# Patient Record
Sex: FEMALE | Race: WHITE | NOT HISPANIC OR LATINO | Employment: OTHER | URBAN - NONMETROPOLITAN AREA
[De-identification: names, ages, dates, MRNs, and addresses within clinical notes are randomized per-mention and may not be internally consistent; named-entity substitution may affect disease eponyms.]

---

## 2017-01-19 ENCOUNTER — OFFICE VISIT (OUTPATIENT)
Dept: FAMILY MEDICINE CLINIC | Facility: CLINIC | Age: 62
End: 2017-01-19

## 2017-01-19 VITALS
DIASTOLIC BLOOD PRESSURE: 90 MMHG | BODY MASS INDEX: 24.41 KG/M2 | HEART RATE: 107 BPM | WEIGHT: 143 LBS | SYSTOLIC BLOOD PRESSURE: 140 MMHG | HEIGHT: 64 IN | OXYGEN SATURATION: 98 %

## 2017-01-19 DIAGNOSIS — M54.41 CHRONIC BILATERAL LOW BACK PAIN WITH BILATERAL SCIATICA: Primary | ICD-10-CM

## 2017-01-19 DIAGNOSIS — M35.3 POLYMYALGIA RHEUMATICA (HCC): ICD-10-CM

## 2017-01-19 DIAGNOSIS — M54.42 CHRONIC BILATERAL LOW BACK PAIN WITH BILATERAL SCIATICA: Primary | ICD-10-CM

## 2017-01-19 DIAGNOSIS — G89.29 CHRONIC BILATERAL LOW BACK PAIN WITH BILATERAL SCIATICA: Primary | ICD-10-CM

## 2017-01-19 PROCEDURE — 99213 OFFICE O/P EST LOW 20 MIN: CPT | Performed by: FAMILY MEDICINE

## 2017-01-19 RX ORDER — HYDROCODONE BITARTRATE AND ACETAMINOPHEN 10; 325 MG/1; MG/1
1 TABLET ORAL EVERY 6 HOURS PRN
Qty: 120 TABLET | Refills: 0 | Status: SHIPPED | OUTPATIENT
Start: 2017-01-19 | End: 2017-02-17 | Stop reason: SDUPTHER

## 2017-01-19 RX ORDER — FLUCONAZOLE 150 MG/1
150 TABLET ORAL DAILY
Refills: 1 | COMMUNITY
Start: 2016-12-22 | End: 2017-02-24

## 2017-01-19 NOTE — MR AVS SNAPSHOT
Adriana Nuñez   1/19/2017 9:45 AM   Office Visit    Dept Phone:  [phone]   Encounter #:  [id number]    Provider:  Donato Donis MD   Department:  [facility] FAM[facility]                Your Full Care Plan              Where to Get Your Medications      You can get these medications from any pharmacy     Bring a paper prescription for each of these medications     HYDROcodone-acetaminophen  MG per tablet            Your Updated Medication List          This list is accurate as of: 1/19/17 10:35 AM.  Always use your most recent med list.                ATROVENT HFA 17 MCG/ACT inhaler   Generic drug:  ipratropium   Inhale 2 puffs 4 (Four) Times a Day.       diazePAM 5 MG tablet   Commonly known as:  VALIUM   Take 1 tablet by mouth 2 (Two) Times a Day As Needed for anxiety.       dicyclomine 20 MG tablet   Commonly known as:  BENTYL   TAKE 1 TABLET BY MOUTH TWO TIMES A DAY       fluconazole 150 MG tablet   Commonly known as:  DIFLUCAN       HYDROcodone-acetaminophen  MG per tablet   Commonly known as:  NORCO   Take 1 tablet by mouth Every 6 (Six) Hours As Needed for moderate pain (4-6).       predniSONE 10 MG tablet   Commonly known as:  DELTASONE       raloxifene 60 MG tablet   Commonly known as:  EVISTA       VENTOLIN  (90 BASE) MCG/ACT inhaler   Generic drug:  albuterol   USE 1 PUFF BY MOUTH EVERY 8 HOURS AS NEEDED               We Performed the Following     Ambulatory Referral to Rheumatology       You Were Diagnosed With        Codes Comments    Chronic bilateral low back pain with bilateral sciatica    -  Primary ICD-10-CM: M54.42, M54.41, G89.29  ICD-9-CM: 724.2, 724.3, 338.29     Polymyalgia rheumatica     ICD-10-CM: M35.3  ICD-9-CM: 725       Instructions     None    Patient Instructions History      Upcoming Appointments     Visit Type Date Time Department    OFFICE VISIT 1/19/2017  9:45 AM [facility]    OFFICE VISIT  "2/9/2017  9:00 AM [facility]    OFFICE VISIT 2/17/2017 11:00 AM [facility]      MyChart Signup     Our records indicate that you have declined [facility] sign[facility]. If you would like to sign up for MyChart, please email [email or web address] or call [phone] to obtain an activation code.             Other Info from Your Visit           Your Appointments     Feb 09, 2017  9:00 AM CST   Office Visit with Aric Pina MD   [facility] (--)    [address] [place] [address] [address]  [address] [facility] [address] [phone]           Arrive 15 minutes prior to appointment.            Feb 17, 2017 11:00 AM CST   Office Visit with Donato Donis MD   [facility] (--)    [address]  [address] [facility] [address] [phone]           Arrive 15 minutes prior to appointment.              Allergies     Fosamax [Alendronate]  GI Intolerance      Reason for Visit     Back Pain           Vital Signs     Blood Pressure Pulse Height Weight Oxygen Saturation Body Mass Index    140/90 107 64\" (162.6 cm) 143 lb (64.9 kg) 98% 24.55 kg/m2    Smoking Status                   Current Every Day Smoker           Problems and Diagnoses Noted     Chronic low back pain    Muscle inflammation        "

## 2017-01-19 NOTE — PROGRESS NOTES
Subjective   Adriana Nuñez is a 61 y.o. female.     Back Pain   This is a chronic problem. The current episode started more than 1 year ago. The problem has been waxing and waning since onset. The pain is present in the lumbar spine. The quality of the pain is described as aching. The pain radiates to the right knee and left knee. The pain is at a severity of 4/10. The pain is mild. The pain is worse during the day. The symptoms are aggravated by position and bending. Pertinent negatives include no bladder incontinence, bowel incontinence or fever.        The following portions of the patient's history were reviewed and updated as appropriate: allergies, current medications, past family history, past medical history, past social history, past surgical history and problem list.    Review of Systems   Constitutional: Negative for fever.   Gastrointestinal: Negative for bowel incontinence.   Genitourinary: Negative for bladder incontinence.   Musculoskeletal: Positive for back pain.       Objective   Physical Exam   Constitutional: She is oriented to person, place, and time. She appears well-developed and well-nourished. No distress.   HENT:   Head: Normocephalic and atraumatic.   Musculoskeletal:        Lumbar back: She exhibits decreased range of motion, tenderness and pain. She exhibits no bony tenderness, no swelling, no edema, no deformity, no laceration and no spasm.   Neurological: She is alert and oriented to person, place, and time.   Reflex Scores:       Patellar reflexes are 2+ on the right side and 2+ on the left side.  Skin: Skin is warm and dry. She is not diaphoretic.   Psychiatric: She has a normal mood and affect. Her behavior is normal. Judgment and thought content normal.   Nursing note and vitals reviewed.      Assessment/Plan   Problems Addressed this Visit        Nervous and Auditory    Chronic low back pain - Primary    Relevant Orders    Urine Drug Screen       Other    Polymyalgia  rheumatica

## 2017-01-30 ENCOUNTER — TELEPHONE (OUTPATIENT)
Dept: FAMILY MEDICINE CLINIC | Facility: CLINIC | Age: 62
End: 2017-01-30

## 2017-01-31 NOTE — TELEPHONE ENCOUNTER
"RESULTS & RECOMMENDATIONS RELAYED pr Dr. Donis.  Patient was advised her Cholesterol was elevated and had gone up from 241 to 303 in 3 mos.  She blames it on her Chocolate Milk and \"being full of infection\",   She does not want to add any medications at this time.  States she will change her diet and see if it goes down.  No Scripts were sent today.       ----- Message from Donato Donis MD sent at 1/19/2017  3:26 PM CST -----  Her urine drug screen is okay.  Somehow someone must have ordered a cholesterol on her.  It is elevated . she does carry a diagnosis of hyperlipidemia.  Recommend starting Lipitor 10 mg a day.  Recheck CMP and lipid in 3 months.    "

## 2017-02-09 ENCOUNTER — OFFICE VISIT (OUTPATIENT)
Dept: GASTROENTEROLOGY | Facility: CLINIC | Age: 62
End: 2017-02-09

## 2017-02-09 ENCOUNTER — HOSPITAL ENCOUNTER (OUTPATIENT)
Facility: HOSPITAL | Age: 62
Setting detail: HOSPITAL OUTPATIENT SURGERY
End: 2017-02-09
Attending: INTERNAL MEDICINE | Admitting: INTERNAL MEDICINE

## 2017-02-09 VITALS
WEIGHT: 145.6 LBS | DIASTOLIC BLOOD PRESSURE: 89 MMHG | HEART RATE: 105 BPM | BODY MASS INDEX: 24.86 KG/M2 | SYSTOLIC BLOOD PRESSURE: 144 MMHG | HEIGHT: 64 IN

## 2017-02-09 DIAGNOSIS — K63.5 COLON POLYPS: Primary | ICD-10-CM

## 2017-02-09 DIAGNOSIS — K59.00 CONSTIPATION, UNSPECIFIED CONSTIPATION TYPE: ICD-10-CM

## 2017-02-09 PROCEDURE — 99213 OFFICE O/P EST LOW 20 MIN: CPT | Performed by: INTERNAL MEDICINE

## 2017-02-09 RX ORDER — SODIUM, POTASSIUM,MAG SULFATES 17.5-3.13G
1 SOLUTION, RECONSTITUTED, ORAL ORAL EVERY 12 HOURS
Qty: 1 BOTTLE | Refills: 0 | Status: SHIPPED | OUTPATIENT
Start: 2017-02-09 | End: 2017-02-24

## 2017-02-09 RX ORDER — DEXTROSE AND SODIUM CHLORIDE 5; .45 G/100ML; G/100ML
30 INJECTION, SOLUTION INTRAVENOUS CONTINUOUS PRN
Status: CANCELLED | OUTPATIENT
Start: 2017-02-09

## 2017-02-09 NOTE — PROGRESS NOTES
[facility]      Chief Complaint:   Chief Complaint   Patient presents with   • Abdominal Pain   • Nausea       Subjective     HPI:   Patient with history of colonic polyps.  Patient had colonic polyp removed last year and a poor prep.  Patient has been fed constipation and abdominal pain.  Patient will need a repeat colonoscopy as she most likely has more polyps.    Plan; we'll schedule patient for colonoscopy to evaluate.    Past Medical History:   Past Medical History   Diagnosis Date   • Adenomatous polyp of colon    • Anorectal abscess    • Anxiety disorder    • Benign polyp of large intestine    • Bladder fistula       post rapair      • Blood in urine    • Chronic obstructive lung disease    • Chronic urinary tract infection    • Depressive disorder    • Diarrhea    • Diverticulitis of colon       post colectomy      • Elevated levels of transaminase & lactic acid dehydrogenase    • Epigastric pain    • Generalized anxiety disorder    • Heavy tobacco smoker    • History of colon polyps    • Hyperlipidemia    • Insomnia    • Low back pain    • Lower urinary tract infectious disease    • Lymphadenopathy      ON CT   • Osteoporosis    • Peripheral nerve disease    • Polymyalgia rheumatica    • Polyp of colon    • Urinary tract infection    • Vitamin D deficiency        Family History:  History reviewed. No pertinent family history.    Social History:   reports that she has been smoking Cigars and Cigarettes.  She has been smoking about 1.00 pack per day. She does not have any smokeless tobacco history on file. She reports that she does not drink alcohol.    Medications:   Current Outpatient Prescriptions   Medication Sig Dispense Refill   • ATROVENT HFA 17 MCG/ACT inhaler Inhale 2 puffs 4 (Four) Times a Day. 12.9 g 11   • diazePAM (VALIUM) 5 MG tablet Take 1 tablet by mouth 2 (Two) Times a Day As Needed for anxiety. 60 tablet 2   • dicyclomine (BENTYL) 20 MG tablet TAKE 1 TABLET BY MOUTH  "TWO TIMES A DAY 60 tablet 6   • fluconazole (DIFLUCAN) 150 MG tablet Take 150 mg by mouth Daily.  1   • HYDROcodone-acetaminophen (NORCO)  MG per tablet Take 1 tablet by mouth Every 6 (Six) Hours As Needed for moderate pain (4-6). 120 tablet 0   • predniSONE (DELTASONE) 10 MG tablet Take 10 mg by mouth Daily.     • raloxifene (EVISTA) 60 MG tablet Take 60 mg by mouth daily.     • sodium-potassium-magnesium sulfates (SUPREP) solution oral solution Take 1 bottle by mouth Every 12 (Twelve) Hours. 1 bottle 0   • VENTOLIN  (90 BASE) MCG/ACT inhaler USE 1 PUFF BY MOUTH EVERY 8 HOURS AS NEEDED 18 g 11     No current facility-administered medications for this visit.        Allergies:  Fosamax [alendronate]    ROS:    Review of Systems   HENT: Negative for congestion, sore throat and trouble swallowing.    Respiratory: Negative for apnea, cough, choking, chest tightness, shortness of breath, wheezing and stridor.    Cardiovascular: Negative for chest pain, palpitations and leg swelling.   Gastrointestinal: Negative for abdominal distention, abdominal pain, anal bleeding, blood in stool, constipation, diarrhea, nausea, rectal pain and vomiting.   Skin: Negative for color change, pallor, rash and wound.   Neurological: Negative for dizziness, syncope, weakness and headaches.   Psychiatric/Behavioral: Negative for agitation, behavioral problems, confusion and decreased concentration.     Objective     Blood pressure 144/89, pulse 105, height 64\" (162.6 cm), weight 145 lb 9.6 oz (66 kg).    Physical Exam   Constitutional: She is oriented to person, place, and time. She appears well-developed and well-nourished. No distress.   HENT:   Head: Normocephalic and atraumatic.   Cardiovascular: Normal rate, regular rhythm, normal heart sounds and intact distal pulses.  Exam reveals no gallop and no friction rub.    No murmur heard.  Pulmonary/Chest: Breath sounds normal. No respiratory distress. She has no wheezes. She has " no rales. She exhibits no tenderness.   Abdominal: Soft. Bowel sounds are normal. She exhibits no distension and no mass. There is no tenderness. There is no rebound and no guarding. No hernia.   Musculoskeletal: Normal range of motion. She exhibits no edema.   Neurological: She is alert and oriented to person, place, and time.   Skin: Skin is warm and dry. No rash noted. She is not diaphoretic. No erythema. No pallor.   Psychiatric: She has a normal mood and affect. Her behavior is normal. Judgment and thought content normal.        Assessment/Plan   Adriana was seen today for abdominal pain and nausea.    Diagnoses and all orders for this visit:    Colon polyps  -     Case Request; Standing  -     dextrose 5 % and sodium chloride 0.45 % infusion; Infuse 30 mL/hr into a venous catheter Continuous As Needed (Start Prior to Procedure).  -     Case Request  -     sodium-potassium-magnesium sulfates (SUPREP) solution oral solution; Take 1 bottle by mouth Every 12 (Twelve) Hours.    Constipation, unspecified constipation type    Other orders  -     Implement Anesthesia Orders Day of Procedure; Standing  -     Obtain Informed Consent; Standing  -     Verify Informed Consent; Standing  -     POC Glucose Fingerstick; Standing        COLONOSCOPY (N/A)     Diagnosis Plan   1. Colon polyps  Case Request    dextrose 5 % and sodium chloride 0.45 % infusion    Case Request    sodium-potassium-magnesium sulfates (SUPREP) solution oral solution   2. Constipation, unspecified constipation type         Anticipated Surgical Procedure:  No orders of the defined types were placed in this encounter.      The risks, benefits, and alternatives of this procedure have been discussed with the patient or the responsible party- the patient understands and agrees to proceed.

## 2017-02-15 ENCOUNTER — TELEPHONE (OUTPATIENT)
Dept: GASTROENTEROLOGY | Facility: CLINIC | Age: 62
End: 2017-02-15

## 2017-02-16 ENCOUNTER — TELEPHONE (OUTPATIENT)
Dept: GASTROENTEROLOGY | Facility: CLINIC | Age: 62
End: 2017-02-16

## 2017-02-16 NOTE — TELEPHONE ENCOUNTER
Patient calling office saying that she is very sick. Patient states that her head is hurting her and that her neck hurts. Patient states that she is looking for antibiotics. Patient states that she is swollen and that her mouth is blood red. Patient states that dr nam pulled something out of her that stopped her diarrhea. She is wanting to let dr nam know that she is not feeling well. Patient is informed to go to the hospital if she feels that sick. Patient refuses because she states she only gets antibiotics. Patient is encouraged again to seek emergency medical attention she refuses.

## 2017-02-17 ENCOUNTER — OFFICE VISIT (OUTPATIENT)
Dept: FAMILY MEDICINE CLINIC | Facility: CLINIC | Age: 62
End: 2017-02-17

## 2017-02-17 VITALS
WEIGHT: 147 LBS | OXYGEN SATURATION: 98 % | DIASTOLIC BLOOD PRESSURE: 90 MMHG | SYSTOLIC BLOOD PRESSURE: 132 MMHG | BODY MASS INDEX: 25.23 KG/M2 | HEART RATE: 109 BPM

## 2017-02-17 DIAGNOSIS — M54.42 CHRONIC BILATERAL LOW BACK PAIN WITH BILATERAL SCIATICA: Primary | ICD-10-CM

## 2017-02-17 DIAGNOSIS — G89.29 CHRONIC BILATERAL LOW BACK PAIN WITH BILATERAL SCIATICA: Primary | ICD-10-CM

## 2017-02-17 DIAGNOSIS — M54.41 CHRONIC BILATERAL LOW BACK PAIN WITH BILATERAL SCIATICA: Primary | ICD-10-CM

## 2017-02-17 PROCEDURE — 99213 OFFICE O/P EST LOW 20 MIN: CPT | Performed by: FAMILY MEDICINE

## 2017-02-17 RX ORDER — HYDROCODONE BITARTRATE AND ACETAMINOPHEN 10; 325 MG/1; MG/1
1 TABLET ORAL EVERY 6 HOURS PRN
Qty: 120 TABLET | Refills: 0 | Status: SHIPPED | OUTPATIENT
Start: 2017-02-17 | End: 2017-03-17 | Stop reason: SDUPTHER

## 2017-02-17 RX ORDER — DIAZEPAM 5 MG/1
5 TABLET ORAL 2 TIMES DAILY PRN
Qty: 60 TABLET | Refills: 2 | OUTPATIENT
Start: 2017-02-17 | End: 2017-05-16 | Stop reason: SDUPTHER

## 2017-02-17 RX ORDER — DIAZEPAM 5 MG/1
5 TABLET ORAL 2 TIMES DAILY PRN
Qty: 60 TABLET | Refills: 2 | OUTPATIENT
Start: 2017-02-17 | End: 2017-02-17 | Stop reason: SDUPTHER

## 2017-02-17 NOTE — PROGRESS NOTES
Subjective   Adriana Nuñez is a 61 y.o. female.     Back Pain   This is a chronic problem. The current episode started more than 1 year ago. The problem has been waxing and waning since onset. The pain is present in the lumbar spine. The quality of the pain is described as aching. The pain radiates to the right knee and left knee. The pain is at a severity of 4/10. The pain is mild. The pain is worse during the day. The symptoms are aggravated by position and bending. Pertinent negatives include no bladder incontinence, bowel incontinence or fever.        The following portions of the patient's history were reviewed and updated as appropriate: allergies, current medications, past family history, past medical history, past social history, past surgical history and problem list.    Review of Systems   Constitutional: Negative for fever.   Gastrointestinal: Negative for bowel incontinence.   Genitourinary: Negative for bladder incontinence.   Musculoskeletal: Positive for back pain.       Objective   Physical Exam   Constitutional: She is oriented to person, place, and time. She appears well-developed and well-nourished. No distress.   HENT:   Head: Normocephalic and atraumatic.   Musculoskeletal:        Lumbar back: She exhibits decreased range of motion, tenderness and pain. She exhibits no bony tenderness, no swelling, no edema, no deformity, no laceration and no spasm.   Neurological: She is alert and oriented to person, place, and time.   Reflex Scores:       Patellar reflexes are 2+ on the right side and 2+ on the left side.  Skin: Skin is warm and dry. She is not diaphoretic.   Psychiatric: She has a normal mood and affect. Her behavior is normal. Judgment and thought content normal.   Nursing note and vitals reviewed.      Assessment/Plan   Problems Addressed this Visit     None      Visit Diagnoses     Chronic bilateral low back pain with bilateral sciatica    -  Primary

## 2017-02-23 ENCOUNTER — TELEPHONE (OUTPATIENT)
Dept: GASTROENTEROLOGY | Facility: CLINIC | Age: 62
End: 2017-02-23

## 2017-02-23 NOTE — TELEPHONE ENCOUNTER
"PATIENT PHONE CALL IS RETURNED. PATIENT STATES THAT SHE FEELS LIKE \" HER GYNO POURED BOILING WATER IN HER VAGINA AND IT IS BLOOD RED\" SHE ALSO STATES THAT HER NECK HURTS AND ALSO HER BRAIN STEM. PATIENT ALSO STATES THAT SHE THINKS SHE IS ATE UP WITH STAPH. PATIENT IS RECOMMENDED TO SEEK CARE FOR HER VAGINA FROM HER OB/GYN. PATIENT [facility]. PATIENT IS ALSO ENCOURAGED TO TALK TO HER PRIMARY CARE DOCTOR ABOUT POSSIBLY GETTING A MUSCLE BIOPSY. PATIENT IS THEN ENCOURAGED TO MAKE APPOINTMENT UPSTAIRS WITH OB/GYN AND IS TRANSFERRED TO [facility]              ----- Message from Rama Lorenz sent at 2/23/2017  1:35 PM CST -----  Please call pt, she is having issues     "

## 2017-02-24 ENCOUNTER — OFFICE VISIT (OUTPATIENT)
Dept: OBSTETRICS AND GYNECOLOGY | Facility: CLINIC | Age: 62
End: 2017-02-24

## 2017-02-24 VITALS
BODY MASS INDEX: 27.19 KG/M2 | WEIGHT: 144 LBS | HEIGHT: 61 IN | SYSTOLIC BLOOD PRESSURE: 126 MMHG | DIASTOLIC BLOOD PRESSURE: 84 MMHG

## 2017-02-24 DIAGNOSIS — N89.8 VAGINAL IRRITATION: Primary | ICD-10-CM

## 2017-02-24 LAB
CANDIDA ALBICANS: NEGATIVE
GARDNERELLA VAGINALIS: POSITIVE
TRICHOMONAS VAGINALIS PCR: NEGATIVE

## 2017-02-24 PROCEDURE — 87481 CANDIDA DNA AMP PROBE: CPT | Performed by: OBSTETRICS & GYNECOLOGY

## 2017-02-24 PROCEDURE — 87512 GARDNER VAG DNA QUANT: CPT | Performed by: OBSTETRICS & GYNECOLOGY

## 2017-02-24 PROCEDURE — 87661 TRICHOMONAS VAGINALIS AMPLIF: CPT | Performed by: OBSTETRICS & GYNECOLOGY

## 2017-02-24 PROCEDURE — 99212 OFFICE O/P EST SF 10 MIN: CPT | Performed by: OBSTETRICS & GYNECOLOGY

## 2017-02-24 NOTE — PROGRESS NOTES
Subjective   Adriana Nuñez is a 61 y.o. female.     HPI Comments: Patient presents today to discuss vaginal redness and irritation. She reports a number of recent infections with says that she has taken antibiotics including bactrim.  Discussed potential for development of yeast infection in connection with antibiotic use and potential for discomfort associated with lack of estrogen.   Will plan to check vaginitis panel and treat if applicable.    Vaginitis   Associated symptoms include abdominal pain (Chronic abdominalpain and GI issues).         Review of Systems   Gastrointestinal: Positive for abdominal pain (Chronic abdominalpain and GI issues).   Genitourinary: Positive for vaginal pain. Negative for vaginal bleeding.   All other systems reviewed and are negative.      Objective   Physical Exam   Constitutional: She appears well-developed and well-nourished. She appears distressed (pressured speech).   HENT:   Head: Normocephalic and atraumatic.   Right Ear: External ear normal.   Left Ear: External ear normal.   Nose: Nose normal.   Mouth/Throat: Oropharynx is clear and moist.   Abdominal: Soft. She exhibits no distension and no mass. There is tenderness. There is no rebound and no guarding. No hernia.   Genitourinary:   Genitourinary Comments: Mild vaginal erythema, vagina lacks rugation. Cervix, uterus and adnexa are surgically absent.   Neurological: She is alert.   Skin: Skin is warm and dry. She is not diaphoretic.   Psychiatric:   Pressured speech, she is difficult to follow but her behavior is consistent with what I have seen from her previously.       Assessment/Plan   Adriana was seen today for vaginitis.    Diagnoses and all orders for this visit:    Vaginal irritation  -     Vaginitis / Vaginosis DNA Probe         Check vaginitis panel and call if yeast infection or other abnormalities present on swab.

## 2017-03-03 ENCOUNTER — TELEPHONE (OUTPATIENT)
Dept: OBSTETRICS AND GYNECOLOGY | Facility: CLINIC | Age: 62
End: 2017-03-03

## 2017-03-03 RX ORDER — METRONIDAZOLE 500 MG/1
500 TABLET ORAL 2 TIMES DAILY
Qty: 14 TABLET | Refills: 0 | Status: SHIPPED | OUTPATIENT
Start: 2017-03-03 | End: 2017-03-08

## 2017-03-03 NOTE — TELEPHONE ENCOUNTER
----- Message from Radha Brandt sent at 3/1/2017  1:22 PM CST -----  Regarding: HURTING  Contact: [phone]  Said had something done last week,and is a lot of pain and needs to talk to you...      Dr. Brian talked to this patient.

## 2017-03-08 ENCOUNTER — OFFICE VISIT (OUTPATIENT)
Dept: OBSTETRICS AND GYNECOLOGY | Facility: CLINIC | Age: 62
End: 2017-03-08

## 2017-03-08 VITALS
HEIGHT: 61 IN | SYSTOLIC BLOOD PRESSURE: 126 MMHG | BODY MASS INDEX: 27.38 KG/M2 | DIASTOLIC BLOOD PRESSURE: 72 MMHG | WEIGHT: 145 LBS

## 2017-03-08 DIAGNOSIS — N76.4 VULVAR ABSCESS: ICD-10-CM

## 2017-03-08 DIAGNOSIS — N76.1 CHRONIC VAGINITIS: Primary | ICD-10-CM

## 2017-03-08 LAB
CANDIDA ALBICANS: NEGATIVE
GARDNERELLA VAGINALIS: POSITIVE
TRICHOMONAS VAGINALIS PCR: NEGATIVE

## 2017-03-08 PROCEDURE — 87661 TRICHOMONAS VAGINALIS AMPLIF: CPT | Performed by: OBSTETRICS & GYNECOLOGY

## 2017-03-08 PROCEDURE — 87481 CANDIDA DNA AMP PROBE: CPT | Performed by: OBSTETRICS & GYNECOLOGY

## 2017-03-08 PROCEDURE — 99212 OFFICE O/P EST SF 10 MIN: CPT | Performed by: OBSTETRICS & GYNECOLOGY

## 2017-03-08 PROCEDURE — 87512 GARDNER VAG DNA QUANT: CPT | Performed by: OBSTETRICS & GYNECOLOGY

## 2017-03-08 NOTE — PROGRESS NOTES
Subjective   Adriana Nuñez is a 61 y.o. female.     HPI Comments: Patient reports a number of infections that she believes she has and a number of gastrointestinal concerns.  Specifically she reports that her groin is red and irritated. She brought a disc with imaging and a report from an outside facility which shows normal imaging of the groin. She asks if she can have a culture of the effected area collected.  After seeing a normal appearing groin with normal appearing imaging of the area I suggest that we may repeat a vaginitis panel as the area just medial to the labia minora does show some mild erythema.        Review of Systems    Objective   Physical Exam   Constitutional: She appears well-developed and well-nourished. Distressed: pressured speech.   Genitourinary: Pelvic exam was performed with patient supine. No labial fusion. There is no rash, tenderness, lesion or injury on the right labia. There is no rash, tenderness, lesion or injury on the left labia. There is erythema (mild erythema just inside labia minora) in the vagina. No tenderness or bleeding in the vagina. No foreign body in the vagina. No signs of injury around the vagina. No vaginal discharge found.   Neurological: She is alert.   Skin: She is not diaphoretic.       Assessment/Plan   Adriana was seen today for follow-up.    Diagnoses and all orders for this visit:    Chronic vaginitis    Vulvar abscess  -     Cancel: Wound Culture; Future  -     Wound Culture  -     Gardnerella vaginalis, Trichomonas vaginalis, Candida albicans, PCR       Check Vaginitis panel  F/u with GI as scheduled.    Note:  I tried moving the vaginitis panel under chronic vaginitis and removing the vulvar abscess diagnosis for this visit but [facility] won't let me. The patient does not appear to have a vulvar abscess.

## 2017-03-09 ENCOUNTER — TELEPHONE (OUTPATIENT)
Dept: OBSTETRICS AND GYNECOLOGY | Facility: CLINIC | Age: 62
End: 2017-03-09

## 2017-03-09 NOTE — TELEPHONE ENCOUNTER
----- Message from Kervin Brian MD sent at 3/8/2017  6:02 PM CST -----  Please call patient to notify of recurrent BV and call in script for Flagyl 500 mg PO BID x 7 days #14    Letter sent.  Her phone is disconnected.

## 2017-03-17 ENCOUNTER — OFFICE VISIT (OUTPATIENT)
Dept: FAMILY MEDICINE CLINIC | Facility: CLINIC | Age: 62
End: 2017-03-17

## 2017-03-17 VITALS
WEIGHT: 141 LBS | DIASTOLIC BLOOD PRESSURE: 80 MMHG | SYSTOLIC BLOOD PRESSURE: 116 MMHG | OXYGEN SATURATION: 94 % | BODY MASS INDEX: 26.64 KG/M2 | HEART RATE: 94 BPM

## 2017-03-17 DIAGNOSIS — G89.29 CHRONIC BILATERAL LOW BACK PAIN WITH BILATERAL SCIATICA: ICD-10-CM

## 2017-03-17 DIAGNOSIS — H66.006 RECURRENT ACUTE SUPPURATIVE OTITIS MEDIA WITHOUT SPONTANEOUS RUPTURE OF TYMPANIC MEMBRANE OF BOTH SIDES: Primary | ICD-10-CM

## 2017-03-17 DIAGNOSIS — M54.41 CHRONIC BILATERAL LOW BACK PAIN WITH BILATERAL SCIATICA: ICD-10-CM

## 2017-03-17 DIAGNOSIS — M54.42 CHRONIC BILATERAL LOW BACK PAIN WITH BILATERAL SCIATICA: ICD-10-CM

## 2017-03-17 PROCEDURE — 99214 OFFICE O/P EST MOD 30 MIN: CPT | Performed by: FAMILY MEDICINE

## 2017-03-17 PROCEDURE — 96372 THER/PROPH/DIAG INJ SC/IM: CPT | Performed by: FAMILY MEDICINE

## 2017-03-17 RX ORDER — AMOXICILLIN 875 MG/1
875 TABLET, COATED ORAL 2 TIMES DAILY
Refills: 0 | COMMUNITY
Start: 2017-03-12 | End: 2017-04-18

## 2017-03-17 RX ORDER — HYDROCODONE BITARTRATE AND ACETAMINOPHEN 10; 325 MG/1; MG/1
1 TABLET ORAL EVERY 6 HOURS PRN
Qty: 120 TABLET | Refills: 0 | Status: SHIPPED | OUTPATIENT
Start: 2017-03-17 | End: 2017-04-18 | Stop reason: SDUPTHER

## 2017-03-17 RX ORDER — GUAIFENESIN 600 MG/1
1200 TABLET, EXTENDED RELEASE ORAL 2 TIMES DAILY
Qty: 40 TABLET | Refills: 0 | Status: SHIPPED | OUTPATIENT
Start: 2017-03-17 | End: 2017-03-27

## 2017-03-17 RX ORDER — BETAMETHASONE SODIUM PHOSPHATE AND BETAMETHASONE ACETATE 3; 3 MG/ML; MG/ML
12 INJECTION, SUSPENSION INTRA-ARTICULAR; INTRALESIONAL; INTRAMUSCULAR; SOFT TISSUE ONCE
Status: COMPLETED | OUTPATIENT
Start: 2017-03-17 | End: 2017-03-17

## 2017-03-17 RX ADMIN — BETAMETHASONE SODIUM PHOSPHATE AND BETAMETHASONE ACETATE 12 MG: 3; 3 INJECTION, SUSPENSION INTRA-ARTICULAR; INTRALESIONAL; INTRAMUSCULAR; SOFT TISSUE at 09:56

## 2017-03-17 NOTE — PROGRESS NOTES
Subjective   Adriana Nuñez is a 61 y.o. female.     Back Pain   This is a chronic problem. The current episode started more than 1 year ago. The problem has been waxing and waning since onset. The pain is present in the lumbar spine. The quality of the pain is described as aching. The pain radiates to the right knee and left knee. The pain is at a severity of 4/10. The pain is mild. The pain is worse during the day. The symptoms are aggravated by position and bending. Associated symptoms include dysuria, a fever and weakness. Pertinent negatives include no bladder incontinence or bowel incontinence.   Earache    Associated symptoms include neck pain. Pertinent negatives include no vomiting.   Muscle Pain   Associated symptoms include dysuria, fatigue, a fever, shortness of breath and weakness. Pertinent negatives include no nausea, vaginal discharge, visual change or vomiting.   Shortness of Breath   Associated symptoms include ear pain, a fever and neck pain. Pertinent negatives include no vomiting.   Dizziness   This is a new problem. The current episode started in the past 7 days. The problem occurs constantly. The problem has been gradually worsening. Associated symptoms include chills, fatigue, a fever, neck pain, vertigo and weakness. Pertinent negatives include no nausea, visual change or vomiting.        The following portions of the patient's history were reviewed and updated as appropriate: allergies, current medications, past family history, past medical history, past social history, past surgical history and problem list.    Review of Systems   Constitutional: Positive for chills, fatigue, fever and unexpected weight change.   HENT: Positive for ear pain.    Respiratory: Positive for shortness of breath.    Gastrointestinal: Negative for bowel incontinence, nausea and vomiting.   Genitourinary: Positive for dysuria and hematuria. Negative for bladder incontinence and vaginal discharge.    Musculoskeletal: Positive for back pain and neck pain.   Neurological: Positive for dizziness, vertigo and weakness. Negative for syncope.   Psychiatric/Behavioral: The patient is not nervous/anxious.        Objective   Physical Exam   Constitutional: She is oriented to person, place, and time. She appears well-developed and well-nourished. No distress.   HENT:   Head: Normocephalic and atraumatic.   Right Ear: Hearing normal. Tympanic membrane is injected and retracted.   Left Ear: Hearing normal. Tympanic membrane is retracted. Tympanic membrane is not injected.   Cardiovascular: Normal rate, regular rhythm and normal heart sounds.  Exam reveals no gallop and no friction rub.    No murmur heard.  Pulmonary/Chest: Effort normal and breath sounds normal. No respiratory distress. She has no wheezes. She has no rales. She exhibits no tenderness.   Musculoskeletal:        Lumbar back: She exhibits decreased range of motion, tenderness and pain. She exhibits no bony tenderness, no swelling, no edema, no deformity, no laceration and no spasm.   Neurological: She is alert and oriented to person, place, and time.   Reflex Scores:       Patellar reflexes are 2+ on the right side and 2+ on the left side.  Skin: Skin is warm and dry. She is not diaphoretic.   Psychiatric: She has a normal mood and affect. Her behavior is normal. Judgment and thought content normal.   Nursing note and vitals reviewed.      Assessment/Plan   Problems Addressed this Visit     None      Visit Diagnoses     Recurrent acute suppurative otitis media without spontaneous rupture of tympanic membrane of both sides    -  Primary    Chronic bilateral low back pain with bilateral sciatica

## 2017-03-20 RX ORDER — METRONIDAZOLE 500 MG/1
500 TABLET ORAL 2 TIMES DAILY
Qty: 14 TABLET | Refills: 0 | Status: SHIPPED | OUTPATIENT
Start: 2017-03-20 | End: 2017-03-27

## 2017-03-29 ENCOUNTER — TELEPHONE (OUTPATIENT)
Dept: FAMILY MEDICINE CLINIC | Facility: CLINIC | Age: 62
End: 2017-03-29

## 2017-03-29 NOTE — TELEPHONE ENCOUNTER
"----- Message from Alisha Lawrence sent at 3/29/2017 11:19 AM CDT -----  Regarding: NEED TO CALL PT BACK  Contact: [phone]  Adriana called and said she had all kinds of things wrong with her and needed to talk to Dr Donis.  I told her to go to the ER and she said she couldn't with all the kids, she would have to wait until in the morning?!!  She said her blood pressure was a hundred.  I told her I would let the nurse call her back and she said \"you know what he will say?  Your just rambling\".      Sorry  "

## 2017-03-29 NOTE — TELEPHONE ENCOUNTER
Called and spoke with patient.  Relayed recommendations per Dr. Donis to make sure patient was taking her medications as directed.  And if she was in extreme pain that she needed to go to the ER.  Pt stated that if she was still not feeling well in the morning that she would go to the ER.

## 2017-03-30 ENCOUNTER — HOSPITAL ENCOUNTER (EMERGENCY)
Facility: HOSPITAL | Age: 62
Discharge: HOME | End: 2017-03-30
Attending: EMERGENCY MEDICINE | Admitting: EMERGENCY MEDICINE

## 2017-03-30 ENCOUNTER — APPOINTMENT (OUTPATIENT)
Dept: CT IMAGING | Facility: HOSPITAL | Age: 62
End: 2017-03-30

## 2017-03-30 ENCOUNTER — APPOINTMENT (OUTPATIENT)
Dept: GENERAL RADIOLOGY | Facility: HOSPITAL | Age: 62
End: 2017-03-30

## 2017-03-30 VITALS
HEIGHT: 64 IN | DIASTOLIC BLOOD PRESSURE: 81 MMHG | SYSTOLIC BLOOD PRESSURE: 131 MMHG | HEART RATE: 92 BPM | WEIGHT: 130 LBS | TEMPERATURE: 98.8 F | RESPIRATION RATE: 22 BRPM | BODY MASS INDEX: 22.2 KG/M2 | OXYGEN SATURATION: 94 %

## 2017-03-30 DIAGNOSIS — R00.0 TACHYCARDIA: ICD-10-CM

## 2017-03-30 DIAGNOSIS — R51.9 NONINTRACTABLE HEADACHE, UNSPECIFIED CHRONICITY PATTERN, UNSPECIFIED HEADACHE TYPE: ICD-10-CM

## 2017-03-30 DIAGNOSIS — D72.828 OTHER ELEVATED WHITE BLOOD CELL (WBC) COUNT: ICD-10-CM

## 2017-03-30 DIAGNOSIS — F41.1 GENERALIZED ANXIETY DISORDER: ICD-10-CM

## 2017-03-30 DIAGNOSIS — R10.84 GENERALIZED ABDOMINAL PAIN: Primary | ICD-10-CM

## 2017-03-30 LAB
ALBUMIN SERPL-MCNC: 4.2 G/DL (ref 3.4–4.8)
ALBUMIN/GLOB SERPL: 1.9 G/DL (ref 1.1–1.8)
ALP SERPL-CCNC: 90 U/L (ref 38–126)
ALT SERPL W P-5'-P-CCNC: 60 U/L (ref 9–52)
ANION GAP SERPL CALCULATED.3IONS-SCNC: 11 MMOL/L (ref 5–15)
APTT PPP: 26.4 SECONDS (ref 20–40.3)
AST SERPL-CCNC: 34 U/L (ref 14–36)
BASOPHILS # BLD AUTO: 0.02 10*3/MM3 (ref 0–0.2)
BASOPHILS NFR BLD AUTO: 0.1 % (ref 0–2)
BILIRUB SERPL-MCNC: 0.5 MG/DL (ref 0.2–1.3)
BILIRUB UR QL STRIP: ABNORMAL
BUN BLD-MCNC: 18 MG/DL (ref 7–21)
BUN/CREAT SERPL: 36 (ref 7–25)
CALCIUM SPEC-SCNC: 9.6 MG/DL (ref 8.4–10.2)
CHLORIDE SERPL-SCNC: 104 MMOL/L (ref 95–110)
CLARITY UR: CLEAR
CO2 SERPL-SCNC: 24 MMOL/L (ref 22–31)
COLOR UR: ABNORMAL
CREAT BLD-MCNC: 0.5 MG/DL (ref 0.5–1)
D-LACTATE SERPL-SCNC: 0.8 MMOL/L (ref 0.5–2)
DEPRECATED RDW RBC AUTO: 39.2 FL (ref 36.4–46.3)
EOSINOPHIL # BLD AUTO: 0.02 10*3/MM3 (ref 0–0.7)
EOSINOPHIL NFR BLD AUTO: 0.1 % (ref 0–7)
ERYTHROCYTE [DISTWIDTH] IN BLOOD BY AUTOMATED COUNT: 12.2 % (ref 11.5–14.5)
GFR SERPL CREATININE-BSD FRML MDRD: 125 ML/MIN/1.73 (ref 60–104)
GLOBULIN UR ELPH-MCNC: 2.2 GM/DL (ref 2.3–3.5)
GLUCOSE BLD-MCNC: 114 MG/DL (ref 60–100)
GLUCOSE UR STRIP-MCNC: NEGATIVE MG/DL
HCT VFR BLD AUTO: 44.8 % (ref 35–45)
HGB BLD-MCNC: 15.9 G/DL (ref 12–15.5)
HGB UR QL STRIP.AUTO: NEGATIVE
HOLD SPECIMEN: NORMAL
IMM GRANULOCYTES # BLD: 0.07 10*3/MM3 (ref 0–0.02)
IMM GRANULOCYTES NFR BLD: 0.4 % (ref 0–0.5)
INR PPP: 0.89 (ref 0.8–1.2)
KETONES UR QL STRIP: NEGATIVE
LEUKOCYTE ESTERASE UR QL STRIP.AUTO: NEGATIVE
LIPASE SERPL-CCNC: 55 U/L (ref 23–300)
LYMPHOCYTES # BLD AUTO: 1.47 10*3/MM3 (ref 0.6–4.2)
LYMPHOCYTES NFR BLD AUTO: 8.5 % (ref 10–50)
MCH RBC QN AUTO: 31.1 PG (ref 26.5–34)
MCHC RBC AUTO-ENTMCNC: 35.5 G/DL (ref 31.4–36)
MCV RBC AUTO: 87.5 FL (ref 80–98)
MONOCYTES # BLD AUTO: 0.43 10*3/MM3 (ref 0–0.9)
MONOCYTES NFR BLD AUTO: 2.5 % (ref 0–12)
NEUTROPHILS # BLD AUTO: 15.34 10*3/MM3 (ref 2–8.6)
NEUTROPHILS NFR BLD AUTO: 88.4 % (ref 37–80)
NITRITE UR QL STRIP: NEGATIVE
PH UR STRIP.AUTO: 7 [PH] (ref 5–9)
PLATELET # BLD AUTO: 267 10*3/MM3 (ref 150–450)
PMV BLD AUTO: 10.5 FL (ref 8–12)
POTASSIUM BLD-SCNC: 4.2 MMOL/L (ref 3.5–5.1)
PROT SERPL-MCNC: 6.4 G/DL (ref 6.3–8.6)
PROT UR QL STRIP: NEGATIVE
PROTHROMBIN TIME: 11.9 SECONDS (ref 11.1–15.3)
RBC # BLD AUTO: 5.12 10*6/MM3 (ref 3.77–5.16)
SODIUM BLD-SCNC: 139 MMOL/L (ref 137–145)
SP GR UR STRIP: 1.02 (ref 1–1.03)
TROPONIN I SERPL-MCNC: <0.012 NG/ML
UROBILINOGEN UR QL STRIP: ABNORMAL
WBC NRBC COR # BLD: 17.35 10*3/MM3 (ref 3.2–9.8)

## 2017-03-30 PROCEDURE — 85730 THROMBOPLASTIN TIME PARTIAL: CPT | Performed by: EMERGENCY MEDICINE

## 2017-03-30 PROCEDURE — 93010 ELECTROCARDIOGRAM REPORT: CPT | Performed by: INTERNAL MEDICINE

## 2017-03-30 PROCEDURE — 85025 COMPLETE CBC W/AUTO DIFF WBC: CPT | Performed by: EMERGENCY MEDICINE

## 2017-03-30 PROCEDURE — 70450 CT HEAD/BRAIN W/O DYE: CPT

## 2017-03-30 PROCEDURE — 0 IOPAMIDOL 61 % SOLUTION: Performed by: EMERGENCY MEDICINE

## 2017-03-30 PROCEDURE — 84484 ASSAY OF TROPONIN QUANT: CPT | Performed by: EMERGENCY MEDICINE

## 2017-03-30 PROCEDURE — 80053 COMPREHEN METABOLIC PANEL: CPT | Performed by: EMERGENCY MEDICINE

## 2017-03-30 PROCEDURE — 71010 HC CHEST PA OR AP: CPT

## 2017-03-30 PROCEDURE — 81003 URINALYSIS AUTO W/O SCOPE: CPT | Performed by: EMERGENCY MEDICINE

## 2017-03-30 PROCEDURE — 85610 PROTHROMBIN TIME: CPT | Performed by: EMERGENCY MEDICINE

## 2017-03-30 PROCEDURE — 96360 HYDRATION IV INFUSION INIT: CPT

## 2017-03-30 PROCEDURE — 74177 CT ABD & PELVIS W/CONTRAST: CPT

## 2017-03-30 PROCEDURE — 83605 ASSAY OF LACTIC ACID: CPT | Performed by: EMERGENCY MEDICINE

## 2017-03-30 PROCEDURE — 83690 ASSAY OF LIPASE: CPT | Performed by: EMERGENCY MEDICINE

## 2017-03-30 PROCEDURE — 93005 ELECTROCARDIOGRAM TRACING: CPT

## 2017-03-30 PROCEDURE — 99284 EMERGENCY DEPT VISIT MOD MDM: CPT

## 2017-03-30 RX ADMIN — SODIUM CHLORIDE 1000 ML: 900 INJECTION, SOLUTION INTRAVENOUS at 10:15

## 2017-03-30 RX ADMIN — IOPAMIDOL 93 ML: 612 INJECTION, SOLUTION INTRAVENOUS at 12:37

## 2017-04-12 ENCOUNTER — OFFICE VISIT (OUTPATIENT)
Dept: OBSTETRICS AND GYNECOLOGY | Facility: CLINIC | Age: 62
End: 2017-04-12

## 2017-04-12 VITALS
DIASTOLIC BLOOD PRESSURE: 74 MMHG | WEIGHT: 141.8 LBS | HEIGHT: 64 IN | OXYGEN SATURATION: 95 % | BODY MASS INDEX: 24.21 KG/M2 | SYSTOLIC BLOOD PRESSURE: 124 MMHG

## 2017-04-12 DIAGNOSIS — R10.31 GROIN PAIN, RIGHT: ICD-10-CM

## 2017-04-12 DIAGNOSIS — B96.89 BV (BACTERIAL VAGINOSIS): Primary | ICD-10-CM

## 2017-04-12 DIAGNOSIS — N76.0 BV (BACTERIAL VAGINOSIS): Primary | ICD-10-CM

## 2017-04-12 PROCEDURE — 99212 OFFICE O/P EST SF 10 MIN: CPT | Performed by: OBSTETRICS & GYNECOLOGY

## 2017-04-12 RX ORDER — METRONIDAZOLE 7.5 MG/G
GEL VAGINAL 2 TIMES WEEKLY
Qty: 70 G | Refills: 5 | Status: SHIPPED | OUTPATIENT
Start: 2017-04-13 | End: 2017-05-16

## 2017-04-12 RX ORDER — METRONIDAZOLE 500 MG/1
500 TABLET ORAL 2 TIMES DAILY
Qty: 14 TABLET | Refills: 0 | Status: SHIPPED | OUTPATIENT
Start: 2017-04-12 | End: 2017-05-16

## 2017-04-12 NOTE — PROGRESS NOTES
Subjective   Adriana Nuñez is a 61 y.o. female.     HPI Comments: Patient presents today to discuss right-sided groin pain which has been an ongoing issue now for several months of more.  Additionally the patient reports continued vaginal discomfort.  She reports that previous treatment with Flagyl has been helpful for her vaginal discomfort and that it has helped with her abdominal discomfort previously.  Previously vaginitis panels have shown bacterial vaginosis on multiple occasions and patient has undergone multiple courses of treatment with Flagyl.  We discussed potential prolonged course of Flagyl with an extended course of MetroGel.  Regarding the patient's groin pain we discussed potential management options including nonmedical treatment modalities such as massage which may be more helpful for likely musculoskeletal issue.    Abdominal Pain   Associated symptoms include myalgias.   Gynecologic Exam   Associated symptoms include abdominal pain.         Review of Systems   Gastrointestinal: Positive for abdominal pain.   Musculoskeletal: Positive for myalgias.   All other systems reviewed and are negative.      Objective   Physical Exam   Constitutional: She appears well-developed and well-nourished. No distress.   HENT:   Head: Normocephalic and atraumatic.   Right Ear: External ear normal.   Left Ear: External ear normal.   Nose: Nose normal.   Mouth/Throat: Oropharynx is clear and moist.   Abdominal: Hernia confirmed negative in the right inguinal area and confirmed negative in the left inguinal area.   Genitourinary: Pelvic exam was performed with patient supine. No labial fusion. There is no rash, tenderness, lesion or injury on the right labia. There is no rash, tenderness, lesion or injury on the left labia.   Genitourinary Comments: Scant labial erythema   Lymphadenopathy:        Right: No inguinal adenopathy present.        Left: No inguinal adenopathy present.   Skin: Skin is warm and dry. No  rash noted. She is not diaphoretic. No erythema. No pallor.   Psychiatric:   Pressured speech   Vitals reviewed.      Assessment/Plan   Adriana was seen today for abdominal pain and gynecologic exam.    Diagnoses and all orders for this visit:    BV (bacterial vaginosis)    Groin pain, right    Other orders  -     metroNIDAZOLE (METROGEL VAGINAL) 0.75 % vaginal gel; Insert  into the vagina 2 (Two) Times a Week for 180 days. Insert pea sized dollop into vagina 2 times per week  -     metroNIDAZOLE (FLAGYL) 500 MG tablet; Take 1 tablet by mouth 2 (Two) Times a Day.       Prolonged course of metronidazole for recurrent BV  F/u 2 mo  Recommended outside management for groin pain.

## 2017-04-18 ENCOUNTER — TELEPHONE (OUTPATIENT)
Dept: GASTROENTEROLOGY | Facility: CLINIC | Age: 62
End: 2017-04-18

## 2017-04-18 ENCOUNTER — OFFICE VISIT (OUTPATIENT)
Dept: FAMILY MEDICINE CLINIC | Facility: CLINIC | Age: 62
End: 2017-04-18

## 2017-04-18 VITALS
SYSTOLIC BLOOD PRESSURE: 130 MMHG | DIASTOLIC BLOOD PRESSURE: 82 MMHG | WEIGHT: 139 LBS | HEART RATE: 108 BPM | BODY MASS INDEX: 23.86 KG/M2 | OXYGEN SATURATION: 98 %

## 2017-04-18 DIAGNOSIS — H65.23 BILATERAL CHRONIC SEROUS OTITIS MEDIA: ICD-10-CM

## 2017-04-18 DIAGNOSIS — M54.42 CHRONIC BILATERAL LOW BACK PAIN WITH BILATERAL SCIATICA: ICD-10-CM

## 2017-04-18 DIAGNOSIS — M54.41 CHRONIC BILATERAL LOW BACK PAIN WITH BILATERAL SCIATICA: ICD-10-CM

## 2017-04-18 DIAGNOSIS — G89.29 CHRONIC BILATERAL LOW BACK PAIN WITH BILATERAL SCIATICA: ICD-10-CM

## 2017-04-18 DIAGNOSIS — M35.3 POLYMYALGIA RHEUMATICA (HCC): Primary | ICD-10-CM

## 2017-04-18 PROCEDURE — 99214 OFFICE O/P EST MOD 30 MIN: CPT | Performed by: FAMILY MEDICINE

## 2017-04-18 PROCEDURE — 96372 THER/PROPH/DIAG INJ SC/IM: CPT | Performed by: FAMILY MEDICINE

## 2017-04-18 RX ORDER — BETAMETHASONE SODIUM PHOSPHATE AND BETAMETHASONE ACETATE 3; 3 MG/ML; MG/ML
12 INJECTION, SUSPENSION INTRA-ARTICULAR; INTRALESIONAL; INTRAMUSCULAR; SOFT TISSUE ONCE
Status: COMPLETED | OUTPATIENT
Start: 2017-04-18 | End: 2017-04-18

## 2017-04-18 RX ORDER — GUAIFENESIN 600 MG/1
1200 TABLET, EXTENDED RELEASE ORAL 2 TIMES DAILY
Qty: 40 TABLET | Refills: 0 | Status: SHIPPED | OUTPATIENT
Start: 2017-04-18 | End: 2017-04-28

## 2017-04-18 RX ORDER — HYDROCODONE BITARTRATE AND ACETAMINOPHEN 10; 325 MG/1; MG/1
1 TABLET ORAL EVERY 6 HOURS PRN
Qty: 120 TABLET | Refills: 0 | Status: SHIPPED | OUTPATIENT
Start: 2017-04-18 | End: 2017-05-16 | Stop reason: SDUPTHER

## 2017-04-18 RX ORDER — HYDROCODONE BITARTRATE AND ACETAMINOPHEN 10; 325 MG/1; MG/1
1 TABLET ORAL EVERY 6 HOURS PRN
Qty: 120 TABLET | Refills: 0 | Status: SHIPPED | OUTPATIENT
Start: 2017-04-18 | End: 2017-04-18 | Stop reason: SDUPTHER

## 2017-04-18 RX ADMIN — BETAMETHASONE SODIUM PHOSPHATE AND BETAMETHASONE ACETATE 12 MG: 3; 3 INJECTION, SUSPENSION INTRA-ARTICULAR; INTRALESIONAL; INTRAMUSCULAR; SOFT TISSUE at 14:41

## 2017-04-18 NOTE — PROGRESS NOTES
Subjective   Adriana Nuñez is a 61 y.o. female.     Back Pain   This is a chronic problem. The current episode started more than 1 year ago. The problem has been waxing and waning since onset. The pain is present in the lumbar spine. The quality of the pain is described as aching. The pain radiates to the right knee and left knee. The pain is at a severity of 10/10. The pain is mild. The pain is worse during the day. The symptoms are aggravated by position and bending. Associated symptoms include abdominal pain. Pertinent negatives include no bladder incontinence, bowel incontinence, dysuria or fever.   Anxiety   Presents for follow-up visit. Symptoms include excessive worry and nervous/anxious behavior. Patient reports no depressed mood or insomnia.            The following portions of the patient's history were reviewed and updated as appropriate: allergies, current medications, past family history, past medical history, past social history, past surgical history and problem list.    Review of Systems   Constitutional: Negative for chills, fever and unexpected weight change.   HENT: Positive for ear pain. Negative for ear discharge.    Gastrointestinal: Positive for abdominal distention, abdominal pain and diarrhea. Negative for anal bleeding and bowel incontinence.   Genitourinary: Negative for bladder incontinence, dysuria and hematuria.   Musculoskeletal: Positive for back pain.   Psychiatric/Behavioral: The patient is nervous/anxious. The patient does not have insomnia.        Objective   Physical Exam   Constitutional: She is oriented to person, place, and time. She appears well-developed and well-nourished. No distress.   HENT:   Head: Normocephalic and atraumatic.   Right Ear: Hearing normal. Tympanic membrane is retracted. Tympanic membrane is not injected, not scarred, not perforated and not erythematous. No hemotympanum.   Left Ear: Hearing normal. Tympanic membrane is retracted. Tympanic membrane  is not injected, not scarred, not perforated and not erythematous. No hemotympanum.   Musculoskeletal:        Lumbar back: She exhibits decreased range of motion, tenderness and pain. She exhibits no bony tenderness, no swelling, no edema, no deformity, no laceration and no spasm.   Neurological: She is alert and oriented to person, place, and time.   Reflex Scores:       Patellar reflexes are 2+ on the right side and 2+ on the left side.  Skin: Skin is warm and dry. She is not diaphoretic.   Psychiatric: She has a normal mood and affect. Her behavior is normal. Judgment and thought content normal. Her speech is rapid and/or pressured. Her speech is not delayed and not slurred. She is not actively hallucinating. She is communicative. She is inattentive.   Nursing note and vitals reviewed.      Assessment/Plan   Problems Addressed this Visit        Other    Polymyalgia rheumatica - Primary      Other Visit Diagnoses     Chronic bilateral low back pain with bilateral sciatica        Bilateral chronic serous otitis media

## 2017-05-16 ENCOUNTER — OFFICE VISIT (OUTPATIENT)
Dept: FAMILY MEDICINE CLINIC | Facility: CLINIC | Age: 62
End: 2017-05-16

## 2017-05-16 VITALS
SYSTOLIC BLOOD PRESSURE: 138 MMHG | HEART RATE: 117 BPM | OXYGEN SATURATION: 98 % | WEIGHT: 138 LBS | DIASTOLIC BLOOD PRESSURE: 88 MMHG | BODY MASS INDEX: 23.69 KG/M2

## 2017-05-16 DIAGNOSIS — M54.41 CHRONIC BILATERAL LOW BACK PAIN WITH BILATERAL SCIATICA: Primary | ICD-10-CM

## 2017-05-16 DIAGNOSIS — G89.29 CHRONIC BILATERAL LOW BACK PAIN WITH BILATERAL SCIATICA: Primary | ICD-10-CM

## 2017-05-16 DIAGNOSIS — M54.42 CHRONIC BILATERAL LOW BACK PAIN WITH BILATERAL SCIATICA: Primary | ICD-10-CM

## 2017-05-16 PROCEDURE — 99213 OFFICE O/P EST LOW 20 MIN: CPT | Performed by: FAMILY MEDICINE

## 2017-05-16 RX ORDER — DIAZEPAM 5 MG/1
5 TABLET ORAL 2 TIMES DAILY PRN
Qty: 60 TABLET | Refills: 2 | OUTPATIENT
Start: 2017-05-16 | End: 2017-08-11 | Stop reason: SDUPTHER

## 2017-05-16 RX ORDER — HYDROCODONE BITARTRATE AND ACETAMINOPHEN 10; 325 MG/1; MG/1
1 TABLET ORAL EVERY 6 HOURS PRN
Qty: 120 TABLET | Refills: 0 | Status: SHIPPED | OUTPATIENT
Start: 2017-05-16 | End: 2017-06-16 | Stop reason: SDUPTHER

## 2017-05-18 ENCOUNTER — OFFICE VISIT (OUTPATIENT)
Dept: GASTROENTEROLOGY | Facility: CLINIC | Age: 62
End: 2017-05-18

## 2017-05-18 VITALS
WEIGHT: 141 LBS | DIASTOLIC BLOOD PRESSURE: 83 MMHG | SYSTOLIC BLOOD PRESSURE: 128 MMHG | HEIGHT: 64 IN | BODY MASS INDEX: 24.07 KG/M2 | HEART RATE: 96 BPM

## 2017-05-18 DIAGNOSIS — K63.5 COLON POLYPS: Primary | ICD-10-CM

## 2017-05-18 PROCEDURE — 99213 OFFICE O/P EST LOW 20 MIN: CPT | Performed by: INTERNAL MEDICINE

## 2017-05-18 RX ORDER — SODIUM CHLORIDE 0.9 % (FLUSH) 0.9 %
1-10 SYRINGE (ML) INJECTION AS NEEDED
Status: CANCELLED | OUTPATIENT
Start: 2017-05-24

## 2017-05-18 RX ORDER — SODIUM, POTASSIUM,MAG SULFATES 17.5-3.13G
1 SOLUTION, RECONSTITUTED, ORAL ORAL EVERY 12 HOURS
Qty: 1 BOTTLE | Refills: 0 | Status: ON HOLD | OUTPATIENT
Start: 2017-05-18 | End: 2017-05-24

## 2017-05-18 RX ORDER — DEXTROSE AND SODIUM CHLORIDE 5; .45 G/100ML; G/100ML
30 INJECTION, SOLUTION INTRAVENOUS CONTINUOUS PRN
Status: CANCELLED | OUTPATIENT
Start: 2017-05-24

## 2017-05-22 RX ORDER — ALBUTEROL SULFATE 90 UG/1
2 AEROSOL, METERED RESPIRATORY (INHALATION) 3 TIMES DAILY
COMMUNITY
End: 2018-03-27 | Stop reason: DRUGHIGH

## 2017-05-22 RX ORDER — DICYCLOMINE HCL 20 MG
20 TABLET ORAL 2 TIMES DAILY
COMMUNITY
End: 2018-03-01 | Stop reason: SDUPTHER

## 2017-05-24 ENCOUNTER — HOSPITAL ENCOUNTER (OUTPATIENT)
Facility: HOSPITAL | Age: 62
Setting detail: HOSPITAL OUTPATIENT SURGERY
Discharge: HOME | End: 2017-05-24
Attending: INTERNAL MEDICINE | Admitting: INTERNAL MEDICINE

## 2017-05-24 ENCOUNTER — ANESTHESIA EVENT (OUTPATIENT)
Dept: GASTROENTEROLOGY | Facility: HOSPITAL | Age: 62
End: 2017-05-24

## 2017-05-24 ENCOUNTER — ANESTHESIA (OUTPATIENT)
Dept: GASTROENTEROLOGY | Facility: HOSPITAL | Age: 62
End: 2017-05-24

## 2017-05-24 VITALS
HEART RATE: 93 BPM | SYSTOLIC BLOOD PRESSURE: 101 MMHG | DIASTOLIC BLOOD PRESSURE: 59 MMHG | BODY MASS INDEX: 23.3 KG/M2 | HEIGHT: 64 IN | TEMPERATURE: 96.8 F | OXYGEN SATURATION: 96 % | RESPIRATION RATE: 16 BRPM | WEIGHT: 136.47 LBS

## 2017-05-24 DIAGNOSIS — K63.5 COLON POLYPS: ICD-10-CM

## 2017-05-24 PROCEDURE — 88305 TISSUE EXAM BY PATHOLOGIST: CPT | Performed by: INTERNAL MEDICINE

## 2017-05-24 PROCEDURE — 25010000002 PROPOFOL 10 MG/ML EMULSION: Performed by: NURSE ANESTHETIST, CERTIFIED REGISTERED

## 2017-05-24 PROCEDURE — 88305 TISSUE EXAM BY PATHOLOGIST: CPT | Performed by: PATHOLOGY

## 2017-05-24 PROCEDURE — 45385 COLONOSCOPY W/LESION REMOVAL: CPT | Performed by: INTERNAL MEDICINE

## 2017-05-24 RX ORDER — PROMETHAZINE HYDROCHLORIDE 25 MG/ML
12.5 INJECTION, SOLUTION INTRAMUSCULAR; INTRAVENOUS ONCE AS NEEDED
Status: DISCONTINUED | OUTPATIENT
Start: 2017-05-24 | End: 2017-05-24 | Stop reason: HOSPADM

## 2017-05-24 RX ORDER — DEXTROSE AND SODIUM CHLORIDE 5; .45 G/100ML; G/100ML
30 INJECTION, SOLUTION INTRAVENOUS CONTINUOUS PRN
Status: DISCONTINUED | OUTPATIENT
Start: 2017-05-24 | End: 2017-05-24 | Stop reason: HOSPADM

## 2017-05-24 RX ORDER — PROPOFOL 10 MG/ML
VIAL (ML) INTRAVENOUS AS NEEDED
Status: DISCONTINUED | OUTPATIENT
Start: 2017-05-24 | End: 2017-05-24 | Stop reason: SURG

## 2017-05-24 RX ORDER — PROMETHAZINE HYDROCHLORIDE 25 MG/1
25 SUPPOSITORY RECTAL ONCE AS NEEDED
Status: DISCONTINUED | OUTPATIENT
Start: 2017-05-24 | End: 2017-05-24 | Stop reason: HOSPADM

## 2017-05-24 RX ORDER — ONDANSETRON 2 MG/ML
4 INJECTION INTRAMUSCULAR; INTRAVENOUS ONCE AS NEEDED
Status: DISCONTINUED | OUTPATIENT
Start: 2017-05-24 | End: 2017-05-24 | Stop reason: HOSPADM

## 2017-05-24 RX ORDER — PROMETHAZINE HYDROCHLORIDE 25 MG/1
25 TABLET ORAL ONCE AS NEEDED
Status: DISCONTINUED | OUTPATIENT
Start: 2017-05-24 | End: 2017-05-24 | Stop reason: HOSPADM

## 2017-05-24 RX ORDER — LIDOCAINE HYDROCHLORIDE 10 MG/ML
INJECTION, SOLUTION INFILTRATION; PERINEURAL AS NEEDED
Status: DISCONTINUED | OUTPATIENT
Start: 2017-05-24 | End: 2017-05-24 | Stop reason: SURG

## 2017-05-24 RX ADMIN — PROPOFOL 20 MG: 10 INJECTION, EMULSION INTRAVENOUS at 15:15

## 2017-05-24 RX ADMIN — DEXTROSE AND SODIUM CHLORIDE 30 ML/HR: 5; 450 INJECTION, SOLUTION INTRAVENOUS at 14:05

## 2017-05-24 RX ADMIN — LIDOCAINE HYDROCHLORIDE 60 MG: 10 INJECTION, SOLUTION INFILTRATION; PERINEURAL at 15:12

## 2017-05-24 RX ADMIN — PROPOFOL 50 MG: 10 INJECTION, EMULSION INTRAVENOUS at 15:18

## 2017-05-24 RX ADMIN — PROPOFOL 80 MG: 10 INJECTION, EMULSION INTRAVENOUS at 15:12

## 2017-05-24 RX ADMIN — PROPOFOL 20 MG: 10 INJECTION, EMULSION INTRAVENOUS at 15:20

## 2017-05-26 LAB
LAB AP CASE REPORT: NORMAL
Lab: NORMAL
PATH REPORT.FINAL DX SPEC: NORMAL
PATH REPORT.GROSS SPEC: NORMAL

## 2017-06-12 ENCOUNTER — TELEPHONE (OUTPATIENT)
Dept: FAMILY MEDICINE CLINIC | Facility: CLINIC | Age: 62
End: 2017-06-12

## 2017-06-12 NOTE — TELEPHONE ENCOUNTER
MAREN HASTINGS HAS CALLED SAYING HER INNER EAR INF IS BACK AGAIN AND HER NECK HURTS AND CANNOT HEAR AGAIN..ASKING TO BE REF TO ENT  AND ANOTHER ROUND OF ANTIBIOTICS 880MG?SAID THEY HELPED BUT NOT THE 500MG SHE HAS BEEN ON..PLEASE SEND TO [facility] [place]..

## 2017-06-12 NOTE — TELEPHONE ENCOUNTER
As discussed, let patient know that next available appt with Dr. Donis is Wed, June 21st.  Ask if she wants to go to  for ear infection and then see Dr. Donis next Wednesday for f/u and referral to ENT.  Thank you

## 2017-06-16 ENCOUNTER — OFFICE VISIT (OUTPATIENT)
Dept: FAMILY MEDICINE CLINIC | Facility: CLINIC | Age: 62
End: 2017-06-16

## 2017-06-16 VITALS
HEIGHT: 64 IN | HEART RATE: 117 BPM | OXYGEN SATURATION: 98 % | WEIGHT: 138 LBS | BODY MASS INDEX: 23.56 KG/M2 | DIASTOLIC BLOOD PRESSURE: 90 MMHG | TEMPERATURE: 99.3 F | SYSTOLIC BLOOD PRESSURE: 144 MMHG

## 2017-06-16 DIAGNOSIS — G89.29 CHRONIC BILATERAL LOW BACK PAIN WITH BILATERAL SCIATICA: ICD-10-CM

## 2017-06-16 DIAGNOSIS — M54.41 CHRONIC BILATERAL LOW BACK PAIN WITH BILATERAL SCIATICA: ICD-10-CM

## 2017-06-16 DIAGNOSIS — H66.016 RECURRENT ACUTE SUPPURATIVE OTITIS MEDIA WITH SPONTANEOUS RUPTURE OF BOTH TYMPANIC MEMBRANES: Primary | ICD-10-CM

## 2017-06-16 DIAGNOSIS — M54.42 CHRONIC BILATERAL LOW BACK PAIN WITH BILATERAL SCIATICA: ICD-10-CM

## 2017-06-16 PROCEDURE — 99214 OFFICE O/P EST MOD 30 MIN: CPT | Performed by: FAMILY MEDICINE

## 2017-06-16 RX ORDER — HYDROCODONE BITARTRATE AND ACETAMINOPHEN 10; 325 MG/1; MG/1
1 TABLET ORAL EVERY 6 HOURS PRN
Qty: 120 TABLET | Refills: 0 | Status: SHIPPED | OUTPATIENT
Start: 2017-06-16 | End: 2017-07-13 | Stop reason: SDUPTHER

## 2017-06-16 RX ORDER — LORATADINE 10 MG/1
10 TABLET ORAL DAILY
Refills: 0 | COMMUNITY
Start: 2017-06-07 | End: 2018-06-04

## 2017-06-16 RX ORDER — SULFAMETHOXAZOLE AND TRIMETHOPRIM 800; 160 MG/1; MG/1
1 TABLET ORAL 2 TIMES DAILY
Qty: 14 TABLET | Refills: 0 | Status: SHIPPED | OUTPATIENT
Start: 2017-06-16 | End: 2017-06-23

## 2017-06-16 NOTE — PROGRESS NOTES
Subjective   Adriana Nuñez is a 62 y.o. female.     Earache    There is pain in both ears. This is a new problem. The current episode started more than 1 year ago. The problem occurs constantly. The problem has been waxing and waning. The maximum temperature recorded prior to her arrival was 103 - 104 F. The fever has been present for 5 days or more. The pain is at a severity of 10/10. Associated symptoms include coughing and headaches. Associated symptoms comments: Seen at [facility] center got [facility].   Headache    This is a recurrent problem. The current episode started more than 1 month ago. The problem occurs intermittently. The problem has been waxing and waning. The pain is located in the occipital region. Associated symptoms include back pain, coughing, ear pain and a fever.   Back Pain   This is a chronic problem. The current episode started more than 1 year ago. The problem has been waxing and waning since onset. The pain is present in the lumbar spine. The quality of the pain is described as aching. The pain radiates to the right knee and left knee. The pain is at a severity of 6/10. The pain is moderate. The pain is worse during the day. The symptoms are aggravated by position and bending. Associated symptoms include a fever and headaches. Pertinent negatives include no bladder incontinence or bowel incontinence.   Anxiety   Presents for follow-up visit. Symptoms include excessive worry and nervous/anxious behavior. Patient reports no depressed mood.            The following portions of the patient's history were reviewed and updated as appropriate: allergies, current medications, past family history, past medical history, past social history, past surgical history and problem list.    Review of Systems   Constitutional: Positive for fever.   HENT: Positive for ear pain.    Respiratory: Positive for cough.    Gastrointestinal: Negative for bowel incontinence.   Genitourinary: Negative for bladder  incontinence.   Neurological: Positive for headaches.   Psychiatric/Behavioral: The patient is nervous/anxious.        Objective   Physical Exam   Constitutional: She is oriented to person, place, and time. She appears well-developed and well-nourished. No distress.   HENT:   Head: Normocephalic and atraumatic.   Right Ear: Hearing normal. Tympanic membrane is injected and retracted. Tympanic membrane is not perforated and not erythematous.   Left Ear: Hearing normal. Tympanic membrane is retracted. Tympanic membrane is not injected, not perforated and not erythematous.   Cardiovascular: Normal rate, regular rhythm and normal heart sounds.    Pulmonary/Chest: Effort normal and breath sounds normal. No respiratory distress. She has no wheezes. She has no rales. She exhibits no tenderness.   Musculoskeletal:        Lumbar back: She exhibits decreased range of motion, tenderness and pain. She exhibits no bony tenderness, no swelling, no edema, no deformity, no laceration and no spasm.   Neurological: She is alert and oriented to person, place, and time.   Reflex Scores:       Patellar reflexes are 2+ on the right side and 2+ on the left side.  Skin: Skin is warm and dry. She is not diaphoretic.   Psychiatric: She has a normal mood and affect. Her behavior is normal. Judgment and thought content normal.   Nursing note and vitals reviewed.      Assessment/Plan   Problems Addressed this Visit     None      Visit Diagnoses     Recurrent acute suppurative otitis media with spontaneous rupture of both tympanic membranes    -  Primary    Chronic bilateral low back pain with bilateral sciatica                Had abnormal? perineal BX by her reportand has been referred to Specialist. To get vulvectomy. Scheduled for pre op clerence

## 2017-06-21 ENCOUNTER — OFFICE VISIT (OUTPATIENT)
Dept: FAMILY MEDICINE CLINIC | Facility: CLINIC | Age: 62
End: 2017-06-21

## 2017-06-21 VITALS
HEART RATE: 113 BPM | DIASTOLIC BLOOD PRESSURE: 72 MMHG | WEIGHT: 135 LBS | HEIGHT: 64 IN | SYSTOLIC BLOOD PRESSURE: 120 MMHG | OXYGEN SATURATION: 96 % | BODY MASS INDEX: 23.05 KG/M2

## 2017-06-21 DIAGNOSIS — Z01.818 PREOPERATIVE CLEARANCE: Primary | ICD-10-CM

## 2017-06-21 DIAGNOSIS — M54.41 CHRONIC BILATERAL LOW BACK PAIN WITH BILATERAL SCIATICA: ICD-10-CM

## 2017-06-21 DIAGNOSIS — F41.1 GENERALIZED ANXIETY DISORDER: ICD-10-CM

## 2017-06-21 DIAGNOSIS — M54.42 CHRONIC BILATERAL LOW BACK PAIN WITH BILATERAL SCIATICA: ICD-10-CM

## 2017-06-21 DIAGNOSIS — J42 CHRONIC BRONCHITIS, UNSPECIFIED CHRONIC BRONCHITIS TYPE (HCC): ICD-10-CM

## 2017-06-21 DIAGNOSIS — R31.9 BLOOD IN URINE: ICD-10-CM

## 2017-06-21 DIAGNOSIS — D07.1 VIN III (VULVAR INTRAEPITHELIAL NEOPLASIA III): ICD-10-CM

## 2017-06-21 DIAGNOSIS — M35.3 POLYMYALGIA RHEUMATICA (HCC): ICD-10-CM

## 2017-06-21 DIAGNOSIS — G89.29 CHRONIC BILATERAL LOW BACK PAIN WITH BILATERAL SCIATICA: ICD-10-CM

## 2017-06-21 PROBLEM — N90.89 VULVAR LESION: Status: ACTIVE | Noted: 2017-06-21

## 2017-06-21 PROCEDURE — 93005 ELECTROCARDIOGRAM TRACING: CPT | Performed by: FAMILY MEDICINE

## 2017-06-21 PROCEDURE — 93010 ELECTROCARDIOGRAM REPORT: CPT | Performed by: INTERNAL MEDICINE

## 2017-06-21 PROCEDURE — 99214 OFFICE O/P EST MOD 30 MIN: CPT | Performed by: FAMILY MEDICINE

## 2017-06-21 NOTE — PROGRESS NOTES
.     COPD   This is a chronic problem. The current episode started more than 1 year ago. The problem has been unchanged. Associated symptoms include abdominal pain (chronic, bryant sig change), arthralgias, fatigue, myalgias and weakness. Pertinent negatives include no anorexia, change in bowel habit, chest pain, chills, congestion, coughing, fever, headaches, joint swelling, nausea, neck pain, numbness, rash, sore throat, swollen glands, urinary symptoms, vertigo, visual change or vomiting.   Anxiety   Presents for follow-up visit. Symptoms include depressed mood and nervous/anxious behavior. Patient reports no chest pain, confusion, decreased concentration, dizziness, insomnia, irritability, nausea, palpitations, shortness of breath or suicidal ideas.            The following portions of the patient's history were reviewed and updated as appropriate: allergies, current medications, past family history, past medical history, past social history, past surgical history and problem list.    Review of Systems   Constitutional: Positive for fatigue. Negative for activity change, appetite change, chills, fever, irritability and unexpected weight change.   HENT: Negative for congestion, facial swelling, nosebleeds, postnasal drip, rhinorrhea, sinus pressure and sore throat.    Eyes: Negative for pain, discharge, itching and visual disturbance.   Respiratory: Negative for cough, chest tightness, shortness of breath and wheezing.    Cardiovascular: Negative for chest pain, palpitations and leg swelling.   Gastrointestinal: Positive for abdominal pain (chronic, bryant sig change). Negative for abdominal distention, anorexia, change in bowel habit, constipation, diarrhea, nausea and vomiting.   Endocrine: Negative for cold intolerance and heat intolerance.   Genitourinary: Positive for genital sores and pelvic pain. Negative for difficulty urinating, dysuria, enuresis, frequency and hematuria.   Musculoskeletal: Positive for  arthralgias and myalgias. Negative for joint swelling and neck pain.   Skin: Positive for wound. Negative for rash.   Allergic/Immunologic: Negative for environmental allergies and food allergies.   Neurological: Positive for weakness. Negative for dizziness, vertigo, seizures, syncope, numbness and headaches.   Hematological: Does not bruise/bleed easily.   Psychiatric/Behavioral: Negative for confusion, decreased concentration, hallucinations, self-injury, sleep disturbance and suicidal ideas. The patient is nervous/anxious and is hyperactive. The patient does not have insomnia.        Objective   Physical Exam   Constitutional: She is oriented to person, place, and time. She appears well-developed and well-nourished. No distress.   HENT:   Head: Normocephalic and atraumatic.   Eyes: Conjunctivae are normal. Pupils are equal, round, and reactive to light. Right eye exhibits no discharge. Left eye exhibits no discharge.   Cardiovascular: Normal rate, regular rhythm and normal heart sounds.  Exam reveals no gallop and no friction rub.    No murmur heard.  Pulmonary/Chest: Effort normal and breath sounds normal. No respiratory distress. She has no wheezes. She has no rales. She exhibits no tenderness.   Abdominal: Soft. Bowel sounds are normal. She exhibits no distension and no mass. There is no tenderness.   Genitourinary:       Pelvic exam was performed with patient supine. There is no rash on the right labia. There is lesion on the left labia. There is no rash on the left labia.   Musculoskeletal: She exhibits no edema or tenderness.   Neurological: She is alert and oriented to person, place, and time.   Skin: Skin is warm and dry. She is not diaphoretic.   Psychiatric: Her speech is normal and behavior is normal. Judgment and thought content normal. Her mood appears anxious.   Nursing note and vitals reviewed.      Assessment/Plan   Problems Addressed this Visit        Respiratory    Chronic obstructive lung  disease       Nervous and Auditory    Chronic low back pain       Genitourinary    GONZALES III (vulvar intraepithelial neoplasia III)       Other    Polymyalgia rheumatica    Generalized anxiety disorder    Blood in urine seeing urology.      Other Visit Diagnoses     Preoperative clearance    -  Primary    Relevant Orders    ECG 12 Lead (Completed)      Lab results and chest x-ray from [facility] done on or about 6/15/2017 were obtained and reviewed today.  EKG was obtained in my office and reviewed today.  It showed normal sinus rhythm no acute changes.  From my standpoint patient has a low probability for complications from  bulbar excision never GONZALES 3 at this time.

## 2017-06-30 ENCOUNTER — TELEPHONE (OUTPATIENT)
Dept: FAMILY MEDICINE CLINIC | Facility: CLINIC | Age: 62
End: 2017-06-30

## 2017-06-30 NOTE — TELEPHONE ENCOUNTER
----- Message from Donato Donis MD sent at 6/30/2017 10:23 AM CDT -----  Regarding: RE: PLEASE CALL  Contact: [phone]  Postop pain with obese the responsibility of the surgeon she needs to call him.  ----- Message -----     From: Jesse Surbamanian MA     Sent: 6/30/2017  10:18 AM       To: Donato Donis MD  Subject: FW: PLEASE CALL                                  Please advise, thank you    ----- Message -----     From: Lucrecia Gonzalez     Sent: 6/30/2017   9:44 AM       To: Jesse Subramanian MA  Subject: PLEASE CALL                                      PATIENT HAS CALLED AND SAID SHE HAD SURGERY LAST Monday. THE DOCTOR ONLY GAVE HER 14 PAIN PILLS. SHE SAID SHE HAS 2 LEFT AND SHE IS STILL IN A LOT OF PAIN. SHE NEEDS TO TALK TO YOU ABOUT MAYBE GETTING SOMETHING FOR THE PAIN.     SHE ALSO IS ASKING FOR A REFERRAL TO SEE DR MELENDEZ. PLEASE CALL HER [phone]

## 2017-07-13 ENCOUNTER — OFFICE VISIT (OUTPATIENT)
Dept: FAMILY MEDICINE CLINIC | Facility: CLINIC | Age: 62
End: 2017-07-13

## 2017-07-13 ENCOUNTER — TELEPHONE (OUTPATIENT)
Dept: GASTROENTEROLOGY | Facility: CLINIC | Age: 62
End: 2017-07-13

## 2017-07-13 ENCOUNTER — OFFICE VISIT (OUTPATIENT)
Dept: GASTROENTEROLOGY | Facility: CLINIC | Age: 62
End: 2017-07-13

## 2017-07-13 VITALS
HEIGHT: 64 IN | WEIGHT: 138.5 LBS | SYSTOLIC BLOOD PRESSURE: 122 MMHG | HEART RATE: 102 BPM | OXYGEN SATURATION: 98 % | BODY MASS INDEX: 23.64 KG/M2 | DIASTOLIC BLOOD PRESSURE: 70 MMHG

## 2017-07-13 VITALS
HEART RATE: 98 BPM | WEIGHT: 138.5 LBS | BODY MASS INDEX: 23.64 KG/M2 | DIASTOLIC BLOOD PRESSURE: 80 MMHG | SYSTOLIC BLOOD PRESSURE: 124 MMHG | HEIGHT: 64 IN

## 2017-07-13 DIAGNOSIS — G89.29 CHRONIC BILATERAL LOW BACK PAIN WITH BILATERAL SCIATICA: ICD-10-CM

## 2017-07-13 DIAGNOSIS — M54.41 CHRONIC BILATERAL LOW BACK PAIN WITH BILATERAL SCIATICA: ICD-10-CM

## 2017-07-13 DIAGNOSIS — R10.13 EPIGASTRIC PAIN: Primary | ICD-10-CM

## 2017-07-13 DIAGNOSIS — F41.1 GENERALIZED ANXIETY DISORDER: Primary | ICD-10-CM

## 2017-07-13 DIAGNOSIS — M54.42 CHRONIC BILATERAL LOW BACK PAIN WITH BILATERAL SCIATICA: ICD-10-CM

## 2017-07-13 DIAGNOSIS — S03.00XA TMJ (DISLOCATION OF TEMPOROMANDIBULAR JOINT), INITIAL ENCOUNTER: ICD-10-CM

## 2017-07-13 PROCEDURE — 99214 OFFICE O/P EST MOD 30 MIN: CPT | Performed by: FAMILY MEDICINE

## 2017-07-13 PROCEDURE — 99214 OFFICE O/P EST MOD 30 MIN: CPT | Performed by: INTERNAL MEDICINE

## 2017-07-13 RX ORDER — SODIUM CHLORIDE 0.9 % (FLUSH) 0.9 %
1-10 SYRINGE (ML) INJECTION AS NEEDED
Status: CANCELLED | OUTPATIENT
Start: 2017-07-21

## 2017-07-13 RX ORDER — DEXTROSE AND SODIUM CHLORIDE 5; .45 G/100ML; G/100ML
30 INJECTION, SOLUTION INTRAVENOUS CONTINUOUS PRN
Status: CANCELLED | OUTPATIENT
Start: 2017-07-21

## 2017-07-13 RX ORDER — HYDROCODONE BITARTRATE AND ACETAMINOPHEN 10; 325 MG/1; MG/1
1 TABLET ORAL EVERY 6 HOURS PRN
Qty: 120 TABLET | Refills: 0 | Status: SHIPPED | OUTPATIENT
Start: 2017-07-13 | End: 2017-08-11

## 2017-07-13 NOTE — PROGRESS NOTES
Subjective   Adriana Nuñez is a 62 y.o. female.     Back Pain   This is a chronic problem. The current episode started more than 1 year ago. The problem has been waxing and waning since onset. The pain is present in the lumbar spine. The quality of the pain is described as aching. The pain radiates to the right knee and left knee. The pain is at a severity of 6/10. The pain is moderate. The pain is worse during the day. The symptoms are aggravated by position and bending. Pertinent negatives include no bladder incontinence or bowel incontinence.   URI    Associated symptoms include a plugged ear sensation.   Earache    There is pain in both ears. This is a new problem. The current episode started more than 1 year ago. The problem occurs constantly. The problem has been waxing and waning. The maximum temperature recorded prior to her arrival was 103 - 104 F. The fever has been present for 5 days or more. The pain is at a severity of 10/10. Associated symptoms comments: Seen at [facility] center got [facility].   Anxiety   Presents for follow-up visit. Symptoms include excessive worry and nervous/anxious behavior. Patient reports no depressed mood.            The following portions of the patient's history were reviewed and updated as appropriate: allergies, current medications, past family history, past medical history, past social history, past surgical history and problem list.    Review of Systems   Gastrointestinal: Negative for bowel incontinence.   Genitourinary: Negative for bladder incontinence.   Psychiatric/Behavioral: The patient is nervous/anxious.        Objective   Physical Exam   Constitutional: She is oriented to person, place, and time. She appears well-developed and well-nourished. No distress.   HENT:   Head: Normocephalic and atraumatic.   Right Ear: Hearing and tympanic membrane normal.   Left Ear: Hearing and tympanic membrane normal.   Neck:       Cardiovascular: Normal rate and regular rhythm.   Exam reveals no gallop and no friction rub.    No murmur heard.  Pulmonary/Chest: Effort normal and breath sounds normal. No respiratory distress. She has no wheezes. She has no rales. She exhibits no tenderness.   Musculoskeletal:        Lumbar back: She exhibits decreased range of motion, tenderness and pain. She exhibits no bony tenderness, no swelling, no edema, no deformity, no laceration and no spasm.   Neurological: She is alert and oriented to person, place, and time.   Skin: Skin is warm and dry. She is not diaphoretic.   Psychiatric: She has a normal mood and affect. Her behavior is normal. Judgment and thought content normal.   Nursing note and vitals reviewed.      Assessment/Plan   Problems Addressed this Visit        Musculoskeletal and Integument    TMJ (dislocation of temporomandibular joint)       Other    Generalized anxiety disorder - Primary    Chronic bilateral low back pain with bilateral sciatica

## 2017-07-13 NOTE — TELEPHONE ENCOUNTER
Please call pt with apt day and time and give instructions                            ----- Message -----          From: Glenny Lopez          Sent: 7/13/2017   1:30 PM            To: Rama Lorenz              10 am July 21st                             ----- Message -----          From: Rama Lorenz          Sent: 7/13/2017  12:52 PM            To: Glenny Lopez              Please schedule       Left appointment date and time in message for patient

## 2017-07-13 NOTE — PROGRESS NOTES
[facility], [place], operated by [facility]      Chief Complaint:   Chief Complaint   Patient presents with   • Results   • Colonoscopy     follow up from 05/24/2017       Subjective     HPI:   Patient for follow-up on colonoscopy.  Patient states some epigastric abdominal pain.    Plan; we'll schedule patient for EGD    Past Medical History:   Past Medical History:   Diagnosis Date   • Adenomatous polyp of colon    • Anorectal abscess    • Anxiety disorder    • Benign polyp of large intestine    • Bladder fistula      post rapair      • Blood in urine    • Chronic obstructive lung disease    • Chronic urinary tract infection    • Depressive disorder    • Diarrhea    • Diverticulitis of colon      post colectomy      • Elevated levels of transaminase & lactic acid dehydrogenase    • Epigastric pain    • Generalized anxiety disorder    • Heavy tobacco smoker    • History of colon polyps    • Hyperlipidemia    • Insomnia    • Low back pain    • Lower urinary tract infectious disease    • Lymphadenopathy     ON CT   • Osteoporosis    • Peripheral nerve disease    • Polymyalgia rheumatica    • Polyp of colon    • Urinary tract infection    • Vitamin D deficiency        Family History:  History reviewed. No pertinent family history.    Social History:   reports that she has been smoking Cigars and Cigarettes.  She has been smoking about 1.00 pack per day. She has never used smokeless tobacco. She reports that she does not drink alcohol or use illicit drugs.    Medications:   Current Outpatient Prescriptions   Medication Sig Dispense Refill   • albuterol (PROVENTIL HFA) 108 (90 BASE) MCG/ACT inhaler Inhale 2 puffs 3 (Three) Times a Day.     • ATROVENT HFA 17 MCG/ACT inhaler Inhale 2 puffs 4 (Four) Times a Day. 12.9 g 11   • diazePAM (VALIUM) 5 MG tablet Take 1 tablet by mouth 2 (Two) Times a Day As Needed for Anxiety. 60 tablet 2   • dicyclomine (BENTYL) 20 MG tablet Take 20 mg by mouth 2 (Two) Times a Day.     •  "HYDROcodone-acetaminophen (NORCO)  MG per tablet Take 1 tablet by mouth Every 6 (Six) Hours As Needed for Moderate Pain (4-6). 120 tablet 0   • loratadine (CLARITIN) 10 MG tablet Take 10 mg by mouth Daily.  0   • predniSONE (DELTASONE) 10 MG tablet Take 10 mg by mouth Daily.     • raloxifene (EVISTA) 60 MG tablet Take 60 mg by mouth daily.       No current facility-administered medications for this visit.        Allergies:  Fosamax [alendronate]    ROS:    Review of Systems   Constitutional: Negative for activity change, appetite change, chills, diaphoresis, fatigue, fever and unexpected weight change.   HENT: Negative for sore throat and trouble swallowing.    Respiratory: Negative for shortness of breath.    Gastrointestinal: Negative for abdominal distention, abdominal pain, anal bleeding, blood in stool, constipation, diarrhea, nausea, rectal pain and vomiting.   Musculoskeletal: Negative for arthralgias.   Skin: Negative for pallor.   Neurological: Negative for light-headedness.     Objective     Blood pressure 124/80, pulse 98, height 64\" (162.6 cm), weight 138 lb 8 oz (62.8 kg).    Physical Exam   Constitutional: She is oriented to person, place, and time. She appears well-developed and well-nourished. No distress.   HENT:   Head: Normocephalic and atraumatic.   Cardiovascular: Normal rate, regular rhythm, normal heart sounds and intact distal pulses.  Exam reveals no gallop and no friction rub.    No murmur heard.  Pulmonary/Chest: Breath sounds normal. No respiratory distress. She has no wheezes. She has no rales. She exhibits no tenderness.   Abdominal: Soft. Bowel sounds are normal. She exhibits no distension and no mass. There is no tenderness. There is no rebound and no guarding. No hernia.   Musculoskeletal: Normal range of motion. She exhibits no edema.   Neurological: She is alert and oriented to person, place, and time.   Skin: Skin is warm and dry. No rash noted. She is not diaphoretic. No " erythema. No pallor.   Psychiatric: She has a normal mood and affect. Her behavior is normal. Judgment and thought content normal.        Assessment/Plan   Adriana was seen today for results and colonoscopy.    Diagnoses and all orders for this visit:    Epigastric pain  -     Case Request; Standing  -     sodium chloride 0.9 % flush 1-10 mL; Infuse 1-10 mL into a venous catheter As Needed for Line Care.  -     dextrose 5 % and sodium chloride 0.45 % infusion; Infuse 30 mL/hr into a venous catheter Continuous As Needed (Start Prior to Procedure).  -     Case Request    Other orders  -     Implement Anesthesia Orders Day of Procedure; Standing  -     Obtain Informed Consent; Standing  -     Insert Peripheral IV; Standing  -     Saline Lock & Maintain IV Access; Standing        ESOPHAGOGASTRODUODENOSCOPY (N/A)     Diagnosis Plan   1. Epigastric pain  Case Request    sodium chloride 0.9 % flush 1-10 mL    dextrose 5 % and sodium chloride 0.45 % infusion    Case Request       Anticipated Surgical Procedure:  No orders of the defined types were placed in this encounter.      The risks, benefits, and alternatives of this procedure have been discussed with the patient or the responsible party- the patient understands and agrees to proceed.

## 2017-08-01 ENCOUNTER — TELEPHONE (OUTPATIENT)
Dept: GASTROENTEROLOGY | Facility: CLINIC | Age: 62
End: 2017-08-01

## 2017-08-01 NOTE — TELEPHONE ENCOUNTER
"Patient is calling stating that she has staph all over her body. Patient states that she also has an ear infection and her neck is red. Patient states that \"every whole on me infection is pouring out of me and its a joke\". Patient states that she has bright red female area. She states that she is frustrated because no one will listen to her. Patient states that  told her \"that I am just up the creek\". Patient states \"i am not crazy\". She states that she is interested in finding a new Primary care doctor also. Patient states that she has a fever and is blood red. She states that she was on strong antibiotics for 2 months and that she was fine for about 1 month after but now everything is back. Patient is encouraged to seek emergency medical attention at her local ER. Patient states that she will go there tomorrow.   "

## 2017-08-01 NOTE — TELEPHONE ENCOUNTER
Patient left voicemail saying that her staph is back in full blast and that she is unable to sit on her tail bone. Patient is wanting a referral to a nerve doctor. Office is unable to reach patient. Voicemail is left for patient to return phone call if she is having any problems.

## 2017-08-11 ENCOUNTER — OFFICE VISIT (OUTPATIENT)
Dept: FAMILY MEDICINE CLINIC | Facility: CLINIC | Age: 62
End: 2017-08-11

## 2017-08-11 VITALS
DIASTOLIC BLOOD PRESSURE: 82 MMHG | WEIGHT: 138.5 LBS | HEART RATE: 88 BPM | BODY MASS INDEX: 23.64 KG/M2 | SYSTOLIC BLOOD PRESSURE: 130 MMHG | HEIGHT: 64 IN | OXYGEN SATURATION: 97 %

## 2017-08-11 DIAGNOSIS — F42.9 OCD (OBSESSIVE COMPULSIVE DISORDER): ICD-10-CM

## 2017-08-11 DIAGNOSIS — M54.41 CHRONIC BILATERAL LOW BACK PAIN WITH BILATERAL SCIATICA: ICD-10-CM

## 2017-08-11 DIAGNOSIS — M54.42 CHRONIC BILATERAL LOW BACK PAIN WITH BILATERAL SCIATICA: ICD-10-CM

## 2017-08-11 DIAGNOSIS — G89.29 CHRONIC BILATERAL LOW BACK PAIN WITH BILATERAL SCIATICA: ICD-10-CM

## 2017-08-11 DIAGNOSIS — F41.1 GENERALIZED ANXIETY DISORDER: ICD-10-CM

## 2017-08-11 DIAGNOSIS — R10.31 RLQ ABDOMINAL PAIN: ICD-10-CM

## 2017-08-11 DIAGNOSIS — K63.5 COLON POLYPS: Primary | ICD-10-CM

## 2017-08-11 PROCEDURE — 99214 OFFICE O/P EST MOD 30 MIN: CPT | Performed by: FAMILY MEDICINE

## 2017-08-11 RX ORDER — HYDROCODONE BITARTRATE AND ACETAMINOPHEN 10; 325 MG/1; MG/1
1 TABLET ORAL EVERY 6 HOURS PRN
Qty: 120 TABLET | Refills: 0 | Status: SHIPPED | OUTPATIENT
Start: 2017-08-11 | End: 2017-09-11 | Stop reason: SDUPTHER

## 2017-08-11 RX ORDER — DIAZEPAM 5 MG/1
5 TABLET ORAL 2 TIMES DAILY PRN
Qty: 60 TABLET | Refills: 2 | Status: SHIPPED | OUTPATIENT
Start: 2017-08-11 | End: 2017-09-11 | Stop reason: SDUPTHER

## 2017-08-11 RX ORDER — DIAZEPAM 5 MG/1
5 TABLET ORAL 2 TIMES DAILY PRN
Qty: 60 TABLET | Refills: 2 | OUTPATIENT
Start: 2017-08-11 | End: 2017-08-11 | Stop reason: SDUPTHER

## 2017-08-11 NOTE — PROGRESS NOTES
Subjective   Adriana Nuñez is a 62 y.o. female.     Anxiety   Presents for follow-up visit. Symptoms include compulsions, excessive worry and nervous/anxious behavior. Patient reports no depressed mood.       Back Pain   This is a chronic problem. The current episode started more than 1 year ago. The problem has been waxing and waning since onset. The pain is present in the lumbar spine. The quality of the pain is described as aching. The pain radiates to the right knee and left knee. The pain is at a severity of 6/10. The pain is moderate. The pain is worse during the day. The symptoms are aggravated by position and bending. Pertinent negatives include no bladder incontinence or bowel incontinence.        The following portions of the patient's history were reviewed and updated as appropriate: allergies, current medications, past family history, past medical history, past social history, past surgical history and problem list.    Review of Systems   Gastrointestinal: Negative for bowel incontinence.   Genitourinary: Negative for bladder incontinence.   Musculoskeletal: Positive for back pain.   Psychiatric/Behavioral: The patient is nervous/anxious.        Objective   Physical Exam   Constitutional: She is oriented to person, place, and time. She appears well-developed and well-nourished. No distress.   HENT:   Head: Normocephalic and atraumatic.   Right Ear: Hearing and tympanic membrane normal.   Left Ear: Hearing and tympanic membrane normal.   Neck:       Pulmonary/Chest: Effort normal and breath sounds normal.   Abdominal: Soft. Bowel sounds are normal. She exhibits no distension. There is tenderness in the right lower quadrant.   Musculoskeletal:        Lumbar back: She exhibits decreased range of motion, tenderness and pain. She exhibits no bony tenderness, no swelling, no edema, no deformity, no laceration and no spasm.   Neurological: She is alert and oriented to person, place, and time.   Skin: Skin  is warm and dry. She is not diaphoretic.   Psychiatric: She has a normal mood and affect. Her behavior is normal. Judgment and thought content normal.   Nursing note and vitals reviewed.      Assessment/Plan   Problems Addressed this Visit        Digestive    Colon polyps - Primary       Other    Generalized anxiety disorder    Chronic bilateral low back pain with bilateral sciatica      Other Visit Diagnoses     RLQ abdominal pain        OCD (obsessive compulsive disorder)              Ct abd and GI notes reviewed     annie chavez OCD, declined treatment for it

## 2017-08-16 RX ORDER — DICYCLOMINE HCL 20 MG
TABLET ORAL
Qty: 60 TABLET | Refills: 6 | Status: SHIPPED | OUTPATIENT
Start: 2017-08-16 | End: 2017-09-11 | Stop reason: SDUPTHER

## 2017-09-05 ENCOUNTER — TELEPHONE (OUTPATIENT)
Dept: GASTROENTEROLOGY | Facility: CLINIC | Age: 62
End: 2017-09-05

## 2017-09-05 NOTE — TELEPHONE ENCOUNTER
Patient is wanting a staph blood test ordered on her. She keeps talking about an infection coming out from her ears and female area. for the past 2 weeks she has felt floaty and underwater. Complains of blood pressure dropping/heart rate going up and that she doesn't feel good. states the hospital does not help her and they tell her to see her family provider and that dr. norman will not help her. Patient states that APRN's will not accept her. Patient is advised to seek emergency medical attention. Patient states that no one will help her and that she doesn't want any of this reported due to the fact that she may loose her doctor. Patient is advised again to seek emergency medical attention.

## 2017-09-11 ENCOUNTER — OFFICE VISIT (OUTPATIENT)
Dept: FAMILY MEDICINE CLINIC | Facility: CLINIC | Age: 62
End: 2017-09-11

## 2017-09-11 VITALS
WEIGHT: 139 LBS | HEART RATE: 97 BPM | BODY MASS INDEX: 23.73 KG/M2 | DIASTOLIC BLOOD PRESSURE: 82 MMHG | HEIGHT: 64 IN | SYSTOLIC BLOOD PRESSURE: 102 MMHG | OXYGEN SATURATION: 99 %

## 2017-09-11 DIAGNOSIS — M54.41 CHRONIC BILATERAL LOW BACK PAIN WITH BILATERAL SCIATICA: Primary | ICD-10-CM

## 2017-09-11 DIAGNOSIS — G89.29 CHRONIC BILATERAL LOW BACK PAIN WITH BILATERAL SCIATICA: Primary | ICD-10-CM

## 2017-09-11 DIAGNOSIS — H65.06 RECURRENT ACUTE SEROUS OTITIS MEDIA OF BOTH EARS: ICD-10-CM

## 2017-09-11 DIAGNOSIS — F41.1 GENERALIZED ANXIETY DISORDER: ICD-10-CM

## 2017-09-11 DIAGNOSIS — M54.42 CHRONIC BILATERAL LOW BACK PAIN WITH BILATERAL SCIATICA: Primary | ICD-10-CM

## 2017-09-11 PROCEDURE — 99214 OFFICE O/P EST MOD 30 MIN: CPT | Performed by: FAMILY MEDICINE

## 2017-09-11 RX ORDER — FLUTICASONE PROPIONATE 50 MCG
2 SPRAY, SUSPENSION (ML) NASAL DAILY
Qty: 9.9 ML | Refills: 2 | Status: SHIPPED | OUTPATIENT
Start: 2017-09-11 | End: 2018-01-25

## 2017-09-11 RX ORDER — GUAIFENESIN 600 MG/1
1200 TABLET, EXTENDED RELEASE ORAL 2 TIMES DAILY
Qty: 40 TABLET | Refills: 0 | Status: SHIPPED | OUTPATIENT
Start: 2017-09-11 | End: 2017-09-21

## 2017-09-11 RX ORDER — HYDROCODONE BITARTRATE AND ACETAMINOPHEN 10; 325 MG/1; MG/1
1 TABLET ORAL EVERY 6 HOURS PRN
Qty: 120 TABLET | Refills: 0 | Status: SHIPPED | OUTPATIENT
Start: 2017-09-11 | End: 2017-10-04 | Stop reason: SDUPTHER

## 2017-09-11 RX ORDER — DIAZEPAM 5 MG/1
5 TABLET ORAL 2 TIMES DAILY PRN
Qty: 60 TABLET | Refills: 2 | Status: SHIPPED | OUTPATIENT
Start: 2017-09-11 | End: 2017-10-04 | Stop reason: SDUPTHER

## 2017-09-11 NOTE — PROGRESS NOTES
Subjective   Adriana Nuñez is a 62 y.o. female.     Back Pain   This is a chronic problem. The current episode started more than 1 year ago. The problem has been waxing and waning since onset. The pain is present in the lumbar spine. The quality of the pain is described as aching. The pain radiates to the right knee and left knee. The pain is at a severity of 6/10. The pain is moderate. The pain is worse during the day. The symptoms are aggravated by position and bending. Pertinent negatives include no bladder incontinence or bowel incontinence.   Anxiety   Presents for follow-up visit. Symptoms include compulsions, excessive worry and nervous/anxious behavior. Patient reports no depressed mood.       Ear Fullness    There is pain in both ears. This is a recurrent problem. The current episode started in the past 7 days. The problem occurs constantly. The problem has been waxing and waning. There has been no fever (none docummented). The pain is mild. Pertinent negatives include no coughing or ear discharge.        The following portions of the patient's history were reviewed and updated as appropriate: allergies, current medications, past family history, past medical history, past social history, past surgical history and problem list.    Review of Systems   HENT: Negative for ear discharge.    Respiratory: Negative for cough.    Gastrointestinal: Negative for bowel incontinence.   Genitourinary: Negative for bladder incontinence.   Musculoskeletal: Positive for back pain.   Psychiatric/Behavioral: The patient is nervous/anxious.        Objective   Physical Exam   Constitutional: She is oriented to person, place, and time. She appears well-developed and well-nourished. No distress.   HENT:   Head: Normocephalic and atraumatic.   Right Ear: Hearing normal. Tympanic membrane is retracted.   Left Ear: Hearing normal. Tympanic membrane is retracted.   Neck:       Pulmonary/Chest: Effort normal and breath sounds  normal.   Musculoskeletal:        Lumbar back: She exhibits decreased range of motion, tenderness and pain. She exhibits no bony tenderness, no swelling, no edema, no deformity, no laceration and no spasm.   Lymphadenopathy:     She has no cervical adenopathy.        Right cervical: No superficial cervical, no deep cervical and no posterior cervical adenopathy present.       Left cervical: No superficial cervical, no deep cervical and no posterior cervical adenopathy present.   Neurological: She is alert and oriented to person, place, and time.   Skin: Skin is warm and dry. She is not diaphoretic.   Psychiatric: She has a normal mood and affect. Her behavior is normal. Judgment and thought content normal.   Nursing note and vitals reviewed.      Assessment/Plan   Problems Addressed this Visit        Other    Generalized anxiety disorder    Relevant Medications    diazePAM (VALIUM) 5 MG tablet    Chronic bilateral low back pain with bilateral sciatica - Primary      Other Visit Diagnoses     Recurrent acute serous otitis media of both ears                 annie pect OCD, declined treatment for it

## 2017-09-11 NOTE — PROGRESS NOTES
Subjective   Adriana Nuñez is a 62 y.o. female.     Back Pain     Anxiety              {Common H&P Review Areas:31625}    Review of Systems   Musculoskeletal: Positive for back pain.       Objective   Physical Exam    Assessment/Plan   {Assess/PlanSmartLinks:10183}

## 2017-09-18 ENCOUNTER — ANESTHESIA EVENT (OUTPATIENT)
Dept: GASTROENTEROLOGY | Facility: HOSPITAL | Age: 62
End: 2017-09-18

## 2017-09-18 ENCOUNTER — HOSPITAL ENCOUNTER (OUTPATIENT)
Facility: HOSPITAL | Age: 62
Setting detail: HOSPITAL OUTPATIENT SURGERY
Discharge: HOME | End: 2017-09-18
Attending: INTERNAL MEDICINE | Admitting: INTERNAL MEDICINE

## 2017-09-18 ENCOUNTER — ANESTHESIA (OUTPATIENT)
Dept: GASTROENTEROLOGY | Facility: HOSPITAL | Age: 62
End: 2017-09-18

## 2017-09-18 VITALS
DIASTOLIC BLOOD PRESSURE: 68 MMHG | HEIGHT: 64 IN | TEMPERATURE: 98 F | SYSTOLIC BLOOD PRESSURE: 110 MMHG | HEART RATE: 88 BPM | WEIGHT: 132.06 LBS | RESPIRATION RATE: 16 BRPM | BODY MASS INDEX: 22.55 KG/M2 | OXYGEN SATURATION: 98 %

## 2017-09-18 DIAGNOSIS — R10.13 EPIGASTRIC PAIN: ICD-10-CM

## 2017-09-18 PROCEDURE — 88305 TISSUE EXAM BY PATHOLOGIST: CPT | Performed by: INTERNAL MEDICINE

## 2017-09-18 PROCEDURE — 25010000002 MIDAZOLAM PER 1 MG: Performed by: NURSE ANESTHETIST, CERTIFIED REGISTERED

## 2017-09-18 PROCEDURE — 43239 EGD BIOPSY SINGLE/MULTIPLE: CPT | Performed by: INTERNAL MEDICINE

## 2017-09-18 PROCEDURE — 25010000002 PROPOFOL 10 MG/ML EMULSION: Performed by: NURSE ANESTHETIST, CERTIFIED REGISTERED

## 2017-09-18 PROCEDURE — 88305 TISSUE EXAM BY PATHOLOGIST: CPT | Performed by: PATHOLOGY

## 2017-09-18 RX ORDER — LIDOCAINE HYDROCHLORIDE 10 MG/ML
INJECTION, SOLUTION INFILTRATION; PERINEURAL AS NEEDED
Status: DISCONTINUED | OUTPATIENT
Start: 2017-09-18 | End: 2017-09-18 | Stop reason: SURG

## 2017-09-18 RX ORDER — MIDAZOLAM HYDROCHLORIDE 1 MG/ML
INJECTION INTRAMUSCULAR; INTRAVENOUS AS NEEDED
Status: DISCONTINUED | OUTPATIENT
Start: 2017-09-18 | End: 2017-09-18 | Stop reason: SURG

## 2017-09-18 RX ORDER — PROPOFOL 10 MG/ML
VIAL (ML) INTRAVENOUS AS NEEDED
Status: DISCONTINUED | OUTPATIENT
Start: 2017-09-18 | End: 2017-09-18 | Stop reason: SURG

## 2017-09-18 RX ORDER — DEXTROSE AND SODIUM CHLORIDE 5; .45 G/100ML; G/100ML
30 INJECTION, SOLUTION INTRAVENOUS CONTINUOUS PRN
Status: DISCONTINUED | OUTPATIENT
Start: 2017-09-18 | End: 2017-09-18 | Stop reason: HOSPADM

## 2017-09-18 RX ADMIN — PROPOFOL 50 MG: 10 INJECTION, EMULSION INTRAVENOUS at 12:05

## 2017-09-18 RX ADMIN — PROPOFOL 100 MG: 10 INJECTION, EMULSION INTRAVENOUS at 12:02

## 2017-09-18 RX ADMIN — DEXTROSE AND SODIUM CHLORIDE 30 ML/HR: 5; 450 INJECTION, SOLUTION INTRAVENOUS at 10:41

## 2017-09-18 RX ADMIN — MIDAZOLAM 1 MG: 1 INJECTION INTRAMUSCULAR; INTRAVENOUS at 11:54

## 2017-09-18 RX ADMIN — LIDOCAINE HYDROCHLORIDE 60 MG: 10 INJECTION, SOLUTION INFILTRATION; PERINEURAL at 11:54

## 2017-09-18 NOTE — PLAN OF CARE
Problem: Patient Care Overview (Adult)  Goal: Plan of Care Review  Outcome: Ongoing (interventions implemented as appropriate)    09/18/17 1207   Coping/Psychosocial Response Interventions   Plan Of Care Reviewed With patient   Patient Care Overview   Progress no change   Outcome Evaluation   Outcome Summary/Follow up Plan vss         Problem: GI Endoscopy (Adult)  Goal: Signs and Symptoms of Listed Potential Problems Will be Absent or Manageable (GI Endoscopy)  Outcome: Ongoing (interventions implemented as appropriate)    09/18/17 1207   GI Endoscopy   Problems Assessed (GI Endoscopy) all   Problems Present (GI Endoscopy) none

## 2017-09-18 NOTE — PLAN OF CARE
Problem: GI Endoscopy (Adult)  Goal: Signs and Symptoms of Listed Potential Problems Will be Absent or Manageable (GI Endoscopy)  Outcome: Ongoing (interventions implemented as appropriate)    09/18/17 1230   GI Endoscopy   Problems Assessed (GI Endoscopy) all   Problems Present (GI Endoscopy) none

## 2017-09-18 NOTE — ANESTHESIA POSTPROCEDURE EVALUATION
Patient: Adriana Nuñez    Procedure Summary     Date Anesthesia Start Anesthesia Stop Room / Location    09/18/17 1154 1206 [facility] ENDOSCOPY 3 / [facility] ENDOSCOPY       Procedure Diagnosis Surgeon Provider    ESOPHAGOGASTRODUODENOSCOPY (N/A Esophagus) Epigastric pain  (Epigastric pain [R10.13]) MD Diego Oneil CRNA          Anesthesia Type: MAC  Last vitals  BP        Temp        Pulse       Resp        SpO2          Post Anesthesia Care and Evaluation    Patient location during evaluation: bedside  Patient participation: complete - patient participated  Level of consciousness: awake  Pain score: 0  Pain management: adequate  Airway patency: patent  Anesthetic complications: No anesthetic complications  PONV Status: none  Cardiovascular status: acceptable and stable  Respiratory status: acceptable and spontaneous ventilation  Hydration status: acceptable

## 2017-09-18 NOTE — ANESTHESIA PREPROCEDURE EVALUATION
Anesthesia Evaluation     Patient summary reviewed and Nursing notes reviewed   no history of anesthetic complications:  NPO Solid Status: > 8 hours  NPO Liquid Status: > 2 hours     Airway   Mallampati: II  TM distance: >3 FB  Neck ROM: full  no difficulty expected  Dental - normal exam     Pulmonary - normal exam    breath sounds clear to auscultation  (+) a smoker Current, COPD,   (-) shortness of breath  Cardiovascular - normal exam  Exercise tolerance: good (4-7 METS)    Rhythm: regular  Rate: normal    (+) hyperlipidemia  (-) CAD, angina, orthopnea, CAMPA      Neuro/Psych- negative ROS  (-) CVA  GI/Hepatic/Renal/Endo - negative ROS     Musculoskeletal (-) negative ROS    Abdominal  - normal exam   Substance History - negative use     OB/GYN negative ob/gyn ROS         Other      history of cancer                                    Anesthesia Plan    ASA 2     MAC     intravenous induction   Anesthetic plan and risks discussed with patient.

## 2017-09-18 NOTE — H&P
Progress Notes  Encounter Date: 9/18/2017  Aric Pina MD   Gastroenterology   Expand All Collapse All    []Hide copied text  []Claudia for attribution information  [facility]        Chief Complaint:        Chief Complaint   Patient presents with   • Results   • Colonoscopy       follow up from 05/24/2017            Subjective         HPI:   Patient for follow-up on colonoscopy.  Patient states some epigastric abdominal pain.     Plan; we'll schedule patient for EGD     Past Medical History:    Medical History         Past Medical History:   Diagnosis Date   • Adenomatous polyp of colon     • Anorectal abscess     • Anxiety disorder     • Benign polyp of large intestine     • Bladder fistula        post rapair      • Blood in urine     • Chronic obstructive lung disease     • Chronic urinary tract infection     • Depressive disorder     • Diarrhea     • Diverticulitis of colon        post colectomy      • Elevated levels of transaminase & lactic acid dehydrogenase     • Epigastric pain     • Generalized anxiety disorder     • Heavy tobacco smoker     • History of colon polyps     • Hyperlipidemia     • Insomnia     • Low back pain     • Lower urinary tract infectious disease     • Lymphadenopathy       ON CT   • Osteoporosis     • Peripheral nerve disease     • Polymyalgia rheumatica     • Polyp of colon     • Urinary tract infection     • Vitamin D deficiency              Family History:  History reviewed. No pertinent family history.     Social History:   reports that she has been smoking Cigars and Cigarettes.  She has been smoking about 1.00 pack per day. She has never used smokeless tobacco. She reports that she does not drink alcohol or use illicit drugs.     Medications:    Current Medications           Current Outpatient Prescriptions   Medication Sig Dispense Refill   • albuterol (PROVENTIL HFA) 108 (90 BASE) MCG/ACT inhaler Inhale 2 puffs 3 (Three) Times a Day.       •  "ATROVENT HFA 17 MCG/ACT inhaler Inhale 2 puffs 4 (Four) Times a Day. 12.9 g 11   • diazePAM (VALIUM) 5 MG tablet Take 1 tablet by mouth 2 (Two) Times a Day As Needed for Anxiety. 60 tablet 2   • dicyclomine (BENTYL) 20 MG tablet Take 20 mg by mouth 2 (Two) Times a Day.       • HYDROcodone-acetaminophen (NORCO)  MG per tablet Take 1 tablet by mouth Every 6 (Six) Hours As Needed for Moderate Pain (4-6). 120 tablet 0   • loratadine (CLARITIN) 10 MG tablet Take 10 mg by mouth Daily.   0   • predniSONE (DELTASONE) 10 MG tablet Take 10 mg by mouth Daily.       • raloxifene (EVISTA) 60 MG tablet Take 60 mg by mouth daily.          No current facility-administered medications for this visit.             Allergies:  Fosamax [alendronate]     ROS:    Review of Systems   Constitutional: Negative for activity change, appetite change, chills, diaphoresis, fatigue, fever and unexpected weight change.   HENT: Negative for sore throat and trouble swallowing.    Respiratory: Negative for shortness of breath.    Gastrointestinal: Negative for abdominal distention, abdominal pain, anal bleeding, blood in stool, constipation, diarrhea, nausea, rectal pain and vomiting.   Musculoskeletal: Negative for arthralgias.   Skin: Negative for pallor.   Neurological: Negative for light-headedness.         Objective          Blood pressure 124/80, pulse 98, height 64\" (162.6 cm), weight 138 lb 8 oz (62.8 kg).     Physical Exam   Constitutional: She is oriented to person, place, and time. She appears well-developed and well-nourished. No distress.   HENT:   Head: Normocephalic and atraumatic.   Cardiovascular: Normal rate, regular rhythm, normal heart sounds and intact distal pulses.  Exam reveals no gallop and no friction rub.    No murmur heard.  Pulmonary/Chest: Breath sounds normal. No respiratory distress. She has no wheezes. She has no rales. She exhibits no tenderness.   Abdominal: Soft. Bowel sounds are normal. She exhibits no " distension and no mass. There is no tenderness. There is no rebound and no guarding. No hernia.   Musculoskeletal: Normal range of motion. She exhibits no edema.   Neurological: She is alert and oriented to person, place, and time.   Skin: Skin is warm and dry. No rash noted. She is not diaphoretic. No erythema. No pallor.   Psychiatric: She has a normal mood and affect. Her behavior is normal. Judgment and thought content normal.            Assessment/Plan       Adriana was seen today for results and colonoscopy.     Diagnoses and all orders for this visit:     Epigastric pain  -     Case Request; Standing  -     sodium chloride 0.9 % flush 1-10 mL; Infuse 1-10 mL into a venous catheter As Needed for Line Care.  -     dextrose 5 % and sodium chloride 0.45 % infusion; Infuse 30 mL/hr into a venous catheter Continuous As Needed (Start Prior to Procedure).  -     Case Request     Other orders  -     Implement Anesthesia Orders Day of Procedure; Standing  -     Obtain Informed Consent; Standing  -     Insert Peripheral IV; Standing  -     Saline Lock & Maintain IV Access; Standing           ESOPHAGOGASTRODUODENOSCOPY (N/A)       Diagnosis Plan   1. Epigastric pain  Case Request     sodium chloride 0.9 % flush 1-10 mL     dextrose 5 % and sodium chloride 0.45 % infusion     Case Request         Anticipated Surgical Procedure:  No orders of the defined types were placed in this encounter.        The risks, benefits, and alternatives of this procedure have been discussed with the patient or the responsible party- the patient understands and agrees to proceed.                                                                                                           Office Visit on 7/13/2017              Detailed Report

## 2017-09-18 NOTE — PLAN OF CARE
Problem: Patient Care Overview (Adult)  Goal: Plan of Care Review  Outcome: Ongoing (interventions implemented as appropriate)    09/18/17 1230   Coping/Psychosocial Response Interventions   Plan Of Care Reviewed With patient   Patient Care Overview   Progress no change   Outcome Evaluation   Outcome Summary/Follow up Plan vidhya well

## 2017-09-21 ENCOUNTER — TELEPHONE (OUTPATIENT)
Dept: GASTROENTEROLOGY | Facility: CLINIC | Age: 62
End: 2017-09-21

## 2017-09-21 NOTE — TELEPHONE ENCOUNTER
09/21/2017, 1521 - Patient telephoned 09/20/2017 at 1122 submitting voice message to return telephone call.  Patient's telephone call is returned per this staff member [phone].  Patient requested EGD pathology results performed 09/18/2017.  Patient notified of requested pathology results as stated in the final diagnosis section of pathology procedure report.  Patient stated she continued to experience abdominal pain.  Patient instructed to seek emergency medical care at [facility] should symptoms persist or worsen.  Patient offered earlier clinical appointment date with Dr. Flower - Tuesday, October 17, 2017 at 2:00 p.m. in [place] versus Tuesday, November 21, 2017 in [place].  Patient declined offer at this time.

## 2017-09-22 ENCOUNTER — TELEPHONE (OUTPATIENT)
Dept: GASTROENTEROLOGY | Facility: CLINIC | Age: 62
End: 2017-09-22

## 2017-09-22 NOTE — TELEPHONE ENCOUNTER
09/22/2017, 0934 - Patient telephoned requesting earlier clinical appointment date with Dr. Flower stating she is experiencing abdominal pain.  Patient noted to have EGD performed 09/18/2017.  Patient noted to reside in [facility].  Patient clinical appointment scheduled Tuesday, September 26, 2017 at 10:15 a.m. at [place], [place] versus original clinical appointment date Tuesday, November 21, 2017 at 10:00 a.m. at [place], [place].  Patient verbalized understanding by repeating clinical appointment date/time.

## 2017-09-26 ENCOUNTER — OFFICE VISIT (OUTPATIENT)
Dept: GASTROENTEROLOGY | Facility: CLINIC | Age: 62
End: 2017-09-26

## 2017-09-26 VITALS
HEIGHT: 64 IN | DIASTOLIC BLOOD PRESSURE: 70 MMHG | WEIGHT: 133 LBS | HEART RATE: 97 BPM | BODY MASS INDEX: 22.71 KG/M2 | SYSTOLIC BLOOD PRESSURE: 100 MMHG

## 2017-09-26 DIAGNOSIS — R10.13 EPIGASTRIC PAIN: Primary | ICD-10-CM

## 2017-09-26 PROCEDURE — 99213 OFFICE O/P EST LOW 20 MIN: CPT | Performed by: INTERNAL MEDICINE

## 2017-09-26 RX ORDER — DEXLANSOPRAZOLE 60 MG/1
60 CAPSULE, DELAYED RELEASE ORAL DAILY
Qty: 30 CAPSULE | Refills: 0 | Status: SHIPPED | OUTPATIENT
Start: 2017-09-26 | End: 2017-09-27 | Stop reason: ALTCHOICE

## 2017-09-26 NOTE — PROGRESS NOTES
[facility]      Chief Complaint:   Chief Complaint   Patient presents with   • Abdominal Pain     Epigastric   • EGD Performed 09/18/2017       Subjective     HPI:   Patient with epigastric abdominal pain severe nature.  Patient had EGD which shows gastritis.    Plan; we'll start patient on a proton pump inhibitor to decrease patient's abdominal pain.    Past Medical History:   Past Medical History:   Diagnosis Date   • Adenomatous polyp of colon    • Anorectal abscess    • Anxiety disorder    • Benign polyp of large intestine    • Bladder fistula      post rapair      • Blood in urine    • Chronic obstructive lung disease    • Chronic urinary tract infection    • Depressive disorder    • Diarrhea    • Diverticulitis of colon      post colectomy      • Elevated levels of transaminase & lactic acid dehydrogenase    • Epigastric pain    • Generalized anxiety disorder    • Heavy tobacco smoker    • History of colon polyps    • Hyperlipidemia    • Insomnia    • Low back pain    • Lower urinary tract infectious disease    • Lymphadenopathy     ON CT   • Osteoporosis    • Peripheral nerve disease    • Polymyalgia rheumatica    • Polyp of colon    • Urinary tract infection    • Vitamin D deficiency        Family History:  History reviewed. No pertinent family history.    Social History:   reports that she has been smoking Cigars and Cigarettes.  She has been smoking about 1.00 pack per day. She has never used smokeless tobacco. She reports that she does not drink alcohol or use illicit drugs.    Medications:   Current Outpatient Prescriptions   Medication Sig Dispense Refill   • albuterol (PROVENTIL HFA) 108 (90 BASE) MCG/ACT inhaler Inhale 2 puffs 3 (Three) Times a Day.     • dicyclomine (BENTYL) 20 MG tablet Take 20 mg by mouth 2 (Two) Times a Day.     • fluticasone (FLONASE) 50 MCG/ACT nasal spray 2 sprays into each nostril Daily. 9.9 mL 2   • loratadine (CLARITIN) 10 MG tablet Take 10 mg by  "mouth Daily.  0   • predniSONE (DELTASONE) 10 MG tablet Take 10 mg by mouth Daily.     • ATROVENT HFA 17 MCG/ACT inhaler INHALE 2 PUFFS 4 (FOUR) TIMES A DAY. 12.9 g 11   • diazePAM (VALIUM) 5 MG tablet Take 1 tablet by mouth 2 (Two) Times a Day As Needed for Anxiety. 60 tablet 0   • HYDROcodone-acetaminophen (NORCO)  MG per tablet Take 1 tablet by mouth Every 6 (Six) Hours As Needed for Moderate Pain . 120 tablet 0   • omeprazole (priLOSEC) 20 MG capsule Take 1 capsule by mouth Daily. 30 capsule 5     No current facility-administered medications for this visit.        Allergies:  Fosamax [alendronate]    ROS:    Review of Systems   Constitutional: Negative for activity change, appetite change, chills, diaphoresis, fatigue, fever and unexpected weight change.   HENT: Negative for sore throat and trouble swallowing.    Respiratory: Negative for shortness of breath.    Gastrointestinal: Negative for abdominal distention, abdominal pain, anal bleeding, blood in stool, constipation, diarrhea, nausea, rectal pain and vomiting.   Musculoskeletal: Negative for arthralgias.   Skin: Negative for pallor.   Neurological: Negative for light-headedness.     Objective     Blood pressure 100/70, pulse 97, height 64\" (162.6 cm), weight 133 lb (60.3 kg).    Physical Exam   Constitutional: She is oriented to person, place, and time. She appears well-developed and well-nourished. No distress.   HENT:   Head: Normocephalic and atraumatic.   Cardiovascular: Normal rate, regular rhythm, normal heart sounds and intact distal pulses.  Exam reveals no gallop and no friction rub.    No murmur heard.  Pulmonary/Chest: Breath sounds normal. No respiratory distress. She has no wheezes. She has no rales. She exhibits no tenderness.   Abdominal: Soft. Bowel sounds are normal. She exhibits no distension and no mass. There is no tenderness. There is no rebound and no guarding. No hernia.   Musculoskeletal: Normal range of motion. She exhibits " no edema.   Neurological: She is alert and oriented to person, place, and time.   Skin: Skin is warm and dry. No rash noted. She is not diaphoretic. No erythema. No pallor.   Psychiatric: She has a normal mood and affect. Her behavior is normal. Judgment and thought content normal.        Assessment/Plan   Adriana was seen today for abdominal pain and egd performed 09/18/2017.    Diagnoses and all orders for this visit:    Epigastric pain    Other orders  -     Discontinue: dexlansoprazole (DEXILANT) 60 MG capsule; Take 1 capsule by mouth Daily for 30 days.      * Surgery not found *     Diagnosis Plan   1. Epigastric pain         Anticipated Surgical Procedure:  No orders of the defined types were placed in this encounter.      The risks, benefits, and alternatives of this procedure have been discussed with the patient or the responsible party- the patient understands and agrees to proceed.

## 2017-09-27 ENCOUNTER — TELEPHONE (OUTPATIENT)
Dept: GASTROENTEROLOGY | Facility: CLINIC | Age: 62
End: 2017-09-27

## 2017-09-27 DIAGNOSIS — R10.13 EPIGASTRIC PAIN: Primary | ICD-10-CM

## 2017-09-27 RX ORDER — OMEPRAZOLE 20 MG/1
20 CAPSULE, DELAYED RELEASE ORAL DAILY
Qty: 30 CAPSULE | Refills: 5 | Status: SHIPPED | OUTPATIENT
Start: 2017-09-27 | End: 2018-03-27 | Stop reason: SDUPTHER

## 2017-09-27 NOTE — TELEPHONE ENCOUNTER
09/27/2017, 1222 and 1229 - Patient telephoned per this staff member [phone] with notification of verbal order received per Dr. Flower regarding alternate prescription medication - Prilosec 20 MG, 1 capsule by mouth daily, #30, 5 renewals - due to patient's statement Dexilant 60 MG, 1 capsule by mouth daily, as prescribed per Dr. Flower 09/26/2017, not a covered prescription medication per patient's insurance benefits.  Patient confirmed pharmacy of choice, [facility], [place], [place].   Prilosec 20 MG, 1 capsule by mouth daily, #30, 5 renewals, e-scribed per this staff member to [facility], [place], [place].  Patient verbalized understanding.

## 2017-09-27 NOTE — TELEPHONE ENCOUNTER
----- Message from Rama Lorenz sent at 9/27/2017 11:51 AM CDT -----  Medication from yesterday not covered needs something different

## 2017-10-04 ENCOUNTER — OFFICE VISIT (OUTPATIENT)
Dept: FAMILY MEDICINE CLINIC | Facility: CLINIC | Age: 62
End: 2017-10-04

## 2017-10-04 VITALS
DIASTOLIC BLOOD PRESSURE: 70 MMHG | OXYGEN SATURATION: 98 % | WEIGHT: 131.3 LBS | HEART RATE: 101 BPM | BODY MASS INDEX: 22.42 KG/M2 | HEIGHT: 64 IN | SYSTOLIC BLOOD PRESSURE: 110 MMHG

## 2017-10-04 DIAGNOSIS — J42 CHRONIC BRONCHITIS, UNSPECIFIED CHRONIC BRONCHITIS TYPE (HCC): Primary | ICD-10-CM

## 2017-10-04 DIAGNOSIS — G89.29 CHRONIC BILATERAL LOW BACK PAIN WITH BILATERAL SCIATICA: ICD-10-CM

## 2017-10-04 DIAGNOSIS — M54.41 CHRONIC BILATERAL LOW BACK PAIN WITH BILATERAL SCIATICA: ICD-10-CM

## 2017-10-04 DIAGNOSIS — M54.42 CHRONIC BILATERAL LOW BACK PAIN WITH BILATERAL SCIATICA: ICD-10-CM

## 2017-10-04 DIAGNOSIS — F17.200 HEAVY TOBACCO SMOKER: ICD-10-CM

## 2017-10-04 PROCEDURE — 99214 OFFICE O/P EST MOD 30 MIN: CPT | Performed by: FAMILY MEDICINE

## 2017-10-04 RX ORDER — DIAZEPAM 5 MG/1
5 TABLET ORAL 2 TIMES DAILY PRN
Qty: 60 TABLET | Refills: 0 | Status: SHIPPED | OUTPATIENT
Start: 2017-10-04 | End: 2017-11-03 | Stop reason: SDUPTHER

## 2017-10-04 RX ORDER — IPRATROPIUM BROMIDE 17 UG/1
AEROSOL, METERED RESPIRATORY (INHALATION)
Qty: 12.9 G | Refills: 11 | Status: SHIPPED | OUTPATIENT
Start: 2017-10-04 | End: 2018-12-17 | Stop reason: SDUPTHER

## 2017-10-04 RX ORDER — HYDROCODONE BITARTRATE AND ACETAMINOPHEN 10; 325 MG/1; MG/1
1 TABLET ORAL EVERY 6 HOURS PRN
Qty: 120 TABLET | Refills: 0 | Status: SHIPPED | OUTPATIENT
Start: 2017-10-04 | End: 2017-11-03 | Stop reason: SDUPTHER

## 2017-10-04 NOTE — PROGRESS NOTES
Subjective   Adriana Nuñez is a 62 y.o. female.     Back Pain   This is a chronic problem. The current episode started more than 1 year ago. The problem has been waxing and waning since onset. The pain is present in the lumbar spine. The quality of the pain is described as aching. The pain radiates to the right knee and left knee. The pain is at a severity of 6/10. The pain is moderate. The pain is worse during the day. The symptoms are aggravated by position and bending. Pertinent negatives include no bladder incontinence or bowel incontinence.   Anxiety   Presents for follow-up visit. Symptoms include compulsions, excessive worry, nervous/anxious behavior and obsessions. Patient reports no depressed mood.       COPD   This is a chronic problem. The current episode started more than 1 year ago. The problem has been unchanged. Pertinent negatives include no congestion or coughing.        The following portions of the patient's history were reviewed and updated as appropriate: allergies, current medications, past family history, past medical history, past social history, past surgical history and problem list.    Review of Systems   HENT: Negative for congestion.    Respiratory: Negative for cough.    Gastrointestinal: Negative for bowel incontinence.   Genitourinary: Negative for bladder incontinence.   Musculoskeletal: Positive for back pain.   Psychiatric/Behavioral: The patient is nervous/anxious.        Objective   Physical Exam   Constitutional: She is oriented to person, place, and time. She appears well-developed and well-nourished. No distress.   HENT:   Head: Normocephalic and atraumatic.   Right Ear: Hearing and tympanic membrane normal.   Left Ear: Hearing and tympanic membrane normal.   Neck:       Cardiovascular: Regular rhythm.  Exam reveals distant heart sounds. Exam reveals no S4 and no friction rub.    No murmur heard.  Pulmonary/Chest: Effort normal. She has decreased breath sounds. She has no  wheezes. She has rhonchi. She has rales.   Musculoskeletal:        Lumbar back: She exhibits decreased range of motion, tenderness and pain. She exhibits no bony tenderness, no swelling, no edema, no deformity, no laceration and no spasm.   Neurological: She is alert and oriented to person, place, and time.   Skin: Skin is warm and dry. She is not diaphoretic.   Psychiatric: She has a normal mood and affect. Her behavior is normal. Judgment and thought content normal.   Nursing note and vitals reviewed.      Assessment/Plan   Problems Addressed this Visit        Respiratory    Chronic obstructive lung disease - Primary       Nervous and Auditory    Chronic bilateral low back pain with bilateral sciatica       Other    Heavy tobacco smoker               annie chavez OCD, declined treatment for it

## 2017-10-26 ENCOUNTER — TELEPHONE (OUTPATIENT)
Dept: GASTROENTEROLOGY | Facility: CLINIC | Age: 62
End: 2017-10-26

## 2017-10-26 NOTE — TELEPHONE ENCOUNTER
10/26/2017, 1628 - Telephone call is returned per this staff member [phone].  Zero answer.  Voice message submitted with date, time, office contact information, and instruction to return this staff member's telephone call at earliest convenience.

## 2017-10-26 NOTE — TELEPHONE ENCOUNTER
----- Message from Rama Lorenz sent at 10/26/2017  2:55 PM CDT -----  Please call harvey at [facility] regarding pt [phone]

## 2017-11-03 ENCOUNTER — OFFICE VISIT (OUTPATIENT)
Dept: FAMILY MEDICINE CLINIC | Facility: CLINIC | Age: 62
End: 2017-11-03

## 2017-11-03 VITALS
WEIGHT: 133.9 LBS | HEIGHT: 64 IN | SYSTOLIC BLOOD PRESSURE: 124 MMHG | BODY MASS INDEX: 22.86 KG/M2 | DIASTOLIC BLOOD PRESSURE: 72 MMHG | OXYGEN SATURATION: 98 % | HEART RATE: 98 BPM

## 2017-11-03 DIAGNOSIS — G89.29 CHRONIC BILATERAL LOW BACK PAIN WITH BILATERAL SCIATICA: Primary | ICD-10-CM

## 2017-11-03 DIAGNOSIS — M54.42 CHRONIC BILATERAL LOW BACK PAIN WITH BILATERAL SCIATICA: Primary | ICD-10-CM

## 2017-11-03 DIAGNOSIS — M35.3 POLYMYALGIA RHEUMATICA (HCC): ICD-10-CM

## 2017-11-03 DIAGNOSIS — Z13.1 DIABETES MELLITUS SCREENING: ICD-10-CM

## 2017-11-03 DIAGNOSIS — M54.41 CHRONIC BILATERAL LOW BACK PAIN WITH BILATERAL SCIATICA: Primary | ICD-10-CM

## 2017-11-03 DIAGNOSIS — R52 PAIN: Primary | ICD-10-CM

## 2017-11-03 DIAGNOSIS — K21.9 GASTROESOPHAGEAL REFLUX DISEASE WITHOUT ESOPHAGITIS: ICD-10-CM

## 2017-11-03 DIAGNOSIS — F41.1 GENERALIZED ANXIETY DISORDER: ICD-10-CM

## 2017-11-03 PROCEDURE — 99214 OFFICE O/P EST MOD 30 MIN: CPT | Performed by: FAMILY MEDICINE

## 2017-11-03 RX ORDER — HYDROCODONE BITARTRATE AND ACETAMINOPHEN 10; 325 MG/1; MG/1
1 TABLET ORAL EVERY 6 HOURS PRN
Qty: 120 TABLET | Refills: 0 | Status: SHIPPED | OUTPATIENT
Start: 2017-11-03 | End: 2017-11-03 | Stop reason: SDUPTHER

## 2017-11-03 RX ORDER — DIAZEPAM 5 MG/1
5 TABLET ORAL 2 TIMES DAILY PRN
Qty: 60 TABLET | Refills: 0 | Status: SHIPPED | OUTPATIENT
Start: 2017-11-03 | End: 2017-12-01 | Stop reason: SDUPTHER

## 2017-11-03 RX ORDER — HYDROCODONE BITARTRATE AND ACETAMINOPHEN 10; 325 MG/1; MG/1
1 TABLET ORAL EVERY 6 HOURS PRN
Qty: 120 TABLET | Refills: 0 | Status: SHIPPED | OUTPATIENT
Start: 2017-11-03 | End: 2017-12-01 | Stop reason: SDUPTHER

## 2017-11-03 NOTE — PROGRESS NOTES
Subjective   Adriana Nuñez is a 62 y.o. female.     Back Pain   This is a chronic problem. The current episode started more than 1 year ago. The problem has been waxing and waning since onset. The pain is present in the lumbar spine. The quality of the pain is described as aching. The pain radiates to the right knee and left knee. The pain is at a severity of 6/10. The pain is moderate. The pain is worse during the day. The symptoms are aggravated by position and bending. Associated symptoms include a fever (100). Pertinent negatives include no bladder incontinence or bowel incontinence.   Anxiety   Presents for follow-up visit. Symptoms include compulsions, excessive worry and nervous/anxious behavior. Patient reports no depressed mood.       Heartburn   She complains of heartburn. This is a chronic problem. The current episode started more than 1 year ago. The problem has been waxing and waning.        The following portions of the patient's history were reviewed and updated as appropriate: allergies, current medications, past family history, past medical history, past social history, past surgical history and problem list.    Review of Systems   Constitutional: Positive for fever (100).   Gastrointestinal: Positive for heartburn. Negative for bowel incontinence.   Genitourinary: Negative for bladder incontinence.   Musculoskeletal: Positive for back pain.   Psychiatric/Behavioral: The patient is nervous/anxious.        Objective   Physical Exam   Constitutional: She is oriented to person, place, and time. She appears well-developed and well-nourished. No distress.   HENT:   Head: Normocephalic and atraumatic.   Right Ear: Hearing and tympanic membrane normal.   Left Ear: Hearing and tympanic membrane normal.   Neck:       Cardiovascular: Normal rate, regular rhythm and normal heart sounds.  Exam reveals no gallop and no friction rub.    No murmur heard.  Pulmonary/Chest: Effort normal and breath sounds  normal. No respiratory distress. She has no wheezes. She has no rales. She exhibits no tenderness.   Abdominal: Soft. Bowel sounds are normal. She exhibits no distension. There is no tenderness.   Musculoskeletal:        Lumbar back: She exhibits decreased range of motion, tenderness and pain. She exhibits no bony tenderness, no swelling, no edema, no deformity, no laceration and no spasm.   Neurological: She is alert and oriented to person, place, and time.   Skin: Skin is warm and dry. She is not diaphoretic.   Psychiatric: She has a normal mood and affect. Her behavior is normal. Judgment and thought content normal.   Nursing note and vitals reviewed.      Assessment/Plan   Problems Addressed this Visit        Digestive    Gastroesophageal reflux disease without esophagitis    Relevant Orders    Vitamin B12    Magnesium       Nervous and Auditory    Chronic bilateral low back pain with bilateral sciatica - Primary       Other    Polymyalgia rheumatica    Generalized anxiety disorder    Relevant Medications    diazePAM (VALIUM) 5 MG tablet      Other Visit Diagnoses     Diabetes mellitus screening        Relevant Orders    Comprehensive Metabolic Panel             annie chavez OCD, declined treatment for it

## 2017-11-06 ENCOUNTER — OFFICE VISIT (OUTPATIENT)
Dept: ORTHOPEDIC SURGERY | Facility: CLINIC | Age: 62
End: 2017-11-06

## 2017-11-06 VITALS — HEIGHT: 64 IN | BODY MASS INDEX: 22.71 KG/M2 | WEIGHT: 133 LBS

## 2017-11-06 DIAGNOSIS — M53.3 COCCYX PAIN: ICD-10-CM

## 2017-11-06 DIAGNOSIS — M54.41 CHRONIC BILATERAL LOW BACK PAIN WITH BILATERAL SCIATICA: Primary | ICD-10-CM

## 2017-11-06 DIAGNOSIS — M54.42 CHRONIC BILATERAL LOW BACK PAIN WITH BILATERAL SCIATICA: Primary | ICD-10-CM

## 2017-11-06 DIAGNOSIS — G89.29 CHRONIC BILATERAL LOW BACK PAIN WITH BILATERAL SCIATICA: Primary | ICD-10-CM

## 2017-11-06 PROCEDURE — 99213 OFFICE O/P EST LOW 20 MIN: CPT | Performed by: NURSE PRACTITIONER

## 2017-11-06 NOTE — PROGRESS NOTES
Adriana Nuñez is a 62 y.o. female   Primary provider:  Donato Donis MD       Chief Complaint   Patient presents with   • Establish Care     Tailbone pain   • Results     patient brings xrays done @ [facility] 08/4/17       HISTORY OF PRESENT ILLNESS:    HPI Comments: 62-year-old  female into the office today for follow-up on the lumbar spine and pelvic pain with bilateral leg pain.  This patient is an extremely poor historian and during her presentation she rambles on about multiple complaints that she's experienced over the last 5-10 years including multiple bouts of bowel sepsis, colon resections and other gastrointestinal complaints.  After a long conversation she finally refocused on her lumbar spine pelvic and leg pain which has been occurring for several months and worsening.  She states that it feels like she has numbness, tingling, pain that starts at her knees and radiates into her feet without any evidence of a traumatic event in the past.  She's been evaluated by multiple providers his family referred her to us for further evaluation of her symptoms.    Lower Extremity Issue   This is a new problem. The current episode started more than 1 month ago. The problem occurs constantly. The problem has been gradually worsening. Associated symptoms comments: Burning and aching. Exacerbated by: sitting. She has tried nothing for the symptoms.        CONCURRENT MEDICAL HISTORY:    Past Medical History:   Diagnosis Date   • Adenomatous polyp of colon    • Anorectal abscess    • Anxiety disorder    • Benign polyp of large intestine    • Bladder fistula      post rapair      • Blood in urine    • Chronic obstructive lung disease    • Chronic urinary tract infection    • Depressive disorder    • Diarrhea    • Diverticulitis of colon      post colectomy      • Elevated levels of transaminase & lactic acid dehydrogenase    • Epigastric pain    • Generalized anxiety disorder    • Heavy tobacco smoker     • History of colon polyps    • Hyperlipidemia    • Insomnia    • Low back pain    • Lower urinary tract infectious disease    • Lymphadenopathy     ON CT   • Osteoporosis    • Peripheral nerve disease    • Polymyalgia rheumatica    • Polyp of colon    • Urinary tract infection    • Vitamin D deficiency        Allergies   Allergen Reactions   • Fosamax [Alendronate] GI Intolerance         Current Outpatient Prescriptions:   •  albuterol (PROVENTIL HFA) 108 (90 BASE) MCG/ACT inhaler, Inhale 2 puffs 3 (Three) Times a Day., Disp: , Rfl:   •  ATROVENT HFA 17 MCG/ACT inhaler, INHALE 2 PUFFS 4 (FOUR) TIMES A DAY., Disp: 12.9 g, Rfl: 11  •  diazePAM (VALIUM) 5 MG tablet, Take 1 tablet by mouth 2 (Two) Times a Day As Needed for Anxiety., Disp: 60 tablet, Rfl: 0  •  dicyclomine (BENTYL) 20 MG tablet, Take 20 mg by mouth 2 (Two) Times a Day., Disp: , Rfl:   •  fluticasone (FLONASE) 50 MCG/ACT nasal spray, 2 sprays into each nostril Daily., Disp: 9.9 mL, Rfl: 2  •  HYDROcodone-acetaminophen (NORCO)  MG per tablet, Take 1 tablet by mouth Every 6 (Six) Hours As Needed for Moderate Pain ., Disp: 120 tablet, Rfl: 0  •  loratadine (CLARITIN) 10 MG tablet, Take 10 mg by mouth Daily., Disp: , Rfl: 0  •  omeprazole (priLOSEC) 20 MG capsule, Take 1 capsule by mouth Daily., Disp: 30 capsule, Rfl: 5  •  predniSONE (DELTASONE) 10 MG tablet, Take 10 mg by mouth Daily., Disp: , Rfl:     Past Surgical History:   Procedure Laterality Date   • COLON RESECTION     • COLONOSCOPY  03/17/2014    polyps   • COLONOSCOPY  02/08/2016   • COLONOSCOPY N/A 5/24/2017    Procedure: COLONOSCOPY;  Surgeon: Aric Pina MD;  Location: [facility] ENDOSCOPY;  Service:    • ENDOSCOPY  01/26/2015    Normal esophagus. Gastritis was found in the stomach. Biopsy taken. Normal duodenum   • ENDOSCOPY N/A 9/18/2017    Procedure: ESOPHAGOGASTRODUODENOSCOPY;  Surgeon: Aric Pina MD;  Location: [facility] ENDOSCOPY;  Service:    • HYSTERECTOMY     • INJECTION OF  "MEDICATION  12/03/2010    KENALOG(3)   • INJECTION OF MEDICATION  11/07/2014    ROCEPHIN(1)   • UPPER GASTROINTESTINAL ENDOSCOPY  01/26/2015   • UPPER GASTROINTESTINAL ENDOSCOPY  09/18/2017       History reviewed. No pertinent family history.    Social History     Social History   • Marital status:      Spouse name: N/A   • Number of children: N/A   • Years of education: N/A     Occupational History   • Not on file.     Social History Main Topics   • Smoking status: Current Every Day Smoker     Packs/day: 1.00     Types: Cigars, Cigarettes   • Smokeless tobacco: Never Used      Comment: 09/26/2017 - Patient states she has utilized tobacco products periodically since the age of 11.   • Alcohol use No   • Drug use: No   • Sexual activity: Defer     Other Topics Concern   • Not on file     Social History Narrative        Review of Systems   Musculoskeletal: Positive for back pain.   All other systems reviewed and are negative.      PHYSICAL EXAMINATION:       Ht 64\" (162.6 cm)  Wt 133 lb (60.3 kg)  BMI 22.83 kg/m2    Physical Exam   Constitutional: She is oriented to person, place, and time. Vital signs are normal. She appears well-developed and well-nourished. She is cooperative.   HENT:   Head: Normocephalic and atraumatic.   Neck: Trachea normal and phonation normal.   Pulmonary/Chest: Effort normal. No respiratory distress.   Abdominal: Soft. Normal appearance. She exhibits no distension.   Neurological: She is alert and oriented to person, place, and time. GCS eye subscore is 4. GCS verbal subscore is 5. GCS motor subscore is 6.   Skin: Skin is warm, dry and intact.   Psychiatric: She has a normal mood and affect. Her speech is normal and behavior is normal. Judgment and thought content normal. Cognition and memory are normal.   Vitals reviewed.      GAIT:     []  Normal  []  Antalgic    Assistive device: []  None  []  Walker     []  Crutches  []  Cane     []  Wheelchair  []  Stretcher    Right Hip Exam "     Tenderness   The patient is experiencing tenderness in the greater trochanter.    Range of Motion   Flexion: normal   Internal Rotation: normal   External Rotation: normal   Abduction: normal     Muscle Strength   Abduction: 4/5   Adduction: 4/5   Flexion: 4/5     Other   Erythema: absent  Scars: absent  Sensation: normal  Pulse: present      Left Hip Exam     Tenderness   The patient is experiencing tenderness in the greater trochanter.    Range of Motion   Flexion: normal   Internal Rotation: normal   External Rotation: normal   Abduction: normal     Muscle Strength   Abduction: 4/5   Adduction: 4/5   Flexion: 4/5     Other   Erythema: absent  Scars: absent  Sensation: normal  Pulse: present      Back Exam     Tenderness   The patient is experiencing tenderness in the sacroiliac and lumbar.    Range of Motion   Extension: abnormal   Flexion: abnormal   Lateral Bend Right: abnormal   Lateral Bend Left: abnormal   Rotation Right: abnormal   Rotation Left: abnormal     Muscle Strength   Right Quadriceps:  4/5   Left Quadriceps:  4/5   Right Hamstrings:  4/5   Left Hamstrings:  4/5     Tests   Straight leg raise right: negative  Straight leg raise left: negative    Reflexes   Patellar: abnormal  Achilles: abnormal    Other   Toe Walk: abnormal  Heel Walk: abnormal  Sensation: decreased  Gait: abnormal   Erythema: no back redness  Scars: absent                  No results found.        ASSESSMENT:    Diagnoses and all orders for this visit:    Chronic bilateral low back pain with bilateral sciatica  -     MRI Pelvis Without Contrast; Future  -     MRI Lumbar Spine Without Contrast; Future  -     EMG & Nerve Conduction Test; Future    Coccyx pain  -     MRI Pelvis Without Contrast; Future  -     MRI Lumbar Spine Without Contrast; Future  -     EMG & Nerve Conduction Test; Future          PLAN  Recommend MRIs of the lumbar spine and pelvis and EMGs for further evaluation of pain    No Follow-up on file.    Kevin BHAGAT  Karrie, NAKUL

## 2017-11-10 DIAGNOSIS — M54.41 CHRONIC BILATERAL LOW BACK PAIN WITH BILATERAL SCIATICA: Primary | ICD-10-CM

## 2017-11-10 DIAGNOSIS — M54.42 CHRONIC BILATERAL LOW BACK PAIN WITH BILATERAL SCIATICA: Primary | ICD-10-CM

## 2017-11-10 DIAGNOSIS — G89.29 CHRONIC BILATERAL LOW BACK PAIN WITH BILATERAL SCIATICA: Primary | ICD-10-CM

## 2017-11-27 ENCOUNTER — TELEPHONE (OUTPATIENT)
Dept: GASTROENTEROLOGY | Facility: CLINIC | Age: 62
End: 2017-11-27

## 2017-11-27 NOTE — TELEPHONE ENCOUNTER
Patient phone call is returned. Patient is calling office stating that she is hurting in her right lower quad. She states that painful place is as big as her hand. She states that her bone doctor is running a MRI of her tailbone and she is wondering if  would be willing to order her an MRI of her sigmoid colon. Patient is made aware that  is not in office today and that it would be tomorrow before office could ask him to order this testing. Patient is made aware that if problems worsen or persist to seek emergency medical attention. Patient agrees.              ----- Message from Candie Gil sent at 11/27/2017 11:22 AM CST -----  Contact: [phone]  Patient would like you to call her is have mri of the butt bone on wed at 1045 and is wanting to know if cyril would order her to have mri of her sigmoid at the same time as the other mri. Patient states is having a lot pain, dirrhea.

## 2017-11-29 ENCOUNTER — HOSPITAL ENCOUNTER (OUTPATIENT)
Dept: MRI IMAGING | Facility: HOSPITAL | Age: 62
Discharge: HOME | End: 2017-11-29
Admitting: NURSE PRACTITIONER

## 2017-11-29 DIAGNOSIS — M53.3 COCCYX PAIN: ICD-10-CM

## 2017-11-29 DIAGNOSIS — M54.42 CHRONIC BILATERAL LOW BACK PAIN WITH BILATERAL SCIATICA: ICD-10-CM

## 2017-11-29 DIAGNOSIS — G89.29 CHRONIC BILATERAL LOW BACK PAIN WITH BILATERAL SCIATICA: ICD-10-CM

## 2017-11-29 DIAGNOSIS — M54.41 CHRONIC BILATERAL LOW BACK PAIN WITH BILATERAL SCIATICA: ICD-10-CM

## 2017-11-29 PROCEDURE — 72195 MRI PELVIS W/O DYE: CPT

## 2017-12-01 ENCOUNTER — OFFICE VISIT (OUTPATIENT)
Dept: FAMILY MEDICINE CLINIC | Facility: CLINIC | Age: 62
End: 2017-12-01

## 2017-12-01 VITALS
DIASTOLIC BLOOD PRESSURE: 80 MMHG | BODY MASS INDEX: 23.56 KG/M2 | OXYGEN SATURATION: 98 % | SYSTOLIC BLOOD PRESSURE: 110 MMHG | HEIGHT: 64 IN | WEIGHT: 138 LBS | HEART RATE: 116 BPM

## 2017-12-01 DIAGNOSIS — N39.0 URINARY TRACT INFECTION WITHOUT HEMATURIA, SITE UNSPECIFIED: ICD-10-CM

## 2017-12-01 DIAGNOSIS — M54.42 CHRONIC BILATERAL LOW BACK PAIN WITH BILATERAL SCIATICA: Primary | ICD-10-CM

## 2017-12-01 DIAGNOSIS — F41.1 GENERALIZED ANXIETY DISORDER: ICD-10-CM

## 2017-12-01 DIAGNOSIS — M54.41 CHRONIC BILATERAL LOW BACK PAIN WITH BILATERAL SCIATICA: Primary | ICD-10-CM

## 2017-12-01 DIAGNOSIS — G89.29 CHRONIC BILATERAL LOW BACK PAIN WITH BILATERAL SCIATICA: Primary | ICD-10-CM

## 2017-12-01 LAB
BILIRUB BLD-MCNC: NEGATIVE MG/DL
CLARITY, POC: ABNORMAL
COLOR UR: ABNORMAL
GLUCOSE UR STRIP-MCNC: NEGATIVE MG/DL
KETONES UR QL: NEGATIVE
LEUKOCYTE EST, POC: NEGATIVE
NITRITE UR-MCNC: NEGATIVE MG/ML
PH UR: 5 [PH] (ref 5–8)
PROT UR STRIP-MCNC: ABNORMAL MG/DL
RBC # UR STRIP: NEGATIVE /UL
SP GR UR: 1.03 (ref 1–1.03)
UROBILINOGEN UR QL: NORMAL

## 2017-12-01 PROCEDURE — 99214 OFFICE O/P EST MOD 30 MIN: CPT | Performed by: FAMILY MEDICINE

## 2017-12-01 RX ORDER — HYDROCODONE BITARTRATE AND ACETAMINOPHEN 10; 325 MG/1; MG/1
1 TABLET ORAL EVERY 6 HOURS PRN
Qty: 120 TABLET | Refills: 0 | Status: SHIPPED | OUTPATIENT
Start: 2017-12-01 | End: 2017-12-28 | Stop reason: SDUPTHER

## 2017-12-01 RX ORDER — ALBUTEROL SULFATE 90 UG/1
AEROSOL, METERED RESPIRATORY (INHALATION)
Qty: 18 G | Refills: 11 | Status: SHIPPED | OUTPATIENT
Start: 2017-12-01 | End: 2018-11-19 | Stop reason: SDUPTHER

## 2017-12-01 RX ORDER — DIAZEPAM 5 MG/1
5 TABLET ORAL 2 TIMES DAILY PRN
Qty: 60 TABLET | Refills: 0 | Status: SHIPPED | OUTPATIENT
Start: 2017-12-01 | End: 2017-12-28 | Stop reason: SDUPTHER

## 2017-12-01 RX ORDER — PREDNISONE 10 MG/1
TABLET ORAL
Qty: 30 TABLET | Refills: 12 | Status: SHIPPED | OUTPATIENT
Start: 2017-12-01 | End: 2018-12-06 | Stop reason: SDUPTHER

## 2017-12-01 NOTE — PROGRESS NOTES
Subjective   Adriana Nuñez is a 62 y.o. female.     Back Pain   This is a chronic problem. The current episode started more than 1 year ago. The problem has been waxing and waning since onset. The pain is present in the lumbar spine. The quality of the pain is described as aching. The pain radiates to the right knee and left knee. The pain is at a severity of 10/10. The pain is moderate. The pain is worse during the day. The symptoms are aggravated by position and bending. Associated symptoms include a fever (says 100). Pertinent negatives include no bladder incontinence or bowel incontinence.   Anxiety   Presents for follow-up visit. Symptoms include compulsions, excessive worry and nervous/anxious behavior. Patient reports no depressed mood.       Urinary Tract Infection    This is a recurrent problem. The current episode started in the past 7 days. The problem occurs every urination. The problem has been rapidly worsening. The quality of the pain is described as burning. The pain is mild. The maximum temperature recorded prior to her arrival was 100 - 100.9 F. Associated symptoms include frequency and hematuria. Pertinent negatives include no chills.        The following portions of the patient's history were reviewed and updated as appropriate: allergies, current medications, past family history, past medical history, past social history, past surgical history and problem list.    Review of Systems   Constitutional: Positive for fever (says 100). Negative for chills.   Gastrointestinal: Negative for bowel incontinence.   Genitourinary: Positive for frequency and hematuria. Negative for bladder incontinence.   Musculoskeletal: Positive for back pain.   Psychiatric/Behavioral: The patient is nervous/anxious.        Objective   Physical Exam   Constitutional: She is oriented to person, place, and time. She appears well-developed and well-nourished. No distress.   HENT:   Head: Normocephalic and atraumatic.    Right Ear: Hearing and tympanic membrane normal.   Left Ear: Hearing and tympanic membrane normal.   Abdominal: There is no tenderness. There is no CVA tenderness.   Musculoskeletal:        Lumbar back: She exhibits decreased range of motion, tenderness and pain. She exhibits no bony tenderness, no swelling, no edema, no deformity, no laceration and no spasm.   Neurological: She is alert and oriented to person, place, and time.   Reflex Scores:       Patellar reflexes are 2+ on the right side and 2+ on the left side.  Skin: Skin is warm and dry. She is not diaphoretic.   Psychiatric: Her speech is normal and behavior is normal. Judgment and thought content normal. Her mood appears anxious. Her affect is not angry, not blunt, not labile and not inappropriate. She does not exhibit a depressed mood.   Nursing note and vitals reviewed.      Assessment/Plan   Problems Addressed this Visit        Nervous and Auditory    Chronic bilateral low back pain with bilateral sciatica - Primary       Other    Generalized anxiety disorder    Relevant Medications    diazePAM (VALIUM) 5 MG tablet

## 2017-12-04 ENCOUNTER — OFFICE VISIT (OUTPATIENT)
Dept: ORTHOPEDIC SURGERY | Facility: CLINIC | Age: 62
End: 2017-12-04

## 2017-12-04 VITALS — WEIGHT: 133 LBS | BODY MASS INDEX: 22.71 KG/M2 | HEIGHT: 64 IN

## 2017-12-04 DIAGNOSIS — G89.29 CHRONIC BILATERAL LOW BACK PAIN WITH BILATERAL SCIATICA: ICD-10-CM

## 2017-12-04 DIAGNOSIS — G89.29 CHRONIC LOW BACK PAIN WITHOUT SCIATICA, UNSPECIFIED BACK PAIN LATERALITY: Primary | ICD-10-CM

## 2017-12-04 DIAGNOSIS — M54.42 CHRONIC BILATERAL LOW BACK PAIN WITH BILATERAL SCIATICA: ICD-10-CM

## 2017-12-04 DIAGNOSIS — M54.41 CHRONIC BILATERAL LOW BACK PAIN WITH BILATERAL SCIATICA: ICD-10-CM

## 2017-12-04 DIAGNOSIS — N75.0 BARTHOLIN'S CYST: ICD-10-CM

## 2017-12-04 DIAGNOSIS — M53.3 COCCYX PAIN: ICD-10-CM

## 2017-12-04 DIAGNOSIS — M54.50 CHRONIC LOW BACK PAIN WITHOUT SCIATICA, UNSPECIFIED BACK PAIN LATERALITY: Primary | ICD-10-CM

## 2017-12-04 PROCEDURE — 99214 OFFICE O/P EST MOD 30 MIN: CPT | Performed by: NURSE PRACTITIONER

## 2017-12-04 NOTE — PROGRESS NOTES
Adriana Nuñez is a 62 y.o. female returns for MRI results- Pelvis     Chief Complaint   Patient presents with   • Lumbar Spine - Follow-up   • Pelvis - Follow-up       HISTORY OF PRESENT ILLNESS: Patient returns to my office today to discuss MRI results- Pelvis. MRI was obtained 11/29/2017. Patient states that she has followed all directives given during her last office visit. Patient states that the pain is the same. Patient        CONCURRENT MEDICAL HISTORY:    Past Medical History:   Diagnosis Date   • Adenomatous polyp of colon    • Anorectal abscess    • Anxiety disorder    • Benign polyp of large intestine    • Bladder fistula      post rapair      • Blood in urine    • Chronic obstructive lung disease    • Chronic urinary tract infection    • Depressive disorder    • Diarrhea    • Diverticulitis of colon      post colectomy      • Elevated levels of transaminase & lactic acid dehydrogenase    • Epigastric pain    • Generalized anxiety disorder    • Heavy tobacco smoker    • History of colon polyps    • Hyperlipidemia    • Insomnia    • Low back pain    • Lower urinary tract infectious disease    • Lymphadenopathy     ON CT   • Osteoporosis    • Peripheral nerve disease    • Polymyalgia rheumatica    • Polyp of colon    • Urinary tract infection    • Vitamin D deficiency        Allergies   Allergen Reactions   • Fosamax [Alendronate] GI Intolerance         Current Outpatient Prescriptions:   •  albuterol (PROVENTIL HFA) 108 (90 BASE) MCG/ACT inhaler, Inhale 2 puffs 3 (Three) Times a Day., Disp: , Rfl:   •  ATROVENT HFA 17 MCG/ACT inhaler, INHALE 2 PUFFS 4 (FOUR) TIMES A DAY., Disp: 12.9 g, Rfl: 11  •  diazePAM (VALIUM) 5 MG tablet, Take 1 tablet by mouth 2 (Two) Times a Day As Needed for Anxiety., Disp: 60 tablet, Rfl: 0  •  dicyclomine (BENTYL) 20 MG tablet, Take 20 mg by mouth 2 (Two) Times a Day., Disp: , Rfl:   •  fluticasone (FLONASE) 50 MCG/ACT nasal spray, 2 sprays into each nostril Daily., Disp:  "9.9 mL, Rfl: 2  •  HYDROcodone-acetaminophen (NORCO)  MG per tablet, Take 1 tablet by mouth Every 6 (Six) Hours As Needed for Moderate Pain ., Disp: 120 tablet, Rfl: 0  •  loratadine (CLARITIN) 10 MG tablet, Take 10 mg by mouth Daily., Disp: , Rfl: 0  •  omeprazole (priLOSEC) 20 MG capsule, Take 1 capsule by mouth Daily., Disp: 30 capsule, Rfl: 5  •  predniSONE (DELTASONE) 10 MG tablet, TAKE ONE TABLET BY MOUTH EVERY DAY, Disp: 30 tablet, Rfl: 12  •  VENTOLIN  (90 Base) MCG/ACT inhaler, INHALE 1 PUFF BY MOUTH EVERY 8 HOURS AS NEEDED, Disp: 18 g, Rfl: 11    Past Surgical History:   Procedure Laterality Date   • COLON RESECTION     • COLONOSCOPY  03/17/2014    polyps   • COLONOSCOPY  02/08/2016   • COLONOSCOPY N/A 5/24/2017    Procedure: COLONOSCOPY;  Surgeon: Aric Pina MD;  Location: [facility] ENDOSCOPY;  Service:    • ENDOSCOPY  01/26/2015    Normal esophagus. Gastritis was found in the stomach. Biopsy taken. Normal duodenum   • ENDOSCOPY N/A 9/18/2017    Procedure: ESOPHAGOGASTRODUODENOSCOPY;  Surgeon: Aric Pina MD;  Location: [facility] ENDOSCOPY;  Service:    • HYSTERECTOMY     • INJECTION OF MEDICATION  12/03/2010    KENALOG(3)   • INJECTION OF MEDICATION  11/07/2014    ROCEPHIN(1)   • UPPER GASTROINTESTINAL ENDOSCOPY  01/26/2015   • UPPER GASTROINTESTINAL ENDOSCOPY  09/18/2017       ROS  No fevers or chills.  No chest pain or shortness of air.  No GI or  disturbances.    PHYSICAL EXAMINATION:       Ht 162.6 cm (64\")  Wt 60.3 kg (133 lb)  BMI 22.83 kg/m2    Physical Exam   Constitutional: She is oriented to person, place, and time. Vital signs are normal. She appears well-developed and well-nourished. She is cooperative.   HENT:   Head: Normocephalic and atraumatic.   Neck: Trachea normal and phonation normal.   Pulmonary/Chest: Effort normal. No respiratory distress.   Abdominal: Soft. Normal appearance. She exhibits no distension.   Neurological: She is alert and oriented to person, " place, and time. GCS eye subscore is 4. GCS verbal subscore is 5. GCS motor subscore is 6.   Skin: Skin is warm, dry and intact.   Psychiatric: She has a normal mood and affect. Her speech is normal and behavior is normal. Judgment and thought content normal. Cognition and memory are normal.   Vitals reviewed.      GAIT:     [x]  Normal  []  Antalgic    Assistive device: [x]  None  []  Walker     []  Crutches  []  Cane     []  Wheelchair  []  Stretcher    Right Hip Exam     Tenderness   The patient is experiencing tenderness in the greater trochanter.    Range of Motion   Flexion: normal   Internal Rotation: normal   External Rotation: normal   Abduction: normal     Muscle Strength   Abduction: 4/5   Adduction: 4/5   Flexion: 4/5     Other   Erythema: absent  Scars: absent  Sensation: normal  Pulse: present      Left Hip Exam     Tenderness   The patient is experiencing tenderness in the greater trochanter.    Range of Motion   Flexion: normal   Internal Rotation: normal   External Rotation: normal   Abduction: normal     Muscle Strength   Abduction: 4/5   Adduction: 4/5   Flexion: 4/5     Other   Erythema: absent  Scars: absent  Sensation: normal  Pulse: present      Back Exam     Tenderness   The patient is experiencing tenderness in the sacroiliac and lumbar.    Range of Motion   Extension: abnormal   Flexion: abnormal   Lateral Bend Right: abnormal   Lateral Bend Left: abnormal   Rotation Right: abnormal   Rotation Left: abnormal     Muscle Strength   Right Quadriceps:  4/5   Left Quadriceps:  4/5   Right Hamstrings:  4/5   Left Hamstrings:  4/5     Tests   Straight leg raise right: negative  Straight leg raise left: negative    Reflexes   Patellar: abnormal  Achilles: abnormal    Other   Toe Walk: abnormal  Heel Walk: abnormal  Sensation: decreased  Gait: abnormal   Erythema: no back redness  Scars: absent              Mri Pelvis Without Contrast    Result Date: 11/29/2017  Narrative: MRI pelvis without  contrast. HISTORY: Lower back pain and bilateral sciatica. Prior exam: CT abdomen pelvis March 30, 2017. PET CT Fabri 17, 2014. TECHNIQUE: Multiplanar multisequence noncontrast images of the right and left hips and pelvis. FINDINGS: Normal right and left hips. Normal marrow signal intensity. No bone edema or fracture. No avascular necrosis. No joint effusions. The visualized portions the bony pelvis and sacrum are normal. No pelvic soft tissue masses. There is incidental oval cystic lesion to the left of the lumbar spine at level of the L3-L4 disc space. This can be seen on prior imaging study CT March 30, 2017 and is unchanged, probably benign retroperitoneal cyst. There is an oval 1.84 cm cyst in the right labia probably incidental Bartholin's cyst. ( Series 3 image 26. Series 4 image 8.)     Impression: CONCLUSION: Stable benign left-sided retroperitoneal cyst similar to prior imaging studies. Incidental oval cyst right labia probably a Bartholin's cyst. Otherwise unremarkable MRI examination pelvis. Electronically signed by:  Candido Gilbert MD  11/29/2017 2:59 PM CST Workstation: [id number]            ASSESSMENT:    Diagnoses and all orders for this visit:    Chronic low back pain without sciatica, unspecified back pain laterality  -     Ambulatory Referral to Physical Therapy Evaluate and treat    Coccyx pain    Chronic bilateral low back pain with bilateral sciatica    Bartholin's cyst          PLAN  Recommend course of Pt/HEP for back and f/u in 6 weeks for recheck     No Follow-up on file.    Kevin Yoon, NAKUL

## 2017-12-06 ENCOUNTER — CONSULT (OUTPATIENT)
Dept: PHYSICAL THERAPY | Facility: CLINIC | Age: 62
End: 2017-12-06

## 2017-12-06 DIAGNOSIS — M54.50 CHRONIC BILATERAL LOW BACK PAIN WITHOUT SCIATICA: Primary | ICD-10-CM

## 2017-12-06 DIAGNOSIS — G89.29 CHRONIC BILATERAL LOW BACK PAIN WITHOUT SCIATICA: Primary | ICD-10-CM

## 2017-12-06 PROCEDURE — 97162 PT EVAL MOD COMPLEX 30 MIN: CPT | Performed by: PHYSICAL THERAPIST

## 2017-12-06 PROCEDURE — 97110 THERAPEUTIC EXERCISES: CPT | Performed by: PHYSICAL THERAPIST

## 2017-12-06 NOTE — PROGRESS NOTES
Outpatient Physical Therapy Ortho Initial Evaluation       Patient Name: Adriana Nuñez  : 1955  MRN: [id number]  Today's Date: 2017      Visit Date: 2017  Visit   Return to MD: 17  Re-cert date: 17  TIME IN 1022    TIME OUT 1055     Patient Active Problem List   Diagnosis   • Vitamin D deficiency   • Urinary tract infection   • \   • Polymyalgia rheumatica   • Peripheral nerve disease   • Chronic low back pain   • Insomnia   • Hyperlipidemia   • Heavy tobacco smoker   • Generalized anxiety disorder   • Elevated levels of transaminase & lactic acid dehydrogenase   • Depressive disorder   • Chronic obstructive lung disease   • Blood in urine seeing urology.   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   • GONZALES III (vulvar intraepithelial neoplasia III)   • Chronic bilateral low back pain with bilateral sciatica   • TMJ (dislocation of temporomandibular joint)   • Epigastric pain   • Gastroesophageal reflux disease without esophagitis   • Coccyx pain   • Bartholin's cyst        Past Medical History:   Diagnosis Date   • Adenomatous polyp of colon    • Anorectal abscess    • Anxiety disorder    • Benign polyp of large intestine    • Bladder fistula      post rapair      • Blood in urine    • Chronic obstructive lung disease    • Chronic urinary tract infection    • Depressive disorder    • Diarrhea    • Diverticulitis of colon      post colectomy      • Elevated levels of transaminase & lactic acid dehydrogenase    • Epigastric pain    • Generalized anxiety disorder    • Heavy tobacco smoker    • History of colon polyps    • Hyperlipidemia    • Insomnia    • Low back pain    • Lower urinary tract infectious disease    • Lymphadenopathy     ON CT   • Osteoporosis    • Peripheral nerve disease    • Polymyalgia rheumatica    • Polyp of colon    • Urinary tract  infection    • Vitamin D deficiency         Past Surgical History:   Procedure Laterality Date   • COLON RESECTION     • COLONOSCOPY  03/17/2014    polyps   • COLONOSCOPY  02/08/2016   • COLONOSCOPY N/A 5/24/2017    Procedure: COLONOSCOPY;  Surgeon: Aric Pina MD;  Location: [facility] ENDOSCOPY;  Service:    • ENDOSCOPY  01/26/2015    Normal esophagus. Gastritis was found in the stomach. Biopsy taken. Normal duodenum   • ENDOSCOPY N/A 9/18/2017    Procedure: ESOPHAGOGASTRODUODENOSCOPY;  Surgeon: Aric Pina MD;  Location: [facility] ENDOSCOPY;  Service:    • HYSTERECTOMY     • INJECTION OF MEDICATION  12/03/2010    KENALOG(3)   • INJECTION OF MEDICATION  11/07/2014    ROCEPHIN(1)   • UPPER GASTROINTESTINAL ENDOSCOPY  01/26/2015   • UPPER GASTROINTESTINAL ENDOSCOPY  09/18/2017       Visit Dx:     ICD-10-CM ICD-9-CM   1. Chronic bilateral low back pain without sciatica M54.5 724.2    G89.29 338.29     Outpatient Medications     albuterol (PROVENTIL HFA) 108 (90 BASE) MCG/ACT inhaler      ATROVENT HFA 17 MCG/ACT inhaler      diazePAM (VALIUM) 5 MG tablet      dicyclomine (BENTYL) 20 MG tablet      fluticasone (FLONASE) 50 MCG/ACT nasal spray      HYDROcodone-acetaminophen (NORCO)  MG per tablet      loratadine (CLARITIN) 10 MG tablet      omeprazole (priLOSEC) 20 MG capsule      predniSONE (DELTASONE) 10 MG tablet      VENTOLIN  (90 Base) MCG/ACT inhaler      Allergies: Fosamax  Precautions: cognitive impairment-severe difficulty following commands, nonsensical speech     Subjective Evaluation    History of Present Illness  Mechanism of injury: 63 yo female with chronic LBP for many years, onset of B LE radicular symptoms with B feet and lower legs starting in February 2017. Reports worsening LBP since then.     Quality of life: fair    Pain  Current pain rating: 10  At worst pain rating: 10  Location: LBP  Quality: throbbing  Relieving factors: heat  Aggravating factors: movement and prolonged  positioning (sitting > 15 min)  Progression: worsening    Social Support  Lives in: one-story house  Lives with: alone    Hand dominance: right    Treatments  Current treatment: physical therapy  Patient Goals  Patient goals for therapy: decreased pain and increased strength                  Strength RLE  R Hip Flexion: 5/5  R Knee Flexion: 5/5  R Knee Extension: 5/5  R Ankle Dorsiflexion: 5/5  R Ankle Plantar Flexion: 5/5  1st Toe Extension: 5/5              Strength LLE  L Hip Flexion: 5/5  L Knee Flexion: 5/5  L Knee Extension: 5/5  L Ankle Dorsiflexion: 5/5  L Ankle Plantar Flexion: 5/5  1st Toe Extension: 5/5                  Body Part  Body Part: Lumbar               Lumbar Spine Range of Motion  Lumbar Spine Flexion:  (min limit 75% )  Lumbar Spine Extension:  (mod limit 50%)  Lumbar Spine Sidebend Left:  (%)  Lumbar Spine Sidebend Right:  (WNL)  Lumbar Spine Rotation Left:  (75% min limit)  Lumbar Spine Rotation Right:  (WNL)     Lumbar Special Tests  SLR (Neural Tension): Bilateral:, Negative                       Exercises       12/06/17 1045          Exercise 1    Exercise Name 1 supine piriformis stretch  -BS        User Key  (r) = Recorded By, (t) = Taken By, (c) = Cosigned By    Initials Name Provider Type    DHRUV Kitchen PT Physical Therapist           [place]rapeutic ex [id number]: 15 minutes        Assessment & Plan     Assessment  Impairments: abnormal or restricted ROM, lacks appropriate home exercise program and pain with function  Assessment details: Chronic LBP due to core trunk instability. No evidence of B LE radiculopathy, negative special test findings for adverse neural tissue tension (-SLR bilat).    Functional Limitations: uncomfortable because of pain and sitting  Goals  Plan Goals: Goals: (2-3 weeks)  1. Pt I with HEP for core trunk stability.  2. Improve lumbar extension AROM to %.  3. Improve core trunk stability to WNL.  4. Reduce LBP to minimal level (2/10) with no c/o LE  radicular symptoms.    Plan  Therapy options: will be seen for skilled physical therapy services  Planned modality interventions: cryotherapy, electrical stimulation/Russian stimulation and thermotherapy (hydrocollator packs)  Planned therapy interventions: manual therapy, postural training, soft tissue mobilization, spinal/joint mobilization, strengthening, joint mobilization, home exercise program and flexibility  Frequency: 2x week  Duration in weeks: 3  Treatment plan discussed with: patient  Plan details: F/u with core trunk stabilization.         Time Calculation: 33                      Marvel Kitchen, PT  12/6/2017        Adriana Nuñez    1955

## 2017-12-13 ENCOUNTER — TREATMENT (OUTPATIENT)
Dept: PHYSICAL THERAPY | Facility: CLINIC | Age: 62
End: 2017-12-13

## 2017-12-13 DIAGNOSIS — M54.50 CHRONIC BILATERAL LOW BACK PAIN WITHOUT SCIATICA: Primary | ICD-10-CM

## 2017-12-13 DIAGNOSIS — G89.29 CHRONIC BILATERAL LOW BACK PAIN WITHOUT SCIATICA: Primary | ICD-10-CM

## 2017-12-13 PROCEDURE — 97110 THERAPEUTIC EXERCISES: CPT | Performed by: PHYSICAL THERAPIST

## 2017-12-13 NOTE — PROGRESS NOTES
"Daily Treatment Note    Time In: 1006      Time Out: 1040    ICD-10-CM ICD-9-CM   1. Chronic bilateral low back pain without sciatica M54.5 724.2    G89.29 338.29       Subjective   Pt reports doing HEP at least once a day.  Patient reports to therapy 10/10 pain, and  0% improvement.  Attendance  2/2 visits. Recert 1/3/18. MD f/u 12/28.      Objective        AROM:                                  PROCEDURES AND MODALITIES:       Moist Heat  MH Applied: Yes  Location: low back  Rx Minutes: 10 mins  MH Prior to Rx: Yes                             EXERCISE  Exercise 1  Exercise Name 1: supine piriformis stretch  Sets/Reps 1: 2/30\"  Exercise 2  Exercise Name 2: supine HS stretch  Sets/Reps 2: 2/30\" Exercise 3  Exercise Name 3: DKC stretch  Sets/Reps 3: 1/10 Exercise 4  Exercise Name 4: bridging  Sets/Reps 4: 2/10 Exercise 5  Exercise Name 5: LTR  Sets/Reps 5: 1/10 Exercise 6  Exercise Name 6: B SLR  Sets/Reps 6: 1/10   Exercise 7  Exercise Name 7: bike   Time 7: 3'                                             Therapy Exercise 70601 24 minutes    Total Treatment Time: 34 Minutes    Assessment/Plan   Pt with no change in low back pain in response to LE stretches and lumbar flexibility shown today. Pt limited by cognitive deficits, distractible and unable to stay on tasks well, perseverative on need for MRI to address c/o B LE radicular symptoms.  Will address core trunk stability and stretch lumbar paraspinals as able.           Marvel Kitchen, PT  Physical Therapist    "

## 2017-12-28 ENCOUNTER — OFFICE VISIT (OUTPATIENT)
Dept: FAMILY MEDICINE CLINIC | Facility: CLINIC | Age: 62
End: 2017-12-28

## 2017-12-28 VITALS
OXYGEN SATURATION: 98 % | BODY MASS INDEX: 23.31 KG/M2 | SYSTOLIC BLOOD PRESSURE: 118 MMHG | HEART RATE: 93 BPM | DIASTOLIC BLOOD PRESSURE: 70 MMHG | HEIGHT: 64 IN | WEIGHT: 136.5 LBS

## 2017-12-28 DIAGNOSIS — M54.41 CHRONIC BILATERAL LOW BACK PAIN WITH BILATERAL SCIATICA: ICD-10-CM

## 2017-12-28 DIAGNOSIS — F41.1 GENERALIZED ANXIETY DISORDER: Primary | ICD-10-CM

## 2017-12-28 DIAGNOSIS — G89.29 CHRONIC BILATERAL LOW BACK PAIN WITH BILATERAL SCIATICA: ICD-10-CM

## 2017-12-28 DIAGNOSIS — M54.42 CHRONIC BILATERAL LOW BACK PAIN WITH BILATERAL SCIATICA: ICD-10-CM

## 2017-12-28 DIAGNOSIS — M35.3 POLYMYALGIA RHEUMATICA (HCC): ICD-10-CM

## 2017-12-28 PROCEDURE — 99214 OFFICE O/P EST MOD 30 MIN: CPT | Performed by: FAMILY MEDICINE

## 2017-12-28 RX ORDER — DIAZEPAM 5 MG/1
5 TABLET ORAL 2 TIMES DAILY PRN
Qty: 60 TABLET | Refills: 0 | Status: SHIPPED | OUTPATIENT
Start: 2017-12-28 | End: 2018-01-25 | Stop reason: SDUPTHER

## 2017-12-28 RX ORDER — HYDROCODONE BITARTRATE AND ACETAMINOPHEN 10; 325 MG/1; MG/1
1 TABLET ORAL EVERY 6 HOURS PRN
Qty: 120 TABLET | Refills: 0 | Status: SHIPPED | OUTPATIENT
Start: 2017-12-28 | End: 2018-01-25 | Stop reason: SDUPTHER

## 2017-12-28 NOTE — PROGRESS NOTES
Subjective   Adriana Nuñez is a 62 y.o. female.     Back Pain   This is a chronic problem. The current episode started more than 1 year ago. The problem has been waxing and waning since onset. The pain is present in the lumbar spine. The quality of the pain is described as aching. The pain radiates to the right knee and left knee. The pain is at a severity of 10/10. The pain is moderate. The pain is worse during the day. The symptoms are aggravated by position and bending. Pertinent negatives include no bladder incontinence, bowel incontinence or fever.   Anxiety   Presents for follow-up visit. Symptoms include compulsions, excessive worry and nervous/anxious behavior. Patient reports no depressed mood.            The following portions of the patient's history were reviewed and updated as appropriate: allergies, current medications, past family history, past medical history, past social history, past surgical history and problem list.    Review of Systems   Constitutional: Negative for fever.   Gastrointestinal: Negative for bowel incontinence.   Genitourinary: Negative for bladder incontinence.   Musculoskeletal: Positive for back pain.   Psychiatric/Behavioral: The patient is nervous/anxious.        Objective   Physical Exam   Constitutional: She is oriented to person, place, and time. She appears well-developed and well-nourished. No distress.   HENT:   Head: Normocephalic and atraumatic.   Right Ear: Hearing and tympanic membrane normal.   Left Ear: Hearing and tympanic membrane normal.   Pulmonary/Chest: Effort normal and breath sounds normal.   Musculoskeletal:        Lumbar back: She exhibits decreased range of motion, tenderness and pain. She exhibits no bony tenderness, no swelling, no edema, no deformity, no laceration and no spasm.   Neurological: She is alert and oriented to person, place, and time.   Reflex Scores:       Patellar reflexes are 2+ on the right side and 2+ on the left side.  Skin:  Skin is warm and dry. She is not diaphoretic.   Psychiatric: She has a normal mood and affect. Her behavior is normal. Judgment and thought content normal.   Nursing note and vitals reviewed.      Assessment/Plan   Problems Addressed this Visit        Nervous and Auditory    Chronic bilateral low back pain with bilateral sciatica       Other    Polymyalgia rheumatica    Generalized anxiety disorder - Primary    Relevant Medications    diazePAM (VALIUM) 5 MG tablet             annie pect OCD, declined treatment for it

## 2018-01-25 ENCOUNTER — OFFICE VISIT (OUTPATIENT)
Dept: FAMILY MEDICINE CLINIC | Facility: CLINIC | Age: 63
End: 2018-01-25

## 2018-01-25 ENCOUNTER — OFFICE VISIT (OUTPATIENT)
Dept: ORTHOPEDIC SURGERY | Facility: CLINIC | Age: 63
End: 2018-01-25

## 2018-01-25 ENCOUNTER — LAB (OUTPATIENT)
Dept: LAB | Facility: HOSPITAL | Age: 63
End: 2018-01-25

## 2018-01-25 VITALS — BODY MASS INDEX: 22.66 KG/M2 | HEIGHT: 64 IN | WEIGHT: 132.72 LBS

## 2018-01-25 VITALS
WEIGHT: 132.7 LBS | OXYGEN SATURATION: 98 % | HEIGHT: 64 IN | BODY MASS INDEX: 22.65 KG/M2 | HEART RATE: 103 BPM | SYSTOLIC BLOOD PRESSURE: 122 MMHG | DIASTOLIC BLOOD PRESSURE: 80 MMHG

## 2018-01-25 DIAGNOSIS — M54.42 CHRONIC BILATERAL LOW BACK PAIN WITH BILATERAL SCIATICA: Primary | ICD-10-CM

## 2018-01-25 DIAGNOSIS — F41.1 GENERALIZED ANXIETY DISORDER: ICD-10-CM

## 2018-01-25 DIAGNOSIS — M54.41 CHRONIC BILATERAL LOW BACK PAIN WITH BILATERAL SCIATICA: Primary | ICD-10-CM

## 2018-01-25 DIAGNOSIS — G89.29 CHRONIC BILATERAL LOW BACK PAIN WITH BILATERAL SCIATICA: ICD-10-CM

## 2018-01-25 DIAGNOSIS — M54.42 CHRONIC BILATERAL LOW BACK PAIN WITH BILATERAL SCIATICA: ICD-10-CM

## 2018-01-25 DIAGNOSIS — M54.41 CHRONIC BILATERAL LOW BACK PAIN WITH BILATERAL SCIATICA: ICD-10-CM

## 2018-01-25 DIAGNOSIS — G89.29 CHRONIC BILATERAL LOW BACK PAIN WITH BILATERAL SCIATICA: Primary | ICD-10-CM

## 2018-01-25 DIAGNOSIS — F17.200 HEAVY TOBACCO SMOKER: Primary | ICD-10-CM

## 2018-01-25 LAB
ALBUMIN SERPL-MCNC: 4.3 G/DL (ref 3.4–4.8)
ALBUMIN/GLOB SERPL: 1.5 G/DL (ref 1.1–1.8)
ALP SERPL-CCNC: 121 U/L (ref 38–126)
ALT SERPL W P-5'-P-CCNC: 41 U/L (ref 9–52)
ANION GAP SERPL CALCULATED.3IONS-SCNC: 10 MMOL/L (ref 5–15)
AST SERPL-CCNC: 62 U/L (ref 14–36)
BILIRUB SERPL-MCNC: 0.3 MG/DL (ref 0.2–1.3)
BUN BLD-MCNC: 15 MG/DL (ref 7–21)
BUN/CREAT SERPL: 25 (ref 7–25)
CALCIUM SPEC-SCNC: 9.7 MG/DL (ref 8.4–10.2)
CHLORIDE SERPL-SCNC: 102 MMOL/L (ref 95–110)
CO2 SERPL-SCNC: 27 MMOL/L (ref 22–31)
CREAT BLD-MCNC: 0.6 MG/DL (ref 0.5–1)
GFR SERPL CREATININE-BSD FRML MDRD: 101 ML/MIN/1.73 (ref 45–104)
GLOBULIN UR ELPH-MCNC: 2.8 GM/DL (ref 2.3–3.5)
GLUCOSE BLD-MCNC: 95 MG/DL (ref 60–100)
MAGNESIUM SERPL-MCNC: 2.3 MG/DL (ref 1.6–2.3)
POTASSIUM BLD-SCNC: 4.8 MMOL/L (ref 3.5–5.1)
PROT SERPL-MCNC: 7.1 G/DL (ref 6.3–8.6)
SODIUM BLD-SCNC: 139 MMOL/L (ref 137–145)
VIT B12 BLD-MCNC: 879 PG/ML (ref 239–931)

## 2018-01-25 PROCEDURE — 80307 DRUG TEST PRSMV CHEM ANLYZR: CPT

## 2018-01-25 PROCEDURE — 36415 COLL VENOUS BLD VENIPUNCTURE: CPT | Performed by: FAMILY MEDICINE

## 2018-01-25 PROCEDURE — 99214 OFFICE O/P EST MOD 30 MIN: CPT | Performed by: NURSE PRACTITIONER

## 2018-01-25 PROCEDURE — 83735 ASSAY OF MAGNESIUM: CPT | Performed by: FAMILY MEDICINE

## 2018-01-25 PROCEDURE — 82607 VITAMIN B-12: CPT | Performed by: FAMILY MEDICINE

## 2018-01-25 PROCEDURE — 99214 OFFICE O/P EST MOD 30 MIN: CPT | Performed by: FAMILY MEDICINE

## 2018-01-25 PROCEDURE — 80053 COMPREHEN METABOLIC PANEL: CPT | Performed by: FAMILY MEDICINE

## 2018-01-25 RX ORDER — HYDROCODONE BITARTRATE AND ACETAMINOPHEN 10; 325 MG/1; MG/1
1 TABLET ORAL EVERY 6 HOURS PRN
Qty: 120 TABLET | Refills: 0 | Status: SHIPPED | OUTPATIENT
Start: 2018-01-25 | End: 2018-02-22 | Stop reason: SDUPTHER

## 2018-01-25 RX ORDER — METRONIDAZOLE 500 MG/1
500 TABLET ORAL 2 TIMES DAILY
COMMUNITY
Start: 2018-01-23 | End: 2018-01-29

## 2018-01-25 RX ORDER — GUAIFENESIN 600 MG/1
1200 TABLET, EXTENDED RELEASE ORAL EVERY 12 HOURS SCHEDULED
Qty: 40 TABLET | Refills: 0 | Status: SHIPPED | OUTPATIENT
Start: 2018-01-25 | End: 2018-02-04

## 2018-01-25 RX ORDER — FLUTICASONE PROPIONATE 50 MCG
2 SPRAY, SUSPENSION (ML) NASAL DAILY
Qty: 9.9 ML | Refills: 2 | Status: SHIPPED | OUTPATIENT
Start: 2018-01-25 | End: 2019-02-07 | Stop reason: SDUPTHER

## 2018-01-25 RX ORDER — DIAZEPAM 5 MG/1
5 TABLET ORAL 2 TIMES DAILY PRN
Qty: 60 TABLET | Refills: 0 | Status: SHIPPED | OUTPATIENT
Start: 2018-01-25 | End: 2018-02-22 | Stop reason: SDUPTHER

## 2018-01-25 NOTE — PROGRESS NOTES
Adirana Nuñez is a 62 y.o. female returns for     Chief Complaint   Patient presents with   • Pelvis - Follow-up   • Lumbar Spine - Follow-up       HISTORY OF PRESENT ILLNESS:     62-year-old  female into the office today for continued back and leg pain which has not improved despite previous treatment since November.  Patient is a poor historian       CONCURRENT MEDICAL HISTORY:    Past Medical History:   Diagnosis Date   • Adenomatous polyp of colon    • Anorectal abscess    • Anxiety disorder    • Benign polyp of large intestine    • Bladder fistula      post rapair      • Blood in urine    • Chronic obstructive lung disease    • Chronic urinary tract infection    • Depressive disorder    • Diarrhea    • Diverticulitis of colon      post colectomy      • Elevated levels of transaminase & lactic acid dehydrogenase    • Epigastric pain    • Generalized anxiety disorder    • Heavy tobacco smoker    • History of colon polyps    • Hyperlipidemia    • Insomnia    • Low back pain    • Lower urinary tract infectious disease    • Lymphadenopathy     ON CT   • Osteoporosis    • Peripheral nerve disease    • Polymyalgia rheumatica    • Polyp of colon    • Urinary tract infection    • Vitamin D deficiency        Allergies   Allergen Reactions   • Fosamax [Alendronate] GI Intolerance         Current Outpatient Prescriptions:   •  albuterol (PROVENTIL HFA) 108 (90 BASE) MCG/ACT inhaler, Inhale 2 puffs 3 (Three) Times a Day., Disp: , Rfl:   •  ATROVENT HFA 17 MCG/ACT inhaler, INHALE 2 PUFFS 4 (FOUR) TIMES A DAY., Disp: 12.9 g, Rfl: 11  •  diazePAM (VALIUM) 5 MG tablet, Take 1 tablet by mouth 2 (Two) Times a Day As Needed for Anxiety., Disp: 60 tablet, Rfl: 0  •  dicyclomine (BENTYL) 20 MG tablet, Take 20 mg by mouth 2 (Two) Times a Day., Disp: , Rfl:   •  fluticasone (FLONASE) 50 MCG/ACT nasal spray, 2 sprays into each nostril Daily., Disp: 9.9 mL, Rfl: 2  •  guaiFENesin (MUCINEX) 600 MG 12 hr tablet, Take 2  "tablets by mouth Every 12 (Twelve) Hours for 10 days., Disp: 40 tablet, Rfl: 0  •  HYDROcodone-acetaminophen (NORCO)  MG per tablet, Take 1 tablet by mouth Every 6 (Six) Hours As Needed for Moderate Pain ., Disp: 120 tablet, Rfl: 0  •  loratadine (CLARITIN) 10 MG tablet, Take 10 mg by mouth Daily., Disp: , Rfl: 0  •  metroNIDAZOLE (FLAGYL) 500 MG tablet, Take 500 mg by mouth 2 (Two) Times a Day., Disp: , Rfl:   •  omeprazole (priLOSEC) 20 MG capsule, Take 1 capsule by mouth Daily., Disp: 30 capsule, Rfl: 5  •  predniSONE (DELTASONE) 10 MG tablet, TAKE ONE TABLET BY MOUTH EVERY DAY, Disp: 30 tablet, Rfl: 12  •  VENTOLIN  (90 Base) MCG/ACT inhaler, INHALE 1 PUFF BY MOUTH EVERY 8 HOURS AS NEEDED, Disp: 18 g, Rfl: 11    Past Surgical History:   Procedure Laterality Date   • COLON RESECTION     • COLONOSCOPY  03/17/2014    polyps   • COLONOSCOPY  02/08/2016   • COLONOSCOPY N/A 5/24/2017    Procedure: COLONOSCOPY;  Surgeon: Aric Pina MD;  Location: [facility] ENDOSCOPY;  Service:    • ENDOSCOPY  01/26/2015    Normal esophagus. Gastritis was found in the stomach. Biopsy taken. Normal duodenum   • ENDOSCOPY N/A 9/18/2017    Procedure: ESOPHAGOGASTRODUODENOSCOPY;  Surgeon: Aric Pina MD;  Location: [facility] ENDOSCOPY;  Service:    • HYSTERECTOMY     • INJECTION OF MEDICATION  12/03/2010    KENALOG(3)   • INJECTION OF MEDICATION  11/07/2014    ROCEPHIN(1)   • UPPER GASTROINTESTINAL ENDOSCOPY  01/26/2015   • UPPER GASTROINTESTINAL ENDOSCOPY  09/18/2017       ROS  No fevers or chills.  No chest pain or shortness of air.  No GI or  disturbances.    PHYSICAL EXAMINATION:       Ht 162.2 cm (63.86\")  Wt 60.2 kg (132 lb 11.5 oz)  BMI 22.88 kg/m2    Physical Exam   Constitutional: She is oriented to person, place, and time. Vital signs are normal. She appears well-developed and well-nourished. She is cooperative.   HENT:   Head: Normocephalic and atraumatic.   Neck: Trachea normal and phonation normal. "   Pulmonary/Chest: Effort normal. No respiratory distress.   Abdominal: Soft. Normal appearance. She exhibits no distension.   Neurological: She is alert and oriented to person, place, and time. GCS eye subscore is 4. GCS verbal subscore is 5. GCS motor subscore is 6.   Skin: Skin is warm, dry and intact.   Psychiatric: She has a normal mood and affect. Her speech is normal and behavior is normal. Judgment and thought content normal. Cognition and memory are normal.   Vitals reviewed.      GAIT:     []  Normal  [x]  Antalgic    Assistive device: []  None  []  Walker     []  Crutches  []  Cane     []  Wheelchair  []  Stretcher    Back Exam     Tenderness   The patient is experiencing tenderness in the sacroiliac and lumbar.    Range of Motion   Extension: abnormal   Flexion: abnormal   Lateral Bend Right: abnormal   Lateral Bend Left: abnormal   Rotation Right: abnormal   Rotation Left: abnormal     Muscle Strength   Right Quadriceps:  4/5   Left Quadriceps:  4/5   Right Hamstrings:  4/5   Left Hamstrings:  4/5     Tests   Straight leg raise right: positive  Straight leg raise left: positive    Other   Toe Walk: abnormal  Heel Walk: abnormal  Sensation: decreased  Gait: antalgic   Erythema: no back redness  Scars: absent              No results found.          ASSESSMENT:    Diagnoses and all orders for this visit:    Chronic bilateral low back pain with bilateral sciatica  -     NM Bone Scan Whole Body; Future  -     MRI Lumbar Spine Without Contrast; Future          PLAN  Recommend MRI of the lumbar spine and a bone scan for further evaluation of diffuse lower extremity and back pain which hasn't improved.  Patient also complains of ear pain today and she was encouraged to follow up in urgent care for pain continues.  No Follow-up on file.    NAKUL Lee

## 2018-01-25 NOTE — PROGRESS NOTES
Subjective   Adriana Nuñez is a 62 y.o. female.     Back Pain   This is a chronic problem. The current episode started more than 1 year ago. The problem has been waxing and waning since onset. The pain is present in the lumbar spine. The quality of the pain is described as aching. The pain radiates to the right knee and left knee. The pain is at a severity of 10/10. The pain is moderate. The pain is worse during the day. The symptoms are aggravated by position and bending. Pertinent negatives include no bladder incontinence, bowel incontinence or fever.   Earache    There is pain in the right ear. This is a recurrent problem. The current episode started 1 to 4 weeks ago. The problem occurs constantly. The problem has been waxing and waning. There has been no fever. The pain is at a severity of 10/10. The pain is severe. Associated symptoms include hearing loss. Pertinent negatives include no ear discharge.   Anxiety   Presents for follow-up visit. Symptoms include compulsions, excessive worry and nervous/anxious behavior. Patient reports no depressed mood.            The following portions of the patient's history were reviewed and updated as appropriate: allergies, current medications, past family history, past medical history, past social history, past surgical history and problem list.    Review of Systems   Constitutional: Negative for fever.   HENT: Positive for ear pain and hearing loss. Negative for ear discharge.    Gastrointestinal: Negative for bowel incontinence.   Genitourinary: Negative for bladder incontinence.   Musculoskeletal: Positive for back pain.   Psychiatric/Behavioral: The patient is nervous/anxious.        Objective   Physical Exam   Constitutional: She is oriented to person, place, and time. She appears well-developed and well-nourished. No distress.   HENT:   Head: Normocephalic and atraumatic.   Right Ear: Hearing normal. There is tenderness. No drainage or swelling. Tympanic  membrane is retracted. A middle ear effusion is present.   Left Ear: Hearing normal. No drainage, swelling or tenderness. Tympanic membrane is retracted. A middle ear effusion is present.   Pulmonary/Chest: Effort normal and breath sounds normal.   Musculoskeletal:        Lumbar back: She exhibits decreased range of motion, tenderness and pain. She exhibits no bony tenderness, no swelling, no edema, no deformity, no laceration and no spasm.   Neurological: She is alert and oriented to person, place, and time.   Reflex Scores:       Patellar reflexes are 2+ on the right side and 2+ on the left side.  Skin: Skin is warm and dry. She is not diaphoretic.   Psychiatric: She has a normal mood and affect. Her behavior is normal. Judgment and thought content normal.   Nursing note and vitals reviewed.      Assessment/Plan   Problems Addressed this Visit        Nervous and Auditory    Chronic bilateral low back pain with bilateral sciatica       Other    Heavy tobacco smoker - Primary    Generalized anxiety disorder             annie pect OCD, declined treatment for it

## 2018-01-26 LAB
AMPHET+METHAMPHET UR QL: NEGATIVE
BARBITURATES UR QL SCN: NEGATIVE
BENZODIAZ UR QL SCN: POSITIVE
CANNABINOIDS SERPL QL: NEGATIVE
COCAINE UR QL: NEGATIVE
METHADONE UR QL SCN: NEGATIVE
OPIATES UR QL: POSITIVE
OXYCODONE UR QL SCN: NEGATIVE

## 2018-01-29 ENCOUNTER — TELEPHONE (OUTPATIENT)
Dept: FAMILY MEDICINE CLINIC | Facility: CLINIC | Age: 63
End: 2018-01-29

## 2018-01-29 DIAGNOSIS — R74.01 ELEVATED AST (SGOT): Primary | ICD-10-CM

## 2018-01-29 NOTE — TELEPHONE ENCOUNTER
Pr Dr. Donis, Ms. Nuñez has been called with her recent lab results & recommendations.  Continue her current medications and follow-up as planned or sooner if any problems.    ----- Message from Donato Donis MD sent at 1/25/2018  3:44 PM CST -----  AST is elevated.  Recommend acute hepatitis panel and ultrasound the liver.

## 2018-01-30 ENCOUNTER — TELEPHONE (OUTPATIENT)
Dept: GASTROENTEROLOGY | Facility: CLINIC | Age: 63
End: 2018-01-30

## 2018-01-30 ENCOUNTER — TELEPHONE (OUTPATIENT)
Dept: FAMILY MEDICINE CLINIC | Facility: CLINIC | Age: 63
End: 2018-01-30

## 2018-01-30 NOTE — TELEPHONE ENCOUNTER
After consulting with  he recommends that patient get second opinion.  would like to [facility] [place]. Patient does not want a second opinion at this time. Patient states that she will keep appointment with . Also patient is made aware that if problems worsen or persist to seek emergency medical attention. Patient agrees.

## 2018-01-30 NOTE — TELEPHONE ENCOUNTER
"Patient phone call is returned. Patient states that she is having trouble with her ears,neck,brain stem,vaginia and feels like she is septic for the past 2 months. Patient states that she is having trouble moving her bowels and that she is having some stomach pain. Patient is convinced that she is septic. Patient is saying that \"the only thing keeping her alive is antibiotics\". Patient states that she is having blood in urine and that \" she is not going to get better\" due to her not being able to get any antibiotics. Patient is wanting to have  order her an ultra sound of her sigmoid colon. The outside of her colon is where she says it hurts. Patient states \"cut bowel out\" so that she can feel  Better. Patient states that she has a really bad liver and that her bowels are the thing that's causing all of her problems. States \" That her bowels are killing her\". Patient states that when she goes to the emergency room that they tell her to go to see her family doctor. Patient is made aware that if problems worsen or persist to seek emergency medical attention. Patient agrees.                ----- Message from Rama Lorenz sent at 1/30/2018  9:50 AM CST -----  PLEASE CALL PT     "

## 2018-02-02 ENCOUNTER — LAB (OUTPATIENT)
Dept: LAB | Facility: HOSPITAL | Age: 63
End: 2018-02-02

## 2018-02-02 DIAGNOSIS — M54.41 CHRONIC BILATERAL LOW BACK PAIN WITH BILATERAL SCIATICA: Primary | ICD-10-CM

## 2018-02-02 DIAGNOSIS — M54.42 CHRONIC BILATERAL LOW BACK PAIN WITH BILATERAL SCIATICA: Primary | ICD-10-CM

## 2018-02-02 DIAGNOSIS — G89.29 CHRONIC BILATERAL LOW BACK PAIN WITH BILATERAL SCIATICA: Primary | ICD-10-CM

## 2018-02-02 LAB
REF LAB TEST RESULTS: NORMAL
REF LAB TEST RESULTS: NORMAL

## 2018-02-05 ENCOUNTER — TELEPHONE (OUTPATIENT)
Dept: FAMILY MEDICINE CLINIC | Facility: CLINIC | Age: 63
End: 2018-02-05

## 2018-02-06 ENCOUNTER — HOSPITAL ENCOUNTER (OUTPATIENT)
Dept: NUCLEAR MEDICINE | Facility: HOSPITAL | Age: 63
Discharge: HOME | End: 2018-02-06

## 2018-02-06 ENCOUNTER — HOSPITAL ENCOUNTER (OUTPATIENT)
Dept: MRI IMAGING | Facility: HOSPITAL | Age: 63
Discharge: HOME | End: 2018-02-06
Admitting: NURSE PRACTITIONER

## 2018-02-06 DIAGNOSIS — M54.42 CHRONIC BILATERAL LOW BACK PAIN WITH BILATERAL SCIATICA: ICD-10-CM

## 2018-02-06 DIAGNOSIS — M54.41 CHRONIC BILATERAL LOW BACK PAIN WITH BILATERAL SCIATICA: ICD-10-CM

## 2018-02-06 DIAGNOSIS — G89.29 CHRONIC BILATERAL LOW BACK PAIN WITH BILATERAL SCIATICA: ICD-10-CM

## 2018-02-06 PROCEDURE — 78306 BONE IMAGING WHOLE BODY: CPT

## 2018-02-06 PROCEDURE — A9503 TC99M MEDRONATE: HCPCS | Performed by: NURSE PRACTITIONER

## 2018-02-06 PROCEDURE — 0 TECHNETIUM MEDRONATE KIT: Performed by: NURSE PRACTITIONER

## 2018-02-06 PROCEDURE — 72148 MRI LUMBAR SPINE W/O DYE: CPT

## 2018-02-06 RX ORDER — TC 99M MEDRONATE 20 MG/10ML
27.2 INJECTION, POWDER, LYOPHILIZED, FOR SOLUTION INTRAVENOUS
Status: COMPLETED | OUTPATIENT
Start: 2018-02-06 | End: 2018-02-06

## 2018-02-06 RX ADMIN — Medication 27.2 MILLICURIE: at 09:08

## 2018-02-14 ENCOUNTER — TELEPHONE (OUTPATIENT)
Dept: FAMILY MEDICINE CLINIC | Facility: CLINIC | Age: 63
End: 2018-02-14

## 2018-02-14 NOTE — TELEPHONE ENCOUNTER
Pr Dr. Donis, Ms. Nuñez has been called with her recent US of the Liver results & recommendations.  Continue her current medications and follow-up as planned or sooner if any problems.      ----- Message from Donato Donis MD sent at 2/13/2018  3:44 PM CST -----  No significant findings

## 2018-02-21 ENCOUNTER — OFFICE VISIT (OUTPATIENT)
Dept: ORTHOPEDIC SURGERY | Facility: CLINIC | Age: 63
End: 2018-02-21

## 2018-02-21 DIAGNOSIS — N75.0 BARTHOLIN'S CYST: ICD-10-CM

## 2018-02-21 DIAGNOSIS — G89.29 CHRONIC BILATERAL LOW BACK PAIN WITH BILATERAL SCIATICA: Primary | ICD-10-CM

## 2018-02-21 DIAGNOSIS — M54.42 CHRONIC BILATERAL LOW BACK PAIN WITH BILATERAL SCIATICA: Primary | ICD-10-CM

## 2018-02-21 DIAGNOSIS — M54.41 CHRONIC BILATERAL LOW BACK PAIN WITH BILATERAL SCIATICA: Primary | ICD-10-CM

## 2018-02-21 DIAGNOSIS — M53.3 COCCYX PAIN: ICD-10-CM

## 2018-02-21 PROBLEM — N95.2 VAGINAL ATROPHY: Status: ACTIVE | Noted: 2017-08-16

## 2018-02-21 PROCEDURE — 99213 OFFICE O/P EST LOW 20 MIN: CPT | Performed by: ORTHOPAEDIC SURGERY

## 2018-02-21 PROCEDURE — 96372 THER/PROPH/DIAG INJ SC/IM: CPT | Performed by: ORTHOPAEDIC SURGERY

## 2018-02-21 RX ORDER — TRIAMCINOLONE ACETONIDE 40 MG/ML
80 INJECTION, SUSPENSION INTRA-ARTICULAR; INTRAMUSCULAR ONCE
Status: CANCELLED | OUTPATIENT
Start: 2018-02-21 | End: 2018-02-21

## 2018-02-21 RX ORDER — TRIAMCINOLONE ACETONIDE 40 MG/ML
80 INJECTION, SUSPENSION INTRA-ARTICULAR; INTRAMUSCULAR ONCE
Status: COMPLETED | OUTPATIENT
Start: 2018-02-21 | End: 2018-02-21

## 2018-02-21 RX ADMIN — TRIAMCINOLONE ACETONIDE 80 MG: 40 INJECTION, SUSPENSION INTRA-ARTICULAR; INTRAMUSCULAR at 10:15

## 2018-02-21 NOTE — PROGRESS NOTES
Adriana Nuñez is a 62 y.o. female returns for     Chief Complaint   Patient presents with   • Lumbar Spine - Follow-up   • Results     MRI l spine and bone scan 2/6/18       HISTORY OF PRESENT ILLNESS:    Patient reports that she has chronic all over body bone pain. She has received epidural injections in her neck and back for thirteen years. She reports constant sacral pain that prevents her from sitting and numbness over an area of the skin with numbness down her legs. Patient is concerned about all over bone infection requesting antibiotics.      CONCURRENT MEDICAL HISTORY:    Past Medical History:   Diagnosis Date   • Adenomatous polyp of colon    • Anorectal abscess    • Anxiety disorder    • Benign polyp of large intestine    • Bladder fistula      post rapair      • Blood in urine    • Chronic obstructive lung disease    • Chronic urinary tract infection    • Depressive disorder    • Diarrhea    • Diverticulitis of colon      post colectomy      • Elevated levels of transaminase & lactic acid dehydrogenase    • Epigastric pain    • Generalized anxiety disorder    • Heavy tobacco smoker    • History of colon polyps    • Hyperlipidemia    • Insomnia    • Low back pain    • Lower urinary tract infectious disease    • Lymphadenopathy     ON CT   • Osteoporosis    • Peripheral nerve disease    • Polymyalgia rheumatica    • Polyp of colon    • Urinary tract infection    • Vitamin D deficiency        Allergies   Allergen Reactions   • Fosamax [Alendronate] GI Intolerance         Current Outpatient Prescriptions:   •  albuterol (PROVENTIL HFA) 108 (90 BASE) MCG/ACT inhaler, Inhale 2 puffs 3 (Three) Times a Day., Disp: , Rfl:   •  ATROVENT HFA 17 MCG/ACT inhaler, INHALE 2 PUFFS 4 (FOUR) TIMES A DAY., Disp: 12.9 g, Rfl: 11  •  diazePAM (VALIUM) 5 MG tablet, Take 1 tablet by mouth 2 (Two) Times a Day As Needed for Anxiety., Disp: 60 tablet, Rfl: 0  •  dicyclomine (BENTYL) 20 MG tablet, Take 20 mg by mouth 2 (Two)  Times a Day., Disp: , Rfl:   •  fluticasone (FLONASE) 50 MCG/ACT nasal spray, 2 sprays into each nostril Daily., Disp: 9.9 mL, Rfl: 2  •  HYDROcodone-acetaminophen (NORCO)  MG per tablet, Take 1 tablet by mouth Every 6 (Six) Hours As Needed for Moderate Pain ., Disp: 120 tablet, Rfl: 0  •  loratadine (CLARITIN) 10 MG tablet, Take 10 mg by mouth Daily., Disp: , Rfl: 0  •  neomycin-polymyxin-hydrocortisone (CORTISPORIN) 3.5-99305-6 otic solution, Administer 3 drops into both ears 4 (Four) Times a Day., Disp: 10 mL, Rfl: 0  •  omeprazole (priLOSEC) 20 MG capsule, Take 1 capsule by mouth Daily., Disp: 30 capsule, Rfl: 5  •  predniSONE (DELTASONE) 10 MG tablet, TAKE ONE TABLET BY MOUTH EVERY DAY, Disp: 30 tablet, Rfl: 12  •  VENTOLIN  (90 Base) MCG/ACT inhaler, INHALE 1 PUFF BY MOUTH EVERY 8 HOURS AS NEEDED, Disp: 18 g, Rfl: 11    Past Surgical History:   Procedure Laterality Date   • COLON RESECTION     • COLONOSCOPY  03/17/2014    polyps   • COLONOSCOPY  02/08/2016   • COLONOSCOPY N/A 5/24/2017    Procedure: COLONOSCOPY;  Surgeon: Aric Pina MD;  Location: [facility] ENDOSCOPY;  Service:    • ENDOSCOPY  01/26/2015    Normal esophagus. Gastritis was found in the stomach. Biopsy taken. Normal duodenum   • ENDOSCOPY N/A 9/18/2017    Procedure: ESOPHAGOGASTRODUODENOSCOPY;  Surgeon: Aric Pina MD;  Location: [facility] ENDOSCOPY;  Service:    • HYSTERECTOMY     • INJECTION OF MEDICATION  12/03/2010    KENALOG(3)   • INJECTION OF MEDICATION  11/07/2014    ROCEPHIN(1)   • UPPER GASTROINTESTINAL ENDOSCOPY  01/26/2015   • UPPER GASTROINTESTINAL ENDOSCOPY  09/18/2017       ROS  No fevers or chills.  No chest pain or shortness of air.  No GI or  disturbances.    PHYSICAL EXAMINATION:       There were no vitals taken for this visit.    Physical Exam    GAIT:     []  Normal  []  Antalgic    Assistive device: []  None  []  Walker     []  Crutches  []  Cane     []  Wheelchair  []  Stretcher    Ortho Exam  Elderly,  energetic  Ambulating normally  + pain of the lumbar region, the coccyx.  No mass or erythema present.   Normal motor examination.        Mri Lumbar Spine Without Contrast    Result Date: 2/6/2018  Narrative: Procedure: MRI lumbar spine without contrast Reason for exam: Low back pain FINDINGS: Multisequence multiplanar MR imaging of the lumbar spine was performed without contrast. Lumbar spine vertebral body heights and alignment are normal. There is no evidence of fracture or dislocation. No abnormal marrow signal. Conus of the spinal cord appears normal with tip at the T12-L1 intervertebral disc space level. T12-L1: Disc space normal. No disc protrusion or extrusion. Spinal cord and nerve roots exit without compromise. L1-L2: Disc space normal. No disc protrusion or extrusion. Nerve roots exit without compromise. L2-L3: Left neural foraminal and far left lateral disc space mild disc protrusion. Nerve roots exit without compromise. L3-L4: Very minimal diffuse disc protrusion minimally indenting the anterior thecal sac. Nerve roots exit without compromise. L4-L5: Very mild posterior central broad-based disc protrusion minimally indenting the anterior thecal sac. Nerve roots exit without compromise. L5-S1: Disc space normal. No disc protrusion or extrusion. Nerve roots exit without compromise.     Impression: 1.  No acute MR lumbar spine abnormality. 2.  L2-L3: Left neural foraminal and far left lateral disc space mild disc protrusion. Nerve roots exit without compromise. 3.  L3-L4: Very minimal diffuse disc protrusion minimally indenting the anterior thecal sac. Nerve roots exit without compromise. 4.  L4-L5: Very mild posterior central broad-based disc protrusion minimally indenting the anterior thecal sac. Nerve roots exit without compromise. Electronically signed by:  Marvel Hunter MD  2/6/2018 11:41 AM [facility] Workstation: [id number]    Nm Bone Scan Whole Body    Result Date: 2/6/2018  Narrative: EXAMINATION:  Total body  bone scan    INDICATION:  Back pain  CLINICAL HISTORY: COMPARISON EXAMINATIONS:   MRI lumbar spine 2/6/18   DOSE:  27 mCi of technetium labeled MDP (I.V.)  TECHNIQUE:  Total body scan, anterior and posterior projections     FINDINGS:    The uptake and distribution of radiotracer activity demonstrates:    A physiologic distribution for a patient of this age.    No abnormal radiotracer activity associated with the axial skeleton.    The kidneys are visualized bilaterally.  .      Impression: CONCLUSION:  1.  No abnormal radiotracer activity associated with the axial skeleton..         Electronically signed by:  VAL Herrera MD  2/6/2018 3:59 PM CST Workstation: [id number]    Us Liver    Result Date: 2/13/2018  Narrative: Ultrasound liver HISTORY: Elevated AST. Nonspecific elevation of levels of transaminase and lactic acid dehydrogenase. Ultrasound examination of the right upper quadrant was performed. COMPARISON: September 9, 2014. Correlation CT March 3, 2017. The visualized liver is of normal echotexture. 1.1 cm hepatic cavernous hemangioma. The gallbladder is well displayed. No calculi, wall thickening or pericholecystic fluid. Common bile duct is upper normal at 6.3 mm. Images of the right kidney are unremarkable. Pancreas partially obscured by bowel gas.     Impression: CONCLUSION: The visualized liver is of normal echotexture. 1.1 cm hepatic cavernous hemangioma. [id number] Electronically signed by:  José Miguel Mike MD  2/13/2018 10:22 AM CST Workstation: [id number]      I reviewed MRI of lumbar spine 2/6/18, normal alignment, no spinal stenosis  I reviewed bone scan on 2/6/18, no areas of uptake to suggest fracture      ASSESSMENT:    Diagnoses and all orders for this visit:    Chronic bilateral low back pain with bilateral sciatica  -     triamcinolone acetonide (KENALOG-40) injection 80 mg; Inject 2 mL into the shoulder, thigh, or buttocks 1 (One) Time.    Coccyx pain  -     triamcinolone acetonide  (KENALOG-40) injection 80 mg; Inject 2 mL into the shoulder, thigh, or buttocks 1 (One) Time.    Bartholin's cyst  -     triamcinolone acetonide (KENALOG-40) injection 80 mg; Inject 2 mL into the shoulder, thigh, or buttocks 1 (One) Time.    Other orders  -     Cancel: triamcinolone acetonide (KENALOG-40) injection 80 mg; Inject 2 mL into the shoulder, thigh, or buttocks 1 (One) Time.          PLAN    Discussed use of a sacral pillow for pressure off loading of the lower spine.  I provided a prescription for her today for sacral offloading.    IM kenalog injection today (nursing staff).      Khris Connolly MD

## 2018-02-22 ENCOUNTER — OFFICE VISIT (OUTPATIENT)
Dept: FAMILY MEDICINE CLINIC | Facility: CLINIC | Age: 63
End: 2018-02-22

## 2018-02-22 VITALS
OXYGEN SATURATION: 100 % | SYSTOLIC BLOOD PRESSURE: 138 MMHG | HEART RATE: 78 BPM | BODY MASS INDEX: 24.55 KG/M2 | HEIGHT: 61 IN | WEIGHT: 130 LBS | DIASTOLIC BLOOD PRESSURE: 80 MMHG

## 2018-02-22 DIAGNOSIS — M54.41 CHRONIC BILATERAL LOW BACK PAIN WITH BILATERAL SCIATICA: Primary | ICD-10-CM

## 2018-02-22 DIAGNOSIS — F41.1 GENERALIZED ANXIETY DISORDER: ICD-10-CM

## 2018-02-22 DIAGNOSIS — M54.42 CHRONIC BILATERAL LOW BACK PAIN WITH BILATERAL SCIATICA: Primary | ICD-10-CM

## 2018-02-22 DIAGNOSIS — G89.29 CHRONIC BILATERAL LOW BACK PAIN WITH BILATERAL SCIATICA: Primary | ICD-10-CM

## 2018-02-22 PROCEDURE — 99214 OFFICE O/P EST MOD 30 MIN: CPT | Performed by: FAMILY MEDICINE

## 2018-02-22 RX ORDER — HYDROCODONE BITARTRATE AND ACETAMINOPHEN 10; 325 MG/1; MG/1
1 TABLET ORAL EVERY 6 HOURS PRN
Qty: 120 TABLET | Refills: 0 | Status: SHIPPED | OUTPATIENT
Start: 2018-02-22 | End: 2018-03-28 | Stop reason: SDUPTHER

## 2018-02-22 RX ORDER — DIAZEPAM 5 MG/1
5 TABLET ORAL 2 TIMES DAILY PRN
Qty: 60 TABLET | Refills: 0 | Status: SHIPPED | OUTPATIENT
Start: 2018-02-22 | End: 2018-03-28 | Stop reason: SDUPTHER

## 2018-02-22 NOTE — PROGRESS NOTES
Subjective   Adriana Nuñez is a 62 y.o. female.     Back Pain   This is a chronic problem. The current episode started more than 1 year ago. The problem has been waxing and waning since onset. The pain is present in the lumbar spine. The quality of the pain is described as aching. The pain radiates to the right knee and left knee. The pain is at a severity of 8/10. The pain is moderate. The pain is worse during the day. The symptoms are aggravated by position and bending. Pertinent negatives include no bladder incontinence, bowel incontinence or fever.   Anxiety   Presents for follow-up visit. Symptoms include compulsions, excessive worry and nervous/anxious behavior. Patient reports no depressed mood.            The following portions of the patient's history were reviewed and updated as appropriate: allergies, current medications, past family history, past medical history, past social history, past surgical history and problem list.    Review of Systems   Constitutional: Negative for fever.   Gastrointestinal: Negative for bowel incontinence.   Genitourinary: Negative for bladder incontinence.   Musculoskeletal: Positive for back pain.   Psychiatric/Behavioral: The patient is nervous/anxious.        Objective   Physical Exam   Constitutional: She is oriented to person, place, and time. She appears well-developed and well-nourished. No distress.   HENT:   Head: Normocephalic and atraumatic.   Right Ear: Hearing and tympanic membrane normal.   Left Ear: Hearing and tympanic membrane normal.   Pulmonary/Chest: Effort normal and breath sounds normal.   Musculoskeletal:        Lumbar back: She exhibits decreased range of motion, tenderness and pain. She exhibits no bony tenderness, no swelling, no edema, no deformity, no laceration and no spasm.   Neurological: She is alert and oriented to person, place, and time.   Reflex Scores:       Patellar reflexes are 2+ on the right side and 2+ on the left side.  Skin:  Skin is warm and dry. She is not diaphoretic.   Psychiatric: She has a normal mood and affect. Her behavior is normal. Judgment and thought content normal.   Nursing note and vitals reviewed.      Assessment/Plan   Problems Addressed this Visit        Nervous and Auditory    Chronic bilateral low back pain with bilateral sciatica - Primary       Other    Generalized anxiety disorder    Relevant Medications    diazePAM (VALIUM) 5 MG tablet             annie pect OCD, declined treatment for it

## 2018-03-01 ENCOUNTER — DOCUMENTATION (OUTPATIENT)
Dept: GASTROENTEROLOGY | Facility: CLINIC | Age: 63
End: 2018-03-01

## 2018-03-01 RX ORDER — DICYCLOMINE HCL 20 MG
TABLET ORAL
Qty: 60 TABLET | Refills: 0 | Status: SHIPPED | OUTPATIENT
Start: 2018-03-01 | End: 2018-03-27 | Stop reason: SDUPTHER

## 2018-03-01 NOTE — TELEPHONE ENCOUNTER
03/01/2018, 1054 - Prescription medication renewal request received per patient's pharmacy of choice, [place], [place] - Dicyclomine 20 MG Tablets, 1 tablet by mouth 2 times per day.  #60, zero renewals submitted per this staff member via E-Scribe.  Patient's prior clinical appointment with Dr. Aric Flower M.D. 09/26/2017 with 6 month clinical appointment with Dr. Flower scheduled Tuesday, March 27, 2018 at 10:00 a.m. at [place], [place]. Patient may request additional prescription medication renewals at this time.

## 2018-03-01 NOTE — PROGRESS NOTES
03/01/2018, 1054 - Prescription medication renewal request received per patient's pharmacy of choice, [place], [place] - Dicyclomine 20 MG Tablets, 1 tablet by mouth 2 times per day.  #60, zero renewals submitted via E-Scribe per this staff member with receipt per pharmacy at 1057.  Patient's prior clinical appointment with Dr. Aric Flower M.D. 09/26/2017 with 6 month clinical appointment with Dr. Flower scheduled Tuesday, March 27, 2018 at 10:00 a.m. at [place], [place]. Patient may request additional prescription medication renewals at this time.

## 2018-03-27 ENCOUNTER — OFFICE VISIT (OUTPATIENT)
Dept: GASTROENTEROLOGY | Facility: CLINIC | Age: 63
End: 2018-03-27

## 2018-03-27 VITALS
BODY MASS INDEX: 22.02 KG/M2 | HEART RATE: 96 BPM | DIASTOLIC BLOOD PRESSURE: 64 MMHG | HEIGHT: 64 IN | OXYGEN SATURATION: 97 % | WEIGHT: 129 LBS | SYSTOLIC BLOOD PRESSURE: 110 MMHG

## 2018-03-27 DIAGNOSIS — R10.13 EPIGASTRIC PAIN: ICD-10-CM

## 2018-03-27 PROCEDURE — 99214 OFFICE O/P EST MOD 30 MIN: CPT | Performed by: INTERNAL MEDICINE

## 2018-03-27 RX ORDER — NEOMYCIN/POLYMYXIN B/HYDROCORT 3.5-10K-1
SUSPENSION, DROPS(FINAL DOSAGE FORM)(ML) OPHTHALMIC (EYE)
COMMUNITY
Start: 2018-01-29 | End: 2018-06-04

## 2018-03-27 RX ORDER — DICYCLOMINE HCL 20 MG
20 TABLET ORAL 2 TIMES DAILY
Qty: 60 TABLET | Refills: 5 | Status: SHIPPED | OUTPATIENT
Start: 2018-03-27 | End: 2018-09-24 | Stop reason: SDUPTHER

## 2018-03-27 RX ORDER — OMEPRAZOLE 20 MG/1
20 CAPSULE, DELAYED RELEASE ORAL DAILY
Qty: 30 CAPSULE | Refills: 5 | Status: SHIPPED | OUTPATIENT
Start: 2018-03-27 | End: 2018-08-23 | Stop reason: SDUPTHER

## 2018-03-27 NOTE — PROGRESS NOTES
[facility]      Chief Complaint:   Chief Complaint   Patient presents with   • Abdominal Pain       Subjective     HPI:   Patient with abdominal pain markedly improved with Bentyl and proton pump inhibitor.  Patient denies any changes in bowel habits nausea vomiting or diarrhea.    Plan; we'll continue patient on Bentyl and proton pump inhibitor.    Past Medical History:   Past Medical History:   Diagnosis Date   • Adenomatous polyp of colon    • Anorectal abscess    • Anxiety disorder    • Benign polyp of large intestine    • Bladder fistula      post rapair      • Blood in urine    • Chronic obstructive lung disease    • Chronic urinary tract infection    • Depressive disorder    • Diarrhea    • Diverticulitis of colon      post colectomy      • Elevated levels of transaminase & lactic acid dehydrogenase    • Epigastric pain    • Generalized anxiety disorder    • Heavy tobacco smoker    • History of colon polyps    • Hyperlipidemia    • Insomnia    • Low back pain    • Lower urinary tract infectious disease    • Lymphadenopathy     ON CT   • Osteoporosis    • Peripheral nerve disease    • Polymyalgia rheumatica    • Polyp of colon    • Urinary tract infection    • Vitamin D deficiency        Family History:  History reviewed. No pertinent family history.    Social History:   reports that she has been smoking Cigars and Cigarettes.  She has been smoking about 1.00 pack per day. She has never used smokeless tobacco. She reports that she does not drink alcohol or use drugs.    Medications:   Prior to Admission medications    Medication Sig Start Date End Date Taking? Authorizing Provider   amoxicillin (AMOXIL) 500 MG capsule Take 1 capsule by mouth 3 (Three) Times a Day. 3/21/18  Yes Gutierrez Bates MD   ATROVENT HFA 17 MCG/ACT inhaler INHALE 2 PUFFS 4 (FOUR) TIMES A DAY. 10/4/17  Yes Donato Donis MD   diazePAM (VALIUM) 5 MG tablet Take 1 tablet by mouth 2 (Two) Times a Day As  Needed for Anxiety. 2/22/18  Yes Donato Donis MD   dicyclomine (BENTYL) 20 MG tablet Take 1 tablet by mouth 2 (Two) Times a Day. 3/27/18  Yes Aric Pina MD   fluticasone (FLONASE) 50 MCG/ACT nasal spray 2 sprays into each nostril Daily. 1/25/18  Yes Donato Donis MD   guaiFENesin (MUCINEX) 600 MG 12 hr tablet Take 1 tablet by mouth 2 (Two) Times a Day. 3/21/18  Yes Gutierrez Bates MD   HYDROcodone-acetaminophen (NORCO)  MG per tablet Take 1 tablet by mouth Every 6 (Six) Hours As Needed for Moderate Pain . 2/22/18  Yes Donato Donis MD   loratadine (CLARITIN) 10 MG tablet Take 10 mg by mouth Daily. 6/7/17  Yes Historical Provider, MD   omeprazole (priLOSEC) 20 MG capsule Take 1 capsule by mouth Daily. 3/27/18  Yes Aric Pina MD   predniSONE (DELTASONE) 10 MG tablet TAKE ONE TABLET BY MOUTH EVERY DAY 12/1/17  Yes Donato Donis MD   VENTOLIN  (90 Base) MCG/ACT inhaler INHALE 1 PUFF BY MOUTH EVERY 8 HOURS AS NEEDED 12/1/17  Yes Donato Donis MD   albuterol (PROVENTIL HFA) 108 (90 BASE) MCG/ACT inhaler Inhale 2 puffs 3 (Three) Times a Day.  3/27/18 Yes Historical Provider, MD   dicyclomine (BENTYL) 20 MG tablet TAKE ONE TABLET BY MOUTH TWO TIMES A DAY 3/1/18 3/27/18 Yes Aric Pina MD   omeprazole (priLOSEC) 20 MG capsule Take 1 capsule by mouth Daily. 9/27/17 3/27/18 Yes Aric Pina MD   neomycin-polymyxin-hydrocortisone (CORTISPORIN) 3.5-62054-8 ophthalmic suspension  1/29/18   Historical Provider, MD       Allergies:  Fosamax [alendronate]    ROS:    Review of Systems   Constitutional: Negative for activity change, appetite change, chills, diaphoresis, fatigue, fever and unexpected weight change.   HENT: Negative for sore throat and trouble swallowing.    Respiratory: Negative for shortness of breath.    Gastrointestinal: Positive for abdominal pain. Negative for abdominal distention, anal bleeding, blood in stool, constipation, diarrhea, nausea, rectal pain and  "vomiting.   Musculoskeletal: Negative for arthralgias.   Skin: Negative for pallor.   Neurological: Negative for light-headedness.     Objective     Blood pressure 110/64, pulse 96, height 162.6 cm (64\"), weight 58.5 kg (129 lb), SpO2 97 %.    Physical Exam   Constitutional: She is oriented to person, place, and time. She appears well-developed and well-nourished. No distress.   HENT:   Head: Normocephalic and atraumatic.   Cardiovascular: Normal rate, regular rhythm, normal heart sounds and intact distal pulses.  Exam reveals no gallop and no friction rub.    No murmur heard.  Pulmonary/Chest: Breath sounds normal. No respiratory distress. She has no wheezes. She has no rales. She exhibits no tenderness.   Abdominal: Soft. Bowel sounds are normal. She exhibits no distension and no mass. There is no tenderness. There is no rebound and no guarding. No hernia.   Musculoskeletal: Normal range of motion. She exhibits no edema.   Neurological: She is alert and oriented to person, place, and time.   Skin: Skin is warm and dry. No rash noted. She is not diaphoretic. No erythema. No pallor.   Psychiatric: She has a normal mood and affect. Her behavior is normal. Judgment and thought content normal.        Assessment/Plan   Adriana was seen today for abdominal pain.    Diagnoses and all orders for this visit:    Epigastric pain  -     omeprazole (priLOSEC) 20 MG capsule; Take 1 capsule by mouth Daily.    Other orders  -     dicyclomine (BENTYL) 20 MG tablet; Take 1 tablet by mouth 2 (Two) Times a Day.        * Surgery not found *     Diagnosis Plan   1. Epigastric pain  omeprazole (priLOSEC) 20 MG capsule       Anticipated Surgical Procedure:  No orders of the defined types were placed in this encounter.      The risks, benefits, and alternatives of this procedure have been discussed with the patient or the responsible party- the patient understands and agrees to " proceed.

## 2018-03-28 ENCOUNTER — TELEPHONE (OUTPATIENT)
Dept: FAMILY MEDICINE CLINIC | Facility: CLINIC | Age: 63
End: 2018-03-28

## 2018-03-28 ENCOUNTER — APPOINTMENT (OUTPATIENT)
Dept: LAB | Facility: HOSPITAL | Age: 63
End: 2018-03-28

## 2018-03-28 ENCOUNTER — OFFICE VISIT (OUTPATIENT)
Dept: FAMILY MEDICINE CLINIC | Facility: CLINIC | Age: 63
End: 2018-03-28

## 2018-03-28 VITALS
OXYGEN SATURATION: 98 % | HEART RATE: 110 BPM | HEIGHT: 64 IN | DIASTOLIC BLOOD PRESSURE: 68 MMHG | SYSTOLIC BLOOD PRESSURE: 120 MMHG | BODY MASS INDEX: 21.85 KG/M2 | WEIGHT: 128 LBS

## 2018-03-28 DIAGNOSIS — M54.42 CHRONIC BILATERAL LOW BACK PAIN WITH BILATERAL SCIATICA: ICD-10-CM

## 2018-03-28 DIAGNOSIS — M35.3 POLYMYALGIA RHEUMATICA (HCC): ICD-10-CM

## 2018-03-28 DIAGNOSIS — G89.29 CHRONIC BILATERAL LOW BACK PAIN WITH BILATERAL SCIATICA: ICD-10-CM

## 2018-03-28 DIAGNOSIS — M54.41 CHRONIC BILATERAL LOW BACK PAIN WITH BILATERAL SCIATICA: ICD-10-CM

## 2018-03-28 DIAGNOSIS — Z13.1 SCREENING FOR DIABETES MELLITUS: ICD-10-CM

## 2018-03-28 DIAGNOSIS — F41.1 GENERALIZED ANXIETY DISORDER: ICD-10-CM

## 2018-03-28 DIAGNOSIS — F17.200 HEAVY TOBACCO SMOKER: ICD-10-CM

## 2018-03-28 DIAGNOSIS — J42 CHRONIC BRONCHITIS, UNSPECIFIED CHRONIC BRONCHITIS TYPE (HCC): ICD-10-CM

## 2018-03-28 DIAGNOSIS — E78.00 PURE HYPERCHOLESTEROLEMIA: Primary | ICD-10-CM

## 2018-03-28 DIAGNOSIS — K21.9 GASTROESOPHAGEAL REFLUX DISEASE WITHOUT ESOPHAGITIS: ICD-10-CM

## 2018-03-28 LAB
ALBUMIN SERPL-MCNC: 4.6 G/DL (ref 3.4–4.8)
ALBUMIN/GLOB SERPL: 1.7 G/DL (ref 1.1–1.8)
ALP SERPL-CCNC: 99 U/L (ref 38–126)
ALT SERPL W P-5'-P-CCNC: 22 U/L (ref 9–52)
ANION GAP SERPL CALCULATED.3IONS-SCNC: 13 MMOL/L (ref 5–15)
ARTICHOKE IGE QN: 162 MG/DL (ref 1–129)
AST SERPL-CCNC: 31 U/L (ref 14–36)
BILIRUB SERPL-MCNC: 0.4 MG/DL (ref 0.2–1.3)
BUN BLD-MCNC: 17 MG/DL (ref 7–21)
BUN/CREAT SERPL: 30.9 (ref 7–25)
CALCIUM SPEC-SCNC: 9.9 MG/DL (ref 8.4–10.2)
CHLORIDE SERPL-SCNC: 103 MMOL/L (ref 95–110)
CHOLEST SERPL-MCNC: 276 MG/DL (ref 0–199)
CO2 SERPL-SCNC: 27 MMOL/L (ref 22–31)
CREAT BLD-MCNC: 0.55 MG/DL (ref 0.5–1)
GFR SERPL CREATININE-BSD FRML MDRD: 112 ML/MIN/1.73 (ref 45–104)
GLOBULIN UR ELPH-MCNC: 2.7 GM/DL (ref 2.3–3.5)
GLUCOSE BLD-MCNC: 108 MG/DL (ref 60–100)
HBA1C MFR BLD: 5 % (ref 4–5.6)
HDLC SERPL-MCNC: 59 MG/DL (ref 60–200)
LDLC/HDLC SERPL: 3.15 {RATIO} (ref 0–3.22)
MAGNESIUM SERPL-MCNC: 2.1 MG/DL (ref 1.6–2.3)
POTASSIUM BLD-SCNC: 4.4 MMOL/L (ref 3.5–5.1)
PROT SERPL-MCNC: 7.3 G/DL (ref 6.3–8.6)
SODIUM BLD-SCNC: 143 MMOL/L (ref 137–145)
TRIGL SERPL-MCNC: 157 MG/DL (ref 20–199)
VIT B12 BLD-MCNC: 662 PG/ML (ref 239–931)

## 2018-03-28 PROCEDURE — 80061 LIPID PANEL: CPT | Performed by: FAMILY MEDICINE

## 2018-03-28 PROCEDURE — 36415 COLL VENOUS BLD VENIPUNCTURE: CPT | Performed by: FAMILY MEDICINE

## 2018-03-28 PROCEDURE — 83735 ASSAY OF MAGNESIUM: CPT | Performed by: FAMILY MEDICINE

## 2018-03-28 PROCEDURE — 83036 HEMOGLOBIN GLYCOSYLATED A1C: CPT | Performed by: FAMILY MEDICINE

## 2018-03-28 PROCEDURE — 82607 VITAMIN B-12: CPT | Performed by: FAMILY MEDICINE

## 2018-03-28 PROCEDURE — 80053 COMPREHEN METABOLIC PANEL: CPT | Performed by: FAMILY MEDICINE

## 2018-03-28 PROCEDURE — 99214 OFFICE O/P EST MOD 30 MIN: CPT | Performed by: FAMILY MEDICINE

## 2018-03-28 RX ORDER — DIAZEPAM 5 MG/1
5 TABLET ORAL 2 TIMES DAILY PRN
Qty: 60 TABLET | Refills: 0 | Status: SHIPPED | OUTPATIENT
Start: 2018-03-28 | End: 2018-04-30 | Stop reason: SDUPTHER

## 2018-03-28 RX ORDER — HYDROCODONE BITARTRATE AND ACETAMINOPHEN 10; 325 MG/1; MG/1
1 TABLET ORAL EVERY 6 HOURS PRN
Qty: 120 TABLET | Refills: 0 | Status: SHIPPED | OUTPATIENT
Start: 2018-03-28 | End: 2018-04-30 | Stop reason: SDUPTHER

## 2018-03-28 NOTE — PROGRESS NOTES
Pr Dr. Donis, Ms. Nuñez has been called with her recent lab results & recommendations.  Continue her current medications and follow-up as planned or sooner if any problems.

## 2018-03-28 NOTE — PROGRESS NOTES
Case of cholesterol 2 high.  Ask her again if she would consider going on cholesterol medicine.  If not just low-cholesterol diet.

## 2018-03-28 NOTE — TELEPHONE ENCOUNTER
"Pr Dr. Donis, Ms. Nuñez has been called with her recent lab results & recommendations.  Continue her current medications and follow-up as planned or sooner if any problems.    Patient Response to recommendations  Patient does not want any Cholesterol medication at this time, she states \"I will have to read up on it, I doesn't want anything that is going to cause me to stroke out, or loosen up anything that is already in my veins\".  She states\" some of that medication does all kinds of stuff to your body.\"      --- Message from Donato Donis MD sent at 3/28/2018  2:04 PM CDT -----  Case of cholesterol 2 high.  Ask her again if she would consider going on cholesterol medicine.  If not just low-cholesterol diet.    "

## 2018-04-30 ENCOUNTER — OFFICE VISIT (OUTPATIENT)
Dept: FAMILY MEDICINE CLINIC | Facility: CLINIC | Age: 63
End: 2018-04-30

## 2018-04-30 VITALS
OXYGEN SATURATION: 98 % | BODY MASS INDEX: 22.23 KG/M2 | DIASTOLIC BLOOD PRESSURE: 78 MMHG | SYSTOLIC BLOOD PRESSURE: 124 MMHG | HEIGHT: 64 IN | HEART RATE: 94 BPM | WEIGHT: 130.2 LBS

## 2018-04-30 DIAGNOSIS — M54.41 CHRONIC BILATERAL LOW BACK PAIN WITH BILATERAL SCIATICA: ICD-10-CM

## 2018-04-30 DIAGNOSIS — F41.1 GENERALIZED ANXIETY DISORDER: ICD-10-CM

## 2018-04-30 DIAGNOSIS — R04.2 HEMOPTYSIS: Primary | ICD-10-CM

## 2018-04-30 DIAGNOSIS — G89.29 CHRONIC BILATERAL LOW BACK PAIN WITH BILATERAL SCIATICA: ICD-10-CM

## 2018-04-30 DIAGNOSIS — M54.42 CHRONIC BILATERAL LOW BACK PAIN WITH BILATERAL SCIATICA: ICD-10-CM

## 2018-04-30 PROCEDURE — 99214 OFFICE O/P EST MOD 30 MIN: CPT | Performed by: FAMILY MEDICINE

## 2018-04-30 RX ORDER — HYDROCODONE BITARTRATE AND ACETAMINOPHEN 10; 325 MG/1; MG/1
1 TABLET ORAL EVERY 6 HOURS PRN
Qty: 120 TABLET | Refills: 0 | Status: SHIPPED | OUTPATIENT
Start: 2018-04-30 | End: 2018-05-29 | Stop reason: SDUPTHER

## 2018-04-30 RX ORDER — DIAZEPAM 5 MG/1
5 TABLET ORAL 2 TIMES DAILY PRN
Qty: 60 TABLET | Refills: 0 | Status: SHIPPED | OUTPATIENT
Start: 2018-04-30 | End: 2018-05-29 | Stop reason: SDUPTHER

## 2018-04-30 NOTE — PROGRESS NOTES
Subjective   Adriana Nuñez is a 62 y.o. female.     Back Pain   This is a chronic problem. The current episode started more than 1 year ago. The problem has been waxing and waning since onset. The pain is present in the lumbar spine. The quality of the pain is described as aching. The pain radiates to the right knee and left knee. The pain is at a severity of 8/10. The pain is moderate. The pain is worse during the day. The symptoms are aggravated by position and bending. Pertinent negatives include no bladder incontinence, bowel incontinence or fever.   Anxiety   Presents for follow-up visit. Symptoms include compulsions, excessive worry, nervous/anxious behavior and shortness of breath. Patient reports no depressed mood.       Pneumonia   She complains of cough, hemoptysis, shortness of breath, sputum production and wheezing. This is a new problem. The current episode started 1 to 4 weeks ago. The problem occurs intermittently. The problem has been resolved. The cough is productive of blood-tinged sputum. Pertinent negatives include no fever.        The following portions of the patient's history were reviewed and updated as appropriate: allergies, current medications, past family history, past medical history, past social history, past surgical history and problem list.    Review of Systems   Constitutional: Negative for fever.   Respiratory: Positive for cough, hemoptysis, sputum production, shortness of breath and wheezing.    Gastrointestinal: Negative for bowel incontinence.   Genitourinary: Negative for bladder incontinence.   Musculoskeletal: Positive for back pain.   Psychiatric/Behavioral: The patient is nervous/anxious.        Objective   Physical Exam   Constitutional: She is oriented to person, place, and time. She appears well-developed and well-nourished. No distress.   HENT:   Head: Normocephalic and atraumatic.   Right Ear: Hearing and tympanic membrane normal.   Left Ear: Hearing and tympanic  membrane normal.   Cardiovascular: Normal rate and regular rhythm.  Exam reveals distant heart sounds.    Pulmonary/Chest: Effort normal. She has decreased breath sounds. She has rhonchi. She has no rales.   Musculoskeletal:        Lumbar back: She exhibits decreased range of motion, tenderness and pain. She exhibits no bony tenderness, no swelling, no edema, no deformity, no laceration and no spasm.   Neurological: She is alert and oriented to person, place, and time.   Reflex Scores:       Patellar reflexes are 2+ on the right side and 2+ on the left side.  Skin: Skin is warm and dry. She is not diaphoretic.   Psychiatric: She has a normal mood and affect. Her behavior is normal. Judgment and thought content normal.   Nursing note and vitals reviewed.      Assessment/Plan   Problems Addressed this Visit        Nervous and Auditory    Chronic bilateral low back pain with bilateral sciatica       Other    Generalized anxiety disorder    Relevant Medications    diazePAM (VALIUM) 5 MG tablet      Other Visit Diagnoses     Hemoptysis    -  Primary    Relevant Orders    XR Chest PA & Lateral             annie pect OCD, declined treatment for it  Hemoptysis 2 we2ks ago resolved. Re commended dc smoking. Declined cholesterol med

## 2018-05-01 ENCOUNTER — TELEPHONE (OUTPATIENT)
Dept: FAMILY MEDICINE CLINIC | Facility: CLINIC | Age: 63
End: 2018-05-01

## 2018-05-02 ENCOUNTER — TELEPHONE (OUTPATIENT)
Dept: FAMILY MEDICINE CLINIC | Facility: CLINIC | Age: 63
End: 2018-05-02

## 2018-05-02 DIAGNOSIS — Z13.820 SCREENING FOR OSTEOPOROSIS: Primary | ICD-10-CM

## 2018-05-02 NOTE — TELEPHONE ENCOUNTER
Pr Dr. Donis, Ms. Nuñez has been called with her recent X-ray results & recommendations.  Continue her current medications and follow-up as planned or sooner if any problems.  DEXA Bone Density Ordered      ----- Message from Donato Donis MD sent at 5/2/2018  4:13 PM CDT -----  Lungs are clear bones are thinning.  Recommend DEXA scan

## 2018-05-08 ENCOUNTER — OFFICE VISIT (OUTPATIENT)
Dept: FAMILY MEDICINE CLINIC | Facility: CLINIC | Age: 63
End: 2018-05-08

## 2018-05-08 VITALS
HEIGHT: 64 IN | HEART RATE: 104 BPM | DIASTOLIC BLOOD PRESSURE: 78 MMHG | BODY MASS INDEX: 21.85 KG/M2 | WEIGHT: 128 LBS | SYSTOLIC BLOOD PRESSURE: 128 MMHG | OXYGEN SATURATION: 95 %

## 2018-05-08 DIAGNOSIS — H66.006 RECURRENT ACUTE SUPPURATIVE OTITIS MEDIA WITHOUT SPONTANEOUS RUPTURE OF TYMPANIC MEMBRANE OF BOTH SIDES: Primary | ICD-10-CM

## 2018-05-08 DIAGNOSIS — L98.9 LESION OF SKIN OF FACE: ICD-10-CM

## 2018-05-08 PROCEDURE — 99213 OFFICE O/P EST LOW 20 MIN: CPT | Performed by: FAMILY MEDICINE

## 2018-05-08 RX ORDER — GUAIFENESIN 600 MG/1
1200 TABLET, EXTENDED RELEASE ORAL 2 TIMES DAILY
Qty: 40 TABLET | Refills: 0 | Status: SHIPPED | OUTPATIENT
Start: 2018-05-08 | End: 2018-05-18

## 2018-05-08 RX ORDER — SULFAMETHOXAZOLE AND TRIMETHOPRIM 800; 160 MG/1; MG/1
1 TABLET ORAL
Qty: 14 TABLET | Refills: 0 | Status: SHIPPED | OUTPATIENT
Start: 2018-05-08 | End: 2018-05-15

## 2018-05-08 NOTE — PROGRESS NOTES
Subjective   Adriana Nuñez is a 63 y.o. female.     Earache    There is pain in both ears. This is a recurrent problem. The current episode started in the past 7 days. The problem occurs constantly. There has been no fever. The pain is moderate. Associated symptoms include coughing, ear discharge and hearing loss. Pertinent negatives include no headaches, rhinorrhea or sore throat.   Ear Fullness    Associated symptoms include coughing, ear discharge and hearing loss. Pertinent negatives include no headaches, rhinorrhea or sore throat.        The following portions of the patient's history were reviewed and updated as appropriate: allergies, current medications, past family history, past medical history, past social history, past surgical history and problem list.    Review of Systems   HENT: Positive for ear discharge, ear pain and hearing loss. Negative for rhinorrhea and sore throat.    Respiratory: Positive for cough.    Neurological: Negative for headaches.       Objective   Physical Exam   Constitutional: She is oriented to person, place, and time. She appears well-developed and well-nourished. No distress.   HENT:   Head: Normocephalic and atraumatic.   Right Ear: Tympanic membrane is injected, erythematous and retracted.   Left Ear: Tympanic membrane is injected and retracted. Tympanic membrane is not erythematous.   Neurological: She is alert and oriented to person, place, and time.   Skin: Skin is warm and dry. Lesion noted. She is not diaphoretic.        Psychiatric: She has a normal mood and affect. Her behavior is normal. Judgment and thought content normal.   Nursing note and vitals reviewed.      Assessment/Plan   Problems Addressed this Visit     None      Visit Diagnoses     Recurrent acute suppurative otitis media without spontaneous rupture of tympanic membrane of both sides    -  Primary    Relevant Orders    Ambulatory Referral to ENT (Otolaryngology)    Lesion of skin of face         Relevant Orders    Ambulatory Referral to ENT (Otolaryngology)            I advised the patient of the risks in continuing to use tobacco, and I provided this patient with smoking cessation educational materials.    During this visit, I spent 5   minutes counseling the patient regarding smoking cessation.

## 2018-05-21 ENCOUNTER — OFFICE VISIT (OUTPATIENT)
Dept: ORTHOPEDIC SURGERY | Facility: CLINIC | Age: 63
End: 2018-05-21

## 2018-05-21 VITALS — WEIGHT: 125.9 LBS | HEIGHT: 64 IN | BODY MASS INDEX: 21.49 KG/M2

## 2018-05-21 DIAGNOSIS — G89.29 CHRONIC BILATERAL LOW BACK PAIN WITH BILATERAL SCIATICA: Primary | ICD-10-CM

## 2018-05-21 DIAGNOSIS — M53.3 COCCYX PAIN: ICD-10-CM

## 2018-05-21 DIAGNOSIS — M54.42 CHRONIC BILATERAL LOW BACK PAIN WITH BILATERAL SCIATICA: Primary | ICD-10-CM

## 2018-05-21 DIAGNOSIS — M54.41 CHRONIC BILATERAL LOW BACK PAIN WITH BILATERAL SCIATICA: Primary | ICD-10-CM

## 2018-05-21 DIAGNOSIS — R10.13 EPIGASTRIC PAIN: ICD-10-CM

## 2018-05-21 PROCEDURE — 96372 THER/PROPH/DIAG INJ SC/IM: CPT | Performed by: ORTHOPAEDIC SURGERY

## 2018-05-21 PROCEDURE — 99213 OFFICE O/P EST LOW 20 MIN: CPT | Performed by: ORTHOPAEDIC SURGERY

## 2018-05-21 RX ORDER — TRIAMCINOLONE ACETONIDE 40 MG/ML
80 INJECTION, SUSPENSION INTRA-ARTICULAR; INTRAMUSCULAR ONCE
Status: COMPLETED | OUTPATIENT
Start: 2018-05-21 | End: 2018-05-23

## 2018-05-21 NOTE — PROGRESS NOTES
Adriana Nuñez is a 63 y.o. female returns for     Chief Complaint   Patient presents with   • Lumbar Spine - Follow-up       HISTORY OF PRESENT ILLNESS: Patient being seen for lumbar spine follow up.     Patient reports that she has chronic all over body bone pain. She has received epidural injections in her neck and back for thirteen years. She reports constant sacral pain that prevents her from sitting and numbness over an area of the skin with numbness down her legs.   Complains of bone pain, complains of back pain.    States she is scheduled to see gynecologic in [place] for 'cancer'.            CONCURRENT MEDICAL HISTORY:    Past Medical History:   Diagnosis Date   • Adenomatous polyp of colon    • Anorectal abscess    • Anxiety disorder    • Benign polyp of large intestine    • Bladder fistula      post rapair      • Blood in urine    • Chronic obstructive lung disease    • Chronic urinary tract infection    • Depressive disorder    • Diarrhea    • Diverticulitis of colon      post colectomy      • Elevated levels of transaminase & lactic acid dehydrogenase    • Epigastric pain    • Generalized anxiety disorder    • Heavy tobacco smoker    • History of colon polyps    • Hyperlipidemia    • Insomnia    • Low back pain    • Lower urinary tract infectious disease    • Lymphadenopathy     ON CT   • Osteoporosis    • Peripheral nerve disease    • Polymyalgia rheumatica    • Polyp of colon    • Urinary tract infection    • Vitamin D deficiency        Allergies   Allergen Reactions   • Fosamax [Alendronate] GI Intolerance         Current Outpatient Prescriptions:   •  ATROVENT HFA 17 MCG/ACT inhaler, INHALE 2 PUFFS 4 (FOUR) TIMES A DAY., Disp: 12.9 g, Rfl: 11  •  diazePAM (VALIUM) 5 MG tablet, Take 1 tablet by mouth 2 (Two) Times a Day As Needed for Anxiety., Disp: 60 tablet, Rfl: 0  •  dicyclomine (BENTYL) 20 MG tablet, Take 1 tablet by mouth 2 (Two) Times a Day., Disp: 60 tablet, Rfl: 5  •  fluticasone  "(FLONASE) 50 MCG/ACT nasal spray, 2 sprays into each nostril Daily., Disp: 9.9 mL, Rfl: 2  •  HYDROcodone-acetaminophen (NORCO)  MG per tablet, Take 1 tablet by mouth Every 6 (Six) Hours As Needed for Moderate Pain ., Disp: 120 tablet, Rfl: 0  •  loratadine (CLARITIN) 10 MG tablet, Take 10 mg by mouth Daily., Disp: , Rfl: 0  •  neomycin-polymyxin-hydrocortisone (CORTISPORIN) 3.5-56381-1 ophthalmic suspension, , Disp: , Rfl:   •  omeprazole (priLOSEC) 20 MG capsule, Take 1 capsule by mouth Daily., Disp: 30 capsule, Rfl: 5  •  predniSONE (DELTASONE) 10 MG tablet, TAKE ONE TABLET BY MOUTH EVERY DAY, Disp: 30 tablet, Rfl: 12  •  VENTOLIN  (90 Base) MCG/ACT inhaler, INHALE 1 PUFF BY MOUTH EVERY 8 HOURS AS NEEDED, Disp: 18 g, Rfl: 11    Current Facility-Administered Medications:   •  triamcinolone acetonide (KENALOG-40) injection 80 mg, 80 mg, Intramuscular, Once, Khris Connolly MD    Past Surgical History:   Procedure Laterality Date   • COLON RESECTION     • COLONOSCOPY  03/17/2014    polyps   • COLONOSCOPY  02/08/2016   • COLONOSCOPY N/A 5/24/2017    Procedure: COLONOSCOPY;  Surgeon: Aric Pina MD;  Location: [facility] ENDOSCOPY;  Service:    • ENDOSCOPY  01/26/2015    Normal esophagus. Gastritis was found in the stomach. Biopsy taken. Normal duodenum   • ENDOSCOPY N/A 9/18/2017    Procedure: ESOPHAGOGASTRODUODENOSCOPY;  Surgeon: Aric Pina MD;  Location: [facility] ENDOSCOPY;  Service:    • HYSTERECTOMY     • INJECTION OF MEDICATION  12/03/2010    KENALOG(3)   • INJECTION OF MEDICATION  11/07/2014    ROCEPHIN(1)   • UPPER GASTROINTESTINAL ENDOSCOPY  01/26/2015   • UPPER GASTROINTESTINAL ENDOSCOPY  09/18/2017       ROS  No fevers or chills.  No chest pain or shortness of air.  No GI or  disturbances.    PHYSICAL EXAMINATION:       Ht 162.6 cm (64\")   Wt 57.1 kg (125 lb 14.4 oz)   BMI 21.61 kg/m²     Physical Exam   Constitutional: She is oriented to person, place, and time. She appears " well-developed and well-nourished. No distress.   Elderly, thin frail   HENT:   Head: Normocephalic.   Mouth/Throat: No oropharyngeal exudate.   Eyes: Pupils are equal, round, and reactive to light. No scleral icterus.   Neck: No tracheal deviation present. No thyromegaly present.   Cardiovascular: Normal rate and intact distal pulses.    Pulmonary/Chest: Effort normal. No respiratory distress.   Abdominal: Soft. She exhibits no distension. There is no tenderness.   Neurological: She is oriented to person, place, and time. She displays normal reflexes. No cranial nerve deficit. Coordination normal.   Skin: Skin is warm and dry. No rash noted. No erythema.   Psychiatric: She has a normal mood and affect. Her behavior is normal.       GAIT:     []  Normal  []  Antalgic    Assistive device: [x]  None  []  Walker     []  Crutches  []  Cane     []  Wheelchair  []  Stretcher    Back Exam     Tenderness   The patient is experiencing tenderness in the lumbar.    Range of Motion   Extension: 10   Flexion: 60   Lateral Bend Right: 10   Lateral Bend Left: 10   Rotation Right: 10   Rotation Left: 10     Tests   Straight leg raise right: negative  Straight leg raise left: negative                MRI Lumbar Spine Without Contrast  2/6/2018    Procedure: MRI lumbar spine without contrast     Reason for exam: Low back pain     FINDINGS: Multisequence multiplanar MR imaging of the lumbar  spine was performed without contrast. Lumbar spine vertebral body  heights and alignment are normal. There is no evidence of  fracture or dislocation. No abnormal marrow signal. Conus of the  spinal cord appears normal with tip at the T12-L1 intervertebral  disc space level.     T12-L1: Disc space normal. No disc protrusion or extrusion.  Spinal cord and nerve roots exit without compromise.     L1-L2: Disc space normal. No disc protrusion or extrusion. Nerve  roots exit without compromise.     L2-L3: Left neural foraminal and far left lateral disc  space mild  disc protrusion. Nerve roots exit without compromise.     L3-L4: Very minimal diffuse disc protrusion minimally indenting  the anterior thecal sac. Nerve roots exit without compromise.     L4-L5: Very mild posterior central broad-based disc protrusion  minimally indenting the anterior thecal sac. Nerve roots exit  without compromise.     L5-S1: Disc space normal. No disc protrusion or extrusion. Nerve  roots exit without compromise.     IMPRESSION:  1.  No acute MR lumbar spine abnormality.  2.  L2-L3: Left neural foraminal and far left lateral disc space  mild disc protrusion. Nerve roots exit without compromise.  3.  L3-L4: Very minimal diffuse disc protrusion minimally  indenting the anterior thecal sac. Nerve roots exit without  compromise.  4.  L4-L5: Very mild posterior central broad-based disc  protrusion minimally indenting the anterior thecal sac. Nerve  roots exit without compromise.     Electronically signed by:  Marvel Hunter MD  2/6/2018 11:41 AM CST  Workstation: [id number]        ASSESSMENT:    Diagnoses and all orders for this visit:    Chronic bilateral low back pain with bilateral sciatica  -     triamcinolone acetonide (KENALOG-40) injection 80 mg; Inject 2 mL into the shoulder, thigh, or buttocks 1 (One) Time.    Coccyx pain  -     triamcinolone acetonide (KENALOG-40) injection 80 mg; Inject 2 mL into the shoulder, thigh, or buttocks 1 (One) Time.    Epigastric pain  -     Ambulatory Referral to General Surgery          PLAN    IM injection today.   I will refer her to general surgery for evaluation of chronic lower abdominal pain      Khris Connolly MD

## 2018-05-23 RX ORDER — TRIAMCINOLONE ACETONIDE 40 MG/ML
80 INJECTION, SUSPENSION INTRA-ARTICULAR; INTRAMUSCULAR ONCE
Status: DISCONTINUED | OUTPATIENT
Start: 2018-05-23 | End: 2018-05-23

## 2018-05-23 RX ADMIN — TRIAMCINOLONE ACETONIDE 80 MG: 40 INJECTION, SUSPENSION INTRA-ARTICULAR; INTRAMUSCULAR at 11:57

## 2018-05-29 ENCOUNTER — OFFICE VISIT (OUTPATIENT)
Dept: FAMILY MEDICINE CLINIC | Facility: CLINIC | Age: 63
End: 2018-05-29

## 2018-05-29 VITALS
BODY MASS INDEX: 22.01 KG/M2 | HEIGHT: 64 IN | SYSTOLIC BLOOD PRESSURE: 122 MMHG | DIASTOLIC BLOOD PRESSURE: 70 MMHG | HEART RATE: 102 BPM | WEIGHT: 128.9 LBS | OXYGEN SATURATION: 98 %

## 2018-05-29 DIAGNOSIS — M54.41 CHRONIC BILATERAL LOW BACK PAIN WITH BILATERAL SCIATICA: Primary | ICD-10-CM

## 2018-05-29 DIAGNOSIS — G89.29 CHRONIC BILATERAL LOW BACK PAIN WITH BILATERAL SCIATICA: Primary | ICD-10-CM

## 2018-05-29 DIAGNOSIS — F41.1 GENERALIZED ANXIETY DISORDER: ICD-10-CM

## 2018-05-29 DIAGNOSIS — M54.42 CHRONIC BILATERAL LOW BACK PAIN WITH BILATERAL SCIATICA: Primary | ICD-10-CM

## 2018-05-29 PROCEDURE — 99214 OFFICE O/P EST MOD 30 MIN: CPT | Performed by: FAMILY MEDICINE

## 2018-05-29 RX ORDER — HYDROCODONE BITARTRATE AND ACETAMINOPHEN 10; 325 MG/1; MG/1
1 TABLET ORAL EVERY 6 HOURS PRN
Qty: 120 TABLET | Refills: 0 | Status: SHIPPED | OUTPATIENT
Start: 2018-05-29 | End: 2018-06-26 | Stop reason: SDUPTHER

## 2018-05-29 RX ORDER — HYDROCODONE BITARTRATE AND ACETAMINOPHEN 10; 325 MG/1; MG/1
1 TABLET ORAL EVERY 6 HOURS PRN
Qty: 120 TABLET | Refills: 0 | Status: SHIPPED | OUTPATIENT
Start: 2018-05-29 | End: 2018-05-29 | Stop reason: SDUPTHER

## 2018-05-29 RX ORDER — DIAZEPAM 5 MG/1
5 TABLET ORAL 2 TIMES DAILY PRN
Qty: 60 TABLET | Refills: 0 | Status: SHIPPED | OUTPATIENT
Start: 2018-05-29 | End: 2018-06-26 | Stop reason: SDUPTHER

## 2018-05-29 NOTE — PROGRESS NOTES
Subjective   Adriana Nuñez is a 63 y.o. female.     Back Pain   This is a chronic problem. The current episode started more than 1 year ago. The problem has been waxing and waning since onset. The pain is present in the lumbar spine. The quality of the pain is described as aching. The pain radiates to the right knee and left knee. The pain is at a severity of 8/10. The pain is moderate. The pain is worse during the day. The symptoms are aggravated by position and bending. Pertinent negatives include no bladder incontinence, bowel incontinence or fever.   Anxiety   Presents for follow-up visit. Symptoms include compulsions, excessive worry and nervous/anxious behavior. Patient reports no depressed mood.            The following portions of the patient's history were reviewed and updated as appropriate: allergies, current medications, past family history, past medical history, past social history, past surgical history and problem list.    Review of Systems   Constitutional: Negative for fever.   Gastrointestinal: Negative for bowel incontinence.   Genitourinary: Negative for bladder incontinence.   Musculoskeletal: Positive for back pain.   Psychiatric/Behavioral: The patient is nervous/anxious.        Objective   Physical Exam   Constitutional: She is oriented to person, place, and time. She appears well-developed and well-nourished. No distress.   HENT:   Head: Normocephalic and atraumatic.   Right Ear: Hearing and tympanic membrane normal.   Left Ear: Hearing and tympanic membrane normal.   Pulmonary/Chest: Effort normal and breath sounds normal.   Musculoskeletal:        Lumbar back: She exhibits decreased range of motion, tenderness and pain. She exhibits no bony tenderness, no swelling, no edema, no deformity, no laceration and no spasm.   Neurological: She is alert and oriented to person, place, and time.   Reflex Scores:       Patellar reflexes are 2+ on the right side and 2+ on the left side.  Skin:  Skin is warm and dry. She is not diaphoretic.   Psychiatric: She has a normal mood and affect. Her behavior is normal. Judgment and thought content normal.   Nursing note and vitals reviewed.      Assessment/Plan   Problems Addressed this Visit        Nervous and Auditory    Chronic bilateral low back pain with bilateral sciatica - Primary       Other    Generalized anxiety disorder    Relevant Medications    diazePAM (VALIUM) 5 MG tablet             annie pect OCD, declined treatment for it

## 2018-05-30 NOTE — PROGRESS NOTES
Problem: RESPIRATORY - ADULT  Goal: Achieves optimal ventilation and oxygenation  INTERVENTIONS:  - Assess for changes in respiratory status  - Assess for changes in mentation and behavior  - Position to facilitate oxygenation and minimize respiratory effo Subjective   Adriana Nuñez is a 62 y.o. female.     Back Pain   This is a chronic problem. The current episode started more than 1 year ago. The problem has been waxing and waning since onset. The pain is present in the lumbar spine. The quality of the pain is described as aching. The pain radiates to the right knee and left knee. The pain is at a severity of 7/10. The pain is moderate. The pain is worse during the day. The symptoms are aggravated by position and bending. Pertinent negatives include no bladder incontinence, bowel incontinence or fever.   Anxiety   Presents for follow-up visit. Symptoms include compulsions, excessive worry and nervous/anxious behavior. Patient reports no depressed mood, insomnia or irritability.       Hyperlipidemia   This is a chronic problem. The current episode started more than 1 year ago. The problem is uncontrolled. Recent lipid tests were reviewed and are high. Associated symptoms include myalgias.   COPD   This is a chronic problem. The current episode started more than 1 year ago. The problem occurs constantly. The problem has been waxing and waning. Associated symptoms include congestion, coughing and myalgias. Pertinent negatives include no chills or fever.        The following portions of the patient's history were reviewed and updated as appropriate: allergies, current medications, past family history, past medical history, past social history, past surgical history and problem list.    Review of Systems   Constitutional: Negative for chills, fever and irritability.   HENT: Positive for congestion.    Respiratory: Positive for cough.    Gastrointestinal: Negative for bowel incontinence.   Genitourinary: Negative for bladder incontinence.   Musculoskeletal: Positive for back pain and myalgias.   Psychiatric/Behavioral: The patient is nervous/anxious. The patient does not have insomnia.        Objective   Physical Exam   Constitutional: She is oriented to person,  place, and time. She appears well-developed and well-nourished. No distress.   HENT:   Head: Normocephalic and atraumatic.   Right Ear: Hearing and tympanic membrane normal.   Left Ear: Hearing and tympanic membrane normal.   Pulmonary/Chest: Effort normal and breath sounds normal.   Musculoskeletal:        Lumbar back: She exhibits decreased range of motion, tenderness and pain. She exhibits no bony tenderness, no swelling, no edema, no deformity, no laceration and no spasm.   Neurological: She is alert and oriented to person, place, and time.   Reflex Scores:       Patellar reflexes are 2+ on the right side and 2+ on the left side.  Skin: Skin is warm and dry. She is not diaphoretic.   Psychiatric: She has a normal mood and affect. Her behavior is normal. Judgment and thought content normal.   Nursing note and vitals reviewed.      Assessment/Plan   Problems Addressed this Visit        Cardiovascular and Mediastinum    Hyperlipidemia - Primary    Relevant Orders    Comprehensive Metabolic Panel    Lipid Panel       Respiratory    Chronic obstructive lung disease       Digestive    Gastroesophageal reflux disease without esophagitis    Relevant Orders    Vitamin B12    Magnesium       Nervous and Auditory    Chronic bilateral low back pain with bilateral sciatica       Other    Polymyalgia rheumatica    Heavy tobacco smoker    Generalized anxiety disorder    Relevant Medications    diazePAM (VALIUM) 5 MG tablet      Other Visit Diagnoses     Screening for diabetes mellitus        Relevant Orders    Hemoglobin A1c             annie pect OCD, declined treatment for it. Declined hyperlipidemia treatment

## 2018-06-04 ENCOUNTER — CLINICAL SUPPORT (OUTPATIENT)
Dept: AUDIOLOGY | Facility: CLINIC | Age: 63
End: 2018-06-04

## 2018-06-04 ENCOUNTER — CONSULT (OUTPATIENT)
Dept: SURGERY | Facility: CLINIC | Age: 63
End: 2018-06-04

## 2018-06-04 ENCOUNTER — OFFICE VISIT (OUTPATIENT)
Dept: OTOLARYNGOLOGY | Facility: CLINIC | Age: 63
End: 2018-06-04

## 2018-06-04 VITALS — BODY MASS INDEX: 21.85 KG/M2 | WEIGHT: 128 LBS | HEIGHT: 64 IN | TEMPERATURE: 97.1 F

## 2018-06-04 VITALS
SYSTOLIC BLOOD PRESSURE: 110 MMHG | DIASTOLIC BLOOD PRESSURE: 70 MMHG | BODY MASS INDEX: 21.85 KG/M2 | HEIGHT: 64 IN | WEIGHT: 128 LBS

## 2018-06-04 DIAGNOSIS — R10.31 RIGHT LOWER QUADRANT ABDOMINAL PAIN: Primary | ICD-10-CM

## 2018-06-04 DIAGNOSIS — R51.9 HEADACHE, OCCIPITAL: ICD-10-CM

## 2018-06-04 DIAGNOSIS — L98.9 FACIAL SKIN LESION: Primary | ICD-10-CM

## 2018-06-04 DIAGNOSIS — H92.01 OTALGIA OF RIGHT EAR: ICD-10-CM

## 2018-06-04 DIAGNOSIS — H90.3 SENSORINEURAL HEARING LOSS, BILATERAL: Primary | ICD-10-CM

## 2018-06-04 PROCEDURE — 99203 OFFICE O/P NEW LOW 30 MIN: CPT | Performed by: OTOLARYNGOLOGY

## 2018-06-04 PROCEDURE — 99204 OFFICE O/P NEW MOD 45 MIN: CPT | Performed by: SURGERY

## 2018-06-04 RX ORDER — FLUCONAZOLE 150 MG/1
150 TABLET ORAL
COMMUNITY
Start: 2018-05-31 | End: 2018-06-04

## 2018-06-04 NOTE — PROGRESS NOTES
STANDARD AUDIOMETRIC EVALUATION      Name:  Adriana Nuñez  :  1955  Age:  63 y.o.  Date of Evaluation:  2018      HISTORY    Reason for visit:  Adriana Nuñez is seen today for a hearing evaluation at the request of Dr. Abelino Gonzalez.  Patient reports a longstanding right ear infection for the past 2-3 years.  She reports having ear drainage in the past.  She reports having a right earache and stopped up feeling.  She reports trouble hearing in the right.      EVALUATION    See Audiogram    RESULTS        Otoscopy and Tympanometry 226 Hz :  Right Ear:  Otoscopy:  Clear ear canal          Tympanometry:  Middle ear function within normal limits    Left Ear:   Otoscopy:  Clear ear canal        Tympanometry:  Middle ear function within normal limits    Test technique:  Standard Audiometry     Pure Tone Audiometry:   Patient responded to pure tones at 20-60 dB for 250-8000 Hz in right ear, and at 25-55 dB for 250-8000 Hz in left ear.       Speech Audiometry:        Right Ear:  Speech Reception Threshold (SRT) was obtained at 25 dBHL                 Speech Discrimination scores were 100% in quiet when words were presented at 60 dBHL       Left Ear:  Speech Reception Threshold (SRT) was obtained at 25 dBHL                 Speech Discrimination scores were 100% in quiet when words were presented at 60 dBHL    Reliability:   good    IMPRESSIONS:  1.  Tympanometry results are consistent with Middle ear function within normal limits in both ears.  2.  Pure tone results are consistent with within normal limits to moderate sloping sensorineural hearing loss for both ears.     RECOMMENDATIONS:  Patient is seeing the Ear Nose and Throat physician immediately following this examination.  It was a pleasure seeing Adriana Nuñez in Audiology today.  We would be happy to do further testing or discuss these test as necessary.      This document has been electronically signed by JONATAN Bacon on   4, 2018 5:03 PM          JONATAN Bacon  Licensed Audiologist

## 2018-06-04 NOTE — PROGRESS NOTES
Subjective   Adriana Nuñez is a 63 y.o. female.   Chronic ear infections right with hearing loss  History of Present Illness   His 2 year history of intermittent ear infections multiple antibiotics with fluid never goes away she has persistent hearing loss complains of pain is her head jaw she also has a facial lesion has some on her back she taking went off her face she says is been on prednisone as well as Flonase does not resolve ear infections never had a ruptured eardrum or draining ear  She describes pain around her head ear neck she causes her brainstem.. Antibiotics on 2 separate occasions.  Her pain she describes is really not of her ear but most of it is very sports her head back or neck    The following portions of the patient's history were reviewed and updated as appropriate: allergies, current medications, past family history, past medical history, past social history, past surgical history and problem list.      Adriana Nuñez reports that she has been smoking Cigars and Cigarettes.  She has been smoking about 1.00 pack per day. She has never used smokeless tobacco. She reports that she does not drink alcohol or use drugs.  Patient is a tobacco user and has been counseled for use of tobacco products    No family history on file.      Current Outpatient Prescriptions:   •  ATROVENT HFA 17 MCG/ACT inhaler, INHALE 2 PUFFS 4 (FOUR) TIMES A DAY., Disp: 12.9 g, Rfl: 11  •  diazePAM (VALIUM) 5 MG tablet, Take 1 tablet by mouth 2 (Two) Times a Day As Needed for Anxiety., Disp: 60 tablet, Rfl: 0  •  dicyclomine (BENTYL) 20 MG tablet, Take 1 tablet by mouth 2 (Two) Times a Day., Disp: 60 tablet, Rfl: 5  •  fluticasone (FLONASE) 50 MCG/ACT nasal spray, 2 sprays into each nostril Daily., Disp: 9.9 mL, Rfl: 2  •  HYDROcodone-acetaminophen (NORCO)  MG per tablet, Take 1 tablet by mouth Every 6 (Six) Hours As Needed for Moderate Pain ., Disp: 120 tablet, Rfl: 0  •  omeprazole (priLOSEC) 20 MG capsule,  Take 1 capsule by mouth Daily., Disp: 30 capsule, Rfl: 5  •  predniSONE (DELTASONE) 10 MG tablet, TAKE ONE TABLET BY MOUTH EVERY DAY, Disp: 30 tablet, Rfl: 12  •  VENTOLIN  (90 Base) MCG/ACT inhaler, INHALE 1 PUFF BY MOUTH EVERY 8 HOURS AS NEEDED, Disp: 18 g, Rfl: 11    Allergies   Allergen Reactions   • Fosamax [Alendronate] GI Intolerance       Past Medical History:   Diagnosis Date   • Adenomatous polyp of colon    • Anorectal abscess    • Anxiety disorder    • Benign polyp of large intestine    • Bladder fistula      post rapair      • Blood in urine    • Chronic obstructive lung disease    • Chronic urinary tract infection    • Depressive disorder    • Diarrhea    • Diverticulitis of colon      post colectomy      • Elevated levels of transaminase & lactic acid dehydrogenase    • Epigastric pain    • Generalized anxiety disorder    • Heavy tobacco smoker    • History of colon polyps    • Hyperlipidemia    • Insomnia    • Low back pain    • Lower urinary tract infectious disease    • Lymphadenopathy     ON CT   • Osteoporosis    • Peripheral nerve disease    • Polymyalgia rheumatica    • Polyp of colon    • Urinary tract infection    • Vitamin D deficiency          Review of Systems   Constitutional: Positive for fever.   HENT: Positive for ear pain, hearing loss and tinnitus.    Eyes: Negative.    Respiratory: Negative.    Cardiovascular: Negative.    Gastrointestinal: Positive for abdominal pain.   Endocrine: Negative.         Hair loss   Genitourinary: Negative.         Blood in urine   Musculoskeletal: Negative.         Arthritis   Skin: Negative.         Facial lesion acute coming back right face   Allergic/Immunologic: Negative.    Neurological: Positive for headaches.   Hematological: Negative.  Negative for adenopathy.   Psychiatric/Behavioral: Negative.    All other systems reviewed and are negative.          Objective   Physical Exam   Constitutional: She is oriented to person, place, and time.  She appears well-developed and well-nourished.   HENT:   Head: Normocephalic and atraumatic.       Right Ear: External ear and ear canal normal. No mastoid tenderness. A middle ear effusion is present.   Left Ear: Hearing, tympanic membrane, external ear and ear canal normal. No mastoid tenderness.   Nose: Septal deviation present. No mucosal edema, rhinorrhea or nasal deformity. No epistaxis. Right sinus exhibits no maxillary sinus tenderness and no frontal sinus tenderness. Left sinus exhibits no maxillary sinus tenderness and no frontal sinus tenderness.   Mouth/Throat: Uvula is midline, oropharynx is clear and moist and mucous membranes are normal. No trismus in the jaw. Abnormal dentition. No oropharyngeal exudate or posterior oropharyngeal edema.   Eyes: Conjunctivae are normal.   Neck: Normal range of motion. Neck supple. No JVD present. No tracheal deviation present. No thyromegaly present.   Pulmonary/Chest: Effort normal.   Musculoskeletal: Normal range of motion.   Lymphadenopathy:        Head (right side): No submental, no submandibular, no tonsillar, no preauricular, no posterior auricular and no occipital adenopathy present.        Head (left side): No submental, no submandibular, no tonsillar, no preauricular, no posterior auricular and no occipital adenopathy present.     She has no cervical adenopathy.        Right cervical: No superficial cervical, no deep cervical and no posterior cervical adenopathy present.       Left cervical: No superficial cervical, no deep cervical and no posterior cervical adenopathy present.   Neurological: She is alert and oriented to person, place, and time. No cranial nerve deficit.   Skin: Skin is warm.   Psychiatric: She has a normal mood and affect. Her speech is normal and behavior is normal. Thought content normal.   Nursing note and vitals reviewed.    The audiogram and tympanogram tracings the tympanogram reviewed with the patient showing her abnormalities of her  hearing high frequencies sensorineural type but it is not conductive.  But the  tympanogram    She's had a normal bone scan recently    Assessment/Plan   Adriana was seen today for ear problem and skin lesion.    Diagnoses and all orders for this visit:    Facial skin lesion    Otalgia of right ear    Headache, occipital    Patient does not want her facial lesion removed that was explained her this could be cancer and couldn't enlarge and spread.  Patient's concern about her pain or head she says is better now that she still has some.  She thinks antibiotics helped.  Her tympanogram  not show any evidence of fluid she does have is scarred eardrum there is no active evidence infection explained her that putting a tube in a ear with the tympanogram  being normal as not likely to help her.  I'm concerned that a lot of her pain is not related to her ear.  She does not seem to want to accept this.  I told her that I be happy to recheck her pain gets worse.  Just that she has her Flonase regularly which she's not been doing she has some intermittent eustachian tube problems this can help prevent problems Luis Antonio on prednisone by history.. Suggested she set up a follow-up is to reassess and recheck her facial lesion but she refuses to do that.  Card with the phone number to call us back this recurs examined her at the time she's having more discomfort.

## 2018-06-04 NOTE — PROGRESS NOTES
Subjective   Adriana Nuñez is a 63 y.o. female     History of Present Illness referred by Dr Connolly for what sounds like chronic abdominal pain.  Difficult to obtain any type of history from this patient due to her rambling nature of her discussion when asked questions.  She seems fixated on some type of abscess in her right lower quadrant which she says she's had for years and she seems to get better when she is treated with antibiotics.  She has a history of vulvar cancer and was seen in [place] and surgery up there most recently.  She is also seen our GI service and had an EGD and colonoscopy done last year for this same problem.  She had a CT scan done of the abdomen and pelvis last year which was unremarkable as well.        Review of Systems   Constitutional: Negative.    HENT: Positive for ear pain.    Eyes: Negative.    Respiratory: Positive for cough.    Gastrointestinal: Positive for abdominal pain, diarrhea and nausea.        Mid to Lower Abdomen   Endocrine: Negative.    Genitourinary: Negative.    Musculoskeletal: Positive for arthralgias.   Skin: Negative.    Allergic/Immunologic: Negative.    Neurological: Positive for dizziness and headaches.   Hematological: Negative.    Psychiatric/Behavioral: Negative.      Past Medical History:   Diagnosis Date   • Adenomatous polyp of colon    • Anorectal abscess    • Anxiety disorder    • Benign polyp of large intestine    • Bladder fistula      post rapair      • Blood in urine    • Chronic obstructive lung disease    • Chronic urinary tract infection    • Depressive disorder    • Diarrhea    • Diverticulitis of colon      post colectomy      • Elevated levels of transaminase & lactic acid dehydrogenase    • Epigastric pain    • Generalized anxiety disorder    • Heavy tobacco smoker    • History of colon polyps    • Hyperlipidemia    • Insomnia    • Low back pain    • Lower urinary tract infectious disease    • Lymphadenopathy     ON CT   •  Osteoporosis    • Peripheral nerve disease    • Polymyalgia rheumatica    • Polyp of colon    • Urinary tract infection    • Vitamin D deficiency      Past Surgical History:   Procedure Laterality Date   • COLON RESECTION     • COLONOSCOPY  03/17/2014    polyps   • COLONOSCOPY  02/08/2016   • COLONOSCOPY N/A 5/24/2017    Procedure: COLONOSCOPY;  Surgeon: Aric Pina MD;  Location: [facility] ENDOSCOPY;  Service:    • ENDOSCOPY  01/26/2015    Normal esophagus. Gastritis was found in the stomach. Biopsy taken. Normal duodenum   • ENDOSCOPY N/A 9/18/2017    Procedure: ESOPHAGOGASTRODUODENOSCOPY;  Surgeon: Aric Pina MD;  Location: [facility] ENDOSCOPY;  Service:    • HYSTERECTOMY     • INJECTION OF MEDICATION  12/03/2010    KENALOG(3)   • INJECTION OF MEDICATION  11/07/2014    ROCEPHIN(1)   • UPPER GASTROINTESTINAL ENDOSCOPY  01/26/2015   • UPPER GASTROINTESTINAL ENDOSCOPY  09/18/2017     No family history on file.  Social History     Social History   • Marital status:      Spouse name: N/A   • Number of children: N/A   • Years of education: N/A     Occupational History   • Not on file.     Social History Main Topics   • Smoking status: Current Every Day Smoker     Packs/day: 1.00     Types: Cigars, Cigarettes   • Smokeless tobacco: Never Used      Comment: 03/27/2018 - Patient states she has utilized tobacco products periodically since the age of 11.   • Alcohol use No   • Drug use: No   • Sexual activity: Defer     Other Topics Concern   • Not on file     Social History Narrative   • No narrative on file     Allergies   Allergen Reactions   • Fosamax [Alendronate] GI Intolerance       Home Medications:  Prior to Admission medications    Medication Sig Start Date End Date Taking? Authorizing Provider   fluconazole (DIFLUCAN) 150 MG tablet Take 150 mg by mouth. 5/31/18  Yes Historical Provider, MD   ATROVENT HFA 17 MCG/ACT inhaler INHALE 2 PUFFS 4 (FOUR) TIMES A DAY. 10/4/17   Donato Donis MD   diazePAM  (VALIUM) 5 MG tablet Take 1 tablet by mouth 2 (Two) Times a Day As Needed for Anxiety. 5/29/18   Donato Donis MD   dicyclomine (BENTYL) 20 MG tablet Take 1 tablet by mouth 2 (Two) Times a Day. 3/27/18   Aric Pina MD   fluticasone (FLONASE) 50 MCG/ACT nasal spray 2 sprays into each nostril Daily. 1/25/18   Donato Donis MD   HYDROcodone-acetaminophen (NORCO)  MG per tablet Take 1 tablet by mouth Every 6 (Six) Hours As Needed for Moderate Pain . 5/29/18   Donato Donis MD   loratadine (CLARITIN) 10 MG tablet Take 10 mg by mouth Daily. 6/7/17   Historical Provider, MD   neomycin-polymyxin-hydrocortisone (CORTISPORIN) [id number] ophthalmic suspension  1/29/18   Historical Provider, MD   omeprazole (priLOSEC) 20 MG capsule Take 1 capsule by mouth Daily. 3/27/18   Aric Pina MD   predniSONE (DELTASONE) 10 MG tablet TAKE ONE TABLET BY MOUTH EVERY DAY 12/1/17   Donato Donis MD   VENTOLIN  (90 Base) MCG/ACT inhaler INHALE 1 PUFF BY MOUTH EVERY 8 HOURS AS NEEDED 12/1/17   Donato Donis MD       Objective   Physical Exam   Constitutional: She is oriented to person, place, and time. She appears well-developed and well-nourished. No distress.   HENT:   Head: Normocephalic and atraumatic.   Nose: Nose normal.   Eyes: Conjunctivae are normal.   Neck: Normal range of motion. No tracheal deviation present. No thyromegaly present.   Cardiovascular: Normal rate, regular rhythm and normal heart sounds.    No murmur heard.  Pulmonary/Chest: Effort normal and breath sounds normal. No respiratory distress. She has no wheezes. She has no rales. She exhibits no tenderness.   Abdominal: Soft. She exhibits no distension. There is no tenderness. There is no rebound and no guarding. No hernia.   Musculoskeletal: She exhibits no tenderness or deformity.   Neurological: She is alert and oriented to person, place, and time.   Skin: Skin is warm and dry. No rash noted.   Psychiatric: She has a normal mood  and affect. Her behavior is normal. Judgment and thought content normal.   Vitals reviewed.   examined her standing and supine and there is no obvious hernia or palpable masses appreciated anywhere in the abdomen especially in the right lower quadrant where she complains of pain    Assessment/Plan abdominal pain unclear etiology.  Multiple potential sources.  We'll get a CT scan of abdomen and pelvis and patient return after the study.  I explained to her the chronic nature of this the fact that she's had multiple workups in the past without any obvious source or abscess demonstrated that she's discussed.      The encounter diagnosis was Right lower quadrant abdominal pain.                     This document has been electronically signed by Shree Santana MD on June 4, 2018 5:27 PM

## 2018-06-04 NOTE — PATIENT INSTRUCTIONS

## 2018-06-04 NOTE — PATIENT INSTRUCTIONS
MyPlate from [facility]  The general, healthful diet is based on the 2010 Dietary Guidelines for Americans. The amount of food you need to eat from each food group depends on your age, sex, and level of physical activity and can be individualized by a dietitian. Go to ChooseMyPlate.gov for more information.  What do I need to know about the MyPlate plan?  · Enjoy your food, but eat less.  · Avoid oversized portions.  ¨ ½ of your plate should include fruits and vegetables.  ¨ ¼ of your plate should be grains.  ¨ ¼ of your plate should be protein.  Grains   · Make at least half of your grains whole grains.  · For a 2,000 calorie daily food plan, eat 6 oz every day.  · 1 oz is about 1 slice bread, 1 cup cereal, or ½ cup cooked rice, cereal, or pasta.  Vegetables   · Make half your plate fruits and vegetables.  · For a 2,000 calorie daily food plan, eat 2½ cups every day.  · 1 cup is about 1 cup raw or cooked vegetables or vegetable juice or 2 cups raw leafy greens.  Fruits   · Make half your plate fruits and vegetables.  · For a 2,000 calorie daily food plan, eat 2 cups every day.  · 1 cup is about 1 cup fruit or 100% fruit juice or ½ cup dried fruit.  Protein   · For a 2,000 calorie daily food plan, eat 5½ oz every day.  · 1 oz is about 1 oz meat, poultry, or fish, ¼ cup cooked beans, 1 egg, 1 Tbsp peanut butter, or ½ oz nuts or seeds.  Dairy   · Switch to fat-free or low-fat (1%) milk.  · For a 2,000 calorie daily food plan, eat 3 cups every day.  · 1 cup is about 1 cup milk or yogurt or soy milk (soy beverage), 1½ oz natural cheese, or 2 oz processed cheese.  Fats, Oils, and Empty Calories   · Only small amounts of oils are recommended.  · Empty calories are calories from solid fats or added sugars.  · Compare sodium in foods like soup, bread, and frozen meals. Choose the foods with lower numbers.  · Drink water instead of sugary drinks.  What foods can I eat?  Grains   Whole grains such as whole wheat, quinoa, millet,  and bulgur. Bread, rolls, and pasta made from whole grains. Brown or wild rice. Hot or cold cereals made from whole grains and without added sugar.  Vegetables   All fresh vegetables, especially fresh red, dark green, or orange vegetables. Peas and beans. Low-sodium frozen or canned vegetables prepared without added salt. Low-sodium vegetable juices.  Fruits   All fresh, frozen, and dried fruits. Canned fruit packed in water or fruit juice without added sugar. Fruit juices without added sugar.  Meats and Other Protein Sources   Boiled, baked, or grilled lean meat trimmed of fat. Skinless poultry. Fresh seafood and shellfish. Canned seafood packed in water. Unsalted nuts and unsalted nut butters. Tofu. Dried beans and pea. Eggs.  Dairy   Low-fat or fat-free milk, yogurt, and cheeses.  Sweets and Desserts   Frozen desserts made from low-fat milk.  Fats and Oils   Olive, peanut, and canola oils and margarine. Salad dressing and mayonnaise made from these oils.  Other   Soups and casseroles made from allowed ingredients and without added fat or salt.  The items listed above may not be a complete list of recommended foods or beverages. Contact your dietitian for more options.   What foods are not recommended?  Grains   Sweetened, low-fiber cereals. Packaged baked goods. Snack crackers and chips. Cheese crackers, butter crackers, and biscuits. Frozen waffles, sweet breads, doughnuts, pastries, packaged baking mixes, pancakes, cakes, and cookies.  Vegetables   Regular canned or frozen vegetables or vegetables prepared with salt. Canned tomatoes. Canned tomato sauce. Fried vegetables. Vegetables in cream sauce or cheese sauce.  Fruits   Fruits packed in syrup or made with added sugar.  Meats and Other Protein Sources   Marbled or fatty meats such as ribs. Poultry with skin. Fried meats, poultry, eggs, or fish. Sausages, hot dogs, and deli meats such as pastrami, bologna, or salami.  Dairy   Whole milk, cream, cheeses made  from whole milk, sour cream. Ice cream or yogurt made from whole milk or with added sugar.  Beverages   For adults, no more than one alcoholic drink per day. Regular soft drinks or other sugary beverages. Juice drinks.  Sweets and Desserts   Sugary or fatty desserts, candy, and other sweets.  Fats and Oils   Solid shortening or partially hydrogenated oils. Solid margarine. Margarine that contains trans fats. Butter.  The items listed above may not be a complete list of foods and beverages to avoid. Contact your dietitian for more information.   This information is not intended to replace advice given to you by your health care provider. Make sure you discuss any questions you have with your health care provider.  Document Released: 01/06/2009 Document Revised: 05/25/2017 Document Reviewed: 11/26/2014  [employer] Interactive Patient Education © 2017 [employer]  For more information:    Quit Now [place]  1-800-QUIT-NOW  https://[place].quitlogix.org/en-[place]/  Steps to Quit Smoking  Smoking tobacco can be harmful to your health and can affect almost every organ in your body. Smoking puts you, and those around you, at risk for developing many serious chronic diseases. Quitting smoking is difficult, but it is one of the best things that you can do for your health. It is never too late to quit.  What are the benefits of quitting smoking?  When you quit smoking, you lower your risk of developing serious diseases and conditions, such as:  · Lung cancer or lung disease, such as COPD.  · Heart disease.  · Stroke.  · Heart attack.  · Infertility.  · Osteoporosis and bone fractures.  Additionally, symptoms such as coughing, wheezing, and shortness of breath may get better when you quit. You may also find that you get sick less often because your body is stronger at fighting off colds and infections. If you are pregnant, quitting smoking can help to reduce your chances of having a baby of low birth weight.  How do I get ready  to quit?  When you decide to quit smoking, create a plan to make sure that you are successful. Before you quit:  · Pick a date to quit. Set a date within the next two weeks to give you time to prepare.  · Write down the reasons why you are quitting. Keep this list in places where you will see it often, such as on your bathroom mirror or in your car or wallet.  · Identify the people, places, things, and activities that make you want to smoke (triggers) and avoid them. Make sure to take these actions:  ¨ Throw away all cigarettes at home, at work, and in your car.  ¨ Throw away smoking accessories, such as ashtrays and lighters.  ¨ Clean your car and make sure to empty the ashtray.  ¨ Clean your home, including curtains and carpets.  · Tell your family, friends, and coworkers that you are quitting. Support from your loved ones can make quitting easier.  · Talk with your health care provider about your options for quitting smoking.  · Find out what treatment options are covered by your health insurance.  What strategies can I use to quit smoking?  Talk with your healthcare provider about different strategies to quit smoking. Some strategies include:  · Quitting smoking altogether instead of gradually lessening how much you smoke over a period of time. Research shows that quitting “cold turkey” is more successful than gradually quitting.  · Attending in-person counseling to help you build problem-solving skills. You are more likely to have success in quitting if you attend several counseling sessions. Even short sessions of 10 minutes can be effective.  · Finding resources and support systems that can help you to quit smoking and remain smoke-free after you quit. These resources are most helpful when you use them often. They can include:  ¨ Online chats with a counselor.  ¨ Telephone quitlines.  ¨ Printed self-help materials.  ¨ Support groups or group counseling.  ¨ Text messaging programs.  ¨ Mobile phone  applications.  · Taking medicines to help you quit smoking. (If you are pregnant or breastfeeding, talk with your health care provider first.) Some medicines contain nicotine and some do not. Both types of medicines help with cravings, but the medicines that include nicotine help to relieve withdrawal symptoms. Your health care provider may recommend:  ¨ Nicotine patches, gum, or lozenges.  ¨ Nicotine inhalers or sprays.  ¨ Non-nicotine medicine that is taken by mouth.  Talk with your health care provider about combining strategies, such as taking medicines while you are also receiving in-person counseling. Using these two strategies together makes you more likely to succeed in quitting than if you used either strategy on its own.  If you are pregnant or breastfeeding, talk with your health care provider about finding counseling or other support strategies to quit smoking. Do not take medicine to help you quit smoking unless told to do so by your health care provider.  What things can I do to make it easier to quit?  Quitting smoking might feel overwhelming at first, but there is a lot that you can do to make it easier. Take these important actions:  · Reach out to your family and friends and ask that they support and encourage you during this time. Call telephone quitlines, reach out to support groups, or work with a counselor for support.  · Ask people who smoke to avoid smoking around you.  · Avoid places that trigger you to smoke, such as bars, parties, or smoke-break areas at work.  · Spend time around people who do not smoke.  · Lessen stress in your life, because stress can be a smoking trigger for some people. To lessen stress, try:  ¨ Exercising regularly.  ¨ Deep-breathing exercises.  ¨ Yoga.  ¨ Meditating.  ¨ Performing a body scan. This involves closing your eyes, scanning your body from head to toe, and noticing which parts of your body are particularly tense. Purposefully relax the muscles in those  areas.  · Download or purchase mobile phone or tablet apps (applications) that can help you stick to your quit plan by providing reminders, tips, and encouragement. There are many free apps, such as QuitGuide from the CDC (Centers for Disease Control and Prevention). You can find other support for quitting smoking (smoking cessation) through smokefree.gov and other websites.  How will I feel when I quit smoking?  Within the first 24 hours of quitting smoking, you may start to feel some withdrawal symptoms. These symptoms are usually most noticeable 2-3 days after quitting, but they usually do not last beyond 2-3 weeks. Changes or symptoms that you might experience include:  · Mood swings.  · Restlessness, anxiety, or irritation.  · Difficulty concentrating.  · Dizziness.  · Strong cravings for sugary foods in addition to nicotine.  · Mild weight gain.  · Constipation.  · Nausea.  · Coughing or a sore throat.  · Changes in how your medicines work in your body.  · A depressed mood.  · Difficulty sleeping (insomnia).  After the first 2-3 weeks of quitting, you may start to notice more positive results, such as:  · Improved sense of smell and taste.  · Decreased coughing and sore throat.  · Slower heart rate.  · Lower blood pressure.  · Clearer skin.  · The ability to breathe more easily.  · Fewer sick days.  Quitting smoking is very challenging for most people. Do not get discouraged if you are not successful the first time. Some people need to make many attempts to quit before they achieve long-term success. Do your best to stick to your quit plan, and talk with your health care provider if you have any questions or concerns.  This information is not intended to replace advice given to you by your health care provider. Make sure you discuss any questions you have with your health care provider.  Document Released: 12/12/2002 Document Revised: 08/15/2017 Document Reviewed: 05/03/2016  [employer] Interactive Patient  Education © 2017 [employer]

## 2018-06-07 ENCOUNTER — TELEPHONE (OUTPATIENT)
Dept: OTOLARYNGOLOGY | Facility: CLINIC | Age: 63
End: 2018-06-07

## 2018-06-07 NOTE — TELEPHONE ENCOUNTER
----- Message from Carissa Stewart sent at 6/7/2018  8:36 AM CDT -----  Contact: [phone]  Has had a headache thinks she had a stroke from the pressure on her ears. did't go to the , but said you could as the people in speech. That is shere she was when she had it.  Wants to know if you will do a mri on her head.

## 2018-06-07 NOTE — TELEPHONE ENCOUNTER
Spoke with patient and told her that if he thinks she had a stroke that she needed to go to the emergency room. She said that she did not think that the the nurse in speech told her she had a mini stroke. I said that if they said she had a stroke that they should have not let her leave and had her go to the emergency room. I told her multiple times that she needed to go to the emergency room and she said that she did not have a ride and that she might go tomorrow. I tried to tell her that if she thinks she had a stroke that she could call 911 and have the ambulance come get her but she hung up on me.

## 2018-06-11 ENCOUNTER — TELEPHONE (OUTPATIENT)
Dept: FAMILY MEDICINE CLINIC | Facility: CLINIC | Age: 63
End: 2018-06-11

## 2018-06-11 NOTE — TELEPHONE ENCOUNTER
----- Message from Monika Yu sent at 6/7/2018 10:12 AM CDT -----  Adriana had a seizure and needs to discuss this with you.

## 2018-06-12 ENCOUNTER — HOSPITAL ENCOUNTER (OUTPATIENT)
Dept: CT IMAGING | Facility: HOSPITAL | Age: 63
Discharge: HOME | End: 2018-06-12
Admitting: SURGERY

## 2018-06-12 ENCOUNTER — LAB (OUTPATIENT)
Dept: LAB | Facility: HOSPITAL | Age: 63
End: 2018-06-12
Attending: SURGERY

## 2018-06-12 DIAGNOSIS — R10.31 RIGHT LOWER QUADRANT ABDOMINAL PAIN: ICD-10-CM

## 2018-06-12 LAB
ANION GAP SERPL CALCULATED.3IONS-SCNC: 7 MMOL/L (ref 5–15)
BUN BLD-MCNC: 16 MG/DL (ref 7–21)
BUN/CREAT SERPL: 28.1 (ref 7–25)
CALCIUM SPEC-SCNC: 9.3 MG/DL (ref 8.4–10.2)
CHLORIDE SERPL-SCNC: 104 MMOL/L (ref 95–110)
CO2 SERPL-SCNC: 29 MMOL/L (ref 22–31)
CREAT BLD-MCNC: 0.57 MG/DL (ref 0.5–1)
GFR SERPL CREATININE-BSD FRML MDRD: 107 ML/MIN/1.73 (ref 45–104)
GLUCOSE BLD-MCNC: 92 MG/DL (ref 60–100)
POTASSIUM BLD-SCNC: 4.4 MMOL/L (ref 3.5–5.1)
SODIUM BLD-SCNC: 140 MMOL/L (ref 137–145)

## 2018-06-12 PROCEDURE — 36415 COLL VENOUS BLD VENIPUNCTURE: CPT

## 2018-06-12 PROCEDURE — 74177 CT ABD & PELVIS W/CONTRAST: CPT

## 2018-06-12 PROCEDURE — 25010000002 IOPAMIDOL 61 % SOLUTION: Performed by: SURGERY

## 2018-06-12 PROCEDURE — 80048 BASIC METABOLIC PNL TOTAL CA: CPT

## 2018-06-12 RX ADMIN — IOPAMIDOL 82 ML: 612 INJECTION, SOLUTION INTRAVENOUS at 09:00

## 2018-06-19 ENCOUNTER — OFFICE VISIT (OUTPATIENT)
Dept: SURGERY | Facility: CLINIC | Age: 63
End: 2018-06-19

## 2018-06-19 VITALS
HEIGHT: 64 IN | WEIGHT: 128 LBS | DIASTOLIC BLOOD PRESSURE: 74 MMHG | BODY MASS INDEX: 21.85 KG/M2 | SYSTOLIC BLOOD PRESSURE: 120 MMHG

## 2018-06-19 DIAGNOSIS — R10.31 RIGHT LOWER QUADRANT ABDOMINAL PAIN: Primary | ICD-10-CM

## 2018-06-19 PROCEDURE — 99212 OFFICE O/P EST SF 10 MIN: CPT | Performed by: SURGERY

## 2018-06-19 NOTE — PROGRESS NOTES
63-year-old female here to follow-up for chronic right lower quadrant pain.  Patient's had a CT scan of her abdomen and pelvis.  Was unremarkable for any abscess or any obvious etiology to any pain in her right lower quadrant.  I reviewed the report and the films and agree.  Patient has an area of spasm in her colon and she had a colonoscopy done last year by the GI service as well as an EGD.  I went over these findings with her.  She seems fixated on a PET scan was done for years ago this suggested a lymph node in the left retroperitoneum that according to the reports was stable and has been stable.  She's had multiple workups since then.  I informed her that I did not think that she had any type of infection currently and she clearly does not have any abscess or other general surgery issue that would address her pain at this point.  Her pain may very well be related to her back and radicular type of the situation.  Recommend that she follow up with her primary care provider Dr. Donis she can follow up with us on a when necessary basis.  He is note I went over the actual CT films with this patient in the office and reviewed portal regarding her other studies and explained to her that this PET scan was 4 years ago and the lymph node was on the other side of her abdomen and likely at this point was unremarkable

## 2018-06-19 NOTE — PATIENT INSTRUCTIONS
MyPlate from [employer]  The general, healthful diet is based on the 2010 Dietary Guidelines for Americans. The amount of food you need to eat from each food group depends on your age, sex, and level of physical activity and can be individualized by a dietitian. Go to ChooseMyPlate.gov for more information.  What do I need to know about the MyPlate plan?  · Enjoy your food, but eat less.  · Avoid oversized portions.  ? ½ of your plate should include fruits and vegetables.  ? ¼ of your plate should be grains.  ? ¼ of your plate should be protein.  Grains  · Make at least half of your grains whole grains.  · For a 2,000 calorie daily food plan, eat 6 oz every day.  · 1 oz is about 1 slice bread, 1 cup cereal, or ½ cup cooked rice, cereal, or pasta.  Vegetables  · Make half your plate fruits and vegetables.  · For a 2,000 calorie daily food plan, eat 2½ cups every day.  · 1 cup is about 1 cup raw or cooked vegetables or vegetable juice or 2 cups raw leafy greens.  Fruits  · Make half your plate fruits and vegetables.  · For a 2,000 calorie daily food plan, eat 2 cups every day.  · 1 cup is about 1 cup fruit or 100% fruit juice or ½ cup dried fruit.  Protein  · For a 2,000 calorie daily food plan, eat 5½ oz every day.  · 1 oz is about 1 oz meat, poultry, or fish, ¼ cup cooked beans, 1 egg, 1 Tbsp peanut butter, or ½ oz nuts or seeds.  Dairy  · Switch to fat-free or low-fat (1%) milk.  · For a 2,000 calorie daily food plan, eat 3 cups every day.  · 1 cup is about 1 cup milk or yogurt or soy milk (soy beverage), 1½ oz natural cheese, or 2 oz processed cheese.  Fats, Oils, and Empty Calories  · Only small amounts of oils are recommended.  · Empty calories are calories from solid fats or added sugars.  · Compare sodium in foods like soup, bread, and frozen meals. Choose the foods with lower numbers.  · Drink water instead of sugary drinks.  What foods can I eat?  Grains  Whole grains such as whole wheat, quinoa, millet, and  bulgur. Bread, rolls, and pasta made from whole grains. Brown or wild rice. Hot or cold cereals made from whole grains and without added sugar.  Vegetables  All fresh vegetables, especially fresh red, dark green, or orange vegetables. Peas and beans. Low-sodium frozen or canned vegetables prepared without added salt. Low-sodium vegetable juices.  Fruits  All fresh, frozen, and dried fruits. Canned fruit packed in water or fruit juice without added sugar. Fruit juices without added sugar.  Meats and Other Protein Sources  Boiled, baked, or grilled lean meat trimmed of fat. Skinless poultry. Fresh seafood and shellfish. Canned seafood packed in water. Unsalted nuts and unsalted nut butters. Tofu. Dried beans and pea. Eggs.  Dairy  Low-fat or fat-free milk, yogurt, and cheeses.  Sweets and Desserts  Frozen desserts made from low-fat milk.  Fats and Oils  Olive, peanut, and canola oils and margarine. Salad dressing and mayonnaise made from these oils.  Other  Soups and casseroles made from allowed ingredients and without added fat or salt.  The items listed above may not be a complete list of recommended foods or beverages. Contact your dietitian for more options.  What foods are not recommended?  Grains  Sweetened, low-fiber cereals. Packaged baked goods. Snack crackers and chips. Cheese crackers, butter crackers, and biscuits. Frozen waffles, sweet breads, doughnuts, pastries, packaged baking mixes, pancakes, cakes, and cookies.  Vegetables  Regular canned or frozen vegetables or vegetables prepared with salt. Canned tomatoes. Canned tomato sauce. Fried vegetables. Vegetables in cream sauce or cheese sauce.  Fruits  Fruits packed in syrup or made with added sugar.  Meats and Other Protein Sources  Marbled or fatty meats such as ribs. Poultry with skin. Fried meats, poultry, eggs, or fish. Sausages, hot dogs, and deli meats such as pastrami, bologna, or salami.  Dairy  Whole milk, cream, cheeses made from whole milk,  sour cream. Ice cream or yogurt made from whole milk or with added sugar.  Beverages  For adults, no more than one alcoholic drink per day. Regular soft drinks or other sugary beverages. Juice drinks.  Sweets and Desserts  Sugary or fatty desserts, candy, and other sweets.  Fats and Oils  Solid shortening or partially hydrogenated oils. Solid margarine. Margarine that contains trans fats. Butter.  The items listed above may not be a complete list of foods and beverages to avoid. Contact your dietitian for more information.  This information is not intended to replace advice given to you by your health care provider. Make sure you discuss any questions you have with your health care provider.  Document Released: 01/06/2009 Document Revised: 05/25/2017 Document Reviewed: 11/26/2014  [employer] Interactive Patient Education © 2018 [employer]  For more information:    Quit Now [place]  1-800-QUIT-NOW  https://[place].quitlogix.org/en-[place]/  Steps to Quit Smoking  Smoking tobacco can be harmful to your health and can affect almost every organ in your body. Smoking puts you, and those around you, at risk for developing many serious chronic diseases. Quitting smoking is difficult, but it is one of the best things that you can do for your health. It is never too late to quit.  What are the benefits of quitting smoking?  When you quit smoking, you lower your risk of developing serious diseases and conditions, such as:  · Lung cancer or lung disease, such as COPD.  · Heart disease.  · Stroke.  · Heart attack.  · Infertility.  · Osteoporosis and bone fractures.  Additionally, symptoms such as coughing, wheezing, and shortness of breath may get better when you quit. You may also find that you get sick less often because your body is stronger at fighting off colds and infections. If you are pregnant, quitting smoking can help to reduce your chances of having a baby of low birth weight.  How do I get ready to quit?  When you  decide to quit smoking, create a plan to make sure that you are successful. Before you quit:  · Pick a date to quit. Set a date within the next two weeks to give you time to prepare.  · Write down the reasons why you are quitting. Keep this list in places where you will see it often, such as on your bathroom mirror or in your car or wallet.  · Identify the people, places, things, and activities that make you want to smoke (triggers) and avoid them. Make sure to take these actions:  ¨ Throw away all cigarettes at home, at work, and in your car.  ¨ Throw away smoking accessories, such as ashtrays and lighters.  ¨ Clean your car and make sure to empty the ashtray.  ¨ Clean your home, including curtains and carpets.  · Tell your family, friends, and coworkers that you are quitting. Support from your loved ones can make quitting easier.  · Talk with your health care provider about your options for quitting smoking.  · Find out what treatment options are covered by your health insurance.  What strategies can I use to quit smoking?  Talk with your healthcare provider about different strategies to quit smoking. Some strategies include:  · Quitting smoking altogether instead of gradually lessening how much you smoke over a period of time. Research shows that quitting “cold turkey” is more successful than gradually quitting.  · Attending in-person counseling to help you build problem-solving skills. You are more likely to have success in quitting if you attend several counseling sessions. Even short sessions of 10 minutes can be effective.  · Finding resources and support systems that can help you to quit smoking and remain smoke-free after you quit. These resources are most helpful when you use them often. They can include:  ¨ Online chats with a counselor.  ¨ Telephone quitlines.  ¨ Printed self-help materials.  ¨ Support groups or group counseling.  ¨ Text messaging programs.  ¨ Mobile phone applications.  · Taking  medicines to help you quit smoking. (If you are pregnant or breastfeeding, talk with your health care provider first.) Some medicines contain nicotine and some do not. Both types of medicines help with cravings, but the medicines that include nicotine help to relieve withdrawal symptoms. Your health care provider may recommend:  ¨ Nicotine patches, gum, or lozenges.  ¨ Nicotine inhalers or sprays.  ¨ Non-nicotine medicine that is taken by mouth.  Talk with your health care provider about combining strategies, such as taking medicines while you are also receiving in-person counseling. Using these two strategies together makes you more likely to succeed in quitting than if you used either strategy on its own.  If you are pregnant or breastfeeding, talk with your health care provider about finding counseling or other support strategies to quit smoking. Do not take medicine to help you quit smoking unless told to do so by your health care provider.  What things can I do to make it easier to quit?  Quitting smoking might feel overwhelming at first, but there is a lot that you can do to make it easier. Take these important actions:  · Reach out to your family and friends and ask that they support and encourage you during this time. Call telephone quitlines, reach out to support groups, or work with a counselor for support.  · Ask people who smoke to avoid smoking around you.  · Avoid places that trigger you to smoke, such as bars, parties, or smoke-break areas at work.  · Spend time around people who do not smoke.  · Lessen stress in your life, because stress can be a smoking trigger for some people. To lessen stress, try:  ¨ Exercising regularly.  ¨ Deep-breathing exercises.  ¨ Yoga.  ¨ Meditating.  ¨ Performing a body scan. This involves closing your eyes, scanning your body from head to toe, and noticing which parts of your body are particularly tense. Purposefully relax the muscles in those areas.  · Download or  purchase mobile phone or tablet apps (applications) that can help you stick to your quit plan by providing reminders, tips, and encouragement. There are many free apps, such as QuitGuide from the CDC (Centers for Disease Control and Prevention). You can find other support for quitting smoking (smoking cessation) through smokefree.gov and other websites.  How will I feel when I quit smoking?  Within the first 24 hours of quitting smoking, you may start to feel some withdrawal symptoms. These symptoms are usually most noticeable 2-3 days after quitting, but they usually do not last beyond 2-3 weeks. Changes or symptoms that you might experience include:  · Mood swings.  · Restlessness, anxiety, or irritation.  · Difficulty concentrating.  · Dizziness.  · Strong cravings for sugary foods in addition to nicotine.  · Mild weight gain.  · Constipation.  · Nausea.  · Coughing or a sore throat.  · Changes in how your medicines work in your body.  · A depressed mood.  · Difficulty sleeping (insomnia).  After the first 2-3 weeks of quitting, you may start to notice more positive results, such as:  · Improved sense of smell and taste.  · Decreased coughing and sore throat.  · Slower heart rate.  · Lower blood pressure.  · Clearer skin.  · The ability to breathe more easily.  · Fewer sick days.  Quitting smoking is very challenging for most people. Do not get discouraged if you are not successful the first time. Some people need to make many attempts to quit before they achieve long-term success. Do your best to stick to your quit plan, and talk with your health care provider if you have any questions or concerns.  This information is not intended to replace advice given to you by your health care provider. Make sure you discuss any questions you have with your health care provider.  Document Released: 12/12/2002 Document Revised: 08/15/2017 Document Reviewed: 05/03/2016  [employer] Interactive Patient Education © 2017 [employer]

## 2018-06-26 ENCOUNTER — OFFICE VISIT (OUTPATIENT)
Dept: FAMILY MEDICINE CLINIC | Facility: CLINIC | Age: 63
End: 2018-06-26

## 2018-06-26 VITALS
DIASTOLIC BLOOD PRESSURE: 70 MMHG | BODY MASS INDEX: 21.85 KG/M2 | OXYGEN SATURATION: 99 % | HEART RATE: 92 BPM | WEIGHT: 128 LBS | SYSTOLIC BLOOD PRESSURE: 110 MMHG | HEIGHT: 64 IN

## 2018-06-26 DIAGNOSIS — M54.42 CHRONIC BILATERAL LOW BACK PAIN WITH BILATERAL SCIATICA: Primary | ICD-10-CM

## 2018-06-26 DIAGNOSIS — G89.29 CHRONIC BILATERAL LOW BACK PAIN WITH BILATERAL SCIATICA: Primary | ICD-10-CM

## 2018-06-26 DIAGNOSIS — M54.41 CHRONIC BILATERAL LOW BACK PAIN WITH BILATERAL SCIATICA: Primary | ICD-10-CM

## 2018-06-26 DIAGNOSIS — G89.29 CHRONIC LOW BACK PAIN WITH SCIATICA, SCIATICA LATERALITY UNSPECIFIED, UNSPECIFIED BACK PAIN LATERALITY: ICD-10-CM

## 2018-06-26 DIAGNOSIS — M54.40 CHRONIC LOW BACK PAIN WITH SCIATICA, SCIATICA LATERALITY UNSPECIFIED, UNSPECIFIED BACK PAIN LATERALITY: ICD-10-CM

## 2018-06-26 PROCEDURE — 99214 OFFICE O/P EST MOD 30 MIN: CPT | Performed by: FAMILY MEDICINE

## 2018-06-26 RX ORDER — HYDROCODONE BITARTRATE AND ACETAMINOPHEN 10; 325 MG/1; MG/1
1 TABLET ORAL EVERY 6 HOURS PRN
Qty: 120 TABLET | Refills: 0 | Status: SHIPPED | OUTPATIENT
Start: 2018-06-26 | End: 2018-07-26 | Stop reason: SDUPTHER

## 2018-06-26 RX ORDER — DIAZEPAM 5 MG/1
5 TABLET ORAL 2 TIMES DAILY PRN
Qty: 60 TABLET | Refills: 0 | Status: SHIPPED | OUTPATIENT
Start: 2018-06-26 | End: 2018-07-26

## 2018-06-26 NOTE — PROGRESS NOTES
Subjective   Adriana Nuñez is a 63 y.o. female.     Back Pain   This is a chronic problem. The current episode started more than 1 year ago. The problem has been waxing and waning since onset. The pain is present in the lumbar spine. The quality of the pain is described as aching. The pain radiates to the right knee and left knee. The pain is at a severity of 6/10. The pain is moderate. The pain is worse during the day. The symptoms are aggravated by position and bending. Pertinent negatives include no bladder incontinence, bowel incontinence or fever.   Anxiety   Presents for follow-up visit. Symptoms include compulsions, excessive worry and nervous/anxious behavior. Patient reports no depressed mood, insomnia or irritability.       Hyperlipidemia   This is a chronic problem. The current episode started more than 1 year ago. The problem is uncontrolled. Recent lipid tests were reviewed and are high. Associated symptoms include myalgias.   COPD   This is a chronic problem. The current episode started more than 1 year ago. The problem occurs constantly. The problem has been waxing and waning. Associated symptoms include congestion, coughing and myalgias. Pertinent negatives include no chills or fever.   Heartburn   She complains of coughing. She reports no heartburn.        The following portions of the patient's history were reviewed and updated as appropriate: allergies, current medications, past family history, past medical history, past social history, past surgical history and problem list.    Review of Systems   Constitutional: Negative for chills, fever and irritability.   HENT: Positive for congestion.    Respiratory: Positive for cough.    Gastrointestinal: Negative for bowel incontinence and heartburn.   Genitourinary: Negative for bladder incontinence.   Musculoskeletal: Positive for back pain and myalgias.   Psychiatric/Behavioral: The patient is nervous/anxious. The patient does not have insomnia.         Objective   Physical Exam   Constitutional: She is oriented to person, place, and time. She appears well-developed and well-nourished. No distress.   HENT:   Head: Normocephalic and atraumatic.   Right Ear: Hearing and tympanic membrane normal.   Left Ear: Hearing and tympanic membrane normal.   Pulmonary/Chest: Effort normal and breath sounds normal.   Musculoskeletal:        Lumbar back: She exhibits decreased range of motion, tenderness and pain. She exhibits no bony tenderness, no swelling, no edema, no deformity, no laceration and no spasm.   Neurological: She is alert and oriented to person, place, and time.   Reflex Scores:       Patellar reflexes are 2+ on the right side and 2+ on the left side.  Skin: Skin is warm and dry. She is not diaphoretic.   Psychiatric: She has a normal mood and affect. Her behavior is normal. Judgment and thought content normal.   Nursing note and vitals reviewed.      Assessment/Plan   Problems Addressed this Visit        Nervous and Auditory    Chronic bilateral low back pain with bilateral sciatica - Primary      Other Visit Diagnoses     Chronic low back pain with sciatica, sciatica laterality unspecified, unspecified back pain laterality            ME = 39 . Risk of valium and hydrocodone discussed     annie pect OCD, declined treatment for it. Declined hyperlipidemia treatment

## 2018-07-26 ENCOUNTER — OFFICE VISIT (OUTPATIENT)
Dept: FAMILY MEDICINE CLINIC | Facility: CLINIC | Age: 63
End: 2018-07-26

## 2018-07-26 VITALS
HEIGHT: 64 IN | WEIGHT: 132 LBS | SYSTOLIC BLOOD PRESSURE: 126 MMHG | HEART RATE: 88 BPM | OXYGEN SATURATION: 94 % | BODY MASS INDEX: 22.53 KG/M2 | DIASTOLIC BLOOD PRESSURE: 80 MMHG

## 2018-07-26 DIAGNOSIS — G89.29 CHRONIC LOW BACK PAIN WITH SCIATICA, SCIATICA LATERALITY UNSPECIFIED, UNSPECIFIED BACK PAIN LATERALITY: ICD-10-CM

## 2018-07-26 DIAGNOSIS — M54.40 CHRONIC LOW BACK PAIN WITH SCIATICA, SCIATICA LATERALITY UNSPECIFIED, UNSPECIFIED BACK PAIN LATERALITY: ICD-10-CM

## 2018-07-26 DIAGNOSIS — F41.1 GENERALIZED ANXIETY DISORDER: Primary | ICD-10-CM

## 2018-07-26 PROCEDURE — 99214 OFFICE O/P EST MOD 30 MIN: CPT | Performed by: FAMILY MEDICINE

## 2018-07-26 RX ORDER — NALOXONE HYDROCHLORIDE 4 MG/.1ML
1 SPRAY NASAL AS NEEDED
Qty: 1 EACH | Refills: 2 | Status: SHIPPED | OUTPATIENT
Start: 2018-07-26 | End: 2021-04-30

## 2018-07-26 RX ORDER — HYDROCODONE BITARTRATE AND ACETAMINOPHEN 10; 325 MG/1; MG/1
1 TABLET ORAL EVERY 6 HOURS PRN
Qty: 120 TABLET | Refills: 0 | Status: SHIPPED | OUTPATIENT
Start: 2018-07-26 | End: 2018-08-23 | Stop reason: SDUPTHER

## 2018-07-26 RX ORDER — FLUCONAZOLE 150 MG/1
1 TABLET ORAL EVERY OTHER DAY
Refills: 1 | COMMUNITY
Start: 2018-06-25 | End: 2018-07-26

## 2018-07-26 RX ORDER — DIAZEPAM 5 MG/1
5 TABLET ORAL 2 TIMES DAILY PRN
Qty: 60 TABLET | Refills: 0 | Status: SHIPPED | OUTPATIENT
Start: 2018-07-26 | End: 2018-08-23 | Stop reason: SDUPTHER

## 2018-07-26 NOTE — PROGRESS NOTES
Subjective   Adriana Nuñez is a 63 y.o. female.     Back Pain          {Common H&P Review Areas:16450}    Review of Systems   Musculoskeletal: Positive for back pain.       Objective   Physical Exam    Assessment/Plan   {Assess/PlanSmartLinks:50605}

## 2018-07-26 NOTE — PROGRESS NOTES
Subjective   Adriana Nuñez is a 63 y.o. female.     Back Pain   This is a chronic problem. The current episode started more than 1 year ago. The problem has been waxing and waning since onset. The pain is present in the lumbar spine. The quality of the pain is described as aching. The pain radiates to the right knee and left knee. The pain is at a severity of 8/10. The pain is moderate. The pain is worse during the day. The symptoms are aggravated by position and bending. Pertinent negatives include no bladder incontinence, bowel incontinence or fever.   Anxiety   Presents for follow-up visit. Symptoms include compulsions, excessive worry and nervous/anxious behavior. Patient reports no depressed mood or insomnia.            The following portions of the patient's history were reviewed and updated as appropriate: allergies, current medications, past family history, past medical history, past social history, past surgical history and problem list.    Review of Systems   Constitutional: Negative for fever.   Gastrointestinal: Negative for bowel incontinence.   Genitourinary: Negative for bladder incontinence.   Musculoskeletal: Positive for back pain.   Psychiatric/Behavioral: The patient is nervous/anxious. The patient does not have insomnia.        Objective   Physical Exam   Constitutional: She is oriented to person, place, and time. She appears well-developed and well-nourished. No distress.   HENT:   Head: Normocephalic and atraumatic.   Right Ear: Hearing and tympanic membrane normal.   Left Ear: Hearing and tympanic membrane normal.   Pulmonary/Chest: Effort normal and breath sounds normal.   Musculoskeletal:        Lumbar back: She exhibits decreased range of motion, tenderness and pain. She exhibits no bony tenderness, no swelling, no edema, no deformity, no laceration and no spasm.   Neurological: She is alert and oriented to person, place, and time.   Reflex Scores:       Patellar reflexes are 2+ on  the right side and 2+ on the left side.  Skin: Skin is warm and dry. She is not diaphoretic.   Psychiatric: Her behavior is normal. Judgment and thought content normal. Her mood appears anxious. Her speech is rapid and/or pressured.   Nursing note and vitals reviewed.      Assessment/Plan   Problems Addressed this Visit        Nervous and Auditory    Chronic low back pain with sciatica       Other    Generalized anxiety disorder - Primary    Relevant Medications    diazePAM (VALIUM) 5 MG tablet        ME= 39 plius on valium. Risk discussed. Narcan written. Will dc diflucan     annie pect OCD, declined treatment for it

## 2018-08-23 ENCOUNTER — OFFICE VISIT (OUTPATIENT)
Dept: FAMILY MEDICINE CLINIC | Facility: CLINIC | Age: 63
End: 2018-08-23

## 2018-08-23 VITALS
HEIGHT: 64 IN | DIASTOLIC BLOOD PRESSURE: 78 MMHG | HEART RATE: 107 BPM | BODY MASS INDEX: 21.29 KG/M2 | OXYGEN SATURATION: 98 % | WEIGHT: 124.7 LBS | SYSTOLIC BLOOD PRESSURE: 128 MMHG

## 2018-08-23 DIAGNOSIS — G89.29 CHRONIC MIDLINE LOW BACK PAIN WITHOUT SCIATICA: ICD-10-CM

## 2018-08-23 DIAGNOSIS — R10.13 EPIGASTRIC PAIN: ICD-10-CM

## 2018-08-23 DIAGNOSIS — F41.1 GENERALIZED ANXIETY DISORDER: ICD-10-CM

## 2018-08-23 DIAGNOSIS — M35.3 POLYMYALGIA RHEUMATICA (HCC): Primary | ICD-10-CM

## 2018-08-23 DIAGNOSIS — M54.50 CHRONIC MIDLINE LOW BACK PAIN WITHOUT SCIATICA: ICD-10-CM

## 2018-08-23 PROBLEM — M54.40 CHRONIC LOW BACK PAIN WITH SCIATICA: Status: RESOLVED | Noted: 2017-07-13 | Resolved: 2018-08-23

## 2018-08-23 PROBLEM — M53.3 COCCYX PAIN: Status: RESOLVED | Noted: 2017-11-06 | Resolved: 2018-08-23

## 2018-08-23 PROCEDURE — 99214 OFFICE O/P EST MOD 30 MIN: CPT | Performed by: FAMILY MEDICINE

## 2018-08-23 RX ORDER — OMEPRAZOLE 20 MG/1
CAPSULE, DELAYED RELEASE ORAL
Qty: 30 CAPSULE | Refills: 0 | Status: SHIPPED | OUTPATIENT
Start: 2018-08-23 | End: 2018-08-28 | Stop reason: SDUPTHER

## 2018-08-23 RX ORDER — METRONIDAZOLE 500 MG/1
1 TABLET ORAL 2 TIMES DAILY
Refills: 0 | COMMUNITY
Start: 2018-08-13 | End: 2018-08-28

## 2018-08-23 RX ORDER — NITROFURANTOIN 25; 75 MG/1; MG/1
1 CAPSULE ORAL 2 TIMES DAILY
Refills: 3 | COMMUNITY
Start: 2018-08-17 | End: 2018-08-28

## 2018-08-23 RX ORDER — DIAZEPAM 5 MG/1
5 TABLET ORAL 2 TIMES DAILY PRN
Qty: 60 TABLET | Refills: 0 | Status: SHIPPED | OUTPATIENT
Start: 2018-08-23 | End: 2018-09-20 | Stop reason: SDUPTHER

## 2018-08-23 RX ORDER — HYDROCODONE BITARTRATE AND ACETAMINOPHEN 10; 325 MG/1; MG/1
1 TABLET ORAL EVERY 6 HOURS PRN
Qty: 120 TABLET | Refills: 0 | Status: SHIPPED | OUTPATIENT
Start: 2018-08-23 | End: 2018-09-20 | Stop reason: SDUPTHER

## 2018-08-23 NOTE — TELEPHONE ENCOUNTER
08/23/2018, 1601 - Patient prior clinical appointment with Dr. Aric Flower M.D. 03/27/2018 with next clinical appointment scheduled Tuesday, August 28, 2018 at 11:00 a.m. at [place], [place].  Per Dr. Aric Flower M.D. - patient to be prescribed adequate prescription medication until next clinical appointment date.  Omeprazole 20 MG Capsules, 1 capsule by mouth daily, #30, 0 renewals submitted per this staff member to patient's pharmacy of choice, [place], [place].

## 2018-08-23 NOTE — PROGRESS NOTES
Subjective   Adriana Nuñez is a 63 y.o. female.     Back Pain          {Common H&P Review Areas:56553}    Review of Systems   Musculoskeletal: Positive for back pain.       Objective   Physical Exam    Assessment/Plan   {Assess/PlanSmartLinks:86140}

## 2018-08-23 NOTE — PROGRESS NOTES
Subjective   Adriana Nuñez is a 63 y.o. female.     Back Pain   This is a chronic problem. The current episode started more than 1 year ago. The problem occurs constantly. The problem has been waxing and waning since onset. The pain is present in the lumbar spine. The quality of the pain is described as aching. The pain radiates to the right knee and left knee. The pain is at a severity of 8/10. The pain is moderate. The pain is worse during the day. The symptoms are aggravated by position and bending. Pertinent negatives include no bladder incontinence, bowel incontinence or fever.   Anxiety   Presents for follow-up visit. Symptoms include compulsions, excessive worry and nervous/anxious behavior. Patient reports no depressed mood, insomnia or irritability.            The following portions of the patient's history were reviewed and updated as appropriate: allergies, current medications, past family history, past medical history, past social history, past surgical history and problem list.    Review of Systems   Constitutional: Negative for fever and irritability.   Gastrointestinal: Negative for bowel incontinence.   Genitourinary: Negative for bladder incontinence.   Musculoskeletal: Positive for back pain.   Psychiatric/Behavioral: The patient is nervous/anxious. The patient does not have insomnia.        Objective   Physical Exam   Constitutional: She is oriented to person, place, and time. She appears well-developed and well-nourished. No distress.   HENT:   Head: Normocephalic and atraumatic.   Right Ear: Hearing and tympanic membrane normal.   Left Ear: Hearing and tympanic membrane normal.   Pulmonary/Chest: Effort normal and breath sounds normal.   Musculoskeletal:        Lumbar back: She exhibits decreased range of motion, tenderness and pain. She exhibits no bony tenderness, no swelling, no edema, no deformity, no laceration and no spasm.   Neurological: She is alert and oriented to person, place,  and time.   Reflex Scores:       Patellar reflexes are 2+ on the right side and 2+ on the left side.  Skin: Skin is warm and dry. She is not diaphoretic.   Psychiatric: Judgment and thought content normal. Her mood appears anxious. Her speech is rapid and/or pressured. She is hyperactive. Cognition and memory are normal.   Nursing note and vitals reviewed.      Assessment/Plan   Problems Addressed this Visit        Nervous and Auditory    Chronic midline low back pain without sciatica       Other    Polymyalgia rheumatica (CMS/HCC) - Primary    Generalized anxiety disorder    Relevant Medications    diazePAM (VALIUM) 5 MG tablet          ME = 39 AND ON VALIUM. RISK DISCUSSED   annie chavez OCD, declined treatment for it

## 2018-08-28 ENCOUNTER — OFFICE VISIT (OUTPATIENT)
Dept: GASTROENTEROLOGY | Facility: CLINIC | Age: 63
End: 2018-08-28

## 2018-08-28 VITALS
HEART RATE: 96 BPM | DIASTOLIC BLOOD PRESSURE: 70 MMHG | BODY MASS INDEX: 21.85 KG/M2 | HEIGHT: 64 IN | SYSTOLIC BLOOD PRESSURE: 102 MMHG | WEIGHT: 128 LBS | OXYGEN SATURATION: 98 %

## 2018-08-28 DIAGNOSIS — R10.13 EPIGASTRIC PAIN: ICD-10-CM

## 2018-08-28 PROCEDURE — 99214 OFFICE O/P EST MOD 30 MIN: CPT | Performed by: INTERNAL MEDICINE

## 2018-08-28 RX ORDER — OMEPRAZOLE 20 MG/1
20 CAPSULE, DELAYED RELEASE ORAL DAILY
Qty: 60 CAPSULE | Refills: 5 | Status: SHIPPED | OUTPATIENT
Start: 2018-08-28 | End: 2018-10-24 | Stop reason: SDUPTHER

## 2018-08-28 NOTE — PATIENT INSTRUCTIONS
MyPlate from [employer]  The general, healthful diet is based on the 2010 Dietary Guidelines for Americans. The amount of food you need to eat from each food group depends on your age, sex, and level of physical activity and can be individualized by a dietitian. Go to ChooseMyPlate.gov for more information.  What do I need to know about the MyPlate plan?  · Enjoy your food, but eat less.  · Avoid oversized portions.  ? ½ of your plate should include fruits and vegetables.  ? ¼ of your plate should be grains.  ? ¼ of your plate should be protein.  Grains  · Make at least half of your grains whole grains.  · For a 2,000 calorie daily food plan, eat 6 oz every day.  · 1 oz is about 1 slice bread, 1 cup cereal, or ½ cup cooked rice, cereal, or pasta.  Vegetables  · Make half your plate fruits and vegetables.  · For a 2,000 calorie daily food plan, eat 2½ cups every day.  · 1 cup is about 1 cup raw or cooked vegetables or vegetable juice or 2 cups raw leafy greens.  Fruits  · Make half your plate fruits and vegetables.  · For a 2,000 calorie daily food plan, eat 2 cups every day.  · 1 cup is about 1 cup fruit or 100% fruit juice or ½ cup dried fruit.  Protein  · For a 2,000 calorie daily food plan, eat 5½ oz every day.  · 1 oz is about 1 oz meat, poultry, or fish, ¼ cup cooked beans, 1 egg, 1 Tbsp peanut butter, or ½ oz nuts or seeds.  Dairy  · Switch to fat-free or low-fat (1%) milk.  · For a 2,000 calorie daily food plan, eat 3 cups every day.  · 1 cup is about 1 cup milk or yogurt or soy milk (soy beverage), 1½ oz natural cheese, or 2 oz processed cheese.  Fats, Oils, and Empty Calories  · Only small amounts of oils are recommended.  · Empty calories are calories from solid fats or added sugars.  · Compare sodium in foods like soup, bread, and frozen meals. Choose the foods with lower numbers.  · Drink water instead of sugary drinks.  What foods can I eat?  Grains  Whole grains such as whole wheat, quinoa, millet, and  bulgur. Bread, rolls, and pasta made from whole grains. Brown or wild rice. Hot or cold cereals made from whole grains and without added sugar.  Vegetables  All fresh vegetables, especially fresh red, dark green, or orange vegetables. Peas and beans. Low-sodium frozen or canned vegetables prepared without added salt. Low-sodium vegetable juices.  Fruits  All fresh, frozen, and dried fruits. Canned fruit packed in water or fruit juice without added sugar. Fruit juices without added sugar.  Meats and Other Protein Sources  Boiled, baked, or grilled lean meat trimmed of fat. Skinless poultry. Fresh seafood and shellfish. Canned seafood packed in water. Unsalted nuts and unsalted nut butters. Tofu. Dried beans and pea. Eggs.  Dairy  Low-fat or fat-free milk, yogurt, and cheeses.  Sweets and Desserts  Frozen desserts made from low-fat milk.  Fats and Oils  Olive, peanut, and canola oils and margarine. Salad dressing and mayonnaise made from these oils.  Other  Soups and casseroles made from allowed ingredients and without added fat or salt.  The items listed above may not be a complete list of recommended foods or beverages. Contact your dietitian for more options.  What foods are not recommended?  Grains  Sweetened, low-fiber cereals. Packaged baked goods. Snack crackers and chips. Cheese crackers, butter crackers, and biscuits. Frozen waffles, sweet breads, doughnuts, pastries, packaged baking mixes, pancakes, cakes, and cookies.  Vegetables  Regular canned or frozen vegetables or vegetables prepared with salt. Canned tomatoes. Canned tomato sauce. Fried vegetables. Vegetables in cream sauce or cheese sauce.  Fruits  Fruits packed in syrup or made with added sugar.  Meats and Other Protein Sources  Marbled or fatty meats such as ribs. Poultry with skin. Fried meats, poultry, eggs, or fish. Sausages, hot dogs, and deli meats such as pastrami, bologna, or salami.  Dairy  Whole milk, cream, cheeses made from whole milk,  sour cream. Ice cream or yogurt made from whole milk or with added sugar.  Beverages  For adults, no more than one alcoholic drink per day. Regular soft drinks or other sugary beverages. Juice drinks.  Sweets and Desserts  Sugary or fatty desserts, candy, and other sweets.  Fats and Oils  Solid shortening or partially hydrogenated oils. Solid margarine. Margarine that contains trans fats. Butter.  The items listed above may not be a complete list of foods and beverages to avoid. Contact your dietitian for more information.  This information is not intended to replace advice given to you by your health care provider. Make sure you discuss any questions you have with your health care provider.  Document Released: 01/06/2009 Document Revised: 05/25/2017 Document Reviewed: 11/26/2014  [employer] Interactive Patient Education © 2018 [employer]  BMI for Adults  Body mass index (BMI) is a number that is calculated from a person's weight and height. In most adults, the number is used to find how much of an adult's weight is made up of fat. BMI is not as accurate as a direct measure of body fat.  How is BMI calculated?  BMI is calculated by dividing weight in kilograms by height in meters squared. It can also be calculated by dividing weight in pounds by height in inches squared, then multiplying the resulting number by 703. Charts are available to help you find your BMI quickly and easily without doing this calculation.  How is BMI interpreted?  Health care professionals use BMI charts to identify whether an adult is underweight, at a normal weight, or overweight based on the following guidelines:  · Underweight: BMI less than 18.5.  · Normal weight: BMI between 18.5 and 24.9.  · Overweight: BMI between 25 and 29.9.  · Obese: BMI of 30 and above.    BMI is usually interpreted the same for males and females.  Weight includes both fat and muscle, so someone with a muscular build, such as an athlete, may have a BMI that is  higher than 24.9. In cases like these, BMI may not accurately depict body fat. To determine if excess body fat is the cause of a BMI of 25 or higher, further assessments may need to be done by a health care provider.  Why is BMI a useful tool?  BMI is used to identify a possible weight problem that may be related to a medical problem or may increase the risk for medical problems. BMI can also be used to promote changes to reach a healthy weight.  This information is not intended to replace advice given to you by your health care provider. Make sure you discuss any questions you have with your health care provider.  Document Released: 08/29/2005 Document Revised: 04/27/2017 Document Reviewed: 05/15/2015  [employer] Interactive Patient Education © 2018 [employer]  Abdominal Pain, Adult  Many things can cause belly (abdominal) pain. Most times, belly pain is not dangerous. Many cases of belly pain can be watched and treated at home. Sometimes belly pain is serious, though. Your doctor will try to find the cause of your belly pain.  Follow these instructions at home:  · Take over-the-counter and prescription medicines only as told by your doctor. Do not take medicines that help you poop (laxatives) unless told to by your doctor.  · Drink enough fluid to keep your pee (urine) clear or pale yellow.  · Watch your belly pain for any changes.  · Keep all follow-up visits as told by your doctor. This is important.  Contact a doctor if:  · Your belly pain changes or gets worse.  · You are not hungry, or you lose weight without trying.  · You are having trouble pooping (constipated) or have watery poop (diarrhea) for more than 2-3 days.  · You have pain when you pee or poop.  · Your belly pain wakes you up at night.  · Your pain gets worse with meals, after eating, or with certain foods.  · You are throwing up and cannot keep anything down.  · You have a fever.  Get help right away if:  · Your pain does not go away as soon as  your doctor says it should.  · You cannot stop throwing up.  · Your pain is only in areas of your belly, such as the right side or the left lower part of the belly.  · You have bloody or black poop, or poop that looks like tar.  · You have very bad pain, cramping, or bloating in your belly.  · You have signs of not having enough fluid or water in your body (dehydration), such as:  ? Dark pee, very little pee, or no pee.  ? Cracked lips.  ? Dry mouth.  ? Sunken eyes.  ? Sleepiness.  ? Weakness.  This information is not intended to replace advice given to you by your health care provider. Make sure you discuss any questions you have with your health care provider.  Document Released: 06/05/2009 Document Revised: 07/07/2017 Document Reviewed: 05/31/2017  [employer] Interactive Patient Education © 2018 [employer]

## 2018-08-28 NOTE — PROGRESS NOTES
[facility]      Chief Complaint:   Chief Complaint   Patient presents with   • Abdominal Pain     Prescription medication renewal requested regarding Omeprazole 20 MG Capsules       Subjective     HPI:   And with continued epigastric abdominal pain.  Patient states marked improvement in abdominal pain with omeprazole 20 mg twice a day.  Patient denies any nausea vomiting.  Patient has recently been treated with antibiotics.    Plan; we'll schedule patient for follow-up in 3 months continue patient on omeprazole twice a day.    Past Medical History:   Past Medical History:   Diagnosis Date   • Adenomatous polyp of colon    • Anorectal abscess    • Anxiety disorder    • Benign polyp of large intestine    • Bladder fistula      post rapair      • Blood in urine    • Chronic obstructive lung disease (CMS/HCC)    • Chronic urinary tract infection    • Depressive disorder    • Diarrhea    • Diverticulitis of colon      post colectomy      • Elevated levels of transaminase & lactic acid dehydrogenase    • Epigastric pain    • Generalized anxiety disorder    • Heavy tobacco smoker    • History of colon polyps    • Hyperlipidemia    • Insomnia    • Low back pain    • Lower urinary tract infectious disease    • Lymphadenopathy     ON CT   • Osteoporosis    • Peripheral nerve disease    • Polymyalgia rheumatica (CMS/HCC)    • Polyp of colon    • Urinary tract infection    • Vitamin D deficiency        Family History:  History reviewed. No pertinent family history.    Social History:   reports that she has been smoking Cigars and Cigarettes.  She has been smoking about 1.00 pack per day. She has never used smokeless tobacco. She reports that she does not drink alcohol or use drugs.    Medications:   Prior to Admission medications    Medication Sig Start Date End Date Taking? Authorizing Provider   ATROVENT HFA 17 MCG/ACT inhaler INHALE 2 PUFFS 4 (FOUR) TIMES A DAY. 10/4/17  Yes Donato Donis MD    diazePAM (VALIUM) 5 MG tablet Take 1 tablet by mouth 2 (Two) Times a Day As Needed for Anxiety. 8/23/18  Yes Donato Donis MD   dicyclomine (BENTYL) 20 MG tablet Take 1 tablet by mouth 2 (Two) Times a Day. 3/27/18  Yes Aric Pina MD   fluticasone (FLONASE) 50 MCG/ACT nasal spray 2 sprays into each nostril Daily. 1/25/18  Yes Donato Donis MD   HYDROcodone-acetaminophen (NORCO)  MG per tablet Take 1 tablet by mouth Every 6 (Six) Hours As Needed for Moderate Pain . 8/23/18  Yes Donato Donis MD   naloxone (NARCAN) 4 MG/0.1ML nasal spray 1 spray into the nostril(s) as directed by provider As Needed (accidental OD). If used, call 911 7/26/18  Yes Donato Donis MD   omeprazole (priLOSEC) 20 MG capsule Take 1 capsule by mouth Daily. Twice a day 8/28/18  Yes Aric Pina MD   predniSONE (DELTASONE) 10 MG tablet TAKE ONE TABLET BY MOUTH EVERY DAY 12/1/17  Yes Donato Donis MD   VENTOLIN  (90 Base) MCG/ACT inhaler INHALE 1 PUFF BY MOUTH EVERY 8 HOURS AS NEEDED 12/1/17  Yes Donato Donis MD   omeprazole (priLOSEC) 20 MG capsule TAKE ONE CAPSULE BY MOUTH EVERY DAY. 8/23/18 8/28/18 Yes Aric Pina MD   metroNIDAZOLE (FLAGYL) 500 MG tablet Take 1 tablet by mouth 2 (Two) Times a Day. 8/13/18 8/28/18  Felicia Buchanan MD   nitrofurantoin, macrocrystal-monohydrate, (MACROBID) 100 MG capsule Take 1 capsule by mouth 2 (Two) Times a Day. 8/17/18 8/28/18  Felicia Buchanan MD       Allergies:  Fosamax [alendronate]    ROS:    Review of Systems   HENT: Negative for congestion, sore throat and trouble swallowing.    Respiratory: Negative for apnea, cough, choking, chest tightness, shortness of breath, wheezing and stridor.    Cardiovascular: Negative for chest pain, palpitations and leg swelling.   Gastrointestinal: Positive for abdominal pain. Negative for abdominal distention, anal bleeding, blood in stool, constipation, diarrhea, nausea, rectal pain and vomiting.   Skin:  "Negative for color change, pallor, rash and wound.   Neurological: Negative for dizziness, syncope, weakness and headaches.   Psychiatric/Behavioral: Negative for agitation, behavioral problems, confusion and decreased concentration.     Objective     Blood pressure 102/70, pulse 96, height 162.6 cm (64\"), weight 58.1 kg (128 lb), SpO2 98 %.    Physical Exam   Constitutional: She is oriented to person, place, and time. She appears well-developed and well-nourished. No distress.   HENT:   Head: Normocephalic and atraumatic.   Cardiovascular: Normal rate, regular rhythm, normal heart sounds and intact distal pulses.  Exam reveals no gallop and no friction rub.    No murmur heard.  Pulmonary/Chest: Breath sounds normal. No respiratory distress. She has no wheezes. She has no rales. She exhibits no tenderness.   Abdominal: Soft. Bowel sounds are normal. She exhibits no distension and no mass. There is tenderness. There is no rebound and no guarding. No hernia.   Musculoskeletal: Normal range of motion. She exhibits no edema.   Neurological: She is alert and oriented to person, place, and time.   Skin: Skin is warm and dry. No rash noted. She is not diaphoretic. No erythema. No pallor.   Psychiatric: She has a normal mood and affect. Her behavior is normal. Judgment and thought content normal.        Assessment/Plan   Adriana was seen today for abdominal pain.    Diagnoses and all orders for this visit:    Epigastric pain  -     omeprazole (priLOSEC) 20 MG capsule; Take 1 capsule by mouth Daily. Twice a day        * Surgery not found *     Diagnosis Plan   1. Epigastric pain  omeprazole (priLOSEC) 20 MG capsule       Anticipated Surgical Procedure:  No orders of the defined types were placed in this encounter.      The risks, benefits, and alternatives of this procedure have been discussed with the patient or the responsible party- the patient understands and agrees to " proceed.

## 2018-09-20 ENCOUNTER — OFFICE VISIT (OUTPATIENT)
Dept: FAMILY MEDICINE CLINIC | Facility: CLINIC | Age: 63
End: 2018-09-20

## 2018-09-20 ENCOUNTER — LAB (OUTPATIENT)
Dept: LAB | Facility: HOSPITAL | Age: 63
End: 2018-09-20

## 2018-09-20 VITALS
HEART RATE: 96 BPM | HEIGHT: 64 IN | SYSTOLIC BLOOD PRESSURE: 114 MMHG | DIASTOLIC BLOOD PRESSURE: 76 MMHG | WEIGHT: 131.8 LBS | OXYGEN SATURATION: 95 % | BODY MASS INDEX: 22.5 KG/M2

## 2018-09-20 DIAGNOSIS — F41.1 GENERALIZED ANXIETY DISORDER: ICD-10-CM

## 2018-09-20 DIAGNOSIS — M54.50 CHRONIC MIDLINE LOW BACK PAIN WITHOUT SCIATICA: ICD-10-CM

## 2018-09-20 DIAGNOSIS — E78.00 PURE HYPERCHOLESTEROLEMIA: ICD-10-CM

## 2018-09-20 DIAGNOSIS — G89.29 CHRONIC MIDLINE LOW BACK PAIN WITHOUT SCIATICA: ICD-10-CM

## 2018-09-20 DIAGNOSIS — F42.9 OBSESSIVE-COMPULSIVE DISORDER, UNSPECIFIED TYPE: ICD-10-CM

## 2018-09-20 DIAGNOSIS — M35.3 POLYMYALGIA RHEUMATICA (HCC): Primary | ICD-10-CM

## 2018-09-20 DIAGNOSIS — R74.01 ELEVATED AST (SGOT): ICD-10-CM

## 2018-09-20 DIAGNOSIS — K21.9 GASTROESOPHAGEAL REFLUX DISEASE WITHOUT ESOPHAGITIS: ICD-10-CM

## 2018-09-20 DIAGNOSIS — F17.200 HEAVY TOBACCO SMOKER: ICD-10-CM

## 2018-09-20 LAB
ALBUMIN SERPL-MCNC: 4.2 G/DL (ref 3.4–4.8)
ALBUMIN/GLOB SERPL: 1.4 G/DL (ref 1.1–1.8)
ALP SERPL-CCNC: 85 U/L (ref 38–126)
ALT SERPL W P-5'-P-CCNC: 17 U/L (ref 9–52)
ANION GAP SERPL CALCULATED.3IONS-SCNC: 6 MMOL/L (ref 5–15)
ARTICHOKE IGE QN: 190 MG/DL (ref 1–129)
AST SERPL-CCNC: 30 U/L (ref 14–36)
BILIRUB SERPL-MCNC: 0.3 MG/DL (ref 0.2–1.3)
BUN BLD-MCNC: 13 MG/DL (ref 7–21)
BUN/CREAT SERPL: 24.5 (ref 7–25)
CALCIUM SPEC-SCNC: 9.3 MG/DL (ref 8.4–10.2)
CHLORIDE SERPL-SCNC: 102 MMOL/L (ref 95–110)
CHOLEST SERPL-MCNC: 298 MG/DL (ref 0–199)
CO2 SERPL-SCNC: 30 MMOL/L (ref 22–31)
CREAT BLD-MCNC: 0.53 MG/DL (ref 0.5–1)
GFR SERPL CREATININE-BSD FRML MDRD: 116 ML/MIN/1.73 (ref 45–104)
GLOBULIN UR ELPH-MCNC: 3.1 GM/DL (ref 2.3–3.5)
GLUCOSE BLD-MCNC: 98 MG/DL (ref 60–100)
HDLC SERPL-MCNC: 39 MG/DL (ref 60–200)
LDLC/HDLC SERPL: ABNORMAL {RATIO} (ref 0–3.22)
MAGNESIUM SERPL-MCNC: 2.2 MG/DL (ref 1.6–2.3)
POTASSIUM BLD-SCNC: 3.9 MMOL/L (ref 3.5–5.1)
PROT SERPL-MCNC: 7.3 G/DL (ref 6.3–8.6)
SODIUM BLD-SCNC: 138 MMOL/L (ref 137–145)
TRIGL SERPL-MCNC: 417 MG/DL (ref 20–199)
VIT B12 BLD-MCNC: 779 PG/ML (ref 239–931)

## 2018-09-20 PROCEDURE — 80053 COMPREHEN METABOLIC PANEL: CPT | Performed by: FAMILY MEDICINE

## 2018-09-20 PROCEDURE — 82607 VITAMIN B-12: CPT | Performed by: FAMILY MEDICINE

## 2018-09-20 PROCEDURE — 36415 COLL VENOUS BLD VENIPUNCTURE: CPT | Performed by: FAMILY MEDICINE

## 2018-09-20 PROCEDURE — 80061 LIPID PANEL: CPT | Performed by: FAMILY MEDICINE

## 2018-09-20 PROCEDURE — 80074 ACUTE HEPATITIS PANEL: CPT

## 2018-09-20 PROCEDURE — 83735 ASSAY OF MAGNESIUM: CPT | Performed by: FAMILY MEDICINE

## 2018-09-20 PROCEDURE — 99214 OFFICE O/P EST MOD 30 MIN: CPT | Performed by: FAMILY MEDICINE

## 2018-09-20 RX ORDER — DIAZEPAM 5 MG/1
5 TABLET ORAL 2 TIMES DAILY PRN
Qty: 60 TABLET | Refills: 0 | Status: SHIPPED | OUTPATIENT
Start: 2018-09-20 | End: 2018-10-19 | Stop reason: SDUPTHER

## 2018-09-20 RX ORDER — NITROFURANTOIN MACROCRYSTALS 100 MG/1
1 CAPSULE ORAL DAILY
Refills: 3 | COMMUNITY
Start: 2018-09-10 | End: 2021-03-29

## 2018-09-20 RX ORDER — HYDROCODONE BITARTRATE AND ACETAMINOPHEN 10; 325 MG/1; MG/1
1 TABLET ORAL EVERY 6 HOURS PRN
Qty: 120 TABLET | Refills: 0 | Status: SHIPPED | OUTPATIENT
Start: 2018-09-20 | End: 2018-10-19 | Stop reason: SDUPTHER

## 2018-09-20 NOTE — PROGRESS NOTES
Subjective   Adriana Nuñez is a 63 y.o. female.     Back Pain   This is a chronic problem. The current episode started more than 1 year ago. The problem occurs constantly. The problem has been waxing and waning since onset. The pain is present in the lumbar spine. The quality of the pain is described as aching. The pain radiates to the right knee and left knee. The pain is at a severity of 8/10. The pain is moderate. The pain is worse during the day. The symptoms are aggravated by position and bending. Pertinent negatives include no bladder incontinence, bowel incontinence or fever.   Anxiety   Presents for follow-up visit. Symptoms include compulsions, excessive worry and nervous/anxious behavior. Patient reports no depressed mood, insomnia or irritability.       Hyperlipidemia   This is a chronic problem. The current episode started more than 1 year ago. The problem is uncontrolled. Recent lipid tests were reviewed and are high. Associated symptoms include myalgias.   COPD   This is a chronic problem. The current episode started more than 1 year ago. The problem occurs constantly. The problem has been waxing and waning. Associated symptoms include myalgias. Pertinent negatives include no chills, congestion, coughing or fever.   Heartburn   She reports no coughing or no heartburn. This is a chronic problem. The problem occurs rarely.        The following portions of the patient's history were reviewed and updated as appropriate: allergies, current medications, past family history, past medical history, past social history, past surgical history and problem list.    Review of Systems   Constitutional: Negative for chills, fever and irritability.   HENT: Negative for congestion.    Respiratory: Negative for cough.    Gastrointestinal: Negative for bowel incontinence and heartburn.   Genitourinary: Negative for bladder incontinence.   Musculoskeletal: Positive for back pain and myalgias.   Psychiatric/Behavioral:  The patient is nervous/anxious. The patient does not have insomnia.        Objective   Physical Exam   Constitutional: She is oriented to person, place, and time. She appears well-developed and well-nourished. No distress.   HENT:   Head: Normocephalic and atraumatic.   Right Ear: Hearing and tympanic membrane normal.   Left Ear: Hearing and tympanic membrane normal.   Cardiovascular: Normal rate, regular rhythm, normal heart sounds and intact distal pulses.  Exam reveals no gallop and no friction rub.    No murmur heard.  Pulmonary/Chest: Effort normal and breath sounds normal. No respiratory distress. She has no wheezes. She has no rales. She exhibits no tenderness.   Musculoskeletal:        Lumbar back: She exhibits decreased range of motion, tenderness and pain. She exhibits no bony tenderness, no swelling, no edema, no deformity, no laceration and no spasm.   Neurological: She is alert and oriented to person, place, and time.   Reflex Scores:       Patellar reflexes are 2+ on the right side and 2+ on the left side.  Skin: Skin is warm and dry. She is not diaphoretic.   Psychiatric: Judgment and thought content normal. Her mood appears anxious. Her speech is rapid and/or pressured. She is hyperactive.   Nursing note and vitals reviewed.      Assessment/Plan   Problems Addressed this Visit        Cardiovascular and Mediastinum    Hyperlipidemia    Relevant Orders    Comprehensive Metabolic Panel    Lipid Panel       Digestive    Gastroesophageal reflux disease without esophagitis    Relevant Orders    Magnesium    Vitamin B12       Nervous and Auditory    Chronic midline low back pain without sciatica       Other    Polymyalgia rheumatica (CMS/HCC) - Primary    Heavy tobacco smoker    Generalized anxiety disorder      Other Visit Diagnoses     Obsessive-compulsive disorder, unspecified type            ME = 39 . Risk of valium and hydrocodone discussed     annie pect OCD, declined treatment for it. Declined  hyperlipidemia treatment

## 2018-09-20 NOTE — PROGRESS NOTES
Cholesterol is elevated.  We have recommended Lipitor in the past.  I think she is declined.  If she would be willing to take medicine I'd recommend Lipitor 40 mg a day

## 2018-09-21 ENCOUNTER — TELEPHONE (OUTPATIENT)
Dept: FAMILY MEDICINE CLINIC | Facility: CLINIC | Age: 63
End: 2018-09-21

## 2018-09-21 LAB
HAV IGM SERPL QL IA: NEGATIVE
HBV CORE IGM SERPL QL IA: NEGATIVE
HBV SURFACE AG SERPL QL IA: NEGATIVE
HCV AB SER DONR QL: NEGATIVE

## 2018-09-21 NOTE — TELEPHONE ENCOUNTER
Pr Dr. Donis, Ms. Nuñez has been called with her recent lab results & recommendations.  Continue her current medications and follow-up as planned or sooner if any problems.    She states she is not going to take any cholesterol lowering medications.  She states those medications will give you a stroke.   I made several attempts to tell her these types of Medications will help lower you cholesterol, which helps lower her risk of having a stroke.  She states she has read the literature and knows what it said and she is Not Taking Any Cholesterol Lowering Medications         ----- Message from Donato Donis MD sent at 9/20/2018  3:58 PM CDT -----  Cholesterol is elevated.  We have recommended Lipitor in the past.  I think she is declined.  If she would be willing to take medicine I'd recommend Lipitor 40 mg a day

## 2018-09-21 NOTE — PROGRESS NOTES
Pr Dr. Donis, Ms. Nuñez has been called with her recent lab results & recommendations.  Continue her current medications and follow-up as planned or sooner if any problems.    She states she is not going to take any cholesterol lowering medications.  She states those medications will give you a stroke.   I made several attempts to tell her these types of Medications will help lower you cholesterol, which helps lower her risk of having a stroke.  She states she has read the literature and knows what it said and she is Not Taking Any Cholesterol Lowering Medications

## 2018-09-24 DIAGNOSIS — R10.13 EPIGASTRIC PAIN: Primary | ICD-10-CM

## 2018-09-24 RX ORDER — DICYCLOMINE HCL 20 MG
20 TABLET ORAL 2 TIMES DAILY
Qty: 60 TABLET | Refills: 1 | Status: SHIPPED | OUTPATIENT
Start: 2018-09-24 | End: 2018-11-19 | Stop reason: SDUPTHER

## 2018-09-24 NOTE — TELEPHONE ENCOUNTER
09/24/2018, 0808 - Patient telephoned per this staff member [phone] with notification requested prescription medication Bentyl 20 MG Tablets, 1 tablet by mouth 2 times per day. #60, 1 renewal has been submitted to patient's pharmacy of choice, [place], [place].  Patient given reminder of 3 month clinical appointment with Dr. Aric Flower M.D. Thursday, November 8, 2018 at 11:15 a.m. at [place], [place] at which time patient may request additional renewals.  Patient verbalized understanding.

## 2018-10-19 ENCOUNTER — OFFICE VISIT (OUTPATIENT)
Dept: FAMILY MEDICINE CLINIC | Facility: CLINIC | Age: 63
End: 2018-10-19

## 2018-10-19 VITALS
HEIGHT: 64 IN | DIASTOLIC BLOOD PRESSURE: 78 MMHG | OXYGEN SATURATION: 96 % | HEART RATE: 103 BPM | BODY MASS INDEX: 22.04 KG/M2 | WEIGHT: 129.1 LBS | SYSTOLIC BLOOD PRESSURE: 120 MMHG

## 2018-10-19 DIAGNOSIS — G89.29 CHRONIC MIDLINE LOW BACK PAIN WITHOUT SCIATICA: Primary | ICD-10-CM

## 2018-10-19 DIAGNOSIS — F41.1 GENERALIZED ANXIETY DISORDER: ICD-10-CM

## 2018-10-19 DIAGNOSIS — M54.50 CHRONIC MIDLINE LOW BACK PAIN WITHOUT SCIATICA: Primary | ICD-10-CM

## 2018-10-19 PROCEDURE — 99213 OFFICE O/P EST LOW 20 MIN: CPT | Performed by: FAMILY MEDICINE

## 2018-10-19 RX ORDER — DIAZEPAM 5 MG/1
5 TABLET ORAL 2 TIMES DAILY PRN
Qty: 60 TABLET | Refills: 0 | Status: SHIPPED | OUTPATIENT
Start: 2018-10-19 | End: 2018-11-12 | Stop reason: SDUPTHER

## 2018-10-19 RX ORDER — HYDROCODONE BITARTRATE AND ACETAMINOPHEN 10; 325 MG/1; MG/1
1 TABLET ORAL EVERY 6 HOURS PRN
Qty: 120 TABLET | Refills: 0 | Status: SHIPPED | OUTPATIENT
Start: 2018-10-19 | End: 2018-11-12 | Stop reason: SDUPTHER

## 2018-10-19 NOTE — PROGRESS NOTES
Subjective   Adriana Nuñez is a 63 y.o. female.     Back Pain   This is a chronic problem. The current episode started more than 1 year ago. The problem occurs constantly. The problem has been waxing and waning since onset. The pain is present in the lumbar spine. The quality of the pain is described as aching. The pain radiates to the right knee and left knee. The pain is at a severity of 8/10. The pain is moderate. The pain is worse during the day. The symptoms are aggravated by position and bending. Pertinent negatives include no bladder incontinence, bowel incontinence or fever.   Anxiety   Presents for follow-up visit. Symptoms include compulsions, excessive worry and nervous/anxious behavior. Patient reports no depressed mood, insomnia or irritability.            The following portions of the patient's history were reviewed and updated as appropriate: allergies, current medications, past family history, past medical history, past social history, past surgical history and problem list.    Review of Systems   Constitutional: Negative for fever and irritability.   Gastrointestinal: Negative for bowel incontinence.   Genitourinary: Negative for bladder incontinence.   Musculoskeletal: Positive for back pain.   Psychiatric/Behavioral: The patient is nervous/anxious. The patient does not have insomnia.        Objective   Physical Exam   Constitutional: She is oriented to person, place, and time. She appears well-developed and well-nourished. No distress.   HENT:   Head: Normocephalic and atraumatic.   Right Ear: Hearing and tympanic membrane normal.   Left Ear: Hearing and tympanic membrane normal.   Pulmonary/Chest: Effort normal and breath sounds normal.   Musculoskeletal:        Lumbar back: She exhibits decreased range of motion, tenderness and pain. She exhibits no bony tenderness, no swelling, no edema, no deformity, no laceration and no spasm.   Neurological: She is alert and oriented to person, place,  and time.   Reflex Scores:       Patellar reflexes are 2+ on the right side and 2+ on the left side.  Skin: Skin is warm and dry. She is not diaphoretic.   Psychiatric: Judgment and thought content normal. Her mood appears anxious. Her speech is rapid and/or pressured. She is hyperactive. Cognition and memory are normal.   Nursing note and vitals reviewed.      Assessment/Plan   Problems Addressed this Visit        Nervous and Auditory    Polymyalgia rheumatica (CMS/HCC) - Primary    Chronic midline low back pain without sciatica       Other    Generalized anxiety disorder    Relevant Medications    diazePAM (VALIUM) 5 MG tablet          ME = 39 AND ON VALIUM. RISK DISCUSSED   annie chavez OCD, declined treatment for it

## 2018-10-24 DIAGNOSIS — R10.13 EPIGASTRIC PAIN: Primary | ICD-10-CM

## 2018-10-24 RX ORDER — OMEPRAZOLE 20 MG/1
CAPSULE, DELAYED RELEASE ORAL
Qty: 60 CAPSULE | Refills: 5 | Status: SHIPPED | OUTPATIENT
Start: 2018-10-24 | End: 2019-04-05 | Stop reason: SDUPTHER

## 2018-10-24 NOTE — TELEPHONE ENCOUNTER
10/24/2018, [address] [place] [address], [place], [place] telephone requesting clarification regarding prescription medication directions for Omeprazole 20 MG capsules.  Renewals prescribed 08/28/2018 per Dr. Aric Flower M.D. states 1 capsule by mouth daily; twice daily.   08/28/2018 clinical documentation per Dr. Aric Flower M.D., patient is to utilize Omeprazole  20 MG Capsules, 1 capsule by mouth twice per day, #60, 5 renewals.  Corrected prescription medication submitted to pharmacy via [employer].

## 2018-11-08 ENCOUNTER — HOSPITAL ENCOUNTER (OUTPATIENT)
Facility: HOSPITAL | Age: 63
Setting detail: HOSPITAL OUTPATIENT SURGERY
End: 2018-11-08
Attending: INTERNAL MEDICINE | Admitting: INTERNAL MEDICINE

## 2018-11-08 ENCOUNTER — OFFICE VISIT (OUTPATIENT)
Dept: GASTROENTEROLOGY | Facility: CLINIC | Age: 63
End: 2018-11-08

## 2018-11-08 VITALS
WEIGHT: 129 LBS | HEIGHT: 64 IN | HEART RATE: 94 BPM | DIASTOLIC BLOOD PRESSURE: 70 MMHG | SYSTOLIC BLOOD PRESSURE: 110 MMHG | BODY MASS INDEX: 22.02 KG/M2

## 2018-11-08 DIAGNOSIS — R10.13 EPIGASTRIC PAIN: Primary | ICD-10-CM

## 2018-11-08 PROCEDURE — 99214 OFFICE O/P EST MOD 30 MIN: CPT | Performed by: INTERNAL MEDICINE

## 2018-11-08 RX ORDER — DEXTROSE AND SODIUM CHLORIDE 5; .45 G/100ML; G/100ML
30 INJECTION, SOLUTION INTRAVENOUS CONTINUOUS PRN
Status: CANCELLED | OUTPATIENT
Start: 2018-11-08

## 2018-11-08 RX ORDER — SODIUM, POTASSIUM,MAG SULFATES 17.5-3.13G
1 SOLUTION, RECONSTITUTED, ORAL ORAL EVERY 12 HOURS
Qty: 1 BOTTLE | Refills: 0 | Status: SHIPPED | OUTPATIENT
Start: 2018-11-08 | End: 2018-11-09 | Stop reason: ALTCHOICE

## 2018-11-08 NOTE — PATIENT INSTRUCTIONS

## 2018-11-08 NOTE — PROGRESS NOTES
[facility]      Chief Complaint:   Chief Complaint   Patient presents with   • Abdominal Pain       Subjective     HPI:    with epigastric abdominal pain.  Patient states is severe nature.  Patient is taking a proton pump inhibitor without relief.  Patient states that she also has a UTI that is being treated with antibiotics.  Patient has some changes in bowel habits but last colonoscopy only showed a small colonic polyp and patient states that she does not want a colonoscopy at this time.    Plan; we'll schedule patient for EGD to evaluate causes epigastric abdominal pain despite taking proton pump inhibitor.  We'll schedule patient for colonoscopy to evaluate for lesions within the colon and right lower quadrant abdominal pain.    Past Medical History:   Past Medical History:   Diagnosis Date   • Adenomatous polyp of colon    • Anorectal abscess    • Anxiety disorder    • Benign polyp of large intestine    • Bladder fistula      post rapair      • Blood in urine    • Chronic obstructive lung disease (CMS/HCC)    • Chronic urinary tract infection    • Depressive disorder    • Diarrhea    • Diverticulitis of colon      post colectomy      • Elevated levels of transaminase & lactic acid dehydrogenase    • Epigastric pain    • Generalized anxiety disorder    • Heavy tobacco smoker    • History of colon polyps    • Hyperlipidemia    • Insomnia    • Low back pain    • Lower urinary tract infectious disease    • Lymphadenopathy     ON CT   • Osteoporosis    • Peripheral nerve disease    • Polymyalgia rheumatica (CMS/HCC)    • Polyp of colon    • Urinary tract infection    • Vitamin D deficiency        Family History:  History reviewed. No pertinent family history.    Social History:   reports that she has been smoking Cigars and Cigarettes.  She has been smoking about 1.00 pack per day. She has never used smokeless tobacco. She reports that she does not drink alcohol or use  drugs.    Medications:   Prior to Admission medications    Medication Sig Start Date End Date Taking? Authorizing Provider   ATROVENT HFA 17 MCG/ACT inhaler INHALE 2 PUFFS 4 (FOUR) TIMES A DAY. 10/4/17  Yes Donato Donis MD   diazePAM (VALIUM) 5 MG tablet Take 1 tablet by mouth 2 (Two) Times a Day As Needed for Anxiety. 10/19/18  Yes Donato Donis MD   dicyclomine (BENTYL) 20 MG tablet Take 1 tablet by mouth 2 (Two) Times a Day. 9/24/18  Yes Aric Pina MD   fluticasone (FLONASE) 50 MCG/ACT nasal spray 2 sprays into each nostril Daily. 1/25/18  Yes Donato Donis MD   HYDROcodone-acetaminophen (NORCO)  MG per tablet Take 1 tablet by mouth Every 6 (Six) Hours As Needed for Moderate Pain . 10/19/18  Yes Donato Donis MD   naloxone (NARCAN) 4 MG/0.1ML nasal spray 1 spray into the nostril(s) as directed by provider As Needed (accidental OD). If used, call 911 7/26/18  Yes Donato Donis MD   nitrofurantoin (MACRODANTIN) 100 MG capsule Take 1 capsule by mouth Daily. 9/10/18  Yes Felicia Buchanan MD   omeprazole (priLOSEC) 20 MG capsule 1 capsule by mouth 2 times per day 10/24/18  Yes Aric Pina MD   predniSONE (DELTASONE) 10 MG tablet TAKE ONE TABLET BY MOUTH EVERY DAY 12/1/17  Yes Donato Dnois MD   VENTOLIN  (90 Base) MCG/ACT inhaler INHALE 1 PUFF BY MOUTH EVERY 8 HOURS AS NEEDED 12/1/17  Yes Donato Donis MD       Allergies:  Fosamax [alendronate]    ROS:    Review of Systems   Constitutional: Negative for activity change, appetite change, chills, diaphoresis, fatigue, fever and unexpected weight change.   HENT: Negative for sore throat and trouble swallowing.    Respiratory: Negative for shortness of breath.    Gastrointestinal: Negative for abdominal distention, abdominal pain, anal bleeding, blood in stool, constipation, diarrhea, nausea, rectal pain and vomiting.   Endocrine: Negative for polydipsia, polyphagia and polyuria.   Genitourinary: Negative for  "difficulty urinating.   Musculoskeletal: Negative for arthralgias.   Skin: Negative for pallor.   Allergic/Immunologic: Negative for food allergies.   Neurological: Negative for weakness and light-headedness.   Psychiatric/Behavioral: Negative for behavioral problems.     Objective     Blood pressure 110/70, pulse 94, height 162.6 cm (64\"), weight 58.5 kg (129 lb).    Physical Exam   Constitutional: She is oriented to person, place, and time. She appears well-developed and well-nourished. No distress.   HENT:   Head: Normocephalic and atraumatic.   Cardiovascular: Normal rate, regular rhythm, normal heart sounds and intact distal pulses.  Exam reveals no gallop and no friction rub.    No murmur heard.  Pulmonary/Chest: Breath sounds normal. No respiratory distress. She has no wheezes. She has no rales. She exhibits no tenderness.   Abdominal: Soft. Bowel sounds are normal. She exhibits no distension and no mass. There is tenderness. There is no rebound and no guarding. No hernia.   Musculoskeletal: Normal range of motion. She exhibits no edema.   Neurological: She is alert and oriented to person, place, and time.   Skin: Skin is warm and dry. No rash noted. She is not diaphoretic. No erythema. No pallor.   Psychiatric: She has a normal mood and affect. Her behavior is normal. Judgment and thought content normal.        Assessment/Plan   Adriana was seen today for abdominal pain.    Diagnoses and all orders for this visit:    Epigastric pain  -     Case Request; Standing  -     dextrose 5 % and sodium chloride 0.45 % infusion; Infuse 30 mL/hr into a venous catheter Continuous As Needed (Start Prior to Procedure).  -     Case Request    Other orders  -     Follow Anesthesia Guidelines / Standing Orders; Future  -     Implement Anesthesia Orders Day of Procedure; Standing  -     Obtain Informed Consent; Standing  -     POC Glucose Once; Standing        ESOPHAGOGASTRODUODENOSCOPY (N/A)     Diagnosis Plan   1. Epigastric " pain  Case Request    dextrose 5 % and sodium chloride 0.45 % infusion    Case Request       Anticipated Surgical Procedure:  Orders Placed This Encounter   Procedures   • Follow Anesthesia Guidelines / Standing Orders     Standing Status:   Future       The risks, benefits, and alternatives of this procedure have been discussed with the patient or the responsible party- the patient understands and agrees to proceed.

## 2018-11-09 ENCOUNTER — TELEPHONE (OUTPATIENT)
Dept: GASTROENTEROLOGY | Facility: CLINIC | Age: 63
End: 2018-11-09

## 2018-11-09 DIAGNOSIS — R10.31 RIGHT LOWER QUADRANT ABDOMINAL PAIN: Primary | ICD-10-CM

## 2018-11-10 PROBLEM — R10.30 LOWER ABDOMINAL PAIN: Status: ACTIVE | Noted: 2018-11-08

## 2018-11-12 RX ORDER — DIAZEPAM 5 MG/1
5 TABLET ORAL 2 TIMES DAILY PRN
Qty: 60 TABLET | Refills: 0 | Status: SHIPPED | OUTPATIENT
Start: 2018-11-12 | End: 2018-11-19 | Stop reason: SDUPTHER

## 2018-11-12 RX ORDER — HYDROCODONE BITARTRATE AND ACETAMINOPHEN 10; 325 MG/1; MG/1
1 TABLET ORAL EVERY 6 HOURS PRN
Qty: 120 TABLET | Refills: 0 | Status: SHIPPED | OUTPATIENT
Start: 2018-11-12 | End: 2018-11-19 | Stop reason: SDUPTHER

## 2018-11-13 ENCOUNTER — TELEPHONE (OUTPATIENT)
Dept: GASTROENTEROLOGY | Facility: CLINIC | Age: 63
End: 2018-11-13

## 2018-11-13 NOTE — TELEPHONE ENCOUNTER
11/13/2018, 1451 - Patient telephoned per this staff member [phone] with notification of approval received per Dr. Aric Flower M.D. regardng patient request for Colonoscopy in addition to EGD scheduled for completion Monday, December 5, 2018 at 3:30 p.m.  Patient made aware prescription medication, Golytely Bowel Preparation, submitted to patient's pharmacy of choice, [place], [place] with instructions for utilization submitted to pharmacy via facsimile.

## 2018-11-19 ENCOUNTER — OFFICE VISIT (OUTPATIENT)
Dept: FAMILY MEDICINE CLINIC | Facility: CLINIC | Age: 63
End: 2018-11-19

## 2018-11-19 VITALS
HEART RATE: 120 BPM | BODY MASS INDEX: 21.68 KG/M2 | SYSTOLIC BLOOD PRESSURE: 110 MMHG | HEIGHT: 64 IN | WEIGHT: 127 LBS | OXYGEN SATURATION: 98 % | DIASTOLIC BLOOD PRESSURE: 70 MMHG

## 2018-11-19 DIAGNOSIS — G89.29 CHRONIC MIDLINE LOW BACK PAIN WITHOUT SCIATICA: Primary | ICD-10-CM

## 2018-11-19 DIAGNOSIS — F41.1 GENERALIZED ANXIETY DISORDER: ICD-10-CM

## 2018-11-19 DIAGNOSIS — M35.3 POLYMYALGIA RHEUMATICA (HCC): ICD-10-CM

## 2018-11-19 DIAGNOSIS — R10.13 EPIGASTRIC PAIN: ICD-10-CM

## 2018-11-19 DIAGNOSIS — M54.50 CHRONIC MIDLINE LOW BACK PAIN WITHOUT SCIATICA: Primary | ICD-10-CM

## 2018-11-19 PROCEDURE — 99214 OFFICE O/P EST MOD 30 MIN: CPT | Performed by: FAMILY MEDICINE

## 2018-11-19 RX ORDER — DICYCLOMINE HCL 20 MG
TABLET ORAL
Qty: 60 TABLET | Refills: 1 | Status: SHIPPED | OUTPATIENT
Start: 2018-11-19 | End: 2019-01-09 | Stop reason: SDUPTHER

## 2018-11-19 RX ORDER — HYDROCODONE BITARTRATE AND ACETAMINOPHEN 10; 325 MG/1; MG/1
1 TABLET ORAL EVERY 6 HOURS PRN
Qty: 120 TABLET | Refills: 0 | Status: SHIPPED | OUTPATIENT
Start: 2018-11-19 | End: 2018-12-17 | Stop reason: SDUPTHER

## 2018-11-19 RX ORDER — DIAZEPAM 5 MG/1
5 TABLET ORAL 2 TIMES DAILY PRN
Qty: 60 TABLET | Refills: 0 | Status: SHIPPED | OUTPATIENT
Start: 2018-11-19 | End: 2018-12-17 | Stop reason: SDUPTHER

## 2018-11-19 NOTE — PROGRESS NOTES
Subjective   Adriana Nuñez is a 63 y.o. female.     Back Pain   This is a chronic problem. The current episode started more than 1 year ago. The problem occurs constantly. The problem has been waxing and waning since onset. The pain is present in the lumbar spine. The quality of the pain is described as aching. The pain radiates to the right knee and left knee. The pain is at a severity of 7/10. The pain is moderate. The pain is worse during the day. The symptoms are aggravated by position and bending. Pertinent negatives include no bladder incontinence, bowel incontinence or fever.   Anxiety   Presents for follow-up visit. Symptoms include compulsions, excessive worry and nervous/anxious behavior. Patient reports no depressed mood, insomnia or irritability.            The following portions of the patient's history were reviewed and updated as appropriate: allergies, current medications, past family history, past medical history, past social history, past surgical history and problem list.    Review of Systems   Constitutional: Negative for fever and irritability.   Gastrointestinal: Negative for bowel incontinence.   Genitourinary: Negative for bladder incontinence.   Musculoskeletal: Positive for back pain.   Psychiatric/Behavioral: The patient is nervous/anxious. The patient does not have insomnia.        Objective   Physical Exam   Constitutional: She is oriented to person, place, and time. She appears well-developed and well-nourished. No distress.   HENT:   Head: Normocephalic and atraumatic.   Right Ear: Hearing and tympanic membrane normal.   Left Ear: Hearing and tympanic membrane normal.   Pulmonary/Chest: Effort normal and breath sounds normal.   Musculoskeletal:        Lumbar back: She exhibits decreased range of motion, tenderness and pain. She exhibits no bony tenderness, no swelling, no edema, no deformity, no laceration and no spasm.   Neurological: She is alert and oriented to person, place,  and time.   Reflex Scores:       Patellar reflexes are 2+ on the right side and 2+ on the left side.  Skin: Skin is warm and dry. She is not diaphoretic.   Psychiatric: Judgment and thought content normal. Her mood appears anxious. Her speech is rapid and/or pressured. She is hyperactive. Cognition and memory are normal.   Nursing note and vitals reviewed.      Assessment/Plan   Problems Addressed this Visit        Nervous and Auditory    Polymyalgia rheumatica (CMS/HCC)    Chronic midline low back pain without sciatica - Primary       Other    Generalized anxiety disorder    Relevant Medications    diazePAM (VALIUM) 5 MG tablet          ME = 39 AND ON VALIUM. RISK DISCUSSED   annie chavez OCD, declined treatment for it

## 2018-11-20 RX ORDER — ALBUTEROL SULFATE 90 UG/1
AEROSOL, METERED RESPIRATORY (INHALATION)
Qty: 18 G | Refills: 11 | Status: SHIPPED | OUTPATIENT
Start: 2018-11-20 | End: 2019-08-27 | Stop reason: SDUPTHER

## 2018-12-06 RX ORDER — PREDNISONE 10 MG/1
TABLET ORAL
Qty: 30 TABLET | Refills: 12 | Status: SHIPPED | OUTPATIENT
Start: 2018-12-06 | End: 2019-05-09 | Stop reason: SDUPTHER

## 2018-12-17 ENCOUNTER — OFFICE VISIT (OUTPATIENT)
Dept: FAMILY MEDICINE CLINIC | Facility: CLINIC | Age: 63
End: 2018-12-17

## 2018-12-17 VITALS
WEIGHT: 122.2 LBS | HEART RATE: 110 BPM | BODY MASS INDEX: 20.86 KG/M2 | SYSTOLIC BLOOD PRESSURE: 126 MMHG | HEIGHT: 64 IN | DIASTOLIC BLOOD PRESSURE: 76 MMHG | OXYGEN SATURATION: 95 %

## 2018-12-17 DIAGNOSIS — F17.200 HEAVY TOBACCO SMOKER: ICD-10-CM

## 2018-12-17 DIAGNOSIS — F41.1 GENERALIZED ANXIETY DISORDER: ICD-10-CM

## 2018-12-17 DIAGNOSIS — M54.50 CHRONIC MIDLINE LOW BACK PAIN WITHOUT SCIATICA: Primary | ICD-10-CM

## 2018-12-17 DIAGNOSIS — E78.00 PURE HYPERCHOLESTEROLEMIA: ICD-10-CM

## 2018-12-17 DIAGNOSIS — G89.29 CHRONIC MIDLINE LOW BACK PAIN WITHOUT SCIATICA: Primary | ICD-10-CM

## 2018-12-17 DIAGNOSIS — K21.9 GASTROESOPHAGEAL REFLUX DISEASE WITHOUT ESOPHAGITIS: ICD-10-CM

## 2018-12-17 DIAGNOSIS — M35.3 POLYMYALGIA RHEUMATICA (HCC): ICD-10-CM

## 2018-12-17 DIAGNOSIS — F42.9 OBSESSIVE-COMPULSIVE DISORDER, UNSPECIFIED TYPE: ICD-10-CM

## 2018-12-17 PROCEDURE — 99214 OFFICE O/P EST MOD 30 MIN: CPT | Performed by: FAMILY MEDICINE

## 2018-12-17 RX ORDER — IPRATROPIUM BROMIDE 17 UG/1
AEROSOL, METERED RESPIRATORY (INHALATION)
Qty: 1 INHALER | Refills: 3 | Status: SHIPPED | OUTPATIENT
Start: 2018-12-17 | End: 2019-07-03 | Stop reason: SDUPTHER

## 2018-12-17 RX ORDER — DIAZEPAM 5 MG/1
TABLET ORAL
Qty: 60 TABLET | OUTPATIENT
Start: 2018-12-17

## 2018-12-17 RX ORDER — HYDROCODONE BITARTRATE AND ACETAMINOPHEN 10; 325 MG/1; MG/1
1 TABLET ORAL EVERY 6 HOURS PRN
Qty: 120 TABLET | Refills: 0 | Status: SHIPPED | OUTPATIENT
Start: 2018-12-17 | End: 2019-01-14 | Stop reason: SDUPTHER

## 2018-12-17 RX ORDER — DIAZEPAM 5 MG/1
5 TABLET ORAL 2 TIMES DAILY PRN
Qty: 60 TABLET | Refills: 0 | Status: SHIPPED | OUTPATIENT
Start: 2018-12-17 | End: 2019-01-14 | Stop reason: SDUPTHER

## 2018-12-26 ENCOUNTER — TELEPHONE (OUTPATIENT)
Dept: FAMILY MEDICINE CLINIC | Facility: CLINIC | Age: 63
End: 2018-12-26

## 2018-12-28 ENCOUNTER — TELEPHONE (OUTPATIENT)
Dept: FAMILY MEDICINE CLINIC | Facility: CLINIC | Age: 63
End: 2018-12-28

## 2018-12-28 NOTE — TELEPHONE ENCOUNTER
Letter faxed to med records department at the [facility] @ [phone] per patient request

## 2018-12-28 NOTE — TELEPHONE ENCOUNTER
[employer] IS NEEDING THE LETTER FX TO THE MED RECORDS DEPT AT THE [facility]... [phone]  SHE WILL CALL THEM TO WATCH FOR IT

## 2019-01-09 DIAGNOSIS — R10.13 EPIGASTRIC PAIN: ICD-10-CM

## 2019-01-09 RX ORDER — DICYCLOMINE HCL 20 MG
TABLET ORAL
Qty: 60 TABLET | Refills: 1 | Status: SHIPPED | OUTPATIENT
Start: 2019-01-09 | End: 2019-04-10 | Stop reason: SDUPTHER

## 2019-01-09 NOTE — TELEPHONE ENCOUNTER
01/09/2019, 1122 - Patient prior clinical appointment with Dr. Aric Flower M.D. 11/08/2018 with 3 month clinical appointment scheduled Thursday, February 14, 2019 at 10:45 a.m. at [place], [place].  Per REBEKA Flower, patient can be prescribed adequate prescription medication until next clinical appointment date/time.  Dicyclomine 20 MG Tablets, 1 tablet by mouth 2 times per day, #60, 1 renewal submitted per this staff member via E-Scribe to patient's pharmacy of choice, [place], [place].

## 2019-01-14 ENCOUNTER — OFFICE VISIT (OUTPATIENT)
Dept: FAMILY MEDICINE CLINIC | Facility: CLINIC | Age: 64
End: 2019-01-14

## 2019-01-14 VITALS
HEIGHT: 64 IN | SYSTOLIC BLOOD PRESSURE: 124 MMHG | BODY MASS INDEX: 20.2 KG/M2 | WEIGHT: 118.3 LBS | HEART RATE: 105 BPM | DIASTOLIC BLOOD PRESSURE: 74 MMHG | OXYGEN SATURATION: 98 %

## 2019-01-14 DIAGNOSIS — G89.29 CHRONIC MIDLINE LOW BACK PAIN WITHOUT SCIATICA: Primary | ICD-10-CM

## 2019-01-14 DIAGNOSIS — F41.1 GENERALIZED ANXIETY DISORDER: ICD-10-CM

## 2019-01-14 DIAGNOSIS — M54.50 CHRONIC MIDLINE LOW BACK PAIN WITHOUT SCIATICA: Primary | ICD-10-CM

## 2019-01-14 PROCEDURE — 99213 OFFICE O/P EST LOW 20 MIN: CPT | Performed by: FAMILY MEDICINE

## 2019-01-14 RX ORDER — SULFAMETHOXAZOLE AND TRIMETHOPRIM 800; 160 MG/1; MG/1
1 TABLET ORAL 2 TIMES DAILY
COMMUNITY
End: 2019-05-09

## 2019-01-14 RX ORDER — HYDROCODONE BITARTRATE AND ACETAMINOPHEN 10; 325 MG/1; MG/1
1 TABLET ORAL EVERY 6 HOURS PRN
Qty: 120 TABLET | Refills: 0 | Status: SHIPPED | OUTPATIENT
Start: 2019-01-14 | End: 2019-02-12 | Stop reason: SDUPTHER

## 2019-01-14 RX ORDER — DIAZEPAM 5 MG/1
5 TABLET ORAL 2 TIMES DAILY PRN
Qty: 60 TABLET | Refills: 0 | Status: SHIPPED | OUTPATIENT
Start: 2019-01-14 | End: 2019-02-12 | Stop reason: SDUPTHER

## 2019-01-14 NOTE — PROGRESS NOTES
Subjective   Adriana Nuñez is a 63 y.o. female.     Back Pain   This is a chronic problem. The current episode started more than 1 year ago. The problem occurs constantly. The problem has been waxing and waning since onset. The pain is present in the lumbar spine. The quality of the pain is described as aching. The pain radiates to the right knee and left knee. The pain is at a severity of 8/10. The pain is moderate. The pain is worse during the day. The symptoms are aggravated by position and bending. Pertinent negatives include no bladder incontinence, bowel incontinence or fever.   Anxiety   Presents for follow-up visit. Symptoms include compulsions, excessive worry and nervous/anxious behavior. Patient reports no depressed mood, insomnia or irritability.            The following portions of the patient's history were reviewed and updated as appropriate: allergies, current medications, past family history, past medical history, past social history, past surgical history and problem list.    Review of Systems   Constitutional: Negative for fever and irritability.   Gastrointestinal: Negative for bowel incontinence.   Genitourinary: Negative for bladder incontinence.   Musculoskeletal: Positive for back pain.   Psychiatric/Behavioral: The patient is nervous/anxious. The patient does not have insomnia.        Objective   Physical Exam   Constitutional: She is oriented to person, place, and time. She appears well-developed and well-nourished. No distress.   HENT:   Head: Normocephalic and atraumatic.   Right Ear: Hearing and tympanic membrane normal.   Left Ear: Hearing and tympanic membrane normal.   Pulmonary/Chest: Effort normal and breath sounds normal.   Musculoskeletal:        Lumbar back: She exhibits decreased range of motion, tenderness and pain. She exhibits no bony tenderness, no swelling, no edema, no deformity, no laceration and no spasm.   Neurological: She is alert and oriented to person, place,  and time.   Reflex Scores:       Patellar reflexes are 2+ on the right side and 2+ on the left side.  Skin: Skin is warm and dry. She is not diaphoretic.   Psychiatric: Judgment and thought content normal. Her mood appears anxious. Her speech is rapid and/or pressured. She is hyperactive. Cognition and memory are normal.   Nursing note and vitals reviewed.      Assessment/Plan   Problems Addressed this Visit        Nervous and Auditory    Chronic midline low back pain without sciatica - Primary       Other    Generalized anxiety disorder    Relevant Medications    diazePAM (VALIUM) 5 MG tablet          ME = 39 AND ON VALIUM. RISK DISCUSSED   annie kathy OCD, declined treatment for it

## 2019-02-07 RX ORDER — FLUTICASONE PROPIONATE 50 MCG
SPRAY, SUSPENSION (ML) NASAL
Qty: 16 G | Refills: 0 | Status: SHIPPED | OUTPATIENT
Start: 2019-02-07 | End: 2021-04-30

## 2019-02-12 ENCOUNTER — OFFICE VISIT (OUTPATIENT)
Dept: FAMILY MEDICINE CLINIC | Facility: CLINIC | Age: 64
End: 2019-02-12

## 2019-02-12 VITALS
BODY MASS INDEX: 19.92 KG/M2 | DIASTOLIC BLOOD PRESSURE: 68 MMHG | WEIGHT: 116.7 LBS | OXYGEN SATURATION: 95 % | HEIGHT: 64 IN | SYSTOLIC BLOOD PRESSURE: 110 MMHG | HEART RATE: 125 BPM
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
## 2019-02-12 DIAGNOSIS — G89.29 CHRONIC MIDLINE LOW BACK PAIN WITHOUT SCIATICA: Primary | ICD-10-CM

## 2019-02-12 DIAGNOSIS — F41.1 GENERALIZED ANXIETY DISORDER: ICD-10-CM

## 2019-02-12 DIAGNOSIS — M54.50 CHRONIC MIDLINE LOW BACK PAIN WITHOUT SCIATICA: Primary | ICD-10-CM

## 2019-02-12 PROCEDURE — 99214 OFFICE O/P EST MOD 30 MIN: CPT | Performed by: FAMILY MEDICINE

## 2019-02-12 RX ORDER — HYDROCODONE BITARTRATE AND ACETAMINOPHEN 10; 325 MG/1; MG/1
1 TABLET ORAL EVERY 6 HOURS PRN
Qty: 120 TABLET | Refills: 0 | Status: SHIPPED | OUTPATIENT
Start: 2019-02-12 | End: 2019-03-13 | Stop reason: SDUPTHER

## 2019-02-12 RX ORDER — DIAZEPAM 5 MG/1
5 TABLET ORAL 2 TIMES DAILY PRN
Qty: 60 TABLET | Refills: 0 | Status: SHIPPED | OUTPATIENT
Start: 2019-02-12 | End: 2019-03-13 | Stop reason: SDUPTHER

## 2019-02-12 NOTE — PROGRESS NOTES
Subjective   Adriana Nuñez is a 63 y.o. female.     Back Pain   This is a chronic problem. The current episode started more than 1 year ago. The problem occurs constantly. The problem has been waxing and waning since onset. The pain is present in the lumbar spine. The quality of the pain is described as aching. The pain radiates to the right knee and left knee. The pain is at a severity of 8/10. The pain is moderate. The pain is worse during the day. The symptoms are aggravated by position and bending. Pertinent negatives include no bladder incontinence, bowel incontinence or fever.   Anxiety   Presents for follow-up visit. Symptoms include compulsions, excessive worry and nervous/anxious behavior. Patient reports no depressed mood, insomnia or irritability.       Fall   The accident occurred more than 1 week ago. The fall occurred while walking. She fell from a height of 6 to 10 ft. She landed on grass (into a ditch helping her grandson with "[employer] There was no blood loss. Point of impact: chest. Pain location: chest wall right posterior. The pain is mild. Pertinent negatives include no bowel incontinence or fever.        The following portions of the patient's history were reviewed and updated as appropriate: allergies, current medications, past family history, past medical history, past social history, past surgical history and problem list.    Review of Systems   Constitutional: Negative for fever and irritability.   Gastrointestinal: Negative for bowel incontinence.   Genitourinary: Negative for bladder incontinence.   Musculoskeletal: Positive for back pain.   Psychiatric/Behavioral: The patient is nervous/anxious. The patient does not have insomnia.        Objective   Physical Exam   Constitutional: She is oriented to person, place, and time. She appears well-developed and well-nourished. No distress.   HENT:   Head: Normocephalic and atraumatic.   Right Ear: Hearing and tympanic membrane  normal.   Left Ear: Hearing and tympanic membrane normal.   Cardiovascular: Normal rate, regular rhythm and normal heart sounds. Exam reveals no gallop and no friction rub.   No murmur heard.  Pulmonary/Chest: Effort normal. She has decreased breath sounds. She has no wheezes. She has rhonchi. She has no rales.   Musculoskeletal:        Lumbar back: She exhibits decreased range of motion, tenderness and pain. She exhibits no bony tenderness, no swelling, no edema, no deformity, no laceration and no spasm.   Neurological: She is alert and oriented to person, place, and time.   Reflex Scores:       Patellar reflexes are 2+ on the right side and 2+ on the left side.  Skin: Skin is warm and dry. She is not diaphoretic.   Psychiatric: Judgment and thought content normal. Her mood appears anxious. Her speech is rapid and/or pressured. She is hyperactive. Cognition and memory are normal.   Nursing note and vitals reviewed.      Assessment/Plan   Problems Addressed this Visit        Nervous and Auditory    Chronic midline low back pain without sciatica - Primary       Other    Generalized anxiety disorder    Relevant Medications    diazePAM (VALIUM) 5 MG tablet      Other Visit Diagnoses     Fall, initial encounter              ME = 39 AND ON VALIUM. RISK DISCUSSED   annie chavez OCD, declined treatment for it

## 2019-03-13 ENCOUNTER — OFFICE VISIT (OUTPATIENT)
Dept: FAMILY MEDICINE CLINIC | Facility: CLINIC | Age: 64
End: 2019-03-13

## 2019-03-13 ENCOUNTER — LAB (OUTPATIENT)
Dept: LAB | Facility: HOSPITAL | Age: 64
End: 2019-03-13

## 2019-03-13 VITALS
BODY MASS INDEX: 19.21 KG/M2 | HEART RATE: 107 BPM | DIASTOLIC BLOOD PRESSURE: 70 MMHG | OXYGEN SATURATION: 94 % | WEIGHT: 112.5 LBS | SYSTOLIC BLOOD PRESSURE: 122 MMHG | HEIGHT: 64 IN

## 2019-03-13 DIAGNOSIS — K21.9 GASTROESOPHAGEAL REFLUX DISEASE WITHOUT ESOPHAGITIS: ICD-10-CM

## 2019-03-13 DIAGNOSIS — F42.9 OBSESSIVE-COMPULSIVE DISORDER, UNSPECIFIED TYPE: ICD-10-CM

## 2019-03-13 DIAGNOSIS — G89.29 CHRONIC MIDLINE LOW BACK PAIN WITHOUT SCIATICA: ICD-10-CM

## 2019-03-13 DIAGNOSIS — F17.200 HEAVY TOBACCO SMOKER: ICD-10-CM

## 2019-03-13 DIAGNOSIS — F41.1 GENERALIZED ANXIETY DISORDER: Primary | ICD-10-CM

## 2019-03-13 DIAGNOSIS — M54.50 CHRONIC MIDLINE LOW BACK PAIN WITHOUT SCIATICA: ICD-10-CM

## 2019-03-13 DIAGNOSIS — M35.3 POLYMYALGIA RHEUMATICA (HCC): ICD-10-CM

## 2019-03-13 DIAGNOSIS — E78.00 PURE HYPERCHOLESTEROLEMIA: ICD-10-CM

## 2019-03-13 LAB
ALBUMIN SERPL-MCNC: 3.4 G/DL (ref 3.4–4.8)
ALBUMIN/GLOB SERPL: 1.3 G/DL (ref 1.1–1.8)
ALP SERPL-CCNC: 106 U/L (ref 38–126)
ALT SERPL W P-5'-P-CCNC: 8 U/L (ref 9–52)
AMPHET+METHAMPHET UR QL: NEGATIVE
ANION GAP SERPL CALCULATED.3IONS-SCNC: 8 MMOL/L (ref 5–15)
ARTICHOKE IGE QN: 94 MG/DL (ref 1–129)
AST SERPL-CCNC: 26 U/L (ref 14–36)
BARBITURATES UR QL SCN: NEGATIVE
BASOPHILS # BLD AUTO: 0.06 10*3/MM3 (ref 0–0.2)
BASOPHILS NFR BLD AUTO: 0.7 % (ref 0–1.5)
BENZODIAZ UR QL SCN: POSITIVE
BILIRUB SERPL-MCNC: 0.3 MG/DL (ref 0.2–1.3)
BUN BLD-MCNC: 12 MG/DL (ref 7–21)
BUN/CREAT SERPL: 17.1 (ref 7–25)
CALCIUM SPEC-SCNC: 8.9 MG/DL (ref 8.4–10.2)
CANNABINOIDS SERPL QL: NEGATIVE
CHLORIDE SERPL-SCNC: 105 MMOL/L (ref 95–110)
CHOLEST SERPL-MCNC: 201 MG/DL (ref 0–199)
CO2 SERPL-SCNC: 22 MMOL/L (ref 22–31)
COCAINE UR QL: NEGATIVE
CREAT BLD-MCNC: 0.7 MG/DL (ref 0.5–1)
DEPRECATED RDW RBC AUTO: 44.2 FL (ref 37–54)
EOSINOPHIL # BLD AUTO: 0.08 10*3/MM3 (ref 0–0.4)
EOSINOPHIL NFR BLD AUTO: 0.9 % (ref 0.3–6.2)
ERYTHROCYTE [DISTWIDTH] IN BLOOD BY AUTOMATED COUNT: 13 % (ref 12.3–15.4)
GFR SERPL CREATININE-BSD FRML MDRD: 84 ML/MIN/1.73 (ref 45–104)
GLOBULIN UR ELPH-MCNC: 2.6 GM/DL (ref 2.3–3.5)
GLUCOSE BLD-MCNC: 77 MG/DL (ref 60–100)
HCT VFR BLD AUTO: 35.1 % (ref 34–46.6)
HDLC SERPL-MCNC: 44 MG/DL (ref 60–200)
HGB BLD-MCNC: 12.1 G/DL (ref 12–15.9)
IMM GRANULOCYTES # BLD AUTO: 0.07 10*3/MM3 (ref 0–0.05)
IMM GRANULOCYTES NFR BLD AUTO: 0.8 % (ref 0–0.5)
LDLC/HDLC SERPL: 2.27 {RATIO} (ref 0–3.22)
LYMPHOCYTES # BLD AUTO: 2.79 10*3/MM3 (ref 0.7–3.1)
LYMPHOCYTES NFR BLD AUTO: 31.4 % (ref 19.6–45.3)
MAGNESIUM SERPL-MCNC: 1.7 MG/DL (ref 1.6–2.3)
MCH RBC QN AUTO: 32.1 PG (ref 26.6–33)
MCHC RBC AUTO-ENTMCNC: 34.5 G/DL (ref 31.5–35.7)
MCV RBC AUTO: 93.1 FL (ref 79–97)
METHADONE UR QL SCN: NEGATIVE
MONOCYTES # BLD AUTO: 0.58 10*3/MM3 (ref 0.1–0.9)
MONOCYTES NFR BLD AUTO: 6.5 % (ref 5–12)
NEUTROPHILS # BLD AUTO: 5.3 10*3/MM3 (ref 1.4–7)
NEUTROPHILS NFR BLD AUTO: 59.7 % (ref 42.7–76)
NRBC BLD AUTO-RTO: 0 /100 WBC (ref 0–0)
OPIATES UR QL: POSITIVE
OXYCODONE UR QL SCN: NEGATIVE
PLATELET # BLD AUTO: 345 10*3/MM3 (ref 140–450)
PMV BLD AUTO: 10.7 FL (ref 6–12)
POTASSIUM BLD-SCNC: 3.9 MMOL/L (ref 3.5–5.1)
PROT SERPL-MCNC: 6 G/DL (ref 6.3–8.6)
RBC # BLD AUTO: 3.77 10*6/MM3 (ref 3.77–5.28)
SODIUM BLD-SCNC: 135 MMOL/L (ref 137–145)
TRIGL SERPL-MCNC: 285 MG/DL (ref 20–199)
VIT B12 BLD-MCNC: 658 PG/ML (ref 239–931)
WBC NRBC COR # BLD: 8.88 10*3/MM3 (ref 3.4–10.8)

## 2019-03-13 PROCEDURE — 80307 DRUG TEST PRSMV CHEM ANLYZR: CPT

## 2019-03-13 PROCEDURE — 80061 LIPID PANEL: CPT | Performed by: FAMILY MEDICINE

## 2019-03-13 PROCEDURE — 80053 COMPREHEN METABOLIC PANEL: CPT | Performed by: FAMILY MEDICINE

## 2019-03-13 PROCEDURE — 99214 OFFICE O/P EST MOD 30 MIN: CPT | Performed by: FAMILY MEDICINE

## 2019-03-13 PROCEDURE — 83735 ASSAY OF MAGNESIUM: CPT | Performed by: FAMILY MEDICINE

## 2019-03-13 PROCEDURE — 36415 COLL VENOUS BLD VENIPUNCTURE: CPT | Performed by: FAMILY MEDICINE

## 2019-03-13 PROCEDURE — 82607 VITAMIN B-12: CPT | Performed by: FAMILY MEDICINE

## 2019-03-13 PROCEDURE — 85025 COMPLETE CBC W/AUTO DIFF WBC: CPT | Performed by: FAMILY MEDICINE

## 2019-03-13 RX ORDER — DIAZEPAM 5 MG/1
5 TABLET ORAL 2 TIMES DAILY PRN
Qty: 60 TABLET | Refills: 0 | Status: SHIPPED | OUTPATIENT
Start: 2019-03-13 | End: 2019-04-11 | Stop reason: SDUPTHER

## 2019-03-13 RX ORDER — HYDROCODONE BITARTRATE AND ACETAMINOPHEN 10; 325 MG/1; MG/1
1 TABLET ORAL EVERY 6 HOURS PRN
Qty: 120 TABLET | Refills: 0 | Status: SHIPPED | OUTPATIENT
Start: 2019-03-13 | End: 2019-04-11 | Stop reason: SDUPTHER

## 2019-03-13 NOTE — PROGRESS NOTES
Subjective   Adriana Nuñez is a 63 y.o. female.     Back Pain   This is a chronic problem. The current episode started more than 1 year ago. The problem occurs constantly. The problem has been waxing and waning since onset. The pain is present in the lumbar spine. The quality of the pain is described as aching. The pain radiates to the right knee and left knee. The pain is at a severity of 10/10. The pain is moderate. The pain is worse during the day. The symptoms are aggravated by position and bending. Associated symptoms include abdominal pain. Pertinent negatives include no bladder incontinence, bowel incontinence, chest pain, dysuria, fever or headaches.   Anxiety   Presents for follow-up visit. Symptoms include compulsions, excessive worry, nausea, nervous/anxious behavior and shortness of breath. Patient reports no chest pain, depressed mood, insomnia, irritability, palpitations or suicidal ideas.       Hyperlipidemia   This is a chronic problem. The current episode started more than 1 year ago. The problem is uncontrolled. Recent lipid tests were reviewed and are high. Associated symptoms include myalgias and shortness of breath. Pertinent negatives include no chest pain.   COPD   This is a chronic problem. The current episode started more than 1 year ago. The problem occurs constantly. The problem has been waxing and waning. Associated symptoms include abdominal pain, myalgias and nausea. Pertinent negatives include no chest pain, chills, congestion, coughing, fever, headaches, rash, sore throat or vomiting.   Heartburn   She complains of abdominal pain and nausea. She reports no chest pain, no coughing, no heartburn, no sore throat or no wheezing. This is a chronic problem. The current episode started more than 1 year ago. The problem occurs rarely.        The following portions of the patient's history were reviewed and updated as appropriate: allergies, current medications, past family history, past  medical history, past social history, past surgical history and problem list.    Review of Systems   Constitutional: Negative for chills, fever and irritability.   HENT: Negative for congestion, postnasal drip, sinus pain and sore throat.    Eyes: Negative for pain and discharge.   Respiratory: Positive for shortness of breath. Negative for cough and wheezing.    Cardiovascular: Negative for chest pain, palpitations and leg swelling.   Gastrointestinal: Positive for abdominal pain and nausea. Negative for blood in stool, bowel incontinence, constipation, diarrhea, heartburn and vomiting.   Endocrine: Positive for polydipsia. Negative for polyuria.   Genitourinary: Negative for bladder incontinence, dysuria and hematuria.   Musculoskeletal: Positive for back pain and myalgias.   Skin: Negative for rash and wound.   Allergic/Immunologic: Negative for environmental allergies and food allergies.   Neurological: Negative for seizures, syncope and headaches.   Hematological: Negative for adenopathy. Does not bruise/bleed easily.   Psychiatric/Behavioral: Positive for dysphoric mood and sleep disturbance. Negative for suicidal ideas. The patient is nervous/anxious. The patient does not have insomnia.        Objective   Physical Exam   Constitutional: She is oriented to person, place, and time. She appears well-developed and well-nourished. No distress.   HENT:   Head: Normocephalic and atraumatic.   Right Ear: Hearing, tympanic membrane and external ear normal.   Left Ear: Hearing, tympanic membrane and external ear normal.   Mouth/Throat: Oropharynx is clear and moist. No oropharyngeal exudate.   Eyes: Conjunctivae are normal. Pupils are equal, round, and reactive to light. Right eye exhibits no discharge. Left eye exhibits no discharge. No scleral icterus.   Neck: No thyromegaly present.   Cardiovascular: Normal rate, regular rhythm, normal heart sounds and intact distal pulses. Exam reveals no gallop and no friction  rub.   No murmur heard.  Pulmonary/Chest: Effort normal. No respiratory distress. She has decreased breath sounds. She has no wheezes. She has rhonchi. She has no rales. She exhibits no tenderness.   Abdominal: Soft. Bowel sounds are normal. There is no hepatosplenomegaly. There is generalized tenderness.   Musculoskeletal:        Lumbar back: She exhibits decreased range of motion, tenderness and pain. She exhibits no bony tenderness, no swelling, no edema, no deformity, no laceration and no spasm.   Lymphadenopathy:        Head (right side): No submental and no submandibular adenopathy present.        Head (left side): No submental and no submandibular adenopathy present.     She has no cervical adenopathy.   Neurological: She is alert and oriented to person, place, and time.   Reflex Scores:       Patellar reflexes are 2+ on the right side and 2+ on the left side.  Skin: Skin is warm and dry. She is not diaphoretic.   Psychiatric: Judgment and thought content normal. Her mood appears anxious. Her speech is rapid and/or pressured. She is hyperactive.   Nursing note and vitals reviewed.      Assessment/Plan   Problems Addressed this Visit        Cardiovascular and Mediastinum    Hyperlipidemia    Relevant Orders    Comprehensive Metabolic Panel    Lipid Panel       Digestive    Gastroesophageal reflux disease without esophagitis    Relevant Orders    Vitamin B12    CBC & Differential    Magnesium       Nervous and Auditory    Polymyalgia rheumatica (CMS/HCC)    Chronic midline low back pain without sciatica    Relevant Orders    Urine Drug Screen - Urine, Clean Catch       Other    Heavy tobacco smoker    Generalized anxiety disorder - Primary      Other Visit Diagnoses     Obsessive-compulsive disorder, unspecified type          to see Gi tomorrow  ME = 39 . Risk of valium and hydrocodone discussed  Recommend quit smoking   annie pect OCD, declined treatment for it. Declined hyperlipidemia treatment

## 2019-03-14 ENCOUNTER — OFFICE VISIT (OUTPATIENT)
Dept: GASTROENTEROLOGY | Facility: CLINIC | Age: 64
End: 2019-03-14

## 2019-03-14 VITALS — HEIGHT: 64 IN | BODY MASS INDEX: 18.1 KG/M2 | OXYGEN SATURATION: 96 % | WEIGHT: 106 LBS | HEART RATE: 107 BPM

## 2019-03-14 DIAGNOSIS — R19.4 CHANGE IN BOWEL HABITS: ICD-10-CM

## 2019-03-14 DIAGNOSIS — R10.9 ABDOMINAL PAIN, UNSPECIFIED ABDOMINAL LOCATION: Primary | ICD-10-CM

## 2019-03-14 PROCEDURE — 99214 OFFICE O/P EST MOD 30 MIN: CPT | Performed by: INTERNAL MEDICINE

## 2019-03-14 RX ORDER — SODIUM, POTASSIUM,MAG SULFATES 17.5-3.13G
1 SOLUTION, RECONSTITUTED, ORAL ORAL EVERY 12 HOURS
Qty: 1 BOTTLE | Refills: 0 | Status: SHIPPED | OUTPATIENT
Start: 2019-03-14 | End: 2019-04-17 | Stop reason: HOSPADM

## 2019-03-14 RX ORDER — DEXTROSE AND SODIUM CHLORIDE 5; .45 G/100ML; G/100ML
30 INJECTION, SOLUTION INTRAVENOUS CONTINUOUS PRN
Status: CANCELLED | OUTPATIENT
Start: 2019-04-17

## 2019-03-14 NOTE — PROGRESS NOTES
[facility]      Chief Complaint:   Chief Complaint   Patient presents with   • Abdominal Pain       Subjective     HPI:   Patient with weight loss and right lower quadrant abdominal pain.  Patient was seen 3 months ago and was scheduled for EGD and colonoscopy but did not undergo this but continues to complain of epigastric abdominal pain right lower quadrant abdominal pain with changes in bowel habits.  Patient states that there is mucus in her stool and she has episodes of incontinence of stools.    Plan; we will schedule patient for EGD and colonoscopy to be done next week.  The patient follow-up in office following these procedures.    Past Medical History:   Past Medical History:   Diagnosis Date   • Adenomatous polyp of colon    • Anorectal abscess    • Anxiety disorder    • Benign polyp of large intestine    • Bladder fistula      post rapair      • Blood in urine    • Chronic obstructive lung disease (CMS/HCC)    • Chronic urinary tract infection    • Depressive disorder    • Diarrhea    • Diverticulitis of colon      post colectomy      • Elevated levels of transaminase & lactic acid dehydrogenase    • Epigastric pain    • Generalized anxiety disorder    • Heavy tobacco smoker    • History of colon polyps    • Hyperlipidemia    • Insomnia    • Low back pain    • Lower urinary tract infectious disease    • Lymphadenopathy     ON CT   • Osteoporosis    • Peripheral nerve disease    • Polymyalgia rheumatica (CMS/HCC)    • Polyp of colon    • Urinary tract infection    • Vitamin D deficiency        Family History:  History reviewed. No pertinent family history.    Social History:   reports that she has been smoking cigars and cigarettes.  She has been smoking about 1.00 pack per day. she has never used smokeless tobacco. She reports that she does not drink alcohol or use drugs.    Medications:   Prior to Admission medications    Medication Sig Start Date End Date Taking? Authorizing  Provider   ATROVENT HFA 17 MCG/ACT inhaler INHALE 2 PUFFS BY MOUTH 4 (FOUR) TIMES A DAY. 12/17/18  Yes Donato Donis MD   diazePAM (VALIUM) 5 MG tablet Take 1 tablet by mouth 2 (Two) Times a Day As Needed for Anxiety. 3/13/19  Yes Donato Donis MD   dicyclomine (BENTYL) 20 MG tablet TAKE ONE TABLET BY MOUTH TWO TIMES A DAY 1/9/19  Yes Aric Flower MD   fluticasone (FLONASE) 50 MCG/ACT nasal spray USE 2 SPRAYS INTO EACH NOSTRIL DAILY 2/7/19  Yes Donato Donis MD   HYDROcodone-acetaminophen (NORCO)  MG per tablet Take 1 tablet by mouth Every 6 (Six) Hours As Needed for Moderate Pain . 3/13/19  Yes Donato Donis MD   naloxone (NARCAN) 4 MG/0.1ML nasal spray 1 spray into the nostril(s) as directed by provider As Needed (accidental OD). If used, call 911 7/26/18  Yes Donato Donis MD   omeprazole (priLOSEC) 20 MG capsule 1 capsule by mouth 2 times per day 10/24/18  Yes Aric Flower MD   predniSONE (DELTASONE) 10 MG tablet TAKE ONE TABLET BY MOUTH EVERY DAY. 12/6/18  Yes Donato Donis MD   VENTOLIN  (90 Base) MCG/ACT inhaler INHALE ONE PUFF BY MOUTH EVERY 8 HOURS AS NEEDED. 11/20/18  Yes Donato Donis MD   nitrofurantoin (MACRODANTIN) 100 MG capsule Take 1 capsule by mouth Daily. 9/10/18   ProviderFelicia MD   polyethylene glycol (GoLYTELY) 236 g solution Utilize as directed per instructions submitted to pharmacy via facsimile from office of Dr. Aric Flower M.D. 11/9/18   Aric Flower MD   sodium-potassium-magnesium sulfates (SUPREP) 17.5-3.13-1.6 GM/177ML solution oral solution Take 1 bottle by mouth Every 12 (Twelve) Hours. 3/14/19   Aric Flower MD   sulfamethoxazole-trimethoprim (BACTRIM DS,SEPTRA DS) 800-160 MG per tablet Take 1 tablet by mouth 2 (Two) Times a Day.    Kaleb Morna MD       Allergies:  Fosamax [alendronate]    ROS:    Review of Systems   Constitutional: Negative for activity change, appetite change, chills, diaphoresis, fatigue,  "fever and unexpected weight change.   HENT: Negative for sore throat and trouble swallowing.    Respiratory: Negative for shortness of breath.    Gastrointestinal: Positive for abdominal pain, diarrhea and nausea. Negative for abdominal distention, anal bleeding, blood in stool, constipation, rectal pain and vomiting.   Endocrine: Negative for polydipsia, polyphagia and polyuria.   Genitourinary: Negative for difficulty urinating.   Musculoskeletal: Negative for arthralgias.   Skin: Negative for pallor.   Allergic/Immunologic: Negative for food allergies.   Neurological: Negative for weakness and light-headedness.   Psychiatric/Behavioral: Negative for behavioral problems.     Objective     Pulse 107, height 162.6 cm (64\"), weight 48.1 kg (106 lb), SpO2 96 %.    Physical Exam   Constitutional: She is oriented to person, place, and time. She appears well-developed and well-nourished. No distress.   HENT:   Head: Normocephalic and atraumatic.   Cardiovascular: Normal rate, regular rhythm, normal heart sounds and intact distal pulses. Exam reveals no gallop and no friction rub.   No murmur heard.  Pulmonary/Chest: Breath sounds normal. No respiratory distress. She has no wheezes. She has no rales. She exhibits no tenderness.   Abdominal: Soft. Bowel sounds are normal. She exhibits no distension and no mass. There is tenderness. There is no rebound and no guarding. No hernia.   Musculoskeletal: Normal range of motion. She exhibits no edema.   Neurological: She is alert and oriented to person, place, and time.   Skin: Skin is warm and dry. No rash noted. She is not diaphoretic. No erythema. No pallor.   Psychiatric: She has a normal mood and affect. Her behavior is normal. Judgment and thought content normal.        Assessment/Plan   Adriana was seen today for abdominal pain.    Diagnoses and all orders for this visit:    Abdominal pain, unspecified abdominal location  -     Case Request; Standing  -     dextrose 5 % and " sodium chloride 0.45 % infusion; Infuse 30 mL/hr into a venous catheter Continuous As Needed (Start Prior to Procedure).  -     Case Request    Change in bowel habits  -     Case Request; Standing  -     dextrose 5 % and sodium chloride 0.45 % infusion; Infuse 30 mL/hr into a venous catheter Continuous As Needed (Start Prior to Procedure).  -     Case Request    Other orders  -     Follow Anesthesia Guidelines / Standing Orders; Future  -     Implement Anesthesia Orders Day of Procedure; Standing  -     Obtain Informed Consent; Standing  -     POC Glucose Once; Standing  -     sodium-potassium-magnesium sulfates (SUPREP) 17.5-3.13-1.6 GM/177ML solution oral solution; Take 1 bottle by mouth Every 12 (Twelve) Hours.        ESOPHAGOGASTRODUODENOSCOPY (N/A), COLONOSCOPY (N/A)     Diagnosis Plan   1. Abdominal pain, unspecified abdominal location  Case Request    dextrose 5 % and sodium chloride 0.45 % infusion    Case Request   2. Change in bowel habits  Case Request    dextrose 5 % and sodium chloride 0.45 % infusion    Case Request       Anticipated Surgical Procedure:  Orders Placed This Encounter   Procedures   • Follow Anesthesia Guidelines / Standing Orders     Standing Status:   Future       The risks, benefits, and alternatives of this procedure have been discussed with the patient or the responsible party- the patient understands and agrees to proceed.

## 2019-03-14 NOTE — PATIENT INSTRUCTIONS
Gastroesophageal Reflux Disease, Adult  Normally, food travels down the esophagus and stays in the stomach to be digested. If a person has gastroesophageal reflux disease (GERD), food and stomach acid move back up into the esophagus. When this happens, the esophagus becomes sore and swollen (inflamed). Over time, GERD can make small holes (ulcers) in the lining of the esophagus.  Follow these instructions at home:  Diet  · Follow a diet as told by your doctor. You may need to avoid foods and drinks such as:  ? Coffee and tea (with or without caffeine).  ? Drinks that contain alcohol.  ? Energy drinks and sports drinks.  ? Carbonated drinks or sodas.  ? Chocolate and cocoa.  ? Peppermint and mint flavorings.  ? Garlic and onions.  ? Horseradish.  ? Spicy and acidic foods, such as peppers, chili powder, cardenas powder, vinegar, hot sauces, and BBQ sauce.  ? Citrus fruit juices and citrus fruits, such as oranges, petros, and limes.  ? Tomato-based foods, such as red sauce, chili, salsa, and pizza with red sauce.  ? Fried and fatty foods, such as donuts, french fries, potato chips, and high-fat dressings.  ? High-fat meats, such as hot dogs, rib eye steak, sausage, ham, and burns.  ? High-fat dairy items, such as whole milk, butter, and cream cheese.  · Eat small meals often. Avoid eating large meals.  · Avoid drinking large amounts of liquid with your meals.  · Avoid eating meals during the 2-3 hours before bedtime.  · Avoid lying down right after you eat.  · Do not exercise right after you eat.  General instructions  · Pay attention to any changes in your symptoms.  · Take over-the-counter and prescription medicines only as told by your doctor. Do not take aspirin, ibuprofen, or other NSAIDs unless your doctor says it is okay.  · Do not use any tobacco products, including cigarettes, chewing tobacco, and e-cigarettes. If you need help quitting, ask your doctor.  · Wear loose clothes. Do not wear anything tight around  your waist.  · Raise (elevate) the head of your bed about 6 inches (15 cm).  · Try to lower your stress. If you need help doing this, ask your doctor.  · If you are overweight, lose an amount of weight that is healthy for you. Ask your doctor about a safe weight loss goal.  · Keep all follow-up visits as told by your doctor. This is important.  Contact a doctor if:  · You have new symptoms.  · You lose weight and you do not know why it is happening.  · You have trouble swallowing, or it hurts to swallow.  · You have wheezing or a cough that keeps happening.  · Your symptoms do not get better with treatment.  · You have a hoarse voice.  Get help right away if:  · You have pain in your arms, neck, jaw, teeth, or back.  · You feel sweaty, dizzy, or light-headed.  · You have chest pain or shortness of breath.  · You throw up (vomit) and your throw up looks like blood or coffee grounds.  · You pass out (faint).  · Your poop (stool) is bloody or black.  · You cannot swallow, drink, or eat.  This information is not intended to replace advice given to you by your health care provider. Make sure you discuss any questions you have with your health care provider.  Document Released: 06/05/2009 Document Revised: 05/25/2017 Document Reviewed: 04/13/2016  [employer] Patient Education © 2018 [employer]  BMI for Adults  Body mass index (BMI) is a number that is calculated from a person's weight and height. In most adults, the number is used to find how much of an adult's weight is made up of fat. BMI is not as accurate as a direct measure of body fat.  How is BMI calculated?  BMI is calculated by dividing weight in kilograms by height in meters squared. It can also be calculated by dividing weight in pounds by height in inches squared, then multiplying the resulting number by 703. Charts are available to help you find your BMI quickly and easily without doing this calculation.  How is BMI interpreted?  Health care  professionals use BMI charts to identify whether an adult is underweight, at a normal weight, or overweight based on the following guidelines:  · Underweight: BMI less than 18.5.  · Normal weight: BMI between 18.5 and 24.9.  · Overweight: BMI between 25 and 29.9.  · Obese: BMI of 30 and above.    BMI is usually interpreted the same for males and females.  Weight includes both fat and muscle, so someone with a muscular build, such as an athlete, may have a BMI that is higher than 24.9. In cases like these, BMI may not accurately depict body fat. To determine if excess body fat is the cause of a BMI of 25 or higher, further assessments may need to be done by a health care provider.  Why is BMI a useful tool?  BMI is used to identify a possible weight problem that may be related to a medical problem or may increase the risk for medical problems. BMI can also be used to promote changes to reach a healthy weight.  This information is not intended to replace advice given to you by your health care provider. Make sure you discuss any questions you have with your health care provider.  Document Released: 08/29/2005 Document Revised: 04/27/2017 Document Reviewed: 05/15/2015  [employer] Interactive Patient Education © 2018 [employer]

## 2019-03-20 ENCOUNTER — TELEPHONE (OUTPATIENT)
Dept: FAMILY MEDICINE CLINIC | Facility: CLINIC | Age: 64
End: 2019-03-20

## 2019-03-20 NOTE — TELEPHONE ENCOUNTER
Adriana left a message saying she didn't have minutes on her phone to get her lab results, but she now has minutes and would like for someone to call her.

## 2019-03-21 NOTE — TELEPHONE ENCOUNTER
Still no answer from Ms. Joaquin,  I did mail her a letter with her results earlier in the week with her results and recommendations.  Hopefully she has received the letter as of this date.

## 2019-03-27 ENCOUNTER — TELEPHONE (OUTPATIENT)
Dept: GASTROENTEROLOGY | Facility: CLINIC | Age: 64
End: 2019-03-27

## 2019-03-27 NOTE — TELEPHONE ENCOUNTER
03/27/2019, 0920 - Patient telephoned this staff member.  Patient made aware Golytely Bowel Preparation with instructions for utilization submitted to patient's pharmacy of choice, [place], [place] due to patient insurance rejection of Suprep Bowel Preparation.  Patient verbalized understanding and stated she has Golytely Bowel Preparation and instructions for utilization remaining from previous Colonoscopy scheduled  but canceled per patient on 11/26/2018.

## 2019-04-05 DIAGNOSIS — R10.13 EPIGASTRIC PAIN: ICD-10-CM

## 2019-04-10 DIAGNOSIS — R10.13 EPIGASTRIC PAIN: ICD-10-CM

## 2019-04-10 RX ORDER — DICYCLOMINE HCL 20 MG
TABLET ORAL
Qty: 60 TABLET | Refills: 1 | Status: SHIPPED | OUTPATIENT
Start: 2019-04-10 | End: 2019-06-01 | Stop reason: SDUPTHER

## 2019-04-10 RX ORDER — OMEPRAZOLE 20 MG/1
CAPSULE, DELAYED RELEASE ORAL
Qty: 60 CAPSULE | Refills: 5 | Status: SHIPPED | OUTPATIENT
Start: 2019-04-10 | End: 2019-09-12 | Stop reason: SDUPTHER

## 2019-04-11 ENCOUNTER — OFFICE VISIT (OUTPATIENT)
Dept: FAMILY MEDICINE CLINIC | Facility: CLINIC | Age: 64
End: 2019-04-11

## 2019-04-11 VITALS
SYSTOLIC BLOOD PRESSURE: 120 MMHG | BODY MASS INDEX: 19.04 KG/M2 | DIASTOLIC BLOOD PRESSURE: 70 MMHG | HEIGHT: 64 IN | OXYGEN SATURATION: 96 % | HEART RATE: 101 BPM | WEIGHT: 111.5 LBS

## 2019-04-11 DIAGNOSIS — G89.29 CHRONIC MIDLINE LOW BACK PAIN WITHOUT SCIATICA: ICD-10-CM

## 2019-04-11 DIAGNOSIS — F41.1 GENERALIZED ANXIETY DISORDER: Primary | ICD-10-CM

## 2019-04-11 DIAGNOSIS — G60.9 IDIOPATHIC PERIPHERAL NEUROPATHY: ICD-10-CM

## 2019-04-11 DIAGNOSIS — M54.50 CHRONIC MIDLINE LOW BACK PAIN WITHOUT SCIATICA: ICD-10-CM

## 2019-04-11 PROCEDURE — 99214 OFFICE O/P EST MOD 30 MIN: CPT | Performed by: FAMILY MEDICINE

## 2019-04-11 RX ORDER — GABAPENTIN 300 MG/1
300 CAPSULE ORAL NIGHTLY
Qty: 30 CAPSULE | Refills: 0 | Status: SHIPPED | OUTPATIENT
Start: 2019-04-11 | End: 2019-05-09 | Stop reason: SDUPTHER

## 2019-04-11 RX ORDER — DIAZEPAM 5 MG/1
5 TABLET ORAL 2 TIMES DAILY PRN
Qty: 60 TABLET | Refills: 0 | Status: SHIPPED | OUTPATIENT
Start: 2019-04-11 | End: 2019-05-09 | Stop reason: SDUPTHER

## 2019-04-11 RX ORDER — HYDROCODONE BITARTRATE AND ACETAMINOPHEN 10; 325 MG/1; MG/1
1 TABLET ORAL EVERY 6 HOURS PRN
Qty: 120 TABLET | Refills: 0 | Status: SHIPPED | OUTPATIENT
Start: 2019-04-11 | End: 2019-05-09 | Stop reason: SDUPTHER

## 2019-04-11 NOTE — PROGRESS NOTES
Subjective   Adriana Nuñez is a 63 y.o. female.     Back Pain   This is a chronic problem. The current episode started more than 1 year ago. The problem occurs constantly. The problem has been waxing and waning since onset. The pain is present in the lumbar spine. The quality of the pain is described as aching. The pain radiates to the right knee and left knee. The pain is at a severity of 8/10. The pain is moderate. The pain is worse during the day. The symptoms are aggravated by position and bending. Associated symptoms include leg pain.   Anxiety   Presents for follow-up visit. Symptoms include compulsions, excessive worry and nervous/anxious behavior. Patient reports no depressed mood, insomnia or irritability.       Leg Pain    The incident occurred more than 1 week ago. There was no injury mechanism. The pain is present in the left foot and right foot. The quality of the pain is described as burning (tingeling). The pain is moderate. The pain has been fluctuating since onset. Pertinent negatives include no inability to bear weight or loss of motion. She reports no foreign bodies present. Nothing aggravates the symptoms. She has tried nothing for the symptoms.        The following portions of the patient's history were reviewed and updated as appropriate: allergies, current medications, past family history, past medical history, past social history, past surgical history and problem list.    Review of Systems   Constitutional: Negative for irritability.   Psychiatric/Behavioral: The patient is nervous/anxious. The patient does not have insomnia.        Objective   Physical Exam   Constitutional: She is oriented to person, place, and time. She appears well-developed and well-nourished. No distress.   HENT:   Head: Normocephalic and atraumatic.   Right Ear: Hearing and tympanic membrane normal.   Left Ear: Hearing and tympanic membrane normal.   Pulmonary/Chest: Effort normal and breath sounds normal.    Musculoskeletal:        Lumbar back: She exhibits decreased range of motion, tenderness and pain. She exhibits no bony tenderness, no swelling, no edema, no deformity, no laceration and no spasm.     Vascular Status -  Her right foot exhibits normal foot vasculature  and no edema. Her left foot exhibits normal foot vasculature  and no edema.  Neurological: She is alert and oriented to person, place, and time. No sensory deficit.   Reflex Scores:       Patellar reflexes are 2+ on the right side and 2+ on the left side.  Skin: Skin is warm and dry. She is not diaphoretic.   Psychiatric: Judgment and thought content normal. Her mood appears anxious. Her speech is rapid and/or pressured. She is hyperactive. Cognition and memory are normal.   Nursing note and vitals reviewed.      Assessment/Plan   Problems Addressed this Visit        Nervous and Auditory    Chronic midline low back pain without sciatica       Other    Generalized anxiety disorder - Primary      Other Visit Diagnoses     Idiopathic peripheral neuropathy            b12 and glu OK  ME = 39 AND ON VALIUM. RISK DISCUSSED   annie chavez OCD, declined treatment for it

## 2019-04-17 ENCOUNTER — HOSPITAL ENCOUNTER (OUTPATIENT)
Facility: HOSPITAL | Age: 64
Setting detail: HOSPITAL OUTPATIENT SURGERY
Discharge: HOME | End: 2019-04-17
Attending: INTERNAL MEDICINE | Admitting: INTERNAL MEDICINE

## 2019-04-17 ENCOUNTER — ANESTHESIA EVENT (OUTPATIENT)
Dept: GASTROENTEROLOGY | Facility: HOSPITAL | Age: 64
End: 2019-04-17

## 2019-04-17 ENCOUNTER — ANESTHESIA (OUTPATIENT)
Dept: GASTROENTEROLOGY | Facility: HOSPITAL | Age: 64
End: 2019-04-17

## 2019-04-17 VITALS
SYSTOLIC BLOOD PRESSURE: 105 MMHG | RESPIRATION RATE: 16 BRPM | TEMPERATURE: 97.3 F | HEART RATE: 95 BPM | OXYGEN SATURATION: 98 % | DIASTOLIC BLOOD PRESSURE: 60 MMHG | HEIGHT: 64 IN | BODY MASS INDEX: 19.13 KG/M2

## 2019-04-17 DIAGNOSIS — R19.4 CHANGE IN BOWEL HABITS: ICD-10-CM

## 2019-04-17 DIAGNOSIS — R10.9 ABDOMINAL PAIN, UNSPECIFIED ABDOMINAL LOCATION: ICD-10-CM

## 2019-04-17 PROCEDURE — 43239 EGD BIOPSY SINGLE/MULTIPLE: CPT | Performed by: INTERNAL MEDICINE

## 2019-04-17 PROCEDURE — 25010000002 PROPOFOL 10 MG/ML EMULSION: Performed by: NURSE ANESTHETIST, CERTIFIED REGISTERED

## 2019-04-17 PROCEDURE — 88305 TISSUE EXAM BY PATHOLOGIST: CPT | Performed by: INTERNAL MEDICINE

## 2019-04-17 PROCEDURE — 45380 COLONOSCOPY AND BIOPSY: CPT | Performed by: INTERNAL MEDICINE

## 2019-04-17 PROCEDURE — 88305 TISSUE EXAM BY PATHOLOGIST: CPT | Performed by: PATHOLOGY

## 2019-04-17 RX ORDER — DEXTROSE AND SODIUM CHLORIDE 5; .45 G/100ML; G/100ML
30 INJECTION, SOLUTION INTRAVENOUS CONTINUOUS PRN
Status: DISCONTINUED | OUTPATIENT
Start: 2019-04-17 | End: 2019-04-17 | Stop reason: HOSPADM

## 2019-04-17 RX ORDER — PROMETHAZINE HYDROCHLORIDE 25 MG/ML
12.5 INJECTION, SOLUTION INTRAMUSCULAR; INTRAVENOUS ONCE AS NEEDED
Status: DISCONTINUED | OUTPATIENT
Start: 2019-04-17 | End: 2019-04-17 | Stop reason: HOSPADM

## 2019-04-17 RX ORDER — DEXAMETHASONE SODIUM PHOSPHATE 4 MG/ML
8 INJECTION, SOLUTION INTRA-ARTICULAR; INTRALESIONAL; INTRAMUSCULAR; INTRAVENOUS; SOFT TISSUE ONCE AS NEEDED
Status: DISCONTINUED | OUTPATIENT
Start: 2019-04-17 | End: 2019-04-17 | Stop reason: HOSPADM

## 2019-04-17 RX ORDER — LIDOCAINE HYDROCHLORIDE 10 MG/ML
INJECTION, SOLUTION INFILTRATION; PERINEURAL AS NEEDED
Status: DISCONTINUED | OUTPATIENT
Start: 2019-04-17 | End: 2019-04-17 | Stop reason: SURG

## 2019-04-17 RX ORDER — PROPOFOL 10 MG/ML
VIAL (ML) INTRAVENOUS AS NEEDED
Status: DISCONTINUED | OUTPATIENT
Start: 2019-04-17 | End: 2019-04-17 | Stop reason: SURG

## 2019-04-17 RX ORDER — PROMETHAZINE HYDROCHLORIDE 25 MG/1
25 SUPPOSITORY RECTAL ONCE AS NEEDED
Status: DISCONTINUED | OUTPATIENT
Start: 2019-04-17 | End: 2019-04-17 | Stop reason: HOSPADM

## 2019-04-17 RX ORDER — ONDANSETRON 2 MG/ML
4 INJECTION INTRAMUSCULAR; INTRAVENOUS ONCE AS NEEDED
Status: DISCONTINUED | OUTPATIENT
Start: 2019-04-17 | End: 2019-04-17 | Stop reason: HOSPADM

## 2019-04-17 RX ORDER — PROMETHAZINE HYDROCHLORIDE 25 MG/1
25 TABLET ORAL ONCE AS NEEDED
Status: DISCONTINUED | OUTPATIENT
Start: 2019-04-17 | End: 2019-04-17 | Stop reason: HOSPADM

## 2019-04-17 RX ADMIN — PROPOFOL 100 MG: 10 INJECTION, EMULSION INTRAVENOUS at 16:35

## 2019-04-17 RX ADMIN — PROPOFOL 100 MG: 10 INJECTION, EMULSION INTRAVENOUS at 16:25

## 2019-04-17 RX ADMIN — LIDOCAINE HYDROCHLORIDE 50 MG: 10 INJECTION, SOLUTION INFILTRATION; PERINEURAL at 16:25

## 2019-04-17 RX ADMIN — DEXTROSE AND SODIUM CHLORIDE 30 ML/HR: 5; 450 INJECTION, SOLUTION INTRAVENOUS at 15:40

## 2019-04-17 NOTE — H&P
[facility]      Chief Complaint:   No chief complaint on file.      Subjective     HPI:   Patient with weight loss and right lower quadrant abdominal pain.  Patient was seen 3 months ago and was scheduled for EGD and colonoscopy but did not undergo this but continues to complain of epigastric abdominal pain right lower quadrant abdominal pain with changes in bowel habits.  Patient states that there is mucus in her stool and she has episodes of incontinence of stools.    Plan; we will schedule patient for EGD and colonoscopy to be done next week.  The patient follow-up in office following these procedures.    Past Medical History:   Past Medical History:   Diagnosis Date   • Adenomatous polyp of colon    • Anorectal abscess    • Anxiety disorder    • Benign polyp of large intestine    • Bladder fistula      post rapair      • Blood in urine    • Chronic obstructive lung disease (CMS/HCC)    • Chronic urinary tract infection    • Depressive disorder    • Diarrhea    • Diverticulitis of colon      post colectomy      • Elevated levels of transaminase & lactic acid dehydrogenase    • Epigastric pain    • Generalized anxiety disorder    • Heavy tobacco smoker    • History of colon polyps    • Hyperlipidemia    • Insomnia    • Low back pain    • Lower urinary tract infectious disease    • Lymphadenopathy     ON CT   • Osteoporosis    • Peripheral nerve disease    • Polymyalgia rheumatica (CMS/HCC)    • Polyp of colon    • Urinary tract infection    • Vitamin D deficiency        Family History:  No family history on file.    Social History:   reports that she has been smoking cigars and cigarettes.  She has been smoking about 1.00 pack per day. She has never used smokeless tobacco. She reports that she does not drink alcohol or use drugs.    Medications:   Prior to Admission medications    Medication Sig Start Date End Date Taking? Authorizing Provider   ATROVENT HFA 17 MCG/ACT inhaler INHALE 2  PUFFS BY MOUTH 4 (FOUR) TIMES A DAY. 12/17/18  Yes Donato Donis MD   diazePAM (VALIUM) 5 MG tablet Take 1 tablet by mouth 2 (Two) Times a Day As Needed for Anxiety. 3/13/19  Yes Donato Donis MD   dicyclomine (BENTYL) 20 MG tablet TAKE ONE TABLET BY MOUTH TWO TIMES A DAY 1/9/19  Yes Aric Flower MD   fluticasone (FLONASE) 50 MCG/ACT nasal spray USE 2 SPRAYS INTO EACH NOSTRIL DAILY 2/7/19  Yes Donato Donis MD   HYDROcodone-acetaminophen (NORCO)  MG per tablet Take 1 tablet by mouth Every 6 (Six) Hours As Needed for Moderate Pain . 3/13/19  Yes Donato Donis MD   naloxone (NARCAN) 4 MG/0.1ML nasal spray 1 spray into the nostril(s) as directed by provider As Needed (accidental OD). If used, call 911 7/26/18  Yes Donato Donis MD   omeprazole (priLOSEC) 20 MG capsule 1 capsule by mouth 2 times per day 10/24/18  Yes Aric Flower MD   predniSONE (DELTASONE) 10 MG tablet TAKE ONE TABLET BY MOUTH EVERY DAY. 12/6/18  Yes Donato Donis MD   VENTOLIN  (90 Base) MCG/ACT inhaler INHALE ONE PUFF BY MOUTH EVERY 8 HOURS AS NEEDED. 11/20/18  Yes Donato Donis MD   nitrofurantoin (MACRODANTIN) 100 MG capsule Take 1 capsule by mouth Daily. 9/10/18   Provider, MD Felicia   polyethylene glycol (GoLYTELY) 236 g solution Utilize as directed per instructions submitted to pharmacy via facsimile from office of Dr. Aric Flower M.D. 11/9/18   Aric Flower MD   sodium-potassium-magnesium sulfates (SUPREP) 17.5-3.13-1.6 GM/177ML solution oral solution Take 1 bottle by mouth Every 12 (Twelve) Hours. 3/14/19   Aric Flower MD   sulfamethoxazole-trimethoprim (BACTRIM DS,SEPTRA DS) 800-160 MG per tablet Take 1 tablet by mouth 2 (Two) Times a Day.    Kaleb Moran MD       Allergies:  Fosamax [alendronate]    ROS:    Review of Systems   Constitutional: Negative for activity change, appetite change, chills, diaphoresis, fatigue, fever and unexpected weight change.   HENT:  Negative for sore throat and trouble swallowing.    Respiratory: Negative for shortness of breath.    Gastrointestinal: Positive for abdominal pain, diarrhea and nausea. Negative for abdominal distention, anal bleeding, blood in stool, constipation, rectal pain and vomiting.   Endocrine: Negative for polydipsia, polyphagia and polyuria.   Genitourinary: Negative for difficulty urinating.   Musculoskeletal: Negative for arthralgias.   Skin: Negative for pallor.   Allergic/Immunologic: Negative for food allergies.   Neurological: Negative for weakness and light-headedness.   Psychiatric/Behavioral: Negative for behavioral problems.     Objective     There were no vitals taken for this visit.    Physical Exam   Constitutional: She is oriented to person, place, and time. She appears well-developed and well-nourished. No distress.   HENT:   Head: Normocephalic and atraumatic.   Cardiovascular: Normal rate, regular rhythm, normal heart sounds and intact distal pulses. Exam reveals no gallop and no friction rub.   No murmur heard.  Pulmonary/Chest: Breath sounds normal. No respiratory distress. She has no wheezes. She has no rales. She exhibits no tenderness.   Abdominal: Soft. Bowel sounds are normal. She exhibits no distension and no mass. There is tenderness. There is no rebound and no guarding. No hernia.   Musculoskeletal: Normal range of motion. She exhibits no edema.   Neurological: She is alert and oriented to person, place, and time.   Skin: Skin is warm and dry. No rash noted. She is not diaphoretic. No erythema. No pallor.   Psychiatric: She has a normal mood and affect. Her behavior is normal. Judgment and thought content normal.        Assessment/Plan   Adriana was seen today for abdominal pain.    Diagnoses and all orders for this visit:    Abdominal pain, unspecified abdominal location  -     Case Request; Standing  -     dextrose 5 % and sodium chloride 0.45 % infusion; Infuse 30 mL/hr into a venous catheter  Continuous As Needed (Start Prior to Procedure).  -     Case Request    Change in bowel habits  -     Case Request; Standing  -     dextrose 5 % and sodium chloride 0.45 % infusion; Infuse 30 mL/hr into a venous catheter Continuous As Needed (Start Prior to Procedure).  -     Case Request    Other orders  -     Follow Anesthesia Guidelines / Standing Orders; Future  -     Implement Anesthesia Orders Day of Procedure; Standing  -     Obtain Informed Consent; Standing  -     POC Glucose Once; Standing  -     sodium-potassium-magnesium sulfates (SUPREP) 17.5-3.13-1.6 GM/177ML solution oral solution; Take 1 bottle by mouth Every 12 (Twelve) Hours.        ESOPHAGOGASTRODUODENOSCOPY (N/A), COLONOSCOPY (N/A)     Diagnosis Plan   1. Abdominal pain, unspecified abdominal location  dextrose 5 % and sodium chloride 0.45 % infusion   2. Change in bowel habits  dextrose 5 % and sodium chloride 0.45 % infusion       Anticipated Surgical Procedure:  Orders Placed This Encounter   Procedures   • Implement Anesthesia Orders Day of Procedure     Standing Status:   Standing     Number of Occurrences:   1   • Obtain Informed Consent     Standing Status:   Standing     Number of Occurrences:   1     Order Specific Question:   Informed Consent Given For     Answer:   egd and colonoscopy   • POC Glucose Once     Prior to Procedure on ALL Diabetic Patients     Standing Status:   Standing     Number of Occurrences:   1   • Insert Peripheral IV     Standing Status:   Standing     Number of Occurrences:   1       The risks, benefits, and alternatives of this procedure have been discussed with the patient or the responsible party- the patient understands and agrees to proceed.

## 2019-04-17 NOTE — ANESTHESIA PREPROCEDURE EVALUATION
Anesthesia Evaluation     Patient summary reviewed and Nursing notes reviewed   NPO Solid Status: > 8 hours             Airway   No difficulty expected  Dental      Pulmonary - normal exam   (+) COPD,   Cardiovascular - normal exam    (+) hyperlipidemia,       Neuro/Psych  GI/Hepatic/Renal/Endo    (+)  GERD,      Musculoskeletal     Abdominal  - normal exam   Substance History      OB/GYN          Other                        Anesthesia Plan    ASA 2     MAC     intravenous induction   Anesthetic plan, all risks, benefits, and alternatives have been provided, discussed and informed consent has been obtained with: patient.    Plan discussed with CRNA.

## 2019-04-17 NOTE — ANESTHESIA POSTPROCEDURE EVALUATION
Patient: Adriana Nuñez    Procedure Summary     Date:  04/17/19 Room / Location:  [facility] ENDOSCOPY 1 / [facility] ENDOSCOPY    Anesthesia Start:  1626 Anesthesia Stop:  1653    Procedures:       ESOPHAGOGASTRODUODENOSCOPY (N/A )      COLONOSCOPY (N/A ) Diagnosis:       Abdominal pain, unspecified abdominal location      Change in bowel habits      (Abdominal pain, unspecified abdominal location [R10.9])      (Change in bowel habits [R19.4])    Surgeon:  Aric Pina MD Provider:  Wade Landrum CRNA    Anesthesia Type:  MAC ASA Status:  2          Anesthesia Type: MAC  Last vitals  BP   105/60 (04/17/19 1644)   Temp   97.3 °F (36.3 °C) (04/17/19 1644)   Pulse   95 (04/17/19 1644)   Resp   16 (04/17/19 1644)     SpO2   98 % (04/17/19 1644)     Post Anesthesia Care and Evaluation    Patient location during evaluation: bedside  Patient participation: complete - patient participated  Level of consciousness: awake and alert  Pain score: 1  Pain management: adequate  Airway patency: patent  Anesthetic complications: No anesthetic complications  PONV Status: none  Cardiovascular status: acceptable  Respiratory status: acceptable  Hydration status: acceptable

## 2019-04-19 LAB
LAB AP CASE REPORT: NORMAL
PATH REPORT.FINAL DX SPEC: NORMAL
PATH REPORT.GROSS SPEC: NORMAL

## 2019-04-20 NOTE — H&P
Past Surgical History:   Procedure Laterality Date   • COLON RESECTION     • COLONOSCOPY  03/17/2014    polyps   • COLONOSCOPY  02/08/2016   • COLONOSCOPY N/A 5/24/2017    Procedure: COLONOSCOPY;  Surgeon: Aric Pina MD;  Location: [facility] ENDOSCOPY;  Service:    • ENDOSCOPY  01/26/2015    Normal esophagus. Gastritis was found in the stomach. Biopsy taken. Normal duodenum   • ENDOSCOPY N/A 9/18/2017    Procedure: ESOPHAGOGASTRODUODENOSCOPY;  Surgeon: Aric Pina MD;  Location: [facility] ENDOSCOPY;  Service:    • HYSTERECTOMY     • INJECTION OF MEDICATION  12/03/2010    KENALOG(3)   • INJECTION OF MEDICATION  11/07/2014    ROCEPHIN(1)   • UPPER GASTROINTESTINAL ENDOSCOPY  01/26/2015   • UPPER GASTROINTESTINAL ENDOSCOPY  09/18/2017

## 2019-04-25 ENCOUNTER — OFFICE VISIT (OUTPATIENT)
Dept: GASTROENTEROLOGY | Facility: CLINIC | Age: 64
End: 2019-04-25

## 2019-04-25 ENCOUNTER — DOCUMENTATION (OUTPATIENT)
Dept: GASTROENTEROLOGY | Facility: CLINIC | Age: 64
End: 2019-04-25

## 2019-04-25 VITALS
HEIGHT: 64 IN | HEART RATE: 92 BPM | WEIGHT: 114 LBS | BODY MASS INDEX: 19.46 KG/M2 | OXYGEN SATURATION: 95 % | SYSTOLIC BLOOD PRESSURE: 98 MMHG | DIASTOLIC BLOOD PRESSURE: 68 MMHG

## 2019-04-25 DIAGNOSIS — R10.9 ABDOMINAL PAIN, UNSPECIFIED ABDOMINAL LOCATION: ICD-10-CM

## 2019-04-25 DIAGNOSIS — R10.13 EPIGASTRIC PAIN: Primary | ICD-10-CM

## 2019-04-25 PROCEDURE — 99213 OFFICE O/P EST LOW 20 MIN: CPT | Performed by: INTERNAL MEDICINE

## 2019-04-25 NOTE — PROGRESS NOTES
[facility]      Chief Complaint:   Chief Complaint   Patient presents with   • Abdominal Pain   • Change In Bowel Habits   • EGD/Colonoscopy Performed 04/17/2019       Subjective     HPI:   Patient with recent EGD and colonoscopy which shows mild esophagitis otherwise normal.  Patient does not have H. pylori.  Patient's complaining of a urinary tract infection with burning when she urinates.  Patient states that this is making her septic.  Patient's pulse ox is 95 patient's blood pressure is also low at 100/70.  Discussed with patient that she must go to urgent care for evaluation of this.    Plan; outpatient follow-up in urgent care for evaluation of her low blood pressure and decreased pulse ox.  Patient to continue on a proton pump inhibitor for her epigastric abdominal pain.    Past Medical History:   Past Medical History:   Diagnosis Date   • Adenomatous polyp of colon    • Anorectal abscess    • Anxiety disorder    • Benign polyp of large intestine    • Bladder fistula      post rapair      • Blood in urine    • Chronic obstructive lung disease (CMS/HCC)    • Chronic urinary tract infection    • Depressive disorder    • Diarrhea    • Diverticulitis of colon      post colectomy      • Elevated levels of transaminase & lactic acid dehydrogenase    • Epigastric pain    • Generalized anxiety disorder    • Heavy tobacco smoker    • History of colon polyps    • Hyperlipidemia    • Insomnia    • Low back pain    • Lower urinary tract infectious disease    • Lymphadenopathy     ON CT   • Osteoporosis    • Peripheral nerve disease    • Polymyalgia rheumatica (CMS/HCC)    • Polyp of colon    • Urinary tract infection    • Vitamin D deficiency        Family History:  Family History   Problem Relation Age of Onset   • No Known Problems Sister    • No Known Problems Brother        Social History:   reports that she has been smoking cigars and cigarettes.  She has been smoking about 1.00 pack per  day. She has never used smokeless tobacco. She reports that she does not drink alcohol or use drugs.    Medications:   Prior to Admission medications    Medication Sig Start Date End Date Taking? Authorizing Provider   ATROVENT HFA 17 MCG/ACT inhaler INHALE 2 PUFFS BY MOUTH 4 (FOUR) TIMES A DAY. 12/17/18  Yes Donato Donis MD   diazePAM (VALIUM) 5 MG tablet Take 1 tablet by mouth 2 (Two) Times a Day As Needed for Anxiety. 4/11/19  Yes Donato Donis MD   dicyclomine (BENTYL) 20 MG tablet TAKE ONE TABLET BY MOUTH TWO TIMES A DAY 4/10/19  Yes Aric Pina MD   fluticasone (FLONASE) 50 MCG/ACT nasal spray USE 2 SPRAYS INTO EACH NOSTRIL DAILY 2/7/19  Yes Donato Donis MD   gabapentin (NEURONTIN) 300 MG capsule Take 1 capsule by mouth Every Night. 4/11/19  Yes Donato Donis MD   HYDROcodone-acetaminophen (NORCO)  MG per tablet Take 1 tablet by mouth Every 6 (Six) Hours As Needed for Moderate Pain . 4/11/19  Yes Donato Donis MD   naloxone (NARCAN) 4 MG/0.1ML nasal spray 1 spray into the nostril(s) as directed by provider As Needed (accidental OD). If used, call 911 7/26/18  Yes Donato Donis MD   nitrofurantoin (MACRODANTIN) 100 MG capsule Take 1 capsule by mouth Daily. 9/10/18  Yes ProviderFelicia MD   omeprazole (priLOSEC) 20 MG capsule TAKE ONE CAPSULE BY MOUTH TWO TIMES A DAY 4/10/19  Yes Aric Pina MD   predniSONE (DELTASONE) 10 MG tablet TAKE ONE TABLET BY MOUTH EVERY DAY. 12/6/18  Yes Donato Donis MD   sulfamethoxazole-trimethoprim (BACTRIM DS,SEPTRA DS) 800-160 MG per tablet Take 1 tablet by mouth 2 (Two) Times a Day.   Yes Kaleb Moran MD   VENTOLIN  (90 Base) MCG/ACT inhaler INHALE ONE PUFF BY MOUTH EVERY 8 HOURS AS NEEDED. 11/20/18  Yes Donato Donis MD       Allergies:  Fosamax [alendronate]    ROS:    Review of Systems   Constitutional: Negative for activity change, appetite change, chills, diaphoresis, fatigue, fever and unexpected weight  "change.   HENT: Negative for sore throat and trouble swallowing.    Respiratory: Negative for shortness of breath.    Gastrointestinal: Positive for abdominal pain. Negative for abdominal distention, anal bleeding, blood in stool, constipation, diarrhea, nausea, rectal pain and vomiting.   Endocrine: Negative for polydipsia, polyphagia and polyuria.   Genitourinary: Negative for difficulty urinating.   Musculoskeletal: Negative for arthralgias.   Skin: Negative for pallor.   Allergic/Immunologic: Negative for food allergies.   Neurological: Negative for weakness and light-headedness.   Psychiatric/Behavioral: Negative for behavioral problems.     Objective     Blood pressure 100/70, pulse 92, height 162.6 cm (64\"), weight 51.7 kg (114 lb), SpO2 95 %.    Physical Exam   Constitutional: She is oriented to person, place, and time. She appears well-developed and well-nourished. No distress.   HENT:   Head: Normocephalic and atraumatic.   Cardiovascular: Normal rate, regular rhythm, normal heart sounds and intact distal pulses. Exam reveals no gallop and no friction rub.   No murmur heard.  Pulmonary/Chest: Breath sounds normal. No respiratory distress. She has no wheezes. She has no rales. She exhibits no tenderness.   Abdominal: Soft. Bowel sounds are normal. She exhibits no distension and no mass. There is tenderness. There is no rebound and no guarding. No hernia.   Musculoskeletal: Normal range of motion. She exhibits no edema.   Neurological: She is alert and oriented to person, place, and time.   Skin: Skin is warm and dry. No rash noted. She is not diaphoretic. No erythema. No pallor.   Psychiatric: She has a normal mood and affect. Her behavior is normal. Judgment and thought content normal.        Assessment/Plan   Adriana was seen today for abdominal pain, change in bowel habits and egd/colonoscopy performed 04/17/2019.    Diagnoses and all orders for this visit:    Epigastric pain    Abdominal pain, unspecified " abdominal location        * Surgery not found *     Diagnosis Plan   1. Epigastric pain     2. Abdominal pain, unspecified abdominal location         Anticipated Surgical Procedure:  No orders of the defined types were placed in this encounter.      The risks, benefits, and alternatives of this procedure have been discussed with the patient or the responsible party- the patient understands and agrees to proceed.

## 2019-04-25 NOTE — PROGRESS NOTES
04/25/2019, 1006 -  [place] [place] [facility] telephoned per this staff member, extension [phone].  Spoke with office personnel, Mindi regarding request per Dr. Aric Flower M.D. for patient to be seen concerning decreasing blood pressure (100/70, 98/68) and pulse rate (111, 92).  Patient escorted to [place] [place] [facility] per Danielle Pathak Medical Assistant.

## 2019-04-25 NOTE — PATIENT INSTRUCTIONS
Constipation, Adult  Constipation is when a person:  · Poops (has a bowel movement) fewer times in a week than normal.  · Has a hard time pooping.  · Has poop that is dry, hard, or bigger than normal.    Follow these instructions at home:  Eating and drinking    · Eat foods that have a lot of fiber, such as:  ? Fresh fruits and vegetables.  ? Whole grains.  ? Beans.  · Eat less of foods that are high in fat, low in fiber, or overly processed, such as:  ? French fries.  ? Hamburgers.  ? Cookies.  ? Candy.  ? Soda.  · Drink enough fluid to keep your pee (urine) clear or pale yellow.  General instructions  · Exercise regularly or as told by your doctor.  · Go to the restroom when you feel like you need to poop. Do not hold it in.  · Take over-the-counter and prescription medicines only as told by your doctor. These include any fiber supplements.  · Do pelvic floor retraining exercises, such as:  ? Doing deep breathing while relaxing your lower belly (abdomen).  ? Relaxing your pelvic floor while pooping.  · Watch your condition for any changes.  · Keep all follow-up visits as told by your doctor. This is important.  Contact a doctor if:  · You have pain that gets worse.  · You have a fever.  · You have not pooped for 4 days.  · You throw up (vomit).  · You are not hungry.  · You lose weight.  · You are bleeding from the anus.  · You have thin, pencil-like poop (stool).  Get help right away if:  · You have a fever, and your symptoms suddenly get worse.  · You leak poop or have blood in your poop.  · Your belly feels hard or bigger than normal (is bloated).  · You have very bad belly pain.  · You feel dizzy or you faint.  This information is not intended to replace advice given to you by your health care provider. Make sure you discuss any questions you have with your health care provider.  Document Released: 06/05/2009 Document Revised: 07/07/2017 Document Reviewed: 06/07/2017  [employer] Interactive Patient Education ©  2019 [employer]  Diarrhea, Adult  Diarrhea is frequent loose and watery bowel movements. Diarrhea can make you feel weak and cause you to become dehydrated. Dehydration can make you tired and thirsty, cause you to have a dry mouth, and decrease how often you urinate. Diarrhea typically lasts 2-3 days. However, it can last longer if it is a sign of something more serious. It is important to treat your diarrhea as told by your health care provider.  Follow these instructions at home:  Eating and drinking  Follow these recommendations as told by your health care provider:  · Take an oral rehydration solution (ORS). This is a drink that is sold at pharmacies and retail stores.  · Drink clear fluids, such as water, ice chips, diluted fruit juice, and low-calorie sports drinks.  · Eat bland, easy-to-digest foods in small amounts as you are able. These foods include bananas, applesauce, rice, lean meats, toast, and crackers.  · Avoid drinking fluids that contain a lot of sugar or caffeine, such as energy drinks, sports drinks, and soda.  · Avoid alcohol.  · Avoid spicy or fatty foods.    General instructions  · Drink enough fluid to keep your urine clear or pale yellow.  · Wash your hands often. If soap and water are not available, use hand .  · Make sure that all people in your household wash their hands well and often.  · Take over-the-counter and prescription medicines only as told by your health care provider.  · Rest at home while you recover.  · Watch your condition for any changes.  · Take a warm bath to relieve any burning or pain from frequent diarrhea episodes.  · Keep all follow-up visits as told by your health care provider. This is important.  Contact a health care provider if:  · You have a fever.  · Your diarrhea gets worse.  · You have new symptoms.  · You cannot keep fluids down.  · You feel light-headed or dizzy.  · You have a headache  · You have muscle cramps.  Get help right away if:  · You  have chest pain.  · You feel extremely weak or you faint.  · You have bloody or black stools or stools that look like tar.  · You have severe pain, cramping, or bloating in your abdomen.  · You have trouble breathing or you are breathing very quickly.  · Your heart is beating very quickly.  · Your skin feels cold and clammy.  · You feel confused.  · You have signs of dehydration, such as:  ? Dark urine, very little urine, or no urine.  ? Cracked lips.  ? Dry mouth.  ? Sunken eyes.  ? Sleepiness.  ? Weakness.  This information is not intended to replace advice given to you by your health care provider. Make sure you discuss any questions you have with your health care provider.  Document Released: 12/08/2003 Document Revised: 08/01/2018 Document Reviewed: 08/23/2016  [employer] Interactive Patient Education © 2019 [employer]  Abdominal Pain, Adult  Many things can cause belly (abdominal) pain. Most times, belly pain is not dangerous. Many cases of belly pain can be watched and treated at home. Sometimes belly pain is serious, though. Your doctor will try to find the cause of your belly pain.  Follow these instructions at home:  · Take over-the-counter and prescription medicines only as told by your doctor. Do not take medicines that help you poop (laxatives) unless told to by your doctor.  · Drink enough fluid to keep your pee (urine) clear or pale yellow.  · Watch your belly pain for any changes.  · Keep all follow-up visits as told by your doctor. This is important.  Contact a doctor if:  · Your belly pain changes or gets worse.  · You are not hungry, or you lose weight without trying.  · You are having trouble pooping (constipated) or have watery poop (diarrhea) for more than 2-3 days.  · You have pain when you pee or poop.  · Your belly pain wakes you up at night.  · Your pain gets worse with meals, after eating, or with certain foods.  · You are throwing up and cannot keep anything down.  · You have a  fever.  Get help right away if:  · Your pain does not go away as soon as your doctor says it should.  · You cannot stop throwing up.  · Your pain is only in areas of your belly, such as the right side or the left lower part of the belly.  · You have bloody or black poop, or poop that looks like tar.  · You have very bad pain, cramping, or bloating in your belly.  · You have signs of not having enough fluid or water in your body (dehydration), such as:  ? Dark pee, very little pee, or no pee.  ? Cracked lips.  ? Dry mouth.  ? Sunken eyes.  ? Sleepiness.  ? Weakness.  This information is not intended to replace advice given to you by your health care provider. Make sure you discuss any questions you have with your health care provider.  Document Released: 06/05/2009 Document Revised: 07/07/2017 Document Reviewed: 05/31/2017  [employer]* Interactive Patient Education © 2019 [employer] Inc[employer]  BMI for Adults  Body mass index (BMI) is a number that is calculated from a person's weight and height. In most adults, the number is used to find how much of an adult's weight is made up of fat. BMI is not as accurate as a direct measure of body fat.  How is BMI calculated?  BMI is calculated by dividing weight in kilograms by height in meters squared. It can also be calculated by dividing weight in pounds by height in inches squared, then multiplying the resulting number by 703. Charts are available to help you find your BMI quickly and easily without doing this calculation.  How is BMI interpreted?  Health care professionals use BMI charts to identify whether an adult is underweight, at a normal weight, or overweight based on the following guidelines:  · Underweight: BMI less than 18.5.  · Normal weight: BMI between 18.5 and 24.9.  · Overweight: BMI between 25 and 29.9.  · Obese: BMI of 30 and above.    BMI is usually interpreted the same for males and females.  Weight includes both fat and muscle, so someone with a muscular build,  such as an athlete, may have a BMI that is higher than 24.9. In cases like these, BMI may not accurately depict body fat. To determine if excess body fat is the cause of a BMI of 25 or higher, further assessments may need to be done by a health care provider.  Why is BMI a useful tool?  BMI is used to identify a possible weight problem that may be related to a medical problem or may increase the risk for medical problems. BMI can also be used to promote changes to reach a healthy weight.  This information is not intended to replace advice given to you by your health care provider. Make sure you discuss any questions you have with your health care provider.  Document Released: 08/29/2005 Document Revised: 04/27/2017 Document Reviewed: 05/15/2015  [employer] Interactive Patient Education © 2018 [employer] Inc[employer]

## 2019-05-09 ENCOUNTER — OFFICE VISIT (OUTPATIENT)
Dept: FAMILY MEDICINE CLINIC | Facility: CLINIC | Age: 64
End: 2019-05-09

## 2019-05-09 VITALS
DIASTOLIC BLOOD PRESSURE: 72 MMHG | SYSTOLIC BLOOD PRESSURE: 126 MMHG | HEIGHT: 64 IN | HEART RATE: 86 BPM | BODY MASS INDEX: 19.82 KG/M2 | WEIGHT: 116.1 LBS | OXYGEN SATURATION: 97 %

## 2019-05-09 DIAGNOSIS — M35.3 POLYMYALGIA RHEUMATICA (HCC): ICD-10-CM

## 2019-05-09 DIAGNOSIS — G89.29 CHRONIC MIDLINE LOW BACK PAIN WITHOUT SCIATICA: ICD-10-CM

## 2019-05-09 DIAGNOSIS — F41.1 GENERALIZED ANXIETY DISORDER: Primary | ICD-10-CM

## 2019-05-09 DIAGNOSIS — M54.50 CHRONIC MIDLINE LOW BACK PAIN WITHOUT SCIATICA: ICD-10-CM

## 2019-05-09 PROCEDURE — 99214 OFFICE O/P EST MOD 30 MIN: CPT | Performed by: FAMILY MEDICINE

## 2019-05-09 RX ORDER — GABAPENTIN 300 MG/1
300 CAPSULE ORAL 2 TIMES DAILY
Qty: 60 CAPSULE | Refills: 0 | Status: SHIPPED | OUTPATIENT
Start: 2019-05-09 | End: 2019-06-07 | Stop reason: SDUPTHER

## 2019-05-09 RX ORDER — HYDROCODONE BITARTRATE AND ACETAMINOPHEN 10; 325 MG/1; MG/1
1 TABLET ORAL EVERY 6 HOURS PRN
Qty: 120 TABLET | Refills: 0 | Status: SHIPPED | OUTPATIENT
Start: 2019-05-09 | End: 2019-06-07 | Stop reason: SDUPTHER

## 2019-05-09 RX ORDER — DIAZEPAM 5 MG/1
5 TABLET ORAL 2 TIMES DAILY PRN
Qty: 60 TABLET | Refills: 0 | Status: SHIPPED | OUTPATIENT
Start: 2019-05-09 | End: 2019-06-07 | Stop reason: SDUPTHER

## 2019-05-09 RX ORDER — PREDNISONE 10 MG/1
10 TABLET ORAL DAILY
Qty: 30 TABLET | Refills: 12 | Status: SHIPPED | OUTPATIENT
Start: 2019-05-09 | End: 2020-06-01

## 2019-05-09 NOTE — PROGRESS NOTES
Subjective   Adriana Nuñez is a 64 y.o. female.     Back Pain   This is a chronic problem. The current episode started more than 1 year ago. The problem occurs constantly. The problem has been waxing and waning since onset. The pain is present in the lumbar spine. The quality of the pain is described as aching. The pain radiates to the right knee and left knee. The pain is at a severity of 8/10. The pain is moderate. The pain is worse during the day. The symptoms are aggravated by position and bending.   Anxiety   Presents for follow-up visit. Symptoms include compulsions, excessive worry and nervous/anxious behavior. Patient reports no depressed mood, insomnia or irritability.            The following portions of the patient's history were reviewed and updated as appropriate: allergies, current medications, past family history, past medical history, past social history, past surgical history and problem list.    Review of Systems   Constitutional: Negative for irritability.   Musculoskeletal: Positive for back pain and myalgias.   Psychiatric/Behavioral: The patient is nervous/anxious. The patient does not have insomnia.        Objective   Physical Exam   Constitutional: She is oriented to person, place, and time. She appears well-developed and well-nourished. No distress.   HENT:   Head: Normocephalic and atraumatic.   Right Ear: Hearing and tympanic membrane normal.   Left Ear: Hearing and tympanic membrane normal.   Musculoskeletal:        Lumbar back: She exhibits decreased range of motion, tenderness and pain. She exhibits no bony tenderness, no swelling, no edema, no deformity, no laceration and no spasm.     Vascular Status -  Her right foot exhibits normal foot vasculature  and no edema. Her left foot exhibits normal foot vasculature  and no edema.  Neurological: She is alert and oriented to person, place, and time.   Reflex Scores:       Patellar reflexes are 2+ on the right side and 2+ on the left  side.  Skin: Skin is warm and dry. She is not diaphoretic.   Psychiatric: Judgment and thought content normal. Her mood appears anxious. Her speech is rapid and/or pressured. She is hyperactive. Cognition and memory are normal.   Nursing note and vitals reviewed.      Assessment/Plan   Problems Addressed this Visit        Nervous and Auditory    Polymyalgia rheumatica (CMS/HCC)    Chronic midline low back pain without sciatica       Other    Generalized anxiety disorder - Primary    Relevant Medications    diazePAM (VALIUM) 5 MG tablet      PMR worse, was off prednisone, will restart  b12 and glu OK  ME = 39 AND ON VALIUM. RISK DISCUSSED   annie chavez OCD, declined treatment for it

## 2019-06-01 DIAGNOSIS — R10.13 EPIGASTRIC PAIN: ICD-10-CM

## 2019-06-03 RX ORDER — DICYCLOMINE HCL 20 MG
TABLET ORAL
Qty: 60 TABLET | Refills: 1 | Status: SHIPPED | OUTPATIENT
Start: 2019-06-03 | End: 2019-08-02 | Stop reason: SDUPTHER

## 2019-06-03 NOTE — TELEPHONE ENCOUNTER
06/03/2019, 0825 - Patient prior clinical appointment with Dr. Aric Flower M.D. 04/25/2019 with 3 month clinical appointment scheduled Thursday, July 18, 2019 at 10:15 A.M. at [place], [place].  With permission granted per Dr. Aric Flower M.D., adequate prescription medication renewed until next clinical appointment date/time at which time additional renewals will be prescribed.

## 2019-06-07 ENCOUNTER — OFFICE VISIT (OUTPATIENT)
Dept: FAMILY MEDICINE CLINIC | Facility: CLINIC | Age: 64
End: 2019-06-07

## 2019-06-07 VITALS
OXYGEN SATURATION: 94 % | WEIGHT: 118.6 LBS | BODY MASS INDEX: 20.25 KG/M2 | HEART RATE: 107 BPM | SYSTOLIC BLOOD PRESSURE: 110 MMHG | DIASTOLIC BLOOD PRESSURE: 78 MMHG | HEIGHT: 64 IN

## 2019-06-07 DIAGNOSIS — M35.3 POLYMYALGIA RHEUMATICA (HCC): ICD-10-CM

## 2019-06-07 DIAGNOSIS — M54.50 CHRONIC MIDLINE LOW BACK PAIN WITHOUT SCIATICA: ICD-10-CM

## 2019-06-07 DIAGNOSIS — K21.9 GASTROESOPHAGEAL REFLUX DISEASE WITHOUT ESOPHAGITIS: ICD-10-CM

## 2019-06-07 DIAGNOSIS — G89.29 CHRONIC MIDLINE LOW BACK PAIN WITHOUT SCIATICA: ICD-10-CM

## 2019-06-07 DIAGNOSIS — F17.200 HEAVY TOBACCO SMOKER: ICD-10-CM

## 2019-06-07 DIAGNOSIS — E78.00 PURE HYPERCHOLESTEROLEMIA: ICD-10-CM

## 2019-06-07 DIAGNOSIS — F41.1 GENERALIZED ANXIETY DISORDER: Primary | ICD-10-CM

## 2019-06-07 PROCEDURE — 99214 OFFICE O/P EST MOD 30 MIN: CPT | Performed by: FAMILY MEDICINE

## 2019-06-07 RX ORDER — GABAPENTIN 300 MG/1
300 CAPSULE ORAL 2 TIMES DAILY
Qty: 60 CAPSULE | Refills: 0 | Status: SHIPPED | OUTPATIENT
Start: 2019-06-07 | End: 2019-07-03 | Stop reason: SDUPTHER

## 2019-06-07 RX ORDER — DIAZEPAM 5 MG/1
5 TABLET ORAL 2 TIMES DAILY PRN
Qty: 60 TABLET | Refills: 0 | Status: SHIPPED | OUTPATIENT
Start: 2019-06-07 | End: 2019-07-03 | Stop reason: SDUPTHER

## 2019-06-07 RX ORDER — HYDROCODONE BITARTRATE AND ACETAMINOPHEN 10; 325 MG/1; MG/1
1 TABLET ORAL EVERY 6 HOURS PRN
Qty: 120 TABLET | Refills: 0 | Status: SHIPPED | OUTPATIENT
Start: 2019-06-07 | End: 2019-07-03 | Stop reason: SDUPTHER

## 2019-06-07 NOTE — PROGRESS NOTES
Subjective   Adriana Nuñez is a 64 y.o. female.     Back Pain   This is a chronic problem. The current episode started more than 1 year ago. The problem occurs constantly. The problem has been waxing and waning since onset. The pain is present in the lumbar spine. The quality of the pain is described as aching. The pain radiates to the right knee and left knee. The pain is at a severity of 10/10. The pain is moderate. The pain is worse during the day. The symptoms are aggravated by position and bending. Pertinent negatives include no bladder incontinence, bowel incontinence or chest pain.   Anxiety   Presents for follow-up visit. Symptoms include compulsions, excessive worry, nervous/anxious behavior and shortness of breath. Patient reports no chest pain, depressed mood, insomnia, irritability, palpitations or suicidal ideas.       Hyperlipidemia   This is a chronic problem. The current episode started more than 1 year ago. The problem is uncontrolled. Recent lipid tests were reviewed and are high. Associated symptoms include myalgias and shortness of breath. Pertinent negatives include no chest pain.   COPD   This is a chronic problem. The current episode started more than 1 year ago. The problem occurs constantly. The problem has been waxing and waning. Associated symptoms include myalgias. Pertinent negatives include no chest pain, coughing or rash.   Heartburn   She reports no chest pain, no coughing, no heartburn or no wheezing. This is a chronic problem. The current episode started more than 1 year ago. The problem occurs rarely.        The following portions of the patient's history were reviewed and updated as appropriate: allergies, current medications, past family history, past medical history, past social history, past surgical history and problem list.    Review of Systems   Constitutional: Negative for irritability.   Respiratory: Positive for shortness of breath. Negative for cough and wheezing.     Cardiovascular: Negative for chest pain, palpitations and leg swelling.   Gastrointestinal: Negative for bowel incontinence and heartburn.   Genitourinary: Negative for bladder incontinence.   Musculoskeletal: Positive for back pain and myalgias.   Skin: Negative for rash and wound.   Psychiatric/Behavioral: Positive for dysphoric mood and sleep disturbance. Negative for suicidal ideas. The patient is nervous/anxious. The patient does not have insomnia.        Objective   Physical Exam   Constitutional: She is oriented to person, place, and time. She appears well-developed and well-nourished. No distress.   HENT:   Head: Normocephalic and atraumatic.   Cardiovascular: Normal rate, regular rhythm, normal heart sounds and intact distal pulses. Exam reveals no gallop and no friction rub.   No murmur heard.  Pulmonary/Chest: Effort normal. No respiratory distress. She has decreased breath sounds. She has no wheezes. She has rhonchi. She has no rales.   Abdominal: Soft. Bowel sounds are normal.   Musculoskeletal:        Lumbar back: She exhibits decreased range of motion, tenderness and pain. She exhibits no bony tenderness, no swelling, no edema, no deformity, no laceration and no spasm.   Lymphadenopathy:        Head (right side): No submental and no submandibular adenopathy present.        Head (left side): No submental and no submandibular adenopathy present.   Neurological: She is alert and oriented to person, place, and time.   Reflex Scores:       Patellar reflexes are 2+ on the right side and 2+ on the left side.  Skin: Skin is warm and dry. She is not diaphoretic.   Psychiatric: Judgment and thought content normal. Her mood appears anxious. Her speech is rapid and/or pressured. She is hyperactive.   Nursing note and vitals reviewed.      Assessment/Plan    Diagnosis Plan   1. Generalized anxiety disorder     2. Chronic midline low back pain without sciatica     3. Polymyalgia rheumatica (CMS/HCC)     4. Pure  hypercholesterolemia     5. Heavy tobacco smoker     6. Gastroesophageal reflux disease without esophagitis           ME = 39 . Risk of valium and hydrocodone discussed  Recommend quit smoking   annie pect OCD, declined treatment for it. Declined hyperlipidemia treatment

## 2019-07-03 ENCOUNTER — OFFICE VISIT (OUTPATIENT)
Dept: FAMILY MEDICINE CLINIC | Facility: CLINIC | Age: 64
End: 2019-07-03

## 2019-07-03 VITALS
OXYGEN SATURATION: 97 % | BODY MASS INDEX: 20.61 KG/M2 | WEIGHT: 120.7 LBS | DIASTOLIC BLOOD PRESSURE: 70 MMHG | SYSTOLIC BLOOD PRESSURE: 124 MMHG | HEIGHT: 64 IN | HEART RATE: 103 BPM

## 2019-07-03 DIAGNOSIS — M54.50 CHRONIC MIDLINE LOW BACK PAIN WITHOUT SCIATICA: Primary | ICD-10-CM

## 2019-07-03 DIAGNOSIS — F41.1 GENERALIZED ANXIETY DISORDER: ICD-10-CM

## 2019-07-03 DIAGNOSIS — J42 CHRONIC BRONCHITIS, UNSPECIFIED CHRONIC BRONCHITIS TYPE (HCC): ICD-10-CM

## 2019-07-03 DIAGNOSIS — R30.0 DYSURIA: ICD-10-CM

## 2019-07-03 DIAGNOSIS — G89.29 CHRONIC MIDLINE LOW BACK PAIN WITHOUT SCIATICA: Primary | ICD-10-CM

## 2019-07-03 DIAGNOSIS — R05.9 COUGH: ICD-10-CM

## 2019-07-03 PROCEDURE — 99214 OFFICE O/P EST MOD 30 MIN: CPT | Performed by: FAMILY MEDICINE

## 2019-07-03 RX ORDER — HYDROCODONE BITARTRATE AND ACETAMINOPHEN 10; 325 MG/1; MG/1
1 TABLET ORAL EVERY 6 HOURS PRN
Qty: 120 TABLET | Refills: 0 | Status: SHIPPED | OUTPATIENT
Start: 2019-07-03 | End: 2019-07-29 | Stop reason: SDUPTHER

## 2019-07-03 RX ORDER — IPRATROPIUM BROMIDE 17 UG/1
2 AEROSOL, METERED RESPIRATORY (INHALATION)
Qty: 1 INHALER | Refills: 3 | Status: SHIPPED | OUTPATIENT
Start: 2019-07-03 | End: 2019-09-30 | Stop reason: SDUPTHER

## 2019-07-03 RX ORDER — DIAZEPAM 5 MG/1
5 TABLET ORAL 2 TIMES DAILY PRN
Qty: 60 TABLET | Refills: 0 | Status: SHIPPED | OUTPATIENT
Start: 2019-07-03 | End: 2019-07-29 | Stop reason: SDUPTHER

## 2019-07-03 RX ORDER — GABAPENTIN 300 MG/1
300 CAPSULE ORAL 2 TIMES DAILY
Qty: 60 CAPSULE | Refills: 0 | Status: SHIPPED | OUTPATIENT
Start: 2019-07-03 | End: 2019-07-29 | Stop reason: SDUPTHER

## 2019-07-03 NOTE — PROGRESS NOTES
Subjective   Adriana Nuñez is a 64 y.o. female.     Back Pain   This is a chronic problem. The current episode started more than 1 year ago. The problem occurs constantly. The problem has been waxing and waning since onset. The pain is present in the lumbar spine. The quality of the pain is described as aching. The pain radiates to the right knee and left knee. The pain is at a severity of 8/10. The pain is moderate. The pain is worse during the day. The symptoms are aggravated by position and bending. Pertinent negatives include no bladder incontinence, bowel incontinence or fever.   Anxiety   Presents for follow-up visit. Symptoms include compulsions, excessive worry and nervous/anxious behavior. Patient reports no depressed mood, insomnia or irritability.       Urinary Tract Infection    This is a recurrent problem. The current episode started in the past 7 days. The problem has been resolved. There has been no fever. Pertinent negatives include no flank pain or hematuria.   Cough   This is a recurrent problem. The current episode started in the past 7 days. The problem has been waxing and waning. The cough is non-productive. Associated symptoms include myalgias. Pertinent negatives include no fever.        The following portions of the patient's history were reviewed and updated as appropriate: allergies, current medications, past family history, past medical history, past social history, past surgical history and problem list.    Review of Systems   Constitutional: Negative for fever and irritability.   Respiratory: Positive for cough.    Gastrointestinal: Negative for bowel incontinence.   Genitourinary: Negative for bladder incontinence, flank pain and hematuria.   Musculoskeletal: Positive for back pain and myalgias.   Psychiatric/Behavioral: The patient is nervous/anxious. The patient does not have insomnia.        Objective   Physical Exam   Constitutional: She is oriented to person, place, and time.  She appears well-developed and well-nourished. No distress.   HENT:   Head: Normocephalic and atraumatic.   Right Ear: Hearing and tympanic membrane normal.   Left Ear: Hearing and tympanic membrane normal.   Cardiovascular: Normal rate and regular rhythm.   Pulmonary/Chest: Effort normal. She has decreased breath sounds. She has no wheezes. She has rhonchi in the left lower field. She has no rales.   Abdominal: There is no tenderness. There is no CVA tenderness.   Musculoskeletal:        Lumbar back: She exhibits decreased range of motion, tenderness and pain. She exhibits no bony tenderness, no swelling, no edema, no deformity, no laceration and no spasm.     Vascular Status -  Her right foot exhibits normal foot vasculature  and no edema. Her left foot exhibits normal foot vasculature  and no edema.  Neurological: She is alert and oriented to person, place, and time.   Reflex Scores:       Patellar reflexes are 2+ on the right side and 2+ on the left side.  Skin: Skin is warm and dry. She is not diaphoretic.   Psychiatric: Judgment and thought content normal. Her mood appears anxious. Her speech is rapid and/or pressured. She is hyperactive. Cognition and memory are normal.   Nursing note and vitals reviewed.      Assessment/Plan   Problems Addressed this Visit        Respiratory    Chronic obstructive lung disease (CMS/HCC)    Relevant Medications    ATROVENT HFA 17 MCG/ACT inhaler       Nervous and Auditory    Chronic midline low back pain without sciatica - Primary       Other    Generalized anxiety disorder    Relevant Medications    diazePAM (VALIUM) 5 MG tablet      Other Visit Diagnoses     Dysuria        Cough          An episode where she had a cough and some dysuria.  She found some old antibiotics that her son had at home but started with a C and took him.  She is asymptomatic today.  Does have a little rhonchi in left lower lobe but overall lungs sound at baseline for her.  I recommended that she seek  medical attention if she has symptoms like this reviewed the ER care center my office.  ME = 39 AND ON VALIUM. RISK DISCUSSED   annie pect OCD, declined treatment for it

## 2019-08-02 DIAGNOSIS — R10.13 EPIGASTRIC PAIN: ICD-10-CM

## 2019-08-02 RX ORDER — DICYCLOMINE HCL 20 MG
TABLET ORAL
Qty: 60 TABLET | Refills: 1 | Status: SHIPPED | OUTPATIENT
Start: 2019-08-02 | End: 2019-09-12 | Stop reason: SDUPTHER

## 2019-08-27 ENCOUNTER — OFFICE VISIT (OUTPATIENT)
Dept: FAMILY MEDICINE CLINIC | Facility: CLINIC | Age: 64
End: 2019-08-27

## 2019-08-27 VITALS
WEIGHT: 123.6 LBS | HEART RATE: 88 BPM | BODY MASS INDEX: 21.1 KG/M2 | DIASTOLIC BLOOD PRESSURE: 78 MMHG | SYSTOLIC BLOOD PRESSURE: 126 MMHG | OXYGEN SATURATION: 97 % | HEIGHT: 64 IN

## 2019-08-27 DIAGNOSIS — G89.29 CHRONIC MIDLINE LOW BACK PAIN WITHOUT SCIATICA: Primary | ICD-10-CM

## 2019-08-27 DIAGNOSIS — M54.50 CHRONIC MIDLINE LOW BACK PAIN WITHOUT SCIATICA: Primary | ICD-10-CM

## 2019-08-27 DIAGNOSIS — F41.1 GENERALIZED ANXIETY DISORDER: ICD-10-CM

## 2019-08-27 PROCEDURE — 99214 OFFICE O/P EST MOD 30 MIN: CPT | Performed by: FAMILY MEDICINE

## 2019-08-27 RX ORDER — GABAPENTIN 300 MG/1
300 CAPSULE ORAL 2 TIMES DAILY
Qty: 60 CAPSULE | Refills: 0 | Status: SHIPPED | OUTPATIENT
Start: 2019-08-27 | End: 2019-08-27 | Stop reason: SDUPTHER

## 2019-08-27 RX ORDER — HYDROCODONE BITARTRATE AND ACETAMINOPHEN 10; 325 MG/1; MG/1
1 TABLET ORAL EVERY 6 HOURS PRN
Qty: 120 TABLET | Refills: 0 | Status: SHIPPED | OUTPATIENT
Start: 2019-08-27 | End: 2019-09-30 | Stop reason: SDUPTHER

## 2019-08-27 RX ORDER — DIAZEPAM 5 MG/1
5 TABLET ORAL 2 TIMES DAILY PRN
Qty: 60 TABLET | Refills: 0 | Status: SHIPPED | OUTPATIENT
Start: 2019-08-27 | End: 2019-09-30 | Stop reason: SDUPTHER

## 2019-08-27 RX ORDER — GABAPENTIN 300 MG/1
300 CAPSULE ORAL 3 TIMES DAILY
Qty: 90 CAPSULE | Refills: 0 | Status: SHIPPED | OUTPATIENT
Start: 2019-08-27 | End: 2019-09-30 | Stop reason: SDUPTHER

## 2019-08-27 RX ORDER — ALBUTEROL SULFATE 90 UG/1
2 AEROSOL, METERED RESPIRATORY (INHALATION) EVERY 6 HOURS PRN
Qty: 18 G | Refills: 11 | Status: SHIPPED | OUTPATIENT
Start: 2019-08-27 | End: 2019-09-30 | Stop reason: SDUPTHER

## 2019-08-27 RX ORDER — SULFAMETHOXAZOLE AND TRIMETHOPRIM 400; 80 MG/1; MG/1
TABLET ORAL
Refills: 12 | COMMUNITY
Start: 2019-08-23 | End: 2019-09-30

## 2019-08-27 NOTE — PROGRESS NOTES
Subjective   Adriana Nuñez is a 64 y.o. female.     Back Pain   This is a chronic problem. The current episode started more than 1 year ago. The problem occurs constantly. The problem has been waxing and waning since onset. The pain is present in the lumbar spine. The quality of the pain is described as aching. The pain radiates to the right knee and left knee. The pain is at a severity of 7/10. The pain is moderate. The pain is worse during the day. The symptoms are aggravated by position and bending. Pertinent negatives include no bladder incontinence, bowel incontinence or fever.   Anxiety   Presents for follow-up visit. Symptoms include compulsions, excessive worry and nervous/anxious behavior. Patient reports no depressed mood, insomnia or irritability.            The following portions of the patient's history were reviewed and updated as appropriate: allergies, current medications, past family history, past medical history, past social history, past surgical history and problem list.    Review of Systems   Constitutional: Negative for fever and irritability.   Gastrointestinal: Negative for bowel incontinence.   Genitourinary: Negative for bladder incontinence.   Musculoskeletal: Positive for back pain and myalgias.   Psychiatric/Behavioral: The patient is nervous/anxious. The patient does not have insomnia.        Objective   Physical Exam   Constitutional: She is oriented to person, place, and time. She appears well-developed and well-nourished. No distress.   HENT:   Head: Normocephalic and atraumatic.   Right Ear: Hearing and tympanic membrane normal.   Left Ear: Hearing and tympanic membrane normal.   Musculoskeletal:        Lumbar back: She exhibits decreased range of motion, tenderness and pain. She exhibits no bony tenderness, no swelling, no edema, no deformity, no laceration and no spasm.     Vascular Status -  Her right foot exhibits normal foot vasculature  and no edema. Her left foot  exhibits normal foot vasculature  and no edema.  Neurological: She is alert and oriented to person, place, and time.   Reflex Scores:       Patellar reflexes are 2+ on the right side and 2+ on the left side.  Skin: Skin is warm and dry. She is not diaphoretic.   Psychiatric: Judgment and thought content normal. Her mood appears anxious. Her speech is rapid and/or pressured. She is hyperactive. Cognition and memory are normal.   Nursing note and vitals reviewed.      Assessment/Plan   Problems Addressed this Visit        Nervous and Auditory    Chronic midline low back pain without sciatica - Primary       Other    Generalized anxiety disorder    Relevant Medications    diazePAM (VALIUM) 5 MG tablet        ME = 39 AND ON VALIUM. RISK DISCUSSED   annie kathy OCD, declined treatment for it

## 2019-09-12 ENCOUNTER — OFFICE VISIT (OUTPATIENT)
Dept: GASTROENTEROLOGY | Facility: CLINIC | Age: 64
End: 2019-09-12

## 2019-09-12 VITALS
HEIGHT: 64 IN | OXYGEN SATURATION: 98 % | SYSTOLIC BLOOD PRESSURE: 140 MMHG | WEIGHT: 124 LBS | HEART RATE: 94 BPM | DIASTOLIC BLOOD PRESSURE: 78 MMHG | BODY MASS INDEX: 21.17 KG/M2

## 2019-09-12 DIAGNOSIS — R10.13 EPIGASTRIC PAIN: ICD-10-CM

## 2019-09-12 PROCEDURE — 99212 OFFICE O/P EST SF 10 MIN: CPT | Performed by: INTERNAL MEDICINE

## 2019-09-12 RX ORDER — OMEPRAZOLE 20 MG/1
CAPSULE, DELAYED RELEASE ORAL
Qty: 60 CAPSULE | Refills: 5 | Status: SHIPPED | OUTPATIENT
Start: 2019-09-12 | End: 2020-04-20 | Stop reason: SDUPTHER

## 2019-09-12 RX ORDER — DICYCLOMINE HCL 20 MG
20 TABLET ORAL 2 TIMES DAILY
Qty: 60 TABLET | Refills: 1 | Status: SHIPPED | OUTPATIENT
Start: 2019-09-12 | End: 2019-11-23 | Stop reason: SDUPTHER

## 2019-09-12 NOTE — PROGRESS NOTES
[facility]      Chief Complaint:   Chief Complaint   Patient presents with   • Abdominal Pain       Subjective     HPI:   Patient with epigastric abdominal pain.  Patient is currently on a proton pump inhibitor doing well with this.  Patient with episodes of diarrhea currently taking dicyclomine with marked improvement in symptoms.    Plan; we will continue patient on dicyclomine and proton pump inhibitor while patient follow-up in 3 months.    Past Medical History:   Past Medical History:   Diagnosis Date   • Adenomatous polyp of colon    • Anorectal abscess    • Anxiety disorder    • Benign polyp of large intestine    • Bladder fistula      post rapair      • Blood in urine    • Chronic obstructive lung disease (CMS/HCC)    • Chronic urinary tract infection    • Depressive disorder    • Diarrhea    • Diverticulitis of colon      post colectomy      • Elevated levels of transaminase & lactic acid dehydrogenase    • Epigastric pain    • Generalized anxiety disorder    • Heavy tobacco smoker    • History of colon polyps    • Hyperlipidemia    • Insomnia    • Low back pain    • Lower urinary tract infectious disease    • Lymphadenopathy     ON CT   • Osteoporosis    • Peripheral nerve disease    • Polymyalgia rheumatica (CMS/HCC)    • Polyp of colon    • Urinary tract infection    • Vitamin D deficiency        Past Surgical History:  Past Surgical History:   Procedure Laterality Date   • COLON RESECTION     • COLONOSCOPY  03/17/2014    polyps   • COLONOSCOPY  02/08/2016   • COLONOSCOPY N/A 5/24/2017    Procedure: COLONOSCOPY;  Surgeon: Aric Pina MD;  Location: [facility] ENDOSCOPY;  Service:    • COLONOSCOPY N/A 4/17/2019    Procedure: COLONOSCOPY;  Surgeon: Aric Pina MD;  Location: [facility] ENDOSCOPY;  Service: Gastroenterology   • ENDOSCOPY  01/26/2015    Normal esophagus. Gastritis was found in the stomach. Biopsy taken. Normal duodenum   • ENDOSCOPY N/A 9/18/2017    Procedure:  ESOPHAGOGASTRODUODENOSCOPY;  Surgeon: Aric Pina MD;  Location: [facility] ENDOSCOPY;  Service:    • ENDOSCOPY N/A 4/17/2019    Procedure: ESOPHAGOGASTRODUODENOSCOPY;  Surgeon: Aric Pina MD;  Location: [facility] ENDOSCOPY;  Service: Gastroenterology   • HYSTERECTOMY     • INJECTION OF MEDICATION  12/03/2010    KENALOG(3)   • INJECTION OF MEDICATION  11/07/2014    ROCEPHIN(1)   • UPPER GASTROINTESTINAL ENDOSCOPY  01/26/2015   • UPPER GASTROINTESTINAL ENDOSCOPY  09/18/2017   • UPPER GASTROINTESTINAL ENDOSCOPY  04/17/2019       Family History:  Family History   Problem Relation Age of Onset   • No Known Problems Sister    • No Known Problems Brother        Social History:   reports that she has been smoking cigars and cigarettes.  She has been smoking about 1.00 pack per day. She has never used smokeless tobacco. She reports that she does not drink alcohol or use drugs.    Medications:   Prior to Admission medications    Medication Sig Start Date End Date Taking? Authorizing Provider   albuterol sulfate HFA (VENTOLIN HFA) 108 (90 Base) MCG/ACT inhaler Inhale 2 puffs Every 6 (Six) Hours As Needed for Wheezing. 8/27/19  Yes Donato Donis MD   ATROVENT HFA 17 MCG/ACT inhaler Inhale 2 puffs 4 (Four) Times a Day. 7/3/19  Yes Donato Donis MD   diazePAM (VALIUM) 5 MG tablet Take 1 tablet by mouth 2 (Two) Times a Day As Needed for Anxiety. 8/27/19  Yes Donato Donis MD   dicyclomine (BENTYL) 20 MG tablet Take 1 tablet by mouth 2 (Two) Times a Day. 9/12/19  Yes Aric Pina MD   fluconazole (DIFLUCAN) 200 MG tablet Take 1 tablet by mouth Daily. 7/10/19  Yes Gutierrez Bates MD   fluticasone (FLONASE) 50 MCG/ACT nasal spray USE 2 SPRAYS INTO EACH NOSTRIL DAILY 2/7/19  Yes Donato Donis MD   gabapentin (NEURONTIN) 300 MG capsule Take 1 capsule by mouth 3 (Three) Times a Day. 8/27/19  Yes Donato Donis MD   HYDROcodone-acetaminophen (NORCO)  MG per tablet Take 1 tablet by mouth Every 6  (Six) Hours As Needed for Moderate Pain . 8/27/19  Yes Donato Donis MD   naloxone (NARCAN) 4 MG/0.1ML nasal spray 1 spray into the nostril(s) as directed by provider As Needed (accidental OD). If used, call 911 7/26/18  Yes Donato Donis MD   nitrofurantoin (MACRODANTIN) 100 MG capsule Take 1 capsule by mouth Daily. 9/10/18  Yes Felicia Buchanan MD   nystatin (MYCOSTATIN) 145256 UNIT/ML suspension Take 5 mL by mouth 4 (Four) Times a Day. 7/10/19  Yes Gutierrez Bates MD   omeprazole (priLOSEC) 20 MG capsule TAKE ONE CAPSULE BY MOUTH TWO TIMES A DAY 9/12/19  Yes Aric Pina MD   predniSONE (DELTASONE) 10 MG tablet Take 1 tablet by mouth Daily. 2/day for 7 days then 1/day 5/9/19  Yes Donato Donis MD   sulfamethoxazole-trimethoprim (BACTRIM,SEPTRA) 400-80 MG tablet TAKE ONE TABLET BY MOUTH AT BEDTIME TO PREVENT BLADDER INFECTIONS 8/23/19  Yes ProviderFelicia MD   dicyclomine (BENTYL) 20 MG tablet TAKE ONE TABLET BY MOUTH TWO TIMES A DAY 8/2/19 9/12/19 Yes Aric Pina MD   omeprazole (priLOSEC) 20 MG capsule TAKE ONE CAPSULE BY MOUTH TWO TIMES A DAY 4/10/19 9/12/19 Yes Aric Pina MD       Allergies:  Fosamax [alendronate]    ROS:    Review of Systems   Constitutional: Negative for activity change, appetite change, chills, diaphoresis, fatigue, fever and unexpected weight change.   HENT: Negative for sore throat and trouble swallowing.    Respiratory: Negative for shortness of breath.    Gastrointestinal: Negative for abdominal distention, abdominal pain, anal bleeding, blood in stool, constipation, diarrhea, nausea, rectal pain and vomiting.   Endocrine: Negative for polydipsia, polyphagia and polyuria.   Genitourinary: Negative for difficulty urinating.   Musculoskeletal: Negative for arthralgias.   Skin: Negative for pallor.   Allergic/Immunologic: Negative for food allergies.   Neurological: Negative for weakness and light-headedness.   Psychiatric/Behavioral: Negative  "for behavioral problems.     Objective     Blood pressure 140/78, pulse 94, height 162.6 cm (64\"), weight 56.2 kg (124 lb), SpO2 98 %.    Physical Exam   Constitutional: She is oriented to person, place, and time. She appears well-developed and well-nourished. No distress.   HENT:   Head: Normocephalic and atraumatic.   Cardiovascular: Normal rate, regular rhythm, normal heart sounds and intact distal pulses. Exam reveals no gallop and no friction rub.   No murmur heard.  Pulmonary/Chest: Breath sounds normal. No respiratory distress. She has no wheezes. She has no rales. She exhibits no tenderness.   Abdominal: Soft. Bowel sounds are normal. She exhibits no distension and no mass. There is no tenderness. There is no rebound and no guarding. No hernia.   Musculoskeletal: Normal range of motion. She exhibits no edema.   Neurological: She is alert and oriented to person, place, and time.   Skin: Skin is warm and dry. No rash noted. She is not diaphoretic. No erythema. No pallor.   Psychiatric: She has a normal mood and affect. Her behavior is normal. Judgment and thought content normal.        Assessment/Plan   Adriana was seen today for abdominal pain.    Diagnoses and all orders for this visit:    Epigastric pain  -     dicyclomine (BENTYL) 20 MG tablet; Take 1 tablet by mouth 2 (Two) Times a Day.  -     omeprazole (priLOSEC) 20 MG capsule; TAKE ONE CAPSULE BY MOUTH TWO TIMES A DAY        * Surgery not found *     Diagnosis Plan   1. Epigastric pain  dicyclomine (BENTYL) 20 MG tablet    omeprazole (priLOSEC) 20 MG capsule       Anticipated Surgical Procedure:  No orders of the defined types were placed in this encounter.      The risks, benefits, and alternatives of this procedure have been discussed with the patient or the responsible party- the patient understands and agrees to proceed.                                                                "

## 2019-09-12 NOTE — PATIENT INSTRUCTIONS
Gastroesophageal Reflux Disease, Adult    Normally, food travels down the esophagus and stays in the stomach to be digested. If a person has gastroesophageal reflux disease (GERD), food and stomach acid move back up into the esophagus. When this happens, the esophagus becomes sore and swollen (inflamed). Over time, GERD can make small holes (ulcers) in the lining of the esophagus.  Follow these instructions at home:  Diet  · Follow a diet as told by your doctor. You may need to avoid foods and drinks such as:  ? Coffee and tea (with or without caffeine).  ? Drinks that contain alcohol.  ? Energy drinks and sports drinks.  ? Carbonated drinks or sodas.  ? Chocolate and cocoa.  ? Peppermint and mint flavorings.  ? Garlic and onions.  ? Horseradish.  ? Spicy and acidic foods, such as peppers, chili powder, cardenas powder, vinegar, hot sauces, and BBQ sauce.  ? Citrus fruit juices and citrus fruits, such as oranges, petros, and limes.  ? Tomato-based foods, such as red sauce, chili, salsa, and pizza with red sauce.  ? Fried and fatty foods, such as donuts, french fries, potato chips, and high-fat dressings.  ? High-fat meats, such as hot dogs, rib eye steak, sausage, ham, and burns.  ? High-fat dairy items, such as whole milk, butter, and cream cheese.  · Eat small meals often. Avoid eating large meals.  · Avoid drinking large amounts of liquid with your meals.  · Avoid eating meals during the 2-3 hours before bedtime.  · Avoid lying down right after you eat.  · Do not exercise right after you eat.  General instructions  · Pay attention to any changes in your symptoms.  · Take over-the-counter and prescription medicines only as told by your doctor. Do not take aspirin, ibuprofen, or other NSAIDs unless your doctor says it is okay.  · Do not use any tobacco products, including cigarettes, chewing tobacco, and e-cigarettes. If you need help quitting, ask your doctor.  · Wear loose clothes. Do not wear anything tight around  your waist.  · Raise (elevate) the head of your bed about 6 inches (15 cm).  · Try to lower your stress. If you need help doing this, ask your doctor.  · If you are overweight, lose an amount of weight that is healthy for you. Ask your doctor about a safe weight loss goal.  · Keep all follow-up visits as told by your doctor. This is important.  Contact a doctor if:  · You have new symptoms.  · You lose weight and you do not know why it is happening.  · You have trouble swallowing, or it hurts to swallow.  · You have wheezing or a cough that keeps happening.  · Your symptoms do not get better with treatment.  · You have a hoarse voice.  Get help right away if:  · You have pain in your arms, neck, jaw, teeth, or back.  · You feel sweaty, dizzy, or light-headed.  · You have chest pain or shortness of breath.  · You throw up (vomit) and your throw up looks like blood or coffee grounds.  · You pass out (faint).  · Your poop (stool) is bloody or black.  · You cannot swallow, drink, or eat.  This information is not intended to replace advice given to you by your health care provider. Make sure you discuss any questions you have with your health care provider.  Document Released: 06/05/2009 Document Revised: 10/30/2018 Document Reviewed: 04/13/2016  [employer] Interactive Patient Education © 2019 [employer]  Abdominal Pain, Adult    Many things can cause belly (abdominal) pain. Most times, belly pain is not dangerous. Many cases of belly pain can be watched and treated at home. Sometimes belly pain is serious, though. Your doctor will try to find the cause of your belly pain.  Follow these instructions at home:  · Take over-the-counter and prescription medicines only as told by your doctor. Do not take medicines that help you poop (laxatives) unless told to by your doctor.  · Drink enough fluid to keep your pee (urine) clear or pale yellow.  · Watch your belly pain for any changes.  · Keep all follow-up visits as told by  your doctor. This is important.  Contact a doctor if:  · Your belly pain changes or gets worse.  · You are not hungry, or you lose weight without trying.  · You are having trouble pooping (constipated) or have watery poop (diarrhea) for more than 2-3 days.  · You have pain when you pee or poop.  · Your belly pain wakes you up at night.  · Your pain gets worse with meals, after eating, or with certain foods.  · You are throwing up and cannot keep anything down.  · You have a fever.  Get help right away if:  · Your pain does not go away as soon as your doctor says it should.  · You cannot stop throwing up.  · Your pain is only in areas of your belly, such as the right side or the left lower part of the belly.  · You have bloody or black poop, or poop that looks like tar.  · You have very bad pain, cramping, or bloating in your belly.  · You have signs of not having enough fluid or water in your body (dehydration), such as:  ? Dark pee, very little pee, or no pee.  ? Cracked lips.  ? Dry mouth.  ? Sunken eyes.  ? Sleepiness.  ? Weakness.  This information is not intended to replace advice given to you by your health care provider. Make sure you discuss any questions you have with your health care provider.  Document Released: 06/05/2009 Document Revised: 07/07/2017 Document Reviewed: 05/31/2017  [employer] Interactive Patient Education © 2019 [employer]  BMI for Adults    Body mass index (BMI) is a number that is calculated from a person's weight and height. BMI may help to estimate how much of a person's weight is composed of fat. BMI can help identify those who may be at higher risk for certain medical problems.  How is BMI used with adults?  BMI is used as a screening tool to identify possible weight problems. It is used to check whether a person is obese, overweight, healthy weight, or underweight.  How is BMI calculated?  BMI measures your weight and compares it to your height. This can be done either in English  "(U.[place] or metric measurements. Note that charts are available to help you find your BMI quickly and easily without having to do these calculations yourself.  To calculate your BMI in English (U.S[place] measurements, your health care provider will:  1. Measure your weight in pounds (lb).  2. Multiply the number of pounds by 703.  ? For example, for a person who weighs 180 lb, multiply that number by 703, which equals 126,540.  3. Measure your height in inches (in). Then multiply that number by itself to get a measurement called \"inches squared.\"  ? For example, for a person who is 70 in tall, the \"inches squared\" measurement is 70 in x 70 in, which equals 4900 inches squared.  4. Divide the total from Step 2 (number of lb x 703) by the total from Step 3 (inches squared): 126,540 ÷ 4900 = 25.8. This is your BMI.  To calculate your BMI in metric measurements, your health care provider will:  1. Measure your weight in kilograms (kg).  2. Measure your height in meters (m). Then multiply that number by itself to get a measurement called \"meters squared.\"  ? For example, for a person who is 1.75 m tall, the \"meters squared\" measurement is 1.75 m x 1.75 m, which is equal to 3.1 meters squared.  3. Divide the number of kilograms (your weight) by the meters squared number. In this example: 70 ÷ 3.1 = 22.6. This is your BMI.  How is BMI interpreted?  To interpret your results, your health care provider will use BMI charts to identify whether you are underweight, normal weight, overweight, or obese. The following guidelines will be used:  · Underweight: BMI less than 18.5.  · Normal weight: BMI between 18.5 and 24.9.  · Overweight: BMI between 25 and 29.9.  · Obese: BMI of 30 and above.  Please note:  · Weight includes both fat and muscle, so someone with a muscular build, such as an athlete, may have a BMI that is higher than 24.9. In cases like these, BMI is not an accurate measure of body fat.  · To determine if excess body fat " is the cause of a BMI of 25 or higher, further assessments may need to be done by a health care provider.  · BMI is usually interpreted in the same way for men and women.  Why is BMI a useful tool?  BMI is useful in two ways:  · Identifying a weight problem that may be related to a medical condition, or that may increase the risk for medical problems.  · Promoting lifestyle and diet changes in order to reach a healthy weight.  Summary  · Body mass index (BMI) is a number that is calculated from a person's weight and height.  · BMI may help to estimate how much of a person's weight is composed of fat. BMI can help identify those who may be at higher risk for certain medical problems.  · BMI can be measured using English measurements or metric measurements.  · To interpret your results, your health care provider will use BMI charts to identify whether you are underweight, normal weight, overweight, or obese.  This information is not intended to replace advice given to you by your health care provider. Make sure you discuss any questions you have with your health care provider.  Document Released: 08/29/2005 Document Revised: 10/31/2018 Document Reviewed: 10/31/2018  [employer] Interactive Patient Education © 2019 [employer]

## 2019-09-26 ENCOUNTER — TELEPHONE (OUTPATIENT)
Dept: FAMILY MEDICINE CLINIC | Facility: CLINIC | Age: 64
End: 2019-09-26

## 2019-09-26 NOTE — TELEPHONE ENCOUNTER
PATIENT IS NEEDING TO  PRESCRIPTIONS FOR VALIUM, NEUROTIN, 2 INHALERS AND LORATABS. CALL BACK NUMBER [phone]

## 2019-09-26 NOTE — TELEPHONE ENCOUNTER
Dr. Donis,     Ms. Adriana Nuñez is requesting refills on the following controlled meds.    Valium 5 mg #60 Take 1 BID.   Last Script Written 08/27/19  #60 with No Refills    Gabapentin 300 mg #90  Take 1 TID Last Script Written 08/27/19 #90 with No Refill    Norco 10/325 mg #90  Take 1 Q 6 hrs as needed for pain. Last Script Written #120  With No Refills      Last OV    08/27/19    Next Sanket OV   09/26/19  No Show Today, I have tried to call her, No Answer, No VM set up    Last Script Written  See Above Information    Last Audie    08/27/19     Please advise on refill    Thank you      **Ventolin Inhaler was sent to her pharmacy on 08/27/19 1 inhaler with 11 refills               Patient is an 82 year old male with PMH of prostate cancer s/p seeding and on oral chemotherapy, HTN, HLD, DM type 2 who presents to the ED with complaints of hematuria that began this evening before supper. Patient states that he has been getting clots in his urine for the past month and had seen his urologist who placed a Timmons 2 weeks ago for urinary obstruction.

## 2019-09-26 NOTE — TELEPHONE ENCOUNTER
Dr. Donis    I talked to Ms Nuñez this afternoon, She states she had to miss today due to her car not starting.    She is Scheduled to see you Monday Afternoon  09/30/19 @ 3:15, She was told to be here at 3:00  She is completely out of all her controlled meds.   She said she may be able to get a ride sometime tomorrow to come and  the Norco script if you will refill it.      I told her that she Must Keep the Appointment on Monday regardless.      Please Advise    Thank you

## 2019-09-30 ENCOUNTER — APPOINTMENT (OUTPATIENT)
Dept: LAB | Facility: HOSPITAL | Age: 64
End: 2019-09-30

## 2019-09-30 ENCOUNTER — OFFICE VISIT (OUTPATIENT)
Dept: FAMILY MEDICINE CLINIC | Facility: CLINIC | Age: 64
End: 2019-09-30

## 2019-09-30 VITALS
OXYGEN SATURATION: 98 % | WEIGHT: 121.8 LBS | HEART RATE: 103 BPM | DIASTOLIC BLOOD PRESSURE: 80 MMHG | SYSTOLIC BLOOD PRESSURE: 130 MMHG | HEIGHT: 64 IN | BODY MASS INDEX: 20.79 KG/M2

## 2019-09-30 DIAGNOSIS — F41.1 GENERALIZED ANXIETY DISORDER: ICD-10-CM

## 2019-09-30 DIAGNOSIS — M54.50 CHRONIC MIDLINE LOW BACK PAIN WITHOUT SCIATICA: Primary | ICD-10-CM

## 2019-09-30 DIAGNOSIS — E78.00 PURE HYPERCHOLESTEROLEMIA: ICD-10-CM

## 2019-09-30 DIAGNOSIS — K21.9 GASTROESOPHAGEAL REFLUX DISEASE WITHOUT ESOPHAGITIS: ICD-10-CM

## 2019-09-30 DIAGNOSIS — J42 CHRONIC BRONCHITIS, UNSPECIFIED CHRONIC BRONCHITIS TYPE (HCC): ICD-10-CM

## 2019-09-30 DIAGNOSIS — M35.3 POLYMYALGIA RHEUMATICA (HCC): ICD-10-CM

## 2019-09-30 DIAGNOSIS — Z23 PNEUMOCOCCAL VACCINATION ADMINISTERED AT CURRENT VISIT: ICD-10-CM

## 2019-09-30 DIAGNOSIS — G89.29 CHRONIC MIDLINE LOW BACK PAIN WITHOUT SCIATICA: Primary | ICD-10-CM

## 2019-09-30 PROCEDURE — G0009 ADMIN PNEUMOCOCCAL VACCINE: HCPCS | Performed by: FAMILY MEDICINE

## 2019-09-30 PROCEDURE — 82607 VITAMIN B-12: CPT | Performed by: FAMILY MEDICINE

## 2019-09-30 PROCEDURE — 36415 COLL VENOUS BLD VENIPUNCTURE: CPT | Performed by: FAMILY MEDICINE

## 2019-09-30 PROCEDURE — 90670 PCV13 VACCINE IM: CPT | Performed by: FAMILY MEDICINE

## 2019-09-30 PROCEDURE — 80061 LIPID PANEL: CPT | Performed by: FAMILY MEDICINE

## 2019-09-30 PROCEDURE — 99214 OFFICE O/P EST MOD 30 MIN: CPT | Performed by: FAMILY MEDICINE

## 2019-09-30 PROCEDURE — 80053 COMPREHEN METABOLIC PANEL: CPT | Performed by: FAMILY MEDICINE

## 2019-09-30 PROCEDURE — 83735 ASSAY OF MAGNESIUM: CPT | Performed by: FAMILY MEDICINE

## 2019-09-30 RX ORDER — IPRATROPIUM BROMIDE 17 UG/1
2 AEROSOL, METERED RESPIRATORY (INHALATION)
Qty: 1 INHALER | Refills: 11 | Status: SHIPPED | OUTPATIENT
Start: 2019-09-30 | End: 2020-02-19 | Stop reason: SDUPTHER

## 2019-09-30 RX ORDER — DIAZEPAM 5 MG/1
5 TABLET ORAL 2 TIMES DAILY PRN
Qty: 60 TABLET | Refills: 0 | Status: SHIPPED | OUTPATIENT
Start: 2019-09-30 | End: 2019-10-30 | Stop reason: SDUPTHER

## 2019-09-30 RX ORDER — HYDROCODONE BITARTRATE AND ACETAMINOPHEN 10; 325 MG/1; MG/1
1 TABLET ORAL EVERY 6 HOURS PRN
Qty: 120 TABLET | Refills: 0 | Status: SHIPPED | OUTPATIENT
Start: 2019-09-30 | End: 2019-10-30 | Stop reason: SDUPTHER

## 2019-09-30 RX ORDER — ALBUTEROL SULFATE 90 UG/1
2 AEROSOL, METERED RESPIRATORY (INHALATION) EVERY 6 HOURS PRN
Qty: 18 G | Refills: 11 | Status: SHIPPED | OUTPATIENT
Start: 2019-09-30 | End: 2020-02-19 | Stop reason: SDUPTHER

## 2019-09-30 RX ORDER — GABAPENTIN 300 MG/1
300 CAPSULE ORAL 3 TIMES DAILY
Qty: 90 CAPSULE | Refills: 0 | Status: SHIPPED | OUTPATIENT
Start: 2019-09-30 | End: 2019-10-30 | Stop reason: SDUPTHER

## 2019-09-30 NOTE — PROGRESS NOTES
Subjective   Adriana Nuñez is a 64 y.o. female.     Back Pain   This is a chronic problem. The current episode started more than 1 year ago. The problem occurs constantly. The problem has been waxing and waning since onset. The pain is present in the lumbar spine. The quality of the pain is described as aching. The pain radiates to the right knee and left knee. The pain is at a severity of 10/10. The pain is moderate. The pain is worse during the day. The symptoms are aggravated by position and bending. Pertinent negatives include no bladder incontinence, bowel incontinence, chest pain or fever.   Anxiety   Presents for follow-up visit. Symptoms include compulsions, excessive worry, nervous/anxious behavior and shortness of breath. Patient reports no chest pain, depressed mood, insomnia, irritability, palpitations or suicidal ideas.       Hyperlipidemia   This is a chronic problem. The current episode started more than 1 year ago. The problem is uncontrolled. Recent lipid tests were reviewed and are high. Associated symptoms include myalgias and shortness of breath. Pertinent negatives include no chest pain.   COPD   She complains of shortness of breath. There is no cough, hemoptysis, sputum production or wheezing. This is a chronic problem. The current episode started more than 1 year ago. The problem occurs constantly. The problem has been waxing and waning. Associated symptoms include myalgias. Pertinent negatives include no chest pain, fever or heartburn.   Heartburn   She reports no chest pain, no coughing, no heartburn or no wheezing. This is a chronic problem. The current episode started more than 1 year ago. The problem occurs rarely.        The following portions of the patient's history were reviewed and updated as appropriate: allergies, current medications, past family history, past medical history, past social history, past surgical history and problem list.    Review of Systems   Constitutional:  Negative for fever and irritability.   Respiratory: Positive for shortness of breath. Negative for cough, hemoptysis, sputum production and wheezing.    Cardiovascular: Negative for chest pain, palpitations and leg swelling.   Gastrointestinal: Negative for bowel incontinence and heartburn.   Genitourinary: Negative for bladder incontinence.   Musculoskeletal: Positive for back pain and myalgias.   Skin: Negative for rash and wound.   Psychiatric/Behavioral: Positive for dysphoric mood and sleep disturbance. Negative for suicidal ideas. The patient is nervous/anxious. The patient does not have insomnia.        Objective   Physical Exam   Constitutional: She is oriented to person, place, and time. She appears well-developed and well-nourished. No distress.   HENT:   Head: Normocephalic and atraumatic.   Cardiovascular: Normal rate, regular rhythm, normal heart sounds and intact distal pulses. Exam reveals no gallop and no friction rub.   No murmur heard.  Pulmonary/Chest: Effort normal. No respiratory distress. She has decreased breath sounds. She has no wheezes. She has rhonchi. She has no rales.   Abdominal: Soft. Bowel sounds are normal.   Musculoskeletal:        Lumbar back: She exhibits decreased range of motion, tenderness and pain. She exhibits no bony tenderness, no swelling, no edema, no deformity, no laceration and no spasm.   Lymphadenopathy:        Head (right side): No submental and no submandibular adenopathy present.        Head (left side): No submental and no submandibular adenopathy present.   Neurological: She is alert and oriented to person, place, and time.   Reflex Scores:       Patellar reflexes are 2+ on the right side and 2+ on the left side.  Skin: Skin is warm and dry. She is not diaphoretic.   Psychiatric: Judgment and thought content normal. Her mood appears anxious. Her speech is rapid and/or pressured. She is hyperactive.   Nursing note and vitals reviewed.      Assessment/Plan     Diagnosis Plan   1. Chronic midline low back pain without sciatica     2. Generalized anxiety disorder     3. Chronic bronchitis, unspecified chronic bronchitis type (CMS/HCC)     4. Polymyalgia rheumatica (CMS/HCC)     5. Pure hypercholesterolemia  Comprehensive Metabolic Panel    Lipid Panel   6. Gastroesophageal reflux disease without esophagitis  Vitamin B12    Magnesium   7. Pneumococcal vaccination administered at current visit  pneumococcal conj. 13-valent (PREVNAR-13) vaccine 0.5 mL         ME = 40 . Risk of valium and hydrocodone discussed  Recommend quit smoking   annie pect OCD, declined treatment for it. Declined hyperlipidemia treatment

## 2019-10-01 ENCOUNTER — TELEPHONE (OUTPATIENT)
Dept: FAMILY MEDICINE CLINIC | Facility: CLINIC | Age: 64
End: 2019-10-01

## 2019-10-01 DIAGNOSIS — R10.13 EPIGASTRIC PAIN: ICD-10-CM

## 2019-10-01 LAB
ALBUMIN SERPL-MCNC: 4.2 G/DL (ref 3.5–5.2)
ALBUMIN/GLOB SERPL: 1.8 G/DL
ALP SERPL-CCNC: 117 U/L (ref 39–117)
ALT SERPL W P-5'-P-CCNC: 11 U/L (ref 1–33)
ANION GAP SERPL CALCULATED.3IONS-SCNC: 12.7 MMOL/L (ref 5–15)
AST SERPL-CCNC: 16 U/L (ref 1–32)
BILIRUB SERPL-MCNC: 0.3 MG/DL (ref 0.2–1.2)
BUN BLD-MCNC: 11 MG/DL (ref 8–23)
BUN/CREAT SERPL: 13.6 (ref 7–25)
CALCIUM SPEC-SCNC: 9.2 MG/DL (ref 8.6–10.5)
CHLORIDE SERPL-SCNC: 97 MMOL/L (ref 98–107)
CHOLEST SERPL-MCNC: 245 MG/DL (ref 0–200)
CO2 SERPL-SCNC: 28.3 MMOL/L (ref 22–29)
CREAT BLD-MCNC: 0.81 MG/DL (ref 0.57–1)
GFR SERPL CREATININE-BSD FRML MDRD: 71 ML/MIN/1.73
GLOBULIN UR ELPH-MCNC: 2.4 GM/DL
GLUCOSE BLD-MCNC: 93 MG/DL (ref 65–99)
HDLC SERPL-MCNC: 43 MG/DL (ref 40–60)
LDLC SERPL CALC-MCNC: 165 MG/DL (ref 0–100)
LDLC/HDLC SERPL: 3.84 {RATIO}
MAGNESIUM SERPL-MCNC: 2.2 MG/DL (ref 1.6–2.4)
POTASSIUM BLD-SCNC: 4.3 MMOL/L (ref 3.5–5.2)
PROT SERPL-MCNC: 6.6 G/DL (ref 6–8.5)
SODIUM BLD-SCNC: 138 MMOL/L (ref 136–145)
TRIGL SERPL-MCNC: 185 MG/DL (ref 0–150)
VIT B12 BLD-MCNC: 604 PG/ML (ref 211–946)
VLDLC SERPL-MCNC: 37 MG/DL (ref 5–40)

## 2019-10-01 NOTE — TELEPHONE ENCOUNTER
Refill is not needed.  Script sent 09/12/19 #60 with 1 refill by     I have called [facility] they said the script is ready for pick-up    I have tried to call the patient to let her know this, no answer, No VM  I will relay this information to her when she calls back.

## 2019-10-01 NOTE — TELEPHONE ENCOUNTER
Adriana needs refill for dicyclomine (BENTYL) 20 MG tablet to [facility]

## 2019-10-03 ENCOUNTER — TELEPHONE (OUTPATIENT)
Dept: FAMILY MEDICINE CLINIC | Facility: CLINIC | Age: 64
End: 2019-10-03

## 2019-10-03 RX ORDER — SULFAMETHOXAZOLE AND TRIMETHOPRIM 400; 80 MG/1; MG/1
TABLET ORAL
Qty: 30 TABLET | Refills: 12
Start: 2019-10-03 | End: 2020-01-23

## 2019-10-03 RX ORDER — ATORVASTATIN CALCIUM 40 MG/1
40 TABLET, FILM COATED ORAL DAILY
Qty: 30 TABLET | Refills: 5 | Status: SHIPPED | OUTPATIENT
Start: 2019-10-03 | End: 2020-09-24

## 2019-10-03 NOTE — TELEPHONE ENCOUNTER
Ms. Nuñez has called back and has changed her mind, she has decided to try the Lipitor 40 mg.  Script has been sent to [facility]    ----- Message from Donato Donis MD sent at 10/1/2019  4:06 PM CDT -----  Cholesterol high.  Recommend Lipitor 40 mg daily.  Please call her and see if she is willing to take this.  She has declined this in the past.

## 2019-10-03 NOTE — TELEPHONE ENCOUNTER
Per Dr. Donis, Ms. Nuñez has been called with recent lab results & recommendations.  Continue current medications and follow-up as planned or sooner if any problems.    She does not want to take the medication at this time.      ----- Message from Donato Donsi MD sent at 10/1/2019  4:06 PM CDT -----  Cholesterol high.  Recommend Lipitor 40 mg daily.  Please call her and see if she is willing to take this.  She has declined this in the past.

## 2019-10-09 ENCOUNTER — TELEPHONE (OUTPATIENT)
Dept: GASTROENTEROLOGY | Facility: CLINIC | Age: 64
End: 2019-10-09

## 2019-10-09 NOTE — TELEPHONE ENCOUNTER
10/09/2019, 1059 - Patient telephone call returned per this staff member [phone] regarding patient request for [facility] ([facility]) assistance in transporting grandson to [facility], [place], [place] as she is unable to personally transport grandson due to decreased blood pressure and lack of vehicle.  Patient made aware this staff member has spoken with Ruthie at [facility] ([facility]) [phone], Option 1 ([phone]).  Ruthie stated [facility] will contact patient to further discuss her requested need for transportation for grandson.  Patient verbalized understanding.    Note - Patient blood pressure decreased during clinical appointment with Dr. Aric Flower M.D. 04/25/2019 as reflected per clinical documentation (100/70) .  Since this time, patient blood pressure has not been decreased (07/10/2019, 137/87, 08/27/2019, 126/78, 09/12/2019, 140/78, and 09/30/2019, 130/80).  Per Ruthie with [facility], patient decreased blood pressure must be an ongoing issue preventing patient from transporting grandson to requested destination.

## 2019-10-30 ENCOUNTER — OFFICE VISIT (OUTPATIENT)
Dept: FAMILY MEDICINE CLINIC | Facility: CLINIC | Age: 64
End: 2019-10-30

## 2019-10-30 ENCOUNTER — TELEPHONE (OUTPATIENT)
Dept: FAMILY MEDICINE CLINIC | Facility: CLINIC | Age: 64
End: 2019-10-30

## 2019-10-30 VITALS
HEART RATE: 90 BPM | WEIGHT: 125 LBS | SYSTOLIC BLOOD PRESSURE: 126 MMHG | BODY MASS INDEX: 21.34 KG/M2 | DIASTOLIC BLOOD PRESSURE: 80 MMHG | HEIGHT: 64 IN | OXYGEN SATURATION: 95 %

## 2019-10-30 DIAGNOSIS — F41.1 GENERALIZED ANXIETY DISORDER: ICD-10-CM

## 2019-10-30 DIAGNOSIS — G89.29 CHRONIC MIDLINE LOW BACK PAIN WITHOUT SCIATICA: Primary | ICD-10-CM

## 2019-10-30 DIAGNOSIS — M54.50 CHRONIC MIDLINE LOW BACK PAIN WITHOUT SCIATICA: Primary | ICD-10-CM

## 2019-10-30 PROCEDURE — 99214 OFFICE O/P EST MOD 30 MIN: CPT | Performed by: FAMILY MEDICINE

## 2019-10-30 PROCEDURE — G0439 PPPS, SUBSEQ VISIT: HCPCS | Performed by: FAMILY MEDICINE

## 2019-10-30 RX ORDER — GABAPENTIN 300 MG/1
300 CAPSULE ORAL 3 TIMES DAILY
Qty: 90 CAPSULE | Refills: 0 | Status: SHIPPED | OUTPATIENT
Start: 2019-10-30 | End: 2019-10-30 | Stop reason: SDUPTHER

## 2019-10-30 RX ORDER — DIAZEPAM 5 MG/1
5 TABLET ORAL 2 TIMES DAILY PRN
Qty: 60 TABLET | Refills: 0 | Status: SHIPPED | OUTPATIENT
Start: 2019-10-30 | End: 2019-11-27 | Stop reason: SDUPTHER

## 2019-10-30 RX ORDER — GABAPENTIN 300 MG/1
300 CAPSULE ORAL 3 TIMES DAILY
Qty: 90 CAPSULE | Refills: 0 | Status: SHIPPED | OUTPATIENT
Start: 2019-10-30 | End: 2019-11-27 | Stop reason: SDUPTHER

## 2019-10-30 RX ORDER — HYDROCODONE BITARTRATE AND ACETAMINOPHEN 10; 325 MG/1; MG/1
1 TABLET ORAL EVERY 6 HOURS PRN
Qty: 120 TABLET | Refills: 0 | Status: SHIPPED | OUTPATIENT
Start: 2019-10-30 | End: 2019-11-27 | Stop reason: SDUPTHER

## 2019-10-30 NOTE — PROGRESS NOTES
The ABCs of the Annual Wellness Visit  Initial Medicare Wellness Visit    Chief Complaint   Patient presents with   • Annual Exam     [facility]       Subjective   History of Present Illness:  Adriana Nuñez is a 64 y.o. female who presents for an Initial Medicare Wellness Visit.    HEALTH RISK ASSESSMENT    Recent Hospitalizations:  No hospitalization(s) within the last year.    Current Medical Providers:  Patient Care Team:  Donato Donis MD as PCP - General  Kevin Yoon APRN as Nurse Practitioner (Orthopedic Surgery)    Smoking Status:  Social History     Tobacco Use   Smoking Status Current Every Day Smoker   • Packs/day: 1.00   • Types: Cigars, Cigarettes   Smokeless Tobacco Never Used   Tobacco Comment    09/12/2019 - Patient states she has utilized tobacco products periodically since the age of 11.       Alcohol Consumption:  Social History     Substance and Sexual Activity   Alcohol Use No       Depression Screen:   PHQ-2/PHQ-9 Depression Screening 10/30/2019   Little interest or pleasure in doing things 0   Feeling down, depressed, or hopeless 0   Trouble falling or staying asleep, or sleeping too much 0   Feeling tired or having little energy 2   Poor appetite or overeating 2   Feeling bad about yourself - or that you are a failure or have let yourself or your family down 0   Trouble concentrating on things, such as reading the newspaper or watching television 0   Moving or speaking so slowly that other people could have noticed. Or the opposite - being so fidgety or restless that you have been moving around a lot more than usual 0   Thoughts that you would be better off dead, or of hurting yourself in some way 0   Total Score 4   If you checked off any problems, how difficult have these problems made it for you to do your work, take care of things at home, or get along with other people? Not difficult at all       Fall Risk Screen:  HORACIO Fall Risk Assessment has not been completed.    Health  Habits and Functional and Cognitive Screening:  Functional & Cognitive Status 10/30/2019   Do you have difficulty preparing food and eating? No   Do you have difficulty bathing yourself, getting dressed or grooming yourself? No   Do you have difficulty using the toilet? No   Do you have difficulty moving around from place to place? No   Do you have trouble with steps or getting out of a bed or a chair? No   Current Diet Well Balanced Diet   Dental Exam Up to date   Eye Exam Up to date   Exercise (times per week) 7 times per week   Current Exercise Activities Include Walking   Do you need help using the phone?  No   Are you deaf or do you have serious difficulty hearing?  No   Do you need help with transportation? No   Do you need help shopping? No   Do you need help preparing meals?  No   Do you need help with housework?  No   Do you need help with laundry? No   Do you need help taking your medications? No   Do you need help managing money? No   Do you ever drive or ride in a car without wearing a seat belt? No   Have you felt unusual stress, anger or loneliness in the last month? No   Who do you live with? Other   If you need help, do you have trouble finding someone available to you? No   Have you been bothered in the last four weeks by sexual problems? No   Do you have difficulty concentrating, remembering or making decisions? No         Does the patient have evidence of cognitive impairment? No    Asprin use counseling: NOT ON IT    Age-appropriate Screening Schedule:  Refer to the list below for future screening recommendations based on patient's age, sex and/or medical conditions. Orders for these recommended tests are listed in the plan section. The patient has been provided with a written plan.    Health Maintenance   Topic Date Due   • ZOSTER VACCINE (2 of 2) 10/20/2016   • MAMMOGRAM  11/03/2019   • DXA SCAN  11/03/2019   • PNEUMOCOCCAL VACCINES (65+ LOW/MEDIUM RISK) (2 of 2 - PPSV23) 09/30/2020   • LIPID  PANEL  09/30/2020   • COLONOSCOPY  04/17/2024   • TDAP/TD VACCINES (2 - Td) 06/18/2024   • INFLUENZA VACCINE  Addressed   • PAP SMEAR  Discontinued          The following portions of the patient's history were reviewed and updated as appropriate: allergies, current medications, past family history, past medical history, past social history, past surgical history and problem list.    Outpatient Medications Prior to Visit   Medication Sig Dispense Refill   • albuterol sulfate HFA (VENTOLIN HFA) 108 (90 Base) MCG/ACT inhaler Inhale 2 puffs Every 6 (Six) Hours As Needed for Wheezing. 18 g 11   • atorvastatin (LIPITOR) 40 MG tablet Take 1 tablet by mouth Daily. 30 tablet 5   • ATROVENT HFA 17 MCG/ACT inhaler Inhale 2 puffs 4 (Four) Times a Day. 1 inhaler 11   • dicyclomine (BENTYL) 20 MG tablet Take 1 tablet by mouth 2 (Two) Times a Day. 60 tablet 1   • fluconazole (DIFLUCAN) 200 MG tablet Take 1 tablet by mouth Daily. 5 tablet 0   • fluticasone (FLONASE) 50 MCG/ACT nasal spray USE 2 SPRAYS INTO EACH NOSTRIL DAILY 16 g 0   • naloxone (NARCAN) 4 MG/0.1ML nasal spray 1 spray into the nostril(s) as directed by provider As Needed (accidental OD). If used, call 911 1 each 2   • nitrofurantoin (MACRODANTIN) 100 MG capsule Take 1 capsule by mouth Daily.  3   • nystatin (MYCOSTATIN) 807102 UNIT/ML suspension Take 5 mL by mouth 4 (Four) Times a Day. 150 mL 0   • omeprazole (priLOSEC) 20 MG capsule TAKE ONE CAPSULE BY MOUTH TWO TIMES A DAY 60 capsule 5   • predniSONE (DELTASONE) 10 MG tablet Take 1 tablet by mouth Daily. 2/day for 7 days then 1/day 30 tablet 12   • sulfamethoxazole-trimethoprim (BACTRIM,SEPTRA) 400-80 MG tablet Take 1 tablet each night at bedtime to help prevent UTI's. Prescribed by another provider 30 tablet 12   • diazePAM (VALIUM) 5 MG tablet Take 1 tablet by mouth 2 (Two) Times a Day As Needed for Anxiety. 60 tablet 0   • gabapentin (NEURONTIN) 300 MG capsule Take 1 capsule by mouth 3 (Three) Times a Day. 90  "capsule 0   • HYDROcodone-acetaminophen (NORCO)  MG per tablet Take 1 tablet by mouth Every 6 (Six) Hours As Needed for Moderate Pain . 120 tablet 0     No facility-administered medications prior to visit.        Patient Active Problem List   Diagnosis   • Vitamin D deficiency   • Urinary tract infection   • \   • Polymyalgia rheumatica (CMS/HCC)   • Peripheral nerve disease   • Chronic midline low back pain without sciatica   • Insomnia   • Hyperlipidemia   • Heavy tobacco smoker   • Generalized anxiety disorder   • Elevated levels of transaminase & lactic acid dehydrogenase   • Depressive disorder   • Chronic obstructive lung disease (CMS/HCC)   • Blood in urine seeing urology.   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   • GONZALES III (vulvar intraepithelial neoplasia III)   • TMJ (dislocation of temporomandibular joint)   • Gastroesophageal reflux disease without esophagitis   • Bartholin's cyst   • Vaginal atrophy   • Right lower quadrant abdominal pain   • Epigastric pain   • Lower abdominal pain   • Abdominal pain   • Change in bowel habits       Advanced Care Planning:  Patient does not have an advance directive - information provided to the patient today    Review of Systems    Compared to one year ago, the patient feels her physical health is better.  Compared to one year ago, the patient feels her mental health is better.    Reviewed chart for potential of high risk medication in the elderly: yes  Reviewed chart for potential of harmful drug interactions in the elderly:yes    Objective         Vitals:    10/30/19 1408   BP: 126/80   Pulse: 90   SpO2: 95%   Weight: 56.7 kg (125 lb)   Height: 162.6 cm (64\")   PainSc:   8   PainLoc: Generalized       Body mass index is 21.46 kg/m².  Discussed the patient's BMI with her. The BMI is in the acceptable range.    Physical Exam    Lab Results   Component Value Date    TRIG 185 (H) 09/30/2019    HDL 43 09/30/2019     (H) 09/30/2019 "    VLDL 37 09/30/2019        Assessment/Plan   Medicare Risks and Personalized Health Plan  CMS Preventative Services Quick Reference  Advance Directive Discussion    The above risks/problems have been discussed with the patient.  Pertinent information has been shared with the patient in the After Visit Summary.  Follow up plans and orders are seen below in the Assessment/Plan Section.    Diagnoses and all orders for this visit:    1. Chronic midline low back pain without sciatica (Primary)    2. Generalized anxiety disorder    Other orders  -     Discontinue: gabapentin (NEURONTIN) 300 MG capsule; Take 1 capsule by mouth 3 (Three) Times a Day.  Dispense: 90 capsule; Refill: 0  -     diazePAM (VALIUM) 5 MG tablet; Take 1 tablet by mouth 2 (Two) Times a Day As Needed for Anxiety.  Dispense: 60 tablet; Refill: 0  -     HYDROcodone-acetaminophen (NORCO)  MG per tablet; Take 1 tablet by mouth Every 6 (Six) Hours As Needed for Moderate Pain .  Dispense: 120 tablet; Refill: 0  -     gabapentin (NEURONTIN) 300 MG capsule; Take 1 capsule by mouth 3 (Three) Times a Day.  Dispense: 90 capsule; Refill: 0      Follow Up:  Return in about 4 weeks (around 11/27/2019) for Recheck.     An After Visit Summary and PPPS were given to the patient.

## 2019-10-30 NOTE — ADDENDUM NOTE
Addended by: SHEA CANSECO on: 10/30/2019 02:46 PM     Modules accepted: Orders, Level of Service

## 2019-10-30 NOTE — PROGRESS NOTES
Subjective   Adriana Nuñez is a 64 y.o. female.     Back Pain   This is a chronic problem. The current episode started more than 1 year ago. The problem occurs constantly. The problem has been waxing and waning since onset. The pain is present in the lumbar spine. The quality of the pain is described as aching. The pain radiates to the right knee and left knee. The pain is at a severity of 10/10. The pain is moderate. The pain is worse during the day. The symptoms are aggravated by position and bending. Pertinent negatives include no bladder incontinence, bowel incontinence or fever.   Anxiety   Presents for follow-up visit. Symptoms include compulsions, excessive worry and nervous/anxious behavior. Patient reports no depressed mood, insomnia or irritability.            The following portions of the patient's history were reviewed and updated as appropriate: allergies, current medications, past family history, past medical history, past social history, past surgical history and problem list.    Review of Systems   Constitutional: Negative for fever and irritability.   Gastrointestinal: Negative for bowel incontinence.   Genitourinary: Negative for bladder incontinence.   Musculoskeletal: Positive for back pain and myalgias.   Psychiatric/Behavioral: The patient is nervous/anxious. The patient does not have insomnia.        Objective   Physical Exam   Constitutional: She is oriented to person, place, and time. She appears well-developed and well-nourished. No distress.   HENT:   Head: Normocephalic and atraumatic.   Right Ear: Hearing and tympanic membrane normal.   Left Ear: Hearing and tympanic membrane normal.   Musculoskeletal:        Lumbar back: She exhibits decreased range of motion, tenderness and pain. She exhibits no bony tenderness, no swelling, no edema, no deformity, no laceration and no spasm.     Vascular Status -  Her right foot exhibits normal foot vasculature  and no edema. Her left foot  "exhibits normal foot vasculature  and no edema.  Neurological: She is alert and oriented to person, place, and time.   Reflex Scores:       Patellar reflexes are 2+ on the right side and 2+ on the left side.  Skin: Skin is warm and dry. She is not diaphoretic.   Psychiatric: Judgment and thought content normal. Her mood appears anxious. Her speech is rapid and/or pressured. She is hyperactive. Cognition and memory are normal.   Nursing note and vitals reviewed.  /80   Pulse 90   Ht 162.6 cm (64\")   Wt 56.7 kg (125 lb)   SpO2 95%   BMI 21.46 kg/m²     Assessment/Plan   Problems Addressed this Visit        Nervous and Auditory    Chronic midline low back pain without sciatica - Primary       Other    Generalized anxiety disorder    Relevant Medications    diazePAM (VALIUM) 5 MG tablet        ME = 39 AND ON VALIUM. RISK DISCUSSED   annie chavez OCD, declined treatment for it  "

## 2019-11-23 DIAGNOSIS — R10.13 EPIGASTRIC PAIN: ICD-10-CM

## 2019-11-26 RX ORDER — DICYCLOMINE HCL 20 MG
TABLET ORAL
Qty: 60 TABLET | Refills: 1 | Status: SHIPPED | OUTPATIENT
Start: 2019-11-26 | End: 2020-02-06

## 2019-11-27 ENCOUNTER — OFFICE VISIT (OUTPATIENT)
Dept: FAMILY MEDICINE CLINIC | Facility: CLINIC | Age: 64
End: 2019-11-27

## 2019-11-27 VITALS
HEART RATE: 89 BPM | BODY MASS INDEX: 20.4 KG/M2 | OXYGEN SATURATION: 97 % | SYSTOLIC BLOOD PRESSURE: 124 MMHG | DIASTOLIC BLOOD PRESSURE: 78 MMHG | HEIGHT: 64 IN | WEIGHT: 119.5 LBS

## 2019-11-27 DIAGNOSIS — F41.1 GENERALIZED ANXIETY DISORDER: ICD-10-CM

## 2019-11-27 DIAGNOSIS — G89.29 CHRONIC MIDLINE LOW BACK PAIN WITHOUT SCIATICA: Primary | ICD-10-CM

## 2019-11-27 DIAGNOSIS — M54.50 CHRONIC MIDLINE LOW BACK PAIN WITHOUT SCIATICA: Primary | ICD-10-CM

## 2019-11-27 PROCEDURE — 99214 OFFICE O/P EST MOD 30 MIN: CPT | Performed by: FAMILY MEDICINE

## 2019-11-27 RX ORDER — DIAZEPAM 5 MG/1
5 TABLET ORAL 2 TIMES DAILY PRN
Qty: 60 TABLET | Refills: 0 | Status: SHIPPED | OUTPATIENT
Start: 2019-11-27 | End: 2019-12-26 | Stop reason: SDUPTHER

## 2019-11-27 RX ORDER — METRONIDAZOLE 500 MG/1
TABLET ORAL
Refills: 4 | COMMUNITY
Start: 2019-11-22 | End: 2020-02-19

## 2019-11-27 RX ORDER — LIDOCAINE AND PRILOCAINE 25; 25 MG/G; MG/G
CREAM TOPICAL
Refills: 3 | COMMUNITY
Start: 2019-11-04 | End: 2021-03-01 | Stop reason: SDUPTHER

## 2019-11-27 RX ORDER — GABAPENTIN 300 MG/1
300 CAPSULE ORAL 3 TIMES DAILY
Qty: 90 CAPSULE | Refills: 0 | Status: SHIPPED | OUTPATIENT
Start: 2019-11-27 | End: 2019-12-26 | Stop reason: SDUPTHER

## 2019-11-27 RX ORDER — HYDROCODONE BITARTRATE AND ACETAMINOPHEN 10; 325 MG/1; MG/1
1 TABLET ORAL EVERY 6 HOURS PRN
Qty: 120 TABLET | Refills: 0 | Status: SHIPPED | OUTPATIENT
Start: 2019-11-27 | End: 2019-12-26 | Stop reason: SDUPTHER

## 2019-11-27 NOTE — PROGRESS NOTES
Subjective   Adriana Nuñez is a 64 y.o. female.     Back Pain   This is a chronic problem. The current episode started more than 1 year ago. The problem occurs constantly. The problem has been waxing and waning since onset. The pain is present in the lumbar spine. The quality of the pain is described as aching. The pain radiates to the right knee and left knee. The pain is at a severity of 8/10. The pain is moderate. The pain is worse during the day. The symptoms are aggravated by position and bending. Pertinent negatives include no bladder incontinence, bowel incontinence or fever.   Anxiety   Presents for follow-up visit. Symptoms include compulsions, excessive worry and nervous/anxious behavior. Patient reports no depressed mood, insomnia or irritability.            The following portions of the patient's history were reviewed and updated as appropriate: allergies, current medications, past family history, past medical history, past social history, past surgical history and problem list.    Review of Systems   Constitutional: Negative for fever and irritability.   Gastrointestinal: Negative for bowel incontinence.   Genitourinary: Negative for bladder incontinence.   Musculoskeletal: Positive for back pain and myalgias.   Psychiatric/Behavioral: The patient is nervous/anxious. The patient does not have insomnia.        Objective   Physical Exam   Constitutional: She is oriented to person, place, and time. She appears well-developed and well-nourished. No distress.   HENT:   Head: Normocephalic and atraumatic.   Right Ear: Hearing and tympanic membrane normal.   Left Ear: Hearing and tympanic membrane normal.   Musculoskeletal:        Lumbar back: She exhibits decreased range of motion, tenderness and pain. She exhibits no bony tenderness, no swelling, no edema, no deformity, no laceration and no spasm.     Vascular Status -  Her right foot exhibits normal foot vasculature  and no edema. Her left foot  "exhibits normal foot vasculature  and no edema.  Neurological: She is alert and oriented to person, place, and time.   Reflex Scores:       Patellar reflexes are 2+ on the right side and 2+ on the left side.  Skin: Skin is warm and dry. She is not diaphoretic.   Psychiatric: Judgment and thought content normal. Her mood appears anxious. Her speech is rapid and/or pressured. She is hyperactive. Cognition and memory are normal.   Nursing note and vitals reviewed.  /78   Pulse 89   Ht 162.6 cm (64\")   Wt 54.2 kg (119 lb 8 oz)   SpO2 97%   BMI 20.51 kg/m²     Assessment/Plan   Problems Addressed this Visit        Nervous and Auditory    Chronic midline low back pain without sciatica - Primary       Other    Generalized anxiety disorder    Relevant Medications    diazePAM (VALIUM) 5 MG tablet        ME = 39 AND ON VALIUM. RISK DISCUSSED   annie pect OCD, declined treatment for it      Visit Vitals  /78   Pulse 89   Ht 162.6 cm (64\")   Wt 54.2 kg (119 lb 8 oz)   SpO2 97%   BMI 20.51 kg/m²       Body mass index is 20.51 kg/m².            This document has been electronically signed by Donato Donis MD on November 27, 2019 10:34 AM    "

## 2019-12-05 ENCOUNTER — TELEPHONE (OUTPATIENT)
Dept: GASTROENTEROLOGY | Facility: CLINIC | Age: 64
End: 2019-12-05

## 2019-12-05 NOTE — TELEPHONE ENCOUNTER
"12/05/2019, 1517 - Patient telephone call returned per this staff member [phone].  Zero answer.  Zero option to submit voice message - \"wireless customer you are calling is not available; try your call again later\".    12/05/2019, 1519 - Patient's Emergency Contact/Daughter, Kellen Nuñez, telephoned per this staff member [phone].  Zero answer.  Voice message submitted with date, time, office contact information, and request to relay message to patient jaya Nuñez to contact office of Dr. Aric Flower M.D. at earliest convenience regarding patient message of experiencing \"issues\".  "

## 2019-12-05 NOTE — TELEPHONE ENCOUNTER
----- Message from Candie Gil sent at 12/5/2019  9:24 AM CST -----  Contact: [phone]  Patient would like you to call her is having some issues

## 2019-12-26 ENCOUNTER — OFFICE VISIT (OUTPATIENT)
Dept: FAMILY MEDICINE CLINIC | Facility: CLINIC | Age: 64
End: 2019-12-26

## 2019-12-26 VITALS
HEART RATE: 76 BPM | WEIGHT: 118.7 LBS | HEIGHT: 64 IN | BODY MASS INDEX: 20.26 KG/M2 | OXYGEN SATURATION: 96 % | SYSTOLIC BLOOD PRESSURE: 110 MMHG | DIASTOLIC BLOOD PRESSURE: 68 MMHG

## 2019-12-26 DIAGNOSIS — F41.1 GENERALIZED ANXIETY DISORDER: ICD-10-CM

## 2019-12-26 DIAGNOSIS — M35.3 POLYMYALGIA RHEUMATICA (HCC): ICD-10-CM

## 2019-12-26 DIAGNOSIS — M54.50 CHRONIC MIDLINE LOW BACK PAIN WITHOUT SCIATICA: Primary | ICD-10-CM

## 2019-12-26 DIAGNOSIS — J42 CHRONIC BRONCHITIS, UNSPECIFIED CHRONIC BRONCHITIS TYPE (HCC): ICD-10-CM

## 2019-12-26 DIAGNOSIS — G89.29 CHRONIC MIDLINE LOW BACK PAIN WITHOUT SCIATICA: Primary | ICD-10-CM

## 2019-12-26 DIAGNOSIS — K21.9 GASTROESOPHAGEAL REFLUX DISEASE WITHOUT ESOPHAGITIS: ICD-10-CM

## 2019-12-26 DIAGNOSIS — E78.00 PURE HYPERCHOLESTEROLEMIA: ICD-10-CM

## 2019-12-26 PROCEDURE — 99214 OFFICE O/P EST MOD 30 MIN: CPT | Performed by: FAMILY MEDICINE

## 2019-12-26 RX ORDER — GABAPENTIN 300 MG/1
300 CAPSULE ORAL 3 TIMES DAILY
Qty: 90 CAPSULE | Refills: 0 | Status: SHIPPED | OUTPATIENT
Start: 2019-12-26 | End: 2020-01-23 | Stop reason: SDUPTHER

## 2019-12-26 RX ORDER — HYDROCODONE BITARTRATE AND ACETAMINOPHEN 10; 325 MG/1; MG/1
1 TABLET ORAL EVERY 6 HOURS PRN
Qty: 120 TABLET | Refills: 0 | Status: SHIPPED | OUTPATIENT
Start: 2019-12-26 | End: 2020-01-23 | Stop reason: SDUPTHER

## 2019-12-26 RX ORDER — DIAZEPAM 5 MG/1
5 TABLET ORAL 2 TIMES DAILY PRN
Qty: 60 TABLET | Refills: 0 | Status: SHIPPED | OUTPATIENT
Start: 2019-12-26 | End: 2020-01-23 | Stop reason: SDUPTHER

## 2019-12-26 NOTE — PROGRESS NOTES
Subjective   Adriana Nuñez is a 64 y.o. female.     Back Pain   This is a chronic problem. The current episode started more than 1 year ago. The problem occurs constantly. The problem has been waxing and waning since onset. The pain is present in the lumbar spine. The quality of the pain is described as aching. The pain radiates to the right knee and left knee. The pain is at a severity of 10/10. The pain is severe. The pain is worse during the day. The symptoms are aggravated by position and bending. Pertinent negatives include no bladder incontinence, bowel incontinence, chest pain or fever.   Anxiety   Presents for follow-up visit. Symptoms include compulsions, excessive worry, nervous/anxious behavior and shortness of breath. Patient reports no chest pain, depressed mood, insomnia, irritability, palpitations or suicidal ideas.       Hyperlipidemia   This is a chronic problem. The current episode started more than 1 year ago. The problem is uncontrolled. Recent lipid tests were reviewed and are high. Associated symptoms include myalgias and shortness of breath. Pertinent negatives include no chest pain.   COPD   She complains of shortness of breath. There is no cough, hemoptysis, sputum production or wheezing. This is a chronic problem. The current episode started more than 1 year ago. The problem occurs constantly. The problem has been waxing and waning. Associated symptoms include myalgias. Pertinent negatives include no chest pain, fever or heartburn.   Heartburn   She reports no chest pain, no coughing, no heartburn or no wheezing. This is a chronic problem. The current episode started more than 1 year ago. The problem occurs rarely.        The following portions of the patient's history were reviewed and updated as appropriate: allergies, current medications, past family history, past medical history, past social history, past surgical history and problem list.    Review of Systems   Constitutional:  Negative for fever and irritability.   Respiratory: Positive for shortness of breath. Negative for cough, hemoptysis, sputum production and wheezing.    Cardiovascular: Negative for chest pain, palpitations and leg swelling.   Gastrointestinal: Negative for bowel incontinence and heartburn.   Genitourinary: Negative for bladder incontinence.   Musculoskeletal: Positive for back pain and myalgias.   Skin: Negative for rash and wound.   Psychiatric/Behavioral: Positive for dysphoric mood and sleep disturbance. Negative for suicidal ideas. The patient is nervous/anxious. The patient does not have insomnia.        Objective   Physical Exam   Constitutional: She is oriented to person, place, and time. She appears well-developed and well-nourished. No distress.   HENT:   Head: Normocephalic and atraumatic.   Cardiovascular: Normal rate, regular rhythm, normal heart sounds and intact distal pulses. Exam reveals no gallop and no friction rub.   No murmur heard.  Pulmonary/Chest: Effort normal. No respiratory distress. She has decreased breath sounds. She has no wheezes. She has rhonchi. She has no rales.   Abdominal: Soft. Bowel sounds are normal.   Musculoskeletal:        Lumbar back: She exhibits decreased range of motion, tenderness and pain. She exhibits no bony tenderness, no swelling, no edema, no deformity, no laceration and no spasm.   Lymphadenopathy:        Head (right side): No submental and no submandibular adenopathy present.        Head (left side): No submental and no submandibular adenopathy present.   Neurological: She is alert and oriented to person, place, and time.   Reflex Scores:       Patellar reflexes are 2+ on the right side and 2+ on the left side.  Skin: Skin is warm and dry. She is not diaphoretic.   Psychiatric: Judgment and thought content normal. Her mood appears anxious. Her speech is rapid and/or pressured. She is hyperactive.   Nursing note and vitals reviewed.      Assessment/Plan     "Diagnosis Plan   1. Chronic midline low back pain without sciatica  gabapentin (NEURONTIN) 300 MG capsule    HYDROcodone-acetaminophen (NORCO)  MG per tablet   2. Generalized anxiety disorder  diazePAM (VALIUM) 5 MG tablet   3. Chronic bronchitis, unspecified chronic bronchitis type (CMS/HCC)     4. Polymyalgia rheumatica (CMS/HCC)     5. Pure hypercholesterolemia     6. Gastroesophageal reflux disease without esophagitis           ME = 39 . Risk of valium and hydrocodone discussed  Recommend quit smoking   annie pect OCD, declined treatment for it. Declined hyperlipidemia treatment      Visit Vitals  /68   Pulse 76   Ht 162.6 cm (64\")   Wt 53.8 kg (118 lb 11.2 oz)   SpO2 96%   BMI 20.37 kg/m²       Body mass index is 20.37 kg/m².            This document has been electronically signed by Donato Donis MD on December 26, 2019 9:57 AM    "

## 2020-01-23 ENCOUNTER — OFFICE VISIT (OUTPATIENT)
Dept: FAMILY MEDICINE CLINIC | Facility: CLINIC | Age: 65
End: 2020-01-23

## 2020-01-23 VITALS
DIASTOLIC BLOOD PRESSURE: 70 MMHG | BODY MASS INDEX: 21.17 KG/M2 | SYSTOLIC BLOOD PRESSURE: 114 MMHG | WEIGHT: 124 LBS | HEIGHT: 64 IN | HEART RATE: 101 BPM | OXYGEN SATURATION: 97 %

## 2020-01-23 DIAGNOSIS — G89.29 CHRONIC MIDLINE LOW BACK PAIN WITHOUT SCIATICA: Primary | ICD-10-CM

## 2020-01-23 DIAGNOSIS — M54.50 CHRONIC MIDLINE LOW BACK PAIN WITHOUT SCIATICA: Primary | ICD-10-CM

## 2020-01-23 DIAGNOSIS — F41.1 GENERALIZED ANXIETY DISORDER: ICD-10-CM

## 2020-01-23 PROCEDURE — 99214 OFFICE O/P EST MOD 30 MIN: CPT | Performed by: FAMILY MEDICINE

## 2020-01-23 RX ORDER — HYDROCODONE BITARTRATE AND ACETAMINOPHEN 10; 325 MG/1; MG/1
1 TABLET ORAL EVERY 6 HOURS PRN
Qty: 120 TABLET | Refills: 0 | Status: SHIPPED | OUTPATIENT
Start: 2020-01-23 | End: 2020-02-19 | Stop reason: SDUPTHER

## 2020-01-23 RX ORDER — GABAPENTIN 300 MG/1
300 CAPSULE ORAL 3 TIMES DAILY
Qty: 90 CAPSULE | Refills: 0 | Status: SHIPPED | OUTPATIENT
Start: 2020-01-23 | End: 2020-02-19 | Stop reason: SDUPTHER

## 2020-01-23 RX ORDER — DIAZEPAM 5 MG/1
5 TABLET ORAL 2 TIMES DAILY PRN
Qty: 60 TABLET | Refills: 0 | Status: SHIPPED | OUTPATIENT
Start: 2020-01-23 | End: 2020-02-19 | Stop reason: SDUPTHER

## 2020-01-23 NOTE — PROGRESS NOTES
Subjective   Adriana Nuñez is a 64 y.o. female.     Back Pain   This is a chronic problem. The current episode started more than 1 year ago. The problem occurs constantly. The problem has been waxing and waning since onset. The pain is present in the lumbar spine. The quality of the pain is described as aching. The pain radiates to the right knee and left knee. The pain is at a severity of 8/10. The pain is moderate. The pain is worse during the day. The symptoms are aggravated by position and bending. Pertinent negatives include no bladder incontinence, bowel incontinence or fever.   Anxiety   Presents for follow-up visit. Symptoms include compulsions, excessive worry, nervous/anxious behavior and restlessness. Patient reports no depressed mood, insomnia or irritability. Symptoms occur constantly. The severity of symptoms is moderate.            The following portions of the patient's history were reviewed and updated as appropriate: allergies, current medications, past family history, past medical history, past social history, past surgical history and problem list.    Review of Systems   Constitutional: Negative for fever and irritability.   Gastrointestinal: Negative for bowel incontinence.   Genitourinary: Negative for bladder incontinence.   Musculoskeletal: Positive for back pain and myalgias.   Psychiatric/Behavioral: The patient is nervous/anxious. The patient does not have insomnia.        Objective   Physical Exam   Constitutional: She is oriented to person, place, and time. She appears well-developed and well-nourished. No distress.   HENT:   Head: Normocephalic and atraumatic.   Right Ear: Hearing and tympanic membrane normal.   Left Ear: Hearing and tympanic membrane normal.   Musculoskeletal:        Lumbar back: She exhibits decreased range of motion, tenderness and pain. She exhibits no bony tenderness, no swelling, no edema, no deformity, no laceration and no spasm.     Vascular Status -  Her  "right foot exhibits normal foot vasculature  and no edema. Her left foot exhibits normal foot vasculature  and no edema.  Neurological: She is alert and oriented to person, place, and time.   Reflex Scores:       Patellar reflexes are 2+ on the right side and 2+ on the left side.  Skin: Skin is warm and dry. She is not diaphoretic.   Psychiatric: Judgment and thought content normal. Her mood appears anxious. Her speech is rapid and/or pressured. She is hyperactive. Cognition and memory are normal.   Nursing note and vitals reviewed.  /70   Pulse 101   Ht 162.6 cm (64\")   Wt 56.2 kg (124 lb)   SpO2 97%   BMI 21.28 kg/m²     Assessment/Plan   Problems Addressed this Visit        Nervous and Auditory    Chronic midline low back pain without sciatica - Primary    Relevant Medications    gabapentin (NEURONTIN) 300 MG capsule    HYDROcodone-acetaminophen (NORCO)  MG per tablet       Other    Generalized anxiety disorder    Relevant Medications    diazePAM (VALIUM) 5 MG tablet        ME = 39 AND ON VALIUM. RISK DISCUSSED   annie pect OCD, declined treatment for it      Visit Vitals  /70   Pulse 101   Ht 162.6 cm (64\")   Wt 56.2 kg (124 lb)   SpO2 97%   BMI 21.28 kg/m²       Body mass index is 21.28 kg/m².            This document has been electronically signed by Donato Donis MD on January 23, 2020 9:56 AM        Visit Vitals  /70   Pulse 101   Ht 162.6 cm (64\")   Wt 56.2 kg (124 lb)   SpO2 97%   BMI 21.28 kg/m²       Body mass index is 21.28 kg/m².            This document has been electronically signed by Donato Donis MD on January 23, 2020 9:56 AM    "

## 2020-01-30 ENCOUNTER — HOSPITAL ENCOUNTER (OUTPATIENT)
Facility: HOSPITAL | Age: 65
Setting detail: HOSPITAL OUTPATIENT SURGERY
End: 2020-01-30
Attending: INTERNAL MEDICINE | Admitting: INTERNAL MEDICINE

## 2020-01-30 ENCOUNTER — OFFICE VISIT (OUTPATIENT)
Dept: GASTROENTEROLOGY | Facility: CLINIC | Age: 65
End: 2020-01-30

## 2020-01-30 VITALS
OXYGEN SATURATION: 93 % | HEIGHT: 64 IN | DIASTOLIC BLOOD PRESSURE: 60 MMHG | BODY MASS INDEX: 21.17 KG/M2 | WEIGHT: 124 LBS | HEART RATE: 98 BPM | SYSTOLIC BLOOD PRESSURE: 120 MMHG

## 2020-01-30 DIAGNOSIS — R10.13 EPIGASTRIC PAIN: Primary | ICD-10-CM

## 2020-01-30 PROCEDURE — 99213 OFFICE O/P EST LOW 20 MIN: CPT | Performed by: INTERNAL MEDICINE

## 2020-01-30 RX ORDER — DEXTROSE AND SODIUM CHLORIDE 5; .45 G/100ML; G/100ML
30 INJECTION, SOLUTION INTRAVENOUS CONTINUOUS PRN
Status: CANCELLED | OUTPATIENT
Start: 2020-03-09

## 2020-01-30 NOTE — PROGRESS NOTES
[facility]      Chief Complaint:   Chief Complaint   Patient presents with   • Abdominal Pain       Subjective     HPI:   Pt with continued abd pain, patient states that the pain is severe in nature, patient is worried about there being an abscess on the wall of the colon, pt had a colonoscopy which was normal,     Plan will schedule patient for an EGD.    Past Medical History:   Past Medical History:   Diagnosis Date   • Adenomatous polyp of colon    • Anorectal abscess    • Anxiety disorder    • Benign polyp of large intestine    • Bladder fistula      post rapair      • Blood in urine    • Chronic obstructive lung disease (CMS/HCC)    • Chronic urinary tract infection    • Depressive disorder    • Diarrhea    • Diverticulitis of colon      post colectomy      • Elevated levels of transaminase & lactic acid dehydrogenase    • Epigastric pain    • Generalized anxiety disorder    • Heavy tobacco smoker    • History of colon polyps    • Hyperlipidemia    • Insomnia    • Low back pain    • Lower urinary tract infectious disease    • Lymphadenopathy     ON CT   • Osteoporosis    • Peripheral nerve disease    • Polymyalgia rheumatica (CMS/HCC)    • Polyp of colon    • Urinary tract infection    • Vitamin D deficiency        Past Surgical History:  Past Surgical History:   Procedure Laterality Date   • COLON RESECTION     • COLONOSCOPY  03/17/2014    polyps   • COLONOSCOPY  02/08/2016   • COLONOSCOPY N/A 5/24/2017    Procedure: COLONOSCOPY;  Surgeon: Aric Pina MD;  Location: [facility] ENDOSCOPY;  Service:    • COLONOSCOPY N/A 4/17/2019    Procedure: COLONOSCOPY;  Surgeon: Aric Pina MD;  Location: [facility] ENDOSCOPY;  Service: Gastroenterology   • ENDOSCOPY  01/26/2015    Normal esophagus. Gastritis was found in the stomach. Biopsy taken. Normal duodenum   • ENDOSCOPY N/A 9/18/2017    Procedure: ESOPHAGOGASTRODUODENOSCOPY;  Surgeon: Aric Pina MD;  Location: [facility] ENDOSCOPY;   Service:    • ENDOSCOPY N/A 4/17/2019    Procedure: ESOPHAGOGASTRODUODENOSCOPY;  Surgeon: Aric Pina MD;  Location: [facility] ENDOSCOPY;  Service: Gastroenterology   • HYSTERECTOMY     • INJECTION OF MEDICATION  12/03/2010    KENALOG(3)   • INJECTION OF MEDICATION  11/07/2014    ROCEPHIN(1)   • UPPER GASTROINTESTINAL ENDOSCOPY  01/26/2015   • UPPER GASTROINTESTINAL ENDOSCOPY  09/18/2017   • UPPER GASTROINTESTINAL ENDOSCOPY  04/17/2019       Family History:  Family History   Problem Relation Age of Onset   • No Known Problems Sister    • No Known Problems Brother        Social History:   reports that she has been smoking cigars and cigarettes. She has been smoking about 1.00 pack per day. She has never used smokeless tobacco. She reports that she does not drink alcohol or use drugs.    Medications:   Prior to Admission medications    Medication Sig Start Date End Date Taking? Authorizing Provider   albuterol sulfate HFA (VENTOLIN HFA) 108 (90 Base) MCG/ACT inhaler Inhale 2 puffs Every 6 (Six) Hours As Needed for Wheezing. 9/30/19  Yes Donato Donis MD   ATROVENT HFA 17 MCG/ACT inhaler Inhale 2 puffs 4 (Four) Times a Day. 9/30/19  Yes Donato Donis MD   diazePAM (VALIUM) 5 MG tablet Take 1 tablet by mouth 2 (Two) Times a Day As Needed for Anxiety. 1/23/20  Yes Donato Donis MD   dicyclomine (BENTYL) 20 MG tablet TAKE ONE TABLET BY MOUTH TWICE DAILY 11/26/19  Yes Aric Pina MD   fluticasone (FLONASE) 50 MCG/ACT nasal spray USE 2 SPRAYS INTO EACH NOSTRIL DAILY 2/7/19  Yes Donato Donis MD   gabapentin (NEURONTIN) 300 MG capsule Take 1 capsule by mouth 3 (Three) Times a Day. 1/23/20  Yes Donato Donis MD   HYDROcodone-acetaminophen (NORCO)  MG per tablet Take 1 tablet by mouth Every 6 (Six) Hours As Needed for Moderate Pain . 1/23/20  Yes Donato Donis MD   lidocaine-prilocaine (EMLA) 2.5-2.5 % cream APPLY SMALL AMOUNT TO AFFECTED AREA FOR ITCHING AS NEEDED 11/4/19  Yes Provider,  "MD Felicia   naloxone (NARCAN) 4 MG/0.1ML nasal spray 1 spray into the nostril(s) as directed by provider As Needed (accidental OD). If used, call 911 7/26/18  Yes Donato Donis MD   nitrofurantoin (MACRODANTIN) 100 MG capsule Take 1 capsule by mouth Daily. 9/10/18  Yes Felicia Buchanan MD   omeprazole (priLOSEC) 20 MG capsule TAKE ONE CAPSULE BY MOUTH TWO TIMES A DAY 9/12/19  Yes Aric Pina MD   predniSONE (DELTASONE) 10 MG tablet Take 1 tablet by mouth Daily. 2/day for 7 days then 1/day 5/9/19  Yes Donato Donis MD   atorvastatin (LIPITOR) 40 MG tablet Take 1 tablet by mouth Daily. 10/3/19   Donato Donis MD   fluconazole (DIFLUCAN) 200 MG tablet Take 1 tablet by mouth Daily. 7/10/19   Gutierrez Bates MD   metroNIDAZOLE (FLAGYL) 500 MG tablet TAKE ONE TABLET BY MOUTH TWICE DAILY FOR 7 DAYS 11/22/19   ProviderFelicia MD   nystatin (MYCOSTATIN) 476321 UNIT/ML suspension Take 5 mL by mouth 4 (Four) Times a Day. 7/10/19   Gutierrez Bates MD       Allergies:  Fosamax [alendronate]    ROS:    Review of Systems   Constitutional: Negative for activity change, appetite change, chills, diaphoresis, fatigue, fever and unexpected weight change.   HENT: Negative for sore throat and trouble swallowing.    Respiratory: Negative for shortness of breath.    Gastrointestinal: Positive for abdominal pain. Negative for abdominal distention, anal bleeding, blood in stool, constipation, diarrhea, nausea, rectal pain and vomiting.   Endocrine: Negative for polydipsia, polyphagia and polyuria.   Genitourinary: Negative for difficulty urinating.   Musculoskeletal: Negative for arthralgias.   Skin: Negative for pallor.   Allergic/Immunologic: Negative for food allergies.   Neurological: Negative for weakness and light-headedness.   Psychiatric/Behavioral: Negative for behavioral problems.     Objective     Blood pressure 120/60, pulse 98, height 162.6 cm (64\"), weight 56.2 kg (124 lb), " SpO2 93 %.    Physical Exam   Constitutional: She is oriented to person, place, and time. She appears well-developed and well-nourished. No distress.   HENT:   Head: Normocephalic and atraumatic.   Cardiovascular: Normal rate, regular rhythm, normal heart sounds and intact distal pulses. Exam reveals no gallop and no friction rub.   No murmur heard.  Pulmonary/Chest: Breath sounds normal. No respiratory distress. She has no wheezes. She has no rales. She exhibits no tenderness.   Abdominal: Soft. Bowel sounds are normal. She exhibits no distension and no mass. There is no tenderness. There is no rebound and no guarding. No hernia.   Musculoskeletal: Normal range of motion. She exhibits no edema.   Neurological: She is alert and oriented to person, place, and time.   Skin: Skin is warm and dry. No rash noted. She is not diaphoretic. No erythema. No pallor.   Psychiatric: She has a normal mood and affect. Her behavior is normal. Judgment and thought content normal.        Assessment/Plan   Adriana was seen today for abdominal pain.    Diagnoses and all orders for this visit:    Epigastric pain  -     Case Request; Standing  -     Case Request    Other orders  -     Follow Anesthesia Guidelines / Standing Orders; Future  -     Obtain Informed Consent; Future        ESOPHAGOGASTRODUODENOSCOPY (N/A)     Diagnosis Plan   1. Epigastric pain  Case Request    dextrose 5 % and sodium chloride 0.45 % infusion    Case Request       Anticipated Surgical Procedure:  Orders Placed This Encounter   Procedures   • Follow Anesthesia Guidelines / Standing Orders     Standing Status:   Future   • Obtain Informed Consent     Standing Status:   Future     Order Specific Question:   Informed Consent Given For     Answer:   ESOPHAGOGASTRODUODENOSCOPY       The risks, benefits, and alternatives of this procedure have been discussed with the patient or the responsible party- the patient understands and agrees to  proceed.

## 2020-01-30 NOTE — PATIENT INSTRUCTIONS
"BMI for Adults    Body mass index (BMI) is a number that is calculated from a person's weight and height. BMI may help to estimate how much of a person's weight is composed of fat. BMI can help identify those who may be at higher risk for certain medical problems.  How is BMI used with adults?  BMI is used as a screening tool to identify possible weight problems. It is used to check whether a person is obese, overweight, healthy weight, or underweight.  How is BMI calculated?  BMI measures your weight and compares it to your height. This can be done either in English (U[place] or metric measurements. Note that charts are available to help you find your BMI quickly and easily without having to do these calculations yourself.  To calculate your BMI in English (U.S[place] measurements, your health care provider will:  1. Measure your weight in pounds (lb).  2. Multiply the number of pounds by 703.  ? For example, for a person who weighs 180 lb, multiply that number by 703, which equals 126,540.  3. Measure your height in inches (in). Then multiply that number by itself to get a measurement called \"inches squared.\"  ? For example, for a person who is 70 in tall, the \"inches squared\" measurement is 70 in x 70 in, which equals 4900 inches squared.  4. Divide the total from Step 2 (number of lb x 703) by the total from Step 3 (inches squared): 126,540 ÷ 4900 = 25.8. This is your BMI.  To calculate your BMI in metric measurements, your health care provider will:  1. Measure your weight in kilograms (kg).  2. Measure your height in meters (m). Then multiply that number by itself to get a measurement called \"meters squared.\"  ? For example, for a person who is 1.75 m tall, the \"meters squared\" measurement is 1.75 m x 1.75 m, which is equal to 3.1 meters squared.  3. Divide the number of kilograms (your weight) by the meters squared number. In this example: 70 ÷ 3.1 = 22.6. This is your BMI.  How is BMI interpreted?  To interpret your " results, your health care provider will use BMI charts to identify whether you are underweight, normal weight, overweight, or obese. The following guidelines will be used:  · Underweight: BMI less than 18.5.  · Normal weight: BMI between 18.5 and 24.9.  · Overweight: BMI between 25 and 29.9.  · Obese: BMI of 30 and above.  Please note:  · Weight includes both fat and muscle, so someone with a muscular build, such as an athlete, may have a BMI that is higher than 24.9. In cases like these, BMI is not an accurate measure of body fat.  · To determine if excess body fat is the cause of a BMI of 25 or higher, further assessments may need to be done by a health care provider.  · BMI is usually interpreted in the same way for men and women.  Why is BMI a useful tool?  BMI is useful in two ways:  · Identifying a weight problem that may be related to a medical condition, or that may increase the risk for medical problems.  · Promoting lifestyle and diet changes in order to reach a healthy weight.  Summary  · Body mass index (BMI) is a number that is calculated from a person's weight and height.  · BMI may help to estimate how much of a person's weight is composed of fat. BMI can help identify those who may be at higher risk for certain medical problems.  · BMI can be measured using English measurements or metric measurements.  · To interpret your results, your health care provider will use BMI charts to identify whether you are underweight, normal weight, overweight, or obese.  This information is not intended to replace advice given to you by your health care provider. Make sure you discuss any questions you have with your health care provider.  Document Released: 08/29/2005 Document Revised: 10/31/2018 Document Reviewed: 10/31/2018  [employer] Interactive Patient Education © 2019 [employer]  MyPlate from [employer]    MyPlate is an outline of a general healthy diet based on the 2010 Dietary Guidelines for Americans, from the  U.[place]. Department of Agriculture (USDA). It sets guidelines for how much food you should eat from each food group based on your age, sex, and level of physical activity.  What are tips for following MyPlate?  To follow MyPlate recommendations:  · Eat a wide variety of fruits and vegetables, grains, and protein foods.  · Serve smaller portions and eat less food throughout the day.  · Limit portion sizes to avoid overeating.  · Enjoy your food.  · Get at least 150 minutes of exercise every week. This is about 30 minutes each day, 5 or more days per week.  It can be difficult to have every meal look like MyPlate. Think about MyPlate as eating guidelines for an entire day, rather than each individual meal.  Fruits and vegetables  · Make half of your plate fruits and vegetables.  · Eat many different colors of fruits and vegetables each day.  · For a 2,000 calorie daily food plan, eat:  ? 2½ cups of vegetables every day.  ? 2 cups of fruit every day.  · 1 cup is equal to:  ? 1 cup raw or cooked vegetables.  ? 1 cup raw fruit.  ? 1 medium-sized orange, apple, or banana.  ? 1 cup 100% fruit or vegetable juice.  ? 2 cups raw leafy greens, such as lettuce, spinach, or kale.  ? ½ cup dried fruit.  Grains  · One fourth of your plate should be grains.  · Make at least half of the grains you eat each day whole grains.  · For a 2,000 calorie daily food plan, eat 6 oz of grains every day.  · 1 oz is equal to:  ? 1 slice bread.  ? 1 cup cereal.  ? ½ cup cooked rice, cereal, or pasta.  Protein  · One fourth of your plate should be protein.  · Eat a wide variety of protein foods, including meat, poultry, fish, eggs, beans, nuts, and tofu.  · For a 2,000 calorie daily food plan, eat 5½ oz of protein every day.  · 1 oz is equal to:  ? 1 oz meat, poultry, or fish.  ? ¼ cup cooked beans.  ? 1 egg.  ? ½ oz nuts or seeds.  ? 1 Tbsp peanut butter.  Dairy  · Drink fat-free or low-fat (1%) milk.  · Eat or drink dairy as a side to meals.  · For  a 2,000 calorie daily food plan, eat or drink 3 cups of dairy every day.  · 1 cup is equal to:  ? 1 cup milk, yogurt, cottage cheese, or soy milk (soy beverage).  ? 2 oz processed cheese.  ? 1½ oz natural cheese.  Fats, oils, salt, and sugars  · Only small amounts of oils are recommended.  · Avoid foods that are high in calories and low in nutritional value (empty calories), like foods high in fat or added sugars.  · Choose foods that are low in salt (sodium). Choose foods that have less than 140 milligrams (mg) of sodium per serving.  · Drink water instead of sugary drinks. Drink enough water each day to keep your urine pale yellow.  Where to find support  · Work with your health care provider or a nutrition specialist (dietitian) to develop a customized eating plan that is right for you.  · Download an bessie (mobile application) to help you track your daily food intake.  Where to find more information  · Go to ChooseMyPlate.gov for more information.  · Learn more and log your daily food intake according to MyPlate using USDA's [employer]: www.[employer].usda.gov  Summary  · MyPlate is a general guideline for healthy eating from the USDA. It is based on the 2010 Dietary Guidelines for Americans.  · In general, fruits and vegetables should take up ½ of your plate, grains should take up ¼ of your plate, and protein should take up ¼ of your plate.  This information is not intended to replace advice given to you by your health care provider. Make sure you discuss any questions you have with your health care provider.  Document Released: 01/06/2009 Document Revised: 03/19/2018 Document Reviewed: 03/19/2018  [employer] Interactive Patient Education © 2019 [employer]

## 2020-02-05 DIAGNOSIS — R10.13 EPIGASTRIC PAIN: ICD-10-CM

## 2020-02-06 RX ORDER — DICYCLOMINE HCL 20 MG
TABLET ORAL
Qty: 60 TABLET | Refills: 1 | Status: SHIPPED | OUTPATIENT
Start: 2020-02-06 | End: 2020-04-02

## 2020-02-19 ENCOUNTER — OFFICE VISIT (OUTPATIENT)
Dept: FAMILY MEDICINE CLINIC | Facility: CLINIC | Age: 65
End: 2020-02-19

## 2020-02-19 VITALS
DIASTOLIC BLOOD PRESSURE: 64 MMHG | HEART RATE: 99 BPM | HEIGHT: 64 IN | OXYGEN SATURATION: 97 % | SYSTOLIC BLOOD PRESSURE: 122 MMHG | WEIGHT: 124.8 LBS | BODY MASS INDEX: 21.31 KG/M2

## 2020-02-19 DIAGNOSIS — G89.29 CHRONIC MIDLINE LOW BACK PAIN WITHOUT SCIATICA: Primary | ICD-10-CM

## 2020-02-19 DIAGNOSIS — I87.2 EDEMA OF BOTH LOWER LEGS DUE TO PERIPHERAL VENOUS INSUFFICIENCY: ICD-10-CM

## 2020-02-19 DIAGNOSIS — M54.50 CHRONIC MIDLINE LOW BACK PAIN WITHOUT SCIATICA: Primary | ICD-10-CM

## 2020-02-19 DIAGNOSIS — R60.0 EDEMA OF BOTH LOWER LEGS DUE TO PERIPHERAL VENOUS INSUFFICIENCY: ICD-10-CM

## 2020-02-19 DIAGNOSIS — F41.1 GENERALIZED ANXIETY DISORDER: ICD-10-CM

## 2020-02-19 PROCEDURE — 99214 OFFICE O/P EST MOD 30 MIN: CPT | Performed by: FAMILY MEDICINE

## 2020-02-19 RX ORDER — DIAZEPAM 5 MG/1
5 TABLET ORAL 2 TIMES DAILY PRN
Qty: 60 TABLET | Refills: 0 | Status: SHIPPED | OUTPATIENT
Start: 2020-02-19 | End: 2020-03-19 | Stop reason: SDUPTHER

## 2020-02-19 RX ORDER — HYDROCODONE BITARTRATE AND ACETAMINOPHEN 10; 325 MG/1; MG/1
1 TABLET ORAL EVERY 6 HOURS PRN
Qty: 120 TABLET | Refills: 0 | Status: SHIPPED | OUTPATIENT
Start: 2020-02-19 | End: 2020-03-19 | Stop reason: SDUPTHER

## 2020-02-19 RX ORDER — SULFAMETHOXAZOLE AND TRIMETHOPRIM 400; 80 MG/1; MG/1
TABLET ORAL
COMMUNITY
Start: 2020-02-03 | End: 2020-07-20

## 2020-02-19 RX ORDER — IPRATROPIUM BROMIDE 17 UG/1
2 AEROSOL, METERED RESPIRATORY (INHALATION)
Qty: 1 INHALER | Refills: 11 | Status: SHIPPED | OUTPATIENT
Start: 2020-02-19 | End: 2020-04-20 | Stop reason: SDUPTHER

## 2020-02-19 RX ORDER — ALBUTEROL SULFATE 90 UG/1
2 AEROSOL, METERED RESPIRATORY (INHALATION) EVERY 6 HOURS PRN
Qty: 18 G | Refills: 11 | Status: SHIPPED | OUTPATIENT
Start: 2020-02-19 | End: 2020-04-20 | Stop reason: SDUPTHER

## 2020-02-19 RX ORDER — GABAPENTIN 300 MG/1
300 CAPSULE ORAL 3 TIMES DAILY
Qty: 90 CAPSULE | Refills: 0 | Status: SHIPPED | OUTPATIENT
Start: 2020-02-19 | End: 2020-03-19 | Stop reason: SDUPTHER

## 2020-02-19 NOTE — PROGRESS NOTES
Subjective   Adriana Nuñez is a 64 y.o. female.     Back Pain   This is a chronic problem. The current episode started more than 1 year ago. The problem occurs constantly. The problem has been waxing and waning since onset. The pain is present in the lumbar spine. The quality of the pain is described as aching. The pain radiates to the right knee and left knee. The pain is at a severity of 8/10. The pain is moderate. The pain is worse during the day. The symptoms are aggravated by position and bending. Pertinent negatives include no bladder incontinence, bowel incontinence or fever.   Anxiety   Presents for follow-up visit. Symptoms include compulsions, excessive worry, nervous/anxious behavior and restlessness. Patient reports no depressed mood, insomnia or irritability. Symptoms occur constantly. The severity of symptoms is moderate.       Leg Swelling   This is a new problem. The current episode started in the past 7 days. The problem occurs intermittently. The problem has been resolved. Associated symptoms include myalgias and a rash. Pertinent negatives include no fever.        The following portions of the patient's history were reviewed and updated as appropriate: allergies, current medications, past family history, past medical history, past social history, past surgical history and problem list.    Review of Systems   Constitutional: Negative for fever and irritability.   Gastrointestinal: Negative for bowel incontinence.   Genitourinary: Negative for bladder incontinence.   Musculoskeletal: Positive for back pain and myalgias.   Skin: Positive for rash.   Psychiatric/Behavioral: The patient is nervous/anxious. The patient does not have insomnia.        Objective   Physical Exam   Constitutional: She is oriented to person, place, and time. She appears well-developed and well-nourished. No distress.   HENT:   Head: Normocephalic and atraumatic.   Right Ear: Hearing and tympanic membrane normal.   Left  "Ear: Hearing and tympanic membrane normal.   Musculoskeletal: She exhibits no edema or tenderness.        Lumbar back: She exhibits decreased range of motion and pain. She exhibits no bony tenderness, no swelling, no edema, no deformity, no laceration and no spasm.     Vascular Status -  Her right foot exhibits normal foot vasculature  and no edema. Her left foot exhibits normal foot vasculature  and no edema.  Neurological: She is alert and oriented to person, place, and time.   Reflex Scores:       Patellar reflexes are 2+ on the right side and 2+ on the left side.  Skin: Skin is warm and dry. No lesion and no rash noted. She is not diaphoretic. No erythema.   Psychiatric: Judgment and thought content normal. Her mood appears anxious. Her speech is rapid and/or pressured. She is hyperactive. Cognition and memory are normal.   Nursing note and vitals reviewed.  /64   Pulse 99   Ht 162.6 cm (64\")   Wt 56.6 kg (124 lb 12.8 oz)   SpO2 97%   BMI 21.42 kg/m²     Assessment/Plan   Problems Addressed this Visit        Nervous and Auditory    Chronic midline low back pain without sciatica - Primary       Other    Generalized anxiety disorder      Other Visit Diagnoses     Edema of both lower legs due to peripheral venous insufficiency intermittent            ME = 39 AND ON VALIUM. RISK DISCUSSED   annie pect OCD, declined treatment for it    Had intermittent edema and erythema in right leg. NONE today  Visit Vitals  /64   Pulse 99   Ht 162.6 cm (64\")   Wt 56.6 kg (124 lb 12.8 oz)   SpO2 97%   BMI 21.42 kg/m²       Body mass index is 21.42 kg/m².            This document has been electronically signed by Donato Donis MD on February 19, 2020 10:48 AM        Visit Vitals  /64   Pulse 99   Ht 162.6 cm (64\")   Wt 56.6 kg (124 lb 12.8 oz)   SpO2 97%   BMI 21.42 kg/m²       Body mass index is 21.42 kg/m².            This document has been electronically signed by Donato Donis MD on February 19, 2020 " "10:48 AM        Visit Vitals  /64   Pulse 99   Ht 162.6 cm (64\")   Wt 56.6 kg (124 lb 12.8 oz)   SpO2 97%   BMI 21.42 kg/m²       Body mass index is 21.42 kg/m².            This document has been electronically signed by Donato Donis MD on February 19, 2020 10:48 AM    "

## 2020-03-19 DIAGNOSIS — G89.29 CHRONIC MIDLINE LOW BACK PAIN WITHOUT SCIATICA: ICD-10-CM

## 2020-03-19 DIAGNOSIS — M54.50 CHRONIC MIDLINE LOW BACK PAIN WITHOUT SCIATICA: ICD-10-CM

## 2020-03-19 DIAGNOSIS — F41.1 GENERALIZED ANXIETY DISORDER: ICD-10-CM

## 2020-03-19 RX ORDER — HYDROCODONE BITARTRATE AND ACETAMINOPHEN 10; 325 MG/1; MG/1
1 TABLET ORAL EVERY 6 HOURS PRN
Qty: 120 TABLET | Refills: 0 | Status: SHIPPED | OUTPATIENT
Start: 2020-03-19 | End: 2020-04-20 | Stop reason: SDUPTHER

## 2020-03-19 RX ORDER — GABAPENTIN 300 MG/1
300 CAPSULE ORAL 3 TIMES DAILY
Qty: 90 CAPSULE | Refills: 0 | Status: SHIPPED | OUTPATIENT
Start: 2020-03-19 | End: 2020-04-20 | Stop reason: SDUPTHER

## 2020-03-19 RX ORDER — DIAZEPAM 5 MG/1
5 TABLET ORAL 2 TIMES DAILY PRN
Qty: 60 TABLET | Refills: 0 | Status: SHIPPED | OUTPATIENT
Start: 2020-03-19 | End: 2020-04-20 | Stop reason: SDUPTHER

## 2020-04-01 DIAGNOSIS — R10.13 EPIGASTRIC PAIN: ICD-10-CM

## 2020-04-02 RX ORDER — DICYCLOMINE HCL 20 MG
TABLET ORAL
Qty: 60 TABLET | Refills: 1 | Status: SHIPPED | OUTPATIENT
Start: 2020-04-02 | End: 2020-04-20 | Stop reason: SDUPTHER

## 2020-04-20 ENCOUNTER — OFFICE VISIT (OUTPATIENT)
Dept: FAMILY MEDICINE CLINIC | Facility: CLINIC | Age: 65
End: 2020-04-20

## 2020-04-20 VITALS — HEIGHT: 64 IN | WEIGHT: 124 LBS | BODY MASS INDEX: 21.17 KG/M2

## 2020-04-20 DIAGNOSIS — F41.1 GENERALIZED ANXIETY DISORDER: ICD-10-CM

## 2020-04-20 DIAGNOSIS — M35.3 POLYMYALGIA RHEUMATICA (HCC): Primary | ICD-10-CM

## 2020-04-20 DIAGNOSIS — R10.13 EPIGASTRIC PAIN: ICD-10-CM

## 2020-04-20 DIAGNOSIS — G89.29 CHRONIC MIDLINE LOW BACK PAIN WITHOUT SCIATICA: ICD-10-CM

## 2020-04-20 DIAGNOSIS — M54.50 CHRONIC MIDLINE LOW BACK PAIN WITHOUT SCIATICA: ICD-10-CM

## 2020-04-20 PROCEDURE — 99442 PR PHYS/QHP TELEPHONE EVALUATION 11-20 MIN: CPT | Performed by: FAMILY MEDICINE

## 2020-04-20 RX ORDER — IPRATROPIUM BROMIDE 17 UG/1
2 AEROSOL, METERED RESPIRATORY (INHALATION)
Qty: 1 INHALER | Refills: 5 | Status: SHIPPED | OUTPATIENT
Start: 2020-04-20 | End: 2021-06-02 | Stop reason: SDUPTHER

## 2020-04-20 RX ORDER — ALBUTEROL SULFATE 90 UG/1
2 AEROSOL, METERED RESPIRATORY (INHALATION) EVERY 6 HOURS PRN
Qty: 18 G | Refills: 5 | Status: SHIPPED | OUTPATIENT
Start: 2020-04-20 | End: 2020-11-13 | Stop reason: SDUPTHER

## 2020-04-20 RX ORDER — DIAZEPAM 5 MG/1
5 TABLET ORAL 2 TIMES DAILY PRN
Qty: 60 TABLET | Refills: 0 | Status: SHIPPED | OUTPATIENT
Start: 2020-04-20 | End: 2020-05-20 | Stop reason: SDUPTHER

## 2020-04-20 RX ORDER — GABAPENTIN 300 MG/1
300 CAPSULE ORAL 3 TIMES DAILY
Qty: 90 CAPSULE | Refills: 0 | Status: SHIPPED | OUTPATIENT
Start: 2020-04-20 | End: 2020-05-20 | Stop reason: SDUPTHER

## 2020-04-20 RX ORDER — HYDROCODONE BITARTRATE AND ACETAMINOPHEN 10; 325 MG/1; MG/1
1 TABLET ORAL EVERY 6 HOURS PRN
Qty: 120 TABLET | Refills: 0 | Status: SHIPPED | OUTPATIENT
Start: 2020-04-20 | End: 2020-05-20 | Stop reason: SDUPTHER

## 2020-04-20 RX ORDER — ALBUTEROL SULFATE 90 UG/1
2 AEROSOL, METERED RESPIRATORY (INHALATION) EVERY 6 HOURS PRN
Qty: 18 G | Refills: 11 | Status: SHIPPED | OUTPATIENT
Start: 2020-04-20 | End: 2020-04-20 | Stop reason: SDUPTHER

## 2020-04-20 NOTE — PROGRESS NOTES
Subjective   Adriana Nuñez is a 64 y.o. female.     Back Pain   This is a chronic problem. The current episode started more than 1 year ago. The problem occurs constantly. The problem has been waxing and waning since onset. The pain is present in the lumbar spine. The quality of the pain is described as aching. The pain radiates to the right knee and left knee. The pain is at a severity of 8/10. The pain is moderate. The pain is worse during the day. The symptoms are aggravated by position and bending. Pertinent negatives include no bladder incontinence, bowel incontinence or fever.   Anxiety   Presents for follow-up visit. Symptoms include compulsions, excessive worry, nervous/anxious behavior and restlessness. Patient reports no depressed mood, insomnia or irritability. Symptoms occur constantly. The severity of symptoms is moderate.            The following portions of the patient's history were reviewed and updated as appropriate: allergies, current medications, past family history, past medical history, past social history, past surgical history and problem list.    Review of Systems   Constitutional: Negative for fever and irritability.   Gastrointestinal: Negative for bowel incontinence.   Genitourinary: Negative for bladder incontinence.   Musculoskeletal: Positive for back pain and myalgias.   Psychiatric/Behavioral: The patient is nervous/anxious. The patient does not have insomnia.        Objective   Physical Exam   Constitutional: She is oriented to person, place, and time.   HENT:   Right Ear: Hearing and tympanic membrane normal.   Left Ear: Hearing and tympanic membrane normal.   Neurological: She is alert and oriented to person, place, and time.   Reflex Scores:       Patellar reflexes are 2+ on the right side and 2+ on the left side.  Skin: Skin is warm and dry.   Psychiatric: Judgment and thought content normal. Her mood appears anxious. Her speech is rapid and/or pressured. She is  "hyperactive. Cognition and memory are normal.   Ht 162.6 cm (64\")   Wt 56.2 kg (124 lb)   BMI 21.28 kg/m²     Assessment/Plan   Problems Addressed this Visit        Nervous and Auditory    Polymyalgia rheumatica (CMS/HCC) - Primary    Chronic midline low back pain without sciatica    Relevant Medications    gabapentin (NEURONTIN) 300 MG capsule    HYDROcodone-acetaminophen (NORCO)  MG per tablet       Other    Generalized anxiety disorder    Relevant Medications    diazePAM (VALIUM) 5 MG tablet        ME = 39 AND ON VALIUM. RISK DISCUSSED   annie pect OCD, declined treatment for it          You have chosen to receive care through a telephone visit. Do you consent to use a telephone visit for your medical care today? Yes  This visit has been rescheduled as a phone visit to comply with patient safety concerns in accordance with CDC recommendations. Total time of discussion was 12 minutes.      "

## 2020-04-20 NOTE — TELEPHONE ENCOUNTER
PT called, states she's unsure which inhaler is correct, but her pharmacy is having issues filling her inhaler:  · albuterol sulfate HFA (Ventolin HFA) 108 (90 Base) MCG/ACT inhaler  · ATROVENT HFA 17 MCG/ACT inhaler    States pharmacy told her that her insurance doesn't want to cover her inhaler and that she'd need PCP to complete a prior authorization for the med.     Confirmed Pharmacy:   [facility] - [place], [place] - [address] - [phone]  - [phone]   [address] [address]  Phone: [phone] Fax: [phone]

## 2020-04-21 RX ORDER — OMEPRAZOLE 20 MG/1
CAPSULE, DELAYED RELEASE ORAL
Qty: 60 CAPSULE | Refills: 5 | Status: SHIPPED | OUTPATIENT
Start: 2020-04-21 | End: 2020-07-01 | Stop reason: SDUPTHER

## 2020-04-21 RX ORDER — DICYCLOMINE HCL 20 MG
20 TABLET ORAL 2 TIMES DAILY
Qty: 60 TABLET | Refills: 1 | Status: SHIPPED | OUTPATIENT
Start: 2020-04-21 | End: 2020-07-01 | Stop reason: SDUPTHER

## 2020-05-01 ENCOUNTER — TELEPHONE (OUTPATIENT)
Dept: FAMILY MEDICINE CLINIC | Facility: CLINIC | Age: 65
End: 2020-05-01

## 2020-05-01 NOTE — TELEPHONE ENCOUNTER
"PT CALLED REQUESTING HER \"WHITE INHALER\". PT STATED SHE DOESN'T THE NAME OF IT AND ALWAYS CALLS IT THE \"WHITE INHALER\".    PLEASE ADVISE PT.    PHARMACY: [facility] - [address] - [phone] [facility] [phone] FX  [phone]    PT'S CALLBACK NUMBER: [phone]  "

## 2020-05-20 ENCOUNTER — OFFICE VISIT (OUTPATIENT)
Dept: FAMILY MEDICINE CLINIC | Facility: CLINIC | Age: 65
End: 2020-05-20

## 2020-05-20 VITALS — BODY MASS INDEX: 21.28 KG/M2 | HEIGHT: 64 IN

## 2020-05-20 DIAGNOSIS — M35.3 POLYMYALGIA RHEUMATICA (HCC): Primary | ICD-10-CM

## 2020-05-20 DIAGNOSIS — J42 CHRONIC BRONCHITIS, UNSPECIFIED CHRONIC BRONCHITIS TYPE (HCC): ICD-10-CM

## 2020-05-20 DIAGNOSIS — E78.00 PURE HYPERCHOLESTEROLEMIA: ICD-10-CM

## 2020-05-20 DIAGNOSIS — F41.1 GENERALIZED ANXIETY DISORDER: ICD-10-CM

## 2020-05-20 DIAGNOSIS — M54.50 CHRONIC MIDLINE LOW BACK PAIN WITHOUT SCIATICA: ICD-10-CM

## 2020-05-20 DIAGNOSIS — G89.29 CHRONIC MIDLINE LOW BACK PAIN WITHOUT SCIATICA: ICD-10-CM

## 2020-05-20 DIAGNOSIS — K21.9 GASTROESOPHAGEAL REFLUX DISEASE WITHOUT ESOPHAGITIS: ICD-10-CM

## 2020-05-20 PROCEDURE — 99214 OFFICE O/P EST MOD 30 MIN: CPT | Performed by: FAMILY MEDICINE

## 2020-05-20 RX ORDER — HYDROCODONE BITARTRATE AND ACETAMINOPHEN 10; 325 MG/1; MG/1
1 TABLET ORAL EVERY 6 HOURS PRN
Qty: 120 TABLET | Refills: 0 | Status: SHIPPED | OUTPATIENT
Start: 2020-05-20 | End: 2020-06-19 | Stop reason: SDUPTHER

## 2020-05-20 RX ORDER — GABAPENTIN 300 MG/1
300 CAPSULE ORAL 3 TIMES DAILY
Qty: 90 CAPSULE | Refills: 0 | Status: SHIPPED | OUTPATIENT
Start: 2020-05-20 | End: 2020-06-19 | Stop reason: SDUPTHER

## 2020-05-20 RX ORDER — DIAZEPAM 5 MG/1
5 TABLET ORAL 2 TIMES DAILY PRN
Qty: 60 TABLET | Refills: 0 | Status: SHIPPED | OUTPATIENT
Start: 2020-05-20 | End: 2020-06-19 | Stop reason: SDUPTHER

## 2020-05-20 NOTE — PROGRESS NOTES
Subjective   Adriana Nuñez is a 65 y.o. female.     Back Pain   This is a chronic problem. The current episode started more than 1 year ago. The problem occurs constantly. The problem has been waxing and waning since onset. The pain is present in the lumbar spine. The quality of the pain is described as aching. The pain radiates to the right knee and left knee. The pain is at a severity of 10/10. The pain is severe. The pain is worse during the day. The symptoms are aggravated by position and bending. Pertinent negatives include no bladder incontinence, bowel incontinence, chest pain or fever.   Anxiety   Presents for follow-up visit. Symptoms include compulsions, excessive worry, nervous/anxious behavior and shortness of breath. Patient reports no chest pain, depressed mood, insomnia, irritability, palpitations or suicidal ideas.       Hyperlipidemia   This is a chronic problem. The current episode started more than 1 year ago. The problem is uncontrolled. Recent lipid tests were reviewed and are high. Associated symptoms include myalgias and shortness of breath. Pertinent negatives include no chest pain.   COPD   She complains of cough, shortness of breath and sputum production. There is no hemoptysis or wheezing. This is a chronic problem. The current episode started more than 1 year ago. The problem occurs constantly. The problem has been waxing and waning. Associated symptoms include myalgias. Pertinent negatives include no chest pain, fever or heartburn.   Heartburn   She complains of coughing. She reports no chest pain, no heartburn or no wheezing. This is a chronic problem. The current episode started more than 1 year ago. The problem occurs rarely.        The following portions of the patient's history were reviewed and updated as appropriate: allergies, current medications, past family history, past medical history, past social history, past surgical history and problem list.    Review of Systems    Constitutional: Negative for fever and irritability.   Respiratory: Positive for cough, sputum production and shortness of breath. Negative for hemoptysis and wheezing.    Cardiovascular: Negative for chest pain, palpitations and leg swelling.   Gastrointestinal: Negative for bowel incontinence and heartburn.   Genitourinary: Negative for bladder incontinence.   Musculoskeletal: Positive for back pain and myalgias.   Skin: Negative for rash and wound.   Psychiatric/Behavioral: Positive for dysphoric mood and sleep disturbance. Negative for suicidal ideas. The patient is nervous/anxious. The patient does not have insomnia.        Objective   Physical Exam   Constitutional: She is oriented to person, place, and time.   Neurological: She is alert and oriented to person, place, and time.   Psychiatric: Judgment and thought content normal. Her mood appears anxious. Her speech is rapid and/or pressured. She is hyperactive.       Assessment/Plan    Diagnosis Plan   1. Polymyalgia rheumatica (CMS/HCC)     2. Chronic midline low back pain without sciatica  gabapentin (NEURONTIN) 300 MG capsule    HYDROcodone-acetaminophen (NORCO)  MG per tablet   3. Generalized anxiety disorder  diazePAM (VALIUM) 5 MG tablet   4. Chronic bronchitis, unspecified chronic bronchitis type (CMS/HCC)     5. Pure hypercholesterolemia     6. Gastroesophageal reflux disease without esophagitis           ME = 39 . Risk of valium and hydrocodone discussed     annie pect OCD, declined treatment for it. Declined hyperlipidemia treatment    You have chosen to receive care through a telephone visit. Do you consent to use a telephone visit for your medical care today? Yes  This visit has been rescheduled as a phone visit to comply with patient safety concerns in accordance with CDC recommendations. Total time of discussion was 15 minutes.

## 2020-06-01 RX ORDER — PREDNISONE 10 MG/1
TABLET ORAL
Qty: 30 TABLET | Refills: 12 | Status: SHIPPED | OUTPATIENT
Start: 2020-06-01 | End: 2021-01-07 | Stop reason: SDUPTHER

## 2020-06-19 ENCOUNTER — OFFICE VISIT (OUTPATIENT)
Dept: FAMILY MEDICINE CLINIC | Facility: CLINIC | Age: 65
End: 2020-06-19

## 2020-06-19 VITALS — HEIGHT: 64 IN | BODY MASS INDEX: 21.28 KG/M2

## 2020-06-19 DIAGNOSIS — G89.29 CHRONIC MIDLINE LOW BACK PAIN WITHOUT SCIATICA: ICD-10-CM

## 2020-06-19 DIAGNOSIS — M54.50 CHRONIC MIDLINE LOW BACK PAIN WITHOUT SCIATICA: ICD-10-CM

## 2020-06-19 DIAGNOSIS — F41.1 GENERALIZED ANXIETY DISORDER: ICD-10-CM

## 2020-06-19 PROCEDURE — 99442 PR PHYS/QHP TELEPHONE EVALUATION 11-20 MIN: CPT | Performed by: FAMILY MEDICINE

## 2020-06-19 RX ORDER — DIAZEPAM 5 MG/1
5 TABLET ORAL 2 TIMES DAILY PRN
Qty: 60 TABLET | Refills: 0 | Status: SHIPPED | OUTPATIENT
Start: 2020-06-19 | End: 2020-07-20 | Stop reason: SDUPTHER

## 2020-06-19 RX ORDER — CEPHALEXIN 250 MG/1
250 CAPSULE ORAL
COMMUNITY
Start: 2020-05-29 | End: 2020-06-29

## 2020-06-19 RX ORDER — GABAPENTIN 300 MG/1
300 CAPSULE ORAL 3 TIMES DAILY
Qty: 90 CAPSULE | Refills: 0 | Status: SHIPPED | OUTPATIENT
Start: 2020-06-19 | End: 2020-07-21 | Stop reason: SDUPTHER

## 2020-06-19 RX ORDER — FLUCONAZOLE 200 MG/1
TABLET ORAL
COMMUNITY
Start: 2020-06-12 | End: 2020-09-25

## 2020-06-19 RX ORDER — HYDROCODONE BITARTRATE AND ACETAMINOPHEN 10; 325 MG/1; MG/1
1 TABLET ORAL EVERY 6 HOURS PRN
Qty: 120 TABLET | Refills: 0 | Status: SHIPPED | OUTPATIENT
Start: 2020-06-19 | End: 2020-07-20 | Stop reason: SDUPTHER

## 2020-06-19 RX ORDER — METRONIDAZOLE 7.5 MG/G
GEL VAGINAL
COMMUNITY
Start: 2020-06-16 | End: 2020-09-25

## 2020-07-01 DIAGNOSIS — R10.13 EPIGASTRIC PAIN: ICD-10-CM

## 2020-07-01 RX ORDER — DICYCLOMINE HCL 20 MG
20 TABLET ORAL 2 TIMES DAILY
Qty: 60 TABLET | Refills: 1 | Status: SHIPPED | OUTPATIENT
Start: 2020-07-01 | End: 2020-09-08

## 2020-07-01 RX ORDER — OMEPRAZOLE 20 MG/1
CAPSULE, DELAYED RELEASE ORAL
Qty: 60 CAPSULE | Refills: 5 | Status: SHIPPED | OUTPATIENT
Start: 2020-07-01 | End: 2021-03-09

## 2020-07-20 ENCOUNTER — OFFICE VISIT (OUTPATIENT)
Dept: FAMILY MEDICINE CLINIC | Facility: CLINIC | Age: 65
End: 2020-07-20

## 2020-07-20 DIAGNOSIS — F41.1 GENERALIZED ANXIETY DISORDER: ICD-10-CM

## 2020-07-20 DIAGNOSIS — G89.29 CHRONIC MIDLINE LOW BACK PAIN WITHOUT SCIATICA: ICD-10-CM

## 2020-07-20 DIAGNOSIS — M54.50 CHRONIC MIDLINE LOW BACK PAIN WITHOUT SCIATICA: ICD-10-CM

## 2020-07-20 PROCEDURE — 99442 PR PHYS/QHP TELEPHONE EVALUATION 11-20 MIN: CPT | Performed by: FAMILY MEDICINE

## 2020-07-20 RX ORDER — DIAZEPAM 5 MG/1
5 TABLET ORAL 2 TIMES DAILY PRN
Qty: 60 TABLET | Refills: 0 | Status: SHIPPED | OUTPATIENT
Start: 2020-07-20 | End: 2020-08-20 | Stop reason: SDUPTHER

## 2020-07-20 RX ORDER — HYDROCODONE BITARTRATE AND ACETAMINOPHEN 10; 325 MG/1; MG/1
1 TABLET ORAL EVERY 6 HOURS PRN
Qty: 120 TABLET | Refills: 0 | Status: SHIPPED | OUTPATIENT
Start: 2020-07-20 | End: 2020-08-20 | Stop reason: SDUPTHER

## 2020-07-20 NOTE — PROGRESS NOTES
Subjective   Adriana Nuñez is a 65 y.o. female.     Back Pain   This is a chronic problem. The current episode started more than 1 year ago. The problem occurs constantly. The problem has been waxing and waning since onset. The pain is present in the lumbar spine. The quality of the pain is described as aching. The pain radiates to the right knee and left knee. The pain is at a severity of 8/10. The pain is moderate. The pain is worse during the day. The symptoms are aggravated by position and bending. Pertinent negatives include no bladder incontinence, bowel incontinence or fever.   Anxiety   Presents for follow-up visit. Symptoms include compulsions, excessive worry, nervous/anxious behavior and restlessness. Patient reports no depressed mood, insomnia or irritability. Symptoms occur constantly. The severity of symptoms is moderate.            The following portions of the patient's history were reviewed and updated as appropriate: allergies, current medications, past family history, past medical history, past social history, past surgical history and problem list.    Review of Systems   Constitutional: Negative for fever and irritability.   Gastrointestinal: Negative for bowel incontinence.   Genitourinary: Negative for bladder incontinence.   Musculoskeletal: Positive for back pain and myalgias.   Psychiatric/Behavioral: The patient is nervous/anxious. The patient does not have insomnia.        Objective   Physical Exam   Constitutional: She is oriented to person, place, and time.   HENT:   Right Ear: Hearing and tympanic membrane normal.   Left Ear: Hearing and tympanic membrane normal.   Neurological: She is alert and oriented to person, place, and time.   Skin: Skin is warm and dry.   Psychiatric: Judgment and thought content normal. Her mood appears anxious. Her speech is rapid and/or pressured. She is hyperactive. Cognition and memory are normal.   There were no vitals taken for this  visit.    Assessment/Plan   Problems Addressed this Visit        Nervous and Auditory    Chronic midline low back pain without sciatica    Relevant Medications    HYDROcodone-acetaminophen (NORCO)  MG per tablet       Other    Generalized anxiety disorder    Relevant Medications    diazePAM (VALIUM) 5 MG tablet        ME = 39 AND ON VALIUM. RISK DISCUSSED   annie peccrista OCD, declined treatment for it          You have chosen to receive care through a telephone visit. Do you consent to use a telephone visit for your medical care today? Yes  This visit has been rescheduled as a phone visit to comply with patient safety concerns in accordance with CDC recommendations. Total time of discussion was 12 minutes.

## 2020-07-21 ENCOUNTER — TELEPHONE (OUTPATIENT)
Dept: FAMILY MEDICINE CLINIC | Facility: CLINIC | Age: 65
End: 2020-07-21

## 2020-07-21 DIAGNOSIS — M54.50 CHRONIC MIDLINE LOW BACK PAIN WITHOUT SCIATICA: ICD-10-CM

## 2020-07-21 DIAGNOSIS — G89.29 CHRONIC MIDLINE LOW BACK PAIN WITHOUT SCIATICA: ICD-10-CM

## 2020-07-21 RX ORDER — GABAPENTIN 300 MG/1
300 CAPSULE ORAL 3 TIMES DAILY
Qty: 90 CAPSULE | Refills: 0 | Status: SHIPPED | OUTPATIENT
Start: 2020-07-21 | End: 2020-08-20 | Stop reason: SDUPTHER

## 2020-07-21 NOTE — TELEPHONE ENCOUNTER
Dr. Donis,    Ms. Adriana Oconnellmary is requesting a refill on her Gabapentin 300 mg #90    Last OV    07/20/2020     Next Sanket OV    8/20/2020    Last Script Written  06/19/2020  #90 with No Refill      Last Audie     06/19/2020    Please advise on refill    Thank you          (Refills for Ventolin Inhaler sent 04/20/2020  #18g   5 Refills)  (Refills for her Prednisone sent 06/01/2020  #30  12 Refills)  (All to [facility])

## 2020-07-21 NOTE — TELEPHONE ENCOUNTER
Pt needs refill of Gabapentin 300 mg, Ventolin Inhaler and Prednisone 10 mg sent to [facility] in [place].

## 2020-07-21 NOTE — TELEPHONE ENCOUNTER
Pt had an appt yesterday and Dr Donis asled if she was still on Bactrim.  She checked her meds after visit and she is still taking Bactrim.

## 2020-08-20 ENCOUNTER — OFFICE VISIT (OUTPATIENT)
Dept: FAMILY MEDICINE CLINIC | Facility: CLINIC | Age: 65
End: 2020-08-20

## 2020-08-20 DIAGNOSIS — K21.9 GASTROESOPHAGEAL REFLUX DISEASE WITHOUT ESOPHAGITIS: ICD-10-CM

## 2020-08-20 DIAGNOSIS — G89.29 CHRONIC MIDLINE LOW BACK PAIN WITHOUT SCIATICA: ICD-10-CM

## 2020-08-20 DIAGNOSIS — M35.3 POLYMYALGIA RHEUMATICA (HCC): Primary | ICD-10-CM

## 2020-08-20 DIAGNOSIS — M54.50 CHRONIC MIDLINE LOW BACK PAIN WITHOUT SCIATICA: ICD-10-CM

## 2020-08-20 DIAGNOSIS — J42 CHRONIC BRONCHITIS, UNSPECIFIED CHRONIC BRONCHITIS TYPE (HCC): ICD-10-CM

## 2020-08-20 DIAGNOSIS — E78.00 PURE HYPERCHOLESTEROLEMIA: ICD-10-CM

## 2020-08-20 DIAGNOSIS — F41.1 GENERALIZED ANXIETY DISORDER: ICD-10-CM

## 2020-08-20 PROCEDURE — 99442 PR PHYS/QHP TELEPHONE EVALUATION 11-20 MIN: CPT | Performed by: FAMILY MEDICINE

## 2020-08-20 RX ORDER — GABAPENTIN 300 MG/1
300 CAPSULE ORAL 3 TIMES DAILY
Qty: 90 CAPSULE | Refills: 0 | Status: SHIPPED | OUTPATIENT
Start: 2020-08-20 | End: 2020-09-17 | Stop reason: SDUPTHER

## 2020-08-20 RX ORDER — HYDROCODONE BITARTRATE AND ACETAMINOPHEN 10; 325 MG/1; MG/1
1 TABLET ORAL EVERY 6 HOURS PRN
Qty: 120 TABLET | Refills: 0 | Status: SHIPPED | OUTPATIENT
Start: 2020-08-20 | End: 2020-09-17 | Stop reason: SDUPTHER

## 2020-08-20 RX ORDER — DIAZEPAM 5 MG/1
5 TABLET ORAL 2 TIMES DAILY PRN
Qty: 60 TABLET | Refills: 0 | Status: SHIPPED | OUTPATIENT
Start: 2020-08-20 | End: 2020-09-17 | Stop reason: SDUPTHER

## 2020-08-20 NOTE — PROGRESS NOTES
Subjective   Adriana Nuñez is a 65 y.o. female.     Back Pain   This is a chronic problem. The current episode started more than 1 year ago. The problem occurs constantly. The problem has been waxing and waning since onset. The pain is present in the lumbar spine. The quality of the pain is described as aching. The pain radiates to the right knee and left knee. The pain is at a severity of 8/10. The pain is severe. The pain is worse during the day. The symptoms are aggravated by position and bending. Pertinent negatives include no bladder incontinence, bowel incontinence, chest pain or fever.   Anxiety   Presents for follow-up visit. Symptoms include compulsions, excessive worry and nervous/anxious behavior. Patient reports no chest pain, depressed mood, insomnia, irritability, palpitations, shortness of breath or suicidal ideas.       Hyperlipidemia   This is a chronic problem. The current episode started more than 1 year ago. The problem is uncontrolled. Recent lipid tests were reviewed and are high. Associated symptoms include myalgias. Pertinent negatives include no chest pain or shortness of breath.   COPD   There is no cough, hemoptysis, shortness of breath, sputum production or wheezing. This is a chronic problem. The current episode started more than 1 year ago. The problem occurs constantly. The problem has been waxing and waning. Associated symptoms include myalgias. Pertinent negatives include no chest pain, fever or heartburn.   Heartburn   She reports no chest pain, no coughing, no heartburn or no wheezing. This is a chronic problem. The current episode started more than 1 year ago. The problem occurs rarely.        The following portions of the patient's history were reviewed and updated as appropriate: allergies, current medications, past family history, past medical history, past social history, past surgical history and problem list.    Review of Systems   Constitutional: Negative for fever and  irritability.   Respiratory: Negative for cough, hemoptysis, sputum production, shortness of breath and wheezing.    Cardiovascular: Negative for chest pain, palpitations and leg swelling.   Gastrointestinal: Negative for bowel incontinence and heartburn.   Genitourinary: Negative for bladder incontinence.   Musculoskeletal: Positive for back pain and myalgias.   Skin: Negative for rash and wound.   Psychiatric/Behavioral: Positive for dysphoric mood and sleep disturbance. Negative for suicidal ideas. The patient is nervous/anxious. The patient does not have insomnia.        Objective   Physical Exam   Constitutional: She is oriented to person, place, and time. No distress.   Neurological: She is alert and oriented to person, place, and time.   Psychiatric: Judgment and thought content normal. Her mood appears anxious. Her speech is rapid and/or pressured. She is hyperactive.       Assessment/Plan    Diagnosis Plan   1. Polymyalgia rheumatica (CMS/HCC)     2. Chronic bronchitis, unspecified chronic bronchitis type (CMS/HCC)     3. Chronic midline low back pain without sciatica  gabapentin (NEURONTIN) 300 MG capsule    HYDROcodone-acetaminophen (NORCO)  MG per tablet   4. Generalized anxiety disorder  diazePAM (VALIUM) 5 MG tablet   5. Gastroesophageal reflux disease without esophagitis     6. Pure hypercholesterolemia           ME = 39 . Risk of valium and hydrocodone discussed     annie pect OCD, declined treatment for it. Declined hyperlipidemia treatment    You have chosen to receive care through a telephone visit. Do you consent to use a telephone visit for your medical care today? Yes  This visit has been rescheduled as a phone visit to comply with patient safety concerns in accordance with CDC recommendations. Total time of discussion was 17minutes.

## 2020-09-05 DIAGNOSIS — R10.13 EPIGASTRIC PAIN: ICD-10-CM

## 2020-09-08 RX ORDER — DICYCLOMINE HCL 20 MG
TABLET ORAL
Qty: 60 TABLET | Refills: 1 | Status: SHIPPED | OUTPATIENT
Start: 2020-09-08 | End: 2020-11-18

## 2020-09-10 DIAGNOSIS — Z12.31 ENCOUNTER FOR SCREENING MAMMOGRAM FOR MALIGNANT NEOPLASM OF BREAST: Primary | ICD-10-CM

## 2020-09-17 ENCOUNTER — OFFICE VISIT (OUTPATIENT)
Dept: FAMILY MEDICINE CLINIC | Facility: CLINIC | Age: 65
End: 2020-09-17

## 2020-09-17 DIAGNOSIS — G89.29 CHRONIC MIDLINE LOW BACK PAIN WITHOUT SCIATICA: ICD-10-CM

## 2020-09-17 DIAGNOSIS — F41.1 GENERALIZED ANXIETY DISORDER: ICD-10-CM

## 2020-09-17 DIAGNOSIS — M54.50 CHRONIC MIDLINE LOW BACK PAIN WITHOUT SCIATICA: ICD-10-CM

## 2020-09-17 PROCEDURE — 99442 PR PHYS/QHP TELEPHONE EVALUATION 11-20 MIN: CPT | Performed by: FAMILY MEDICINE

## 2020-09-17 RX ORDER — DIAZEPAM 5 MG/1
5 TABLET ORAL 2 TIMES DAILY PRN
Qty: 60 TABLET | Refills: 0 | Status: SHIPPED | OUTPATIENT
Start: 2020-09-17 | End: 2020-10-15 | Stop reason: SDUPTHER

## 2020-09-17 RX ORDER — GABAPENTIN 300 MG/1
300 CAPSULE ORAL 3 TIMES DAILY
Qty: 90 CAPSULE | Refills: 0 | Status: SHIPPED | OUTPATIENT
Start: 2020-09-17 | End: 2020-10-15 | Stop reason: SDUPTHER

## 2020-09-17 RX ORDER — HYDROCODONE BITARTRATE AND ACETAMINOPHEN 10; 325 MG/1; MG/1
1 TABLET ORAL EVERY 6 HOURS PRN
Qty: 120 TABLET | Refills: 0 | Status: SHIPPED | OUTPATIENT
Start: 2020-09-17 | End: 2020-10-15 | Stop reason: SDUPTHER

## 2020-09-17 NOTE — PROGRESS NOTES
Subjective   Adriana Nuñez is a 65 y.o. female.     Back Pain  This is a chronic problem. The current episode started more than 1 year ago. The problem occurs constantly. The problem has been waxing and waning since onset. The pain is present in the lumbar spine. The quality of the pain is described as aching. The pain radiates to the right knee and left knee. The pain is at a severity of 8/10. The pain is moderate. The pain is worse during the day. The symptoms are aggravated by position and bending. Pertinent negatives include no bladder incontinence, bowel incontinence or fever.   Anxiety  Presents for follow-up visit. Symptoms include compulsions, excessive worry, nervous/anxious behavior and restlessness. Patient reports no depressed mood, insomnia or irritability. Symptoms occur constantly. The severity of symptoms is moderate.            The following portions of the patient's history were reviewed and updated as appropriate: allergies, current medications, past family history, past medical history, past social history, past surgical history and problem list.    Review of Systems   Constitutional: Negative for fever and irritability.   Gastrointestinal: Negative for bowel incontinence.   Genitourinary: Negative for bladder incontinence.   Musculoskeletal: Positive for back pain and myalgias.   Psychiatric/Behavioral: The patient is nervous/anxious. The patient does not have insomnia.        Objective   Physical Exam   Neurological: She is alert.   Skin: Skin is warm and dry.   Psychiatric: Judgment and thought content normal. Her mood appears anxious. Her speech is rapid and/or pressured. She is hyperactive.   There were no vitals taken for this visit.    Assessment/Plan   Problems Addressed this Visit        Nervous and Auditory    Chronic midline low back pain without sciatica    Relevant Medications    gabapentin (NEURONTIN) 300 MG capsule    HYDROcodone-acetaminophen (NORCO)  MG per tablet        Other    Generalized anxiety disorder    Relevant Medications    diazePAM (VALIUM) 5 MG tablet        ME = 39 AND ON VALIUM. RISK DISCUSSED   annie pect OCD, declined treatment for it          You have chosen to receive care through a telephone visit. Do you consent to use a telephone visit for your medical care today? Yes  This visit has been rescheduled as a phone visit to comply with patient safety concerns in accordance with CDC recommendations. Total time of discussion was 14 minutes.

## 2020-09-24 ENCOUNTER — RESULTS ENCOUNTER (OUTPATIENT)
Dept: OBSTETRICS AND GYNECOLOGY | Facility: CLINIC | Age: 65
End: 2020-09-24

## 2020-09-24 ENCOUNTER — OFFICE VISIT (OUTPATIENT)
Dept: OBSTETRICS AND GYNECOLOGY | Facility: CLINIC | Age: 65
End: 2020-09-24

## 2020-09-24 VITALS — DIASTOLIC BLOOD PRESSURE: 74 MMHG | WEIGHT: 139.4 LBS | BODY MASS INDEX: 23.93 KG/M2 | SYSTOLIC BLOOD PRESSURE: 126 MMHG

## 2020-09-24 DIAGNOSIS — N76.0 RECURRENT VAGINITIS: Primary | ICD-10-CM

## 2020-09-24 DIAGNOSIS — N95.2 VAGINAL ATROPHY: ICD-10-CM

## 2020-09-24 DIAGNOSIS — N95.2 VAGINAL ATROPHY: Primary | ICD-10-CM

## 2020-09-24 DIAGNOSIS — Z87.412 HISTORY OF VULVAR DYSPLASIA: ICD-10-CM

## 2020-09-24 LAB
CANDIDA ALBICANS: POSITIVE
GARDNERELLA VAGINALIS: POSITIVE
T VAGINALIS DNA VAG QL PROBE+SIG AMP: NEGATIVE

## 2020-09-24 PROCEDURE — 99204 OFFICE O/P NEW MOD 45 MIN: CPT | Performed by: OBSTETRICS & GYNECOLOGY

## 2020-09-24 PROCEDURE — 87480 CANDIDA DNA DIR PROBE: CPT | Performed by: OBSTETRICS & GYNECOLOGY

## 2020-09-24 PROCEDURE — 87660 TRICHOMONAS VAGIN DIR PROBE: CPT | Performed by: OBSTETRICS & GYNECOLOGY

## 2020-09-24 PROCEDURE — 87510 GARDNER VAG DNA DIR PROBE: CPT | Performed by: OBSTETRICS & GYNECOLOGY

## 2020-09-24 NOTE — PROGRESS NOTES
"[facility]  Gynecology  Date of Service: 2020    CC: Establish care    HPI  Adriana Nuñez is a 65 y.o.  postmenopausal female who presents with multiple concerns.      She states that \"her tailbone will hurt when she gets an infection because it is directly connected.\"  When asked, she states that a UTI will cause this pain but really vaginal infections cause her to have this tailbone pain.  She states that all of this is related to the E.coli abscess that she has on the right side.  She states that Dr. Luna previously did colonoscopies and she states that the urologist that she saw recently (Dr. Thornton) got a picture of the abscess.  She states that Dr. Flower does her colonoscopies now.  She states that \"all those doctors in [place] are cut happy, you hear?!\"  She states that they almost let her die when her abscess \"busted open.\"  She states that Dr. Luna \"should have put an IV in me.\"    She previously saw NAKUL Jean Baptiste at [facility] for recurrent vaginal infections.  The patient states that she was fired from the practice due to non-compliance.  This is because she refused to the vaginal estrogen cream.  She states that \"she read research that for people with diverticulitis, they should not take hormones because it screws with your body and there is already so much going on, you hear?!\"  She states that \"the drug companies know more than they are letting on and they are out to get you.\"  She states that she saw Dr. Serna who recommended that she go to [place] but she refused.  She was given PO Flagyl on  but also has vaginal MetroGel which she also uses too sometimes.  She states that the plastic applicator is very uncomfortable so she doesn't use it.    She is pulling out medications and states that \"I ain't no pill-popper, you hear?!\" while she is pulling out bottles of medications that are spilling on the floor.      She had a " "hysterectomy at age 41 for \"beginning of cancer.\"    ROS  Review of Systems   Constitutional: Positive for appetite change and unexpected weight change.        \"last winter got down 100 lb from bowel abscess that busted, was 160\"   HENT: Positive for trouble swallowing.         \"trouble swallowing- last Neurontin meds give to feel sick swallowing\"   Eyes: Negative.    Respiratory: Negative.    Cardiovascular: Negative.    Gastrointestinal: Positive for constipation, diarrhea and nausea.   Endocrine: Negative.    Genitourinary: Positive for pelvic pain and vaginal pain.   Musculoskeletal: Positive for joint swelling and myalgias.        \"old sick\"   Skin: Negative.    Allergic/Immunologic: Negative.    Neurological: Positive for tremors and headaches.        Tremor- \"on left side lately\"   Hematological: Negative.    Psychiatric/Behavioral: Positive for confusion.        Stress- \"got disabled son age 20, can't die on him\"  Confusion- \"Neurontin make feet straight but forgot what I doing do take minute forget what say then take minute it not have to go onto another subject but fine\"     GYN HISTORY  Menarche: age unknown  History of STIs: None per patient  S/p hyst at age 41 \"beginning of cancer\"     OB HISTORY  OB History    Para Term  AB Living   3 2 2   1 2   SAB TAB Ectopic Molar Multiple Live Births   1         2      # Outcome Date GA Lbr Omar/2nd Weight Sex Delivery Anes PTL Lv   3 Term      Vag-Spont   BERNARDO   2 Term      Vag-Spont   BERNARDO   1 SAB      SAB        PAST MEDICAL HISTORY  Past Medical History:   Diagnosis Date   • Adenomatous polyp of colon    • Anorectal abscess    • Anxiety disorder    • Benign polyp of large intestine    • Bladder fistula      post rapair      • Blood in urine    • Chronic obstructive lung disease (CMS/HCC)    • Chronic urinary tract infection    • Depressive disorder    • Diarrhea    • Diverticulitis of colon      post colectomy      • Elevated levels of transaminase " & lactic acid dehydrogenase    • Generalized anxiety disorder    • Heavy tobacco smoker    • History of colon polyps    • Hyperlipidemia    • Insomnia    • Low back pain    • Lymphadenopathy     ON CT   • Osteoporosis    • Peripheral nerve disease    • Polymyalgia rheumatica (CMS/HCC)    • Polyp of colon    • Vitamin D deficiency      PAST SURGICAL HISTORY  Past Surgical History:   Procedure Laterality Date   • COLON RESECTION     • COLONOSCOPY  03/17/2014    polyps   • COLONOSCOPY  02/08/2016   • COLONOSCOPY N/A 5/24/2017    Procedure: COLONOSCOPY;  Surgeon: Aric Pnia MD;  Location: [facility] ENDOSCOPY;  Service:    • COLONOSCOPY N/A 4/17/2019    Procedure: COLONOSCOPY;  Surgeon: Aric Pina MD;  Location: [facility] ENDOSCOPY;  Service: Gastroenterology   • ENDOSCOPY  01/26/2015    Normal esophagus. Gastritis was found in the stomach. Biopsy taken. Normal duodenum   • ENDOSCOPY N/A 9/18/2017    Procedure: ESOPHAGOGASTRODUODENOSCOPY;  Surgeon: Aric Pina MD;  Location: [facility] ENDOSCOPY;  Service:    • ENDOSCOPY N/A 4/17/2019    Procedure: ESOPHAGOGASTRODUODENOSCOPY;  Surgeon: Aric Pina MD;  Location: [facility] ENDOSCOPY;  Service: Gastroenterology   • HYSTERECTOMY     • UPPER GASTROINTESTINAL ENDOSCOPY  01/26/2015   • UPPER GASTROINTESTINAL ENDOSCOPY  09/18/2017   • UPPER GASTROINTESTINAL ENDOSCOPY  04/17/2019     FAMILY HISTORY  Family History   Problem Relation Age of Onset   • No Known Problems Sister    • No Known Problems Brother      SOCIAL HISTORY  Social History     Socioeconomic History   • Marital status:      Spouse name: Not on file   • Number of children: Not on file   • Years of education: Not on file   • Highest education level: Not on file   Tobacco Use   • Smoking status: Current Every Day Smoker     Packs/day: 1.00     Types: Cigars, Cigarettes   • Smokeless tobacco: Never Used   • Tobacco comment: 09/12/2019 - Patient states she has utilized tobacco products periodically  since the age of 11.   Substance and Sexual Activity   • Alcohol use: No   • Drug use: No   • Sexual activity: Defer     ALLERGIES  Allergies   Allergen Reactions   • Fosamax [Alendronate] GI Intolerance     HOME MEDICATIONS  Prior to Admission medications    Medication Sig Start Date End Date Taking? Authorizing Provider   albuterol sulfate HFA (Ventolin HFA) 108 (90 Base) MCG/ACT inhaler Inhale 2 puffs Every 6 (Six) Hours As Needed for Wheezing. 4/20/20  Yes Donato Donis MD   ATROVENT HFA 17 MCG/ACT inhaler Inhale 2 puffs 4 (Four) Times a Day. 4/20/20  Yes Donato Donis MD   diazePAM (VALIUM) 5 MG tablet Take 1 tablet by mouth 2 (Two) Times a Day As Needed for Anxiety. 9/17/20  Yes Donato Donis MD   dicyclomine (BENTYL) 20 MG tablet TAKE 1 TABLET BY MOUTH TWICE DAILY 9/8/20  Yes Aric Pina MD   fluconazole (DIFLUCAN) 200 MG tablet TAKE 1 TABLET BY MOUTH EVERY OTHER DAY FOR 2 DOSES 6/12/20  Yes Felicia Buchanan MD   fluticasone (FLONASE) 50 MCG/ACT nasal spray USE 2 SPRAYS INTO EACH NOSTRIL DAILY 2/7/19  Yes Donato Donis MD   gabapentin (NEURONTIN) 300 MG capsule Take 1 capsule by mouth 3 (Three) Times a Day. 9/17/20  Yes Donato Donis MD   HYDROcodone-acetaminophen (NORCO)  MG per tablet Take 1 tablet by mouth Every 6 (Six) Hours As Needed for Moderate Pain . 9/17/20  Yes Donato Donis MD   lidocaine-prilocaine (EMLA) 2.5-2.5 % cream APPLY SMALL AMOUNT TO AFFECTED AREA FOR ITCHING AS NEEDED 11/4/19  Yes Felicia Buchanan MD   metroNIDAZOLE (METROGEL) 0.75 % vaginal gel Place 1 gram vaginally twice weekly for 4 months. 6/16/20  Yes Felicia Buchanan MD   nitrofurantoin (MACRODANTIN) 100 MG capsule Take 1 capsule by mouth Daily. 9/10/18  Yes Felicia Buchanan MD   nystatin (MYCOSTATIN) 468209 UNIT/ML suspension Take 5 mL by mouth 4 (Four) Times a Day. 7/10/19  Yes Gutierrez Bates MD   omeprazole (priLOSEC) 20 MG capsule TAKE ONE CAPSULE BY MOUTH TWO  TIMES A DAY 20  Yes Aric Pina MD   predniSONE (DELTASONE) 10 MG tablet TAKE TWO TABLETS BY MOUTH DAILY FOR 7 DAYS THEN TAKE ONE TABLET DAILY 20  Yes Donato Donis MD   naloxone (NARCAN) 4 MG/0.1ML nasal spray 1 spray into the nostril(s) as directed by provider As Needed (accidental OD). If used, call 911 18   Donato Donis MD   atorvastatin (LIPITOR) 40 MG tablet Take 1 tablet by mouth Daily. 10/3/19 9/24/20  Donato Donis MD     PE  /74 (BP Location: Left arm, Patient Position: Sitting, Cuff Size: Adult)   Wt 63.2 kg (139 lb 6.4 oz)   BMI 23.93 kg/m²        General: Disheveled but NAD.  Tangential speech.  Neurologic: Alert, oriented to person, place, and time.  Gait normal.  Cranial nerves II-XII grossly intact.    HEENT: Normocephalic, atraumatic.  Extraocular muscles intact, pupils equal and reactive times two.    Neck: Supple, no adenopathy, thyroid normal size, non-tender, without nodularity, trachea midline.  Lungs: Normal respiratory effort.  Clear to auscultation bilaterally.  No wheezes, rhonci, or rales.  Heart: Regular rate and rhythm.  No murmer, rub or gallop.  Abdomen: Soft, non-tender, non-distended,no masses, no hepatosplenomegaly, no hernia.  Skin: No rash, no lesions.  Extremities: No cyanosis, clubbing or edema.  PELVIC EXAM:  External Genitalia/Vulva: Anatomy is atrophic, no significant redness of labia, no discharge on vulvar tissues, Peever's and Bartholin's glands are normal, no ulcers, no condylomatous lesions.  Urethra: Normal, no lesions.  Vagina: Vaginal tissues are atrophic, inflamed.  Cervix: Absent  Uterus: Absent  Adnexa: Absent  Rectal: Normal, no masses or polyps, confirms bimanual exam, perianal normal, no lesions; JASON deferred.    IMPRESSION  Adriana Nuñez is a 65 y.o.  presenting with several concerns regarding recurrent vaginitis.  I suspect that this stems from her vaginal atrophy.  I spent a significant amount of time trying  "to explain this to the patient but she states that \"you're wrong\" and \"you only know what the drug companies tell you.\"  I reiterated with her that I do not receive information from the drug companies but from evidence-based data and she disagreed.  She would clearly benefit from vaginal estrogen cream therapy but declines.    PLAN    1. Recurrent vaginitis  - Vag panel and OneSwab obtained    2. Vaginal atrophy  Encouraged estrogen cream therapy; patient adamently declined.  Recommended daily coconut oil.    3. History of vulvar dysplasia       Patient is unclear about this and I am unable to access the records from [facility] currently on CareEverywhere.  Will review once available.    This document has been electronically signed by Corazon Rojas MD on September 24, 2020 14:15 CDT.    I spent 62 minutes with the patient, >50% of that time was spent counseling the patient.  "

## 2020-09-25 ENCOUNTER — TELEPHONE (OUTPATIENT)
Dept: OBSTETRICS AND GYNECOLOGY | Facility: CLINIC | Age: 65
End: 2020-09-25

## 2020-09-25 RX ORDER — FLUCONAZOLE 150 MG/1
TABLET ORAL
Qty: 2 TABLET | Refills: 0 | Status: SHIPPED | OUTPATIENT
Start: 2020-09-25 | End: 2020-10-12

## 2020-09-25 RX ORDER — METRONIDAZOLE 500 MG/1
500 TABLET ORAL 2 TIMES DAILY
Qty: 14 TABLET | Refills: 0 | Status: SHIPPED | OUTPATIENT
Start: 2020-09-25 | End: 2020-10-02

## 2020-09-25 NOTE — TELEPHONE ENCOUNTER
Tried calling patient, number is not accepting calls at this time.  Tried contacting Kellen (emergency contact/daughter) no answer.  Left vm for callback at office

## 2020-10-06 ENCOUNTER — TELEPHONE (OUTPATIENT)
Dept: OBSTETRICS AND GYNECOLOGY | Facility: CLINIC | Age: 65
End: 2020-10-06

## 2020-10-12 ENCOUNTER — TELEPHONE (OUTPATIENT)
Dept: OBSTETRICS AND GYNECOLOGY | Facility: CLINIC | Age: 65
End: 2020-10-12

## 2020-10-12 RX ORDER — FLUCONAZOLE 150 MG/1
150 TABLET ORAL
Qty: 4 TABLET | Refills: 0 | Status: SHIPPED | OUTPATIENT
Start: 2020-10-12 | End: 2020-10-22

## 2020-10-12 RX ORDER — AMPICILLIN 500 MG/1
500 CAPSULE ORAL 4 TIMES DAILY
Qty: 40 CAPSULE | Refills: 0 | Status: SHIPPED | OUTPATIENT
Start: 2020-10-12 | End: 2020-10-22

## 2020-10-12 NOTE — TELEPHONE ENCOUNTER
OneSwab +BV, +Enterococcus, +Candida glabrata.  Ampicillin and Diflucan course sent (previously treated w/ Flagyl).    Corazon Rojas MD  10/12/2020  13:47 CDT

## 2020-10-12 NOTE — TELEPHONE ENCOUNTER
Patient returned call, discussed results of oneswab, let her know positive for enterococcus, BV, and yeast.   Patient verbalized understanding and wuld like antibiotics sent in to [facility] pharmacy.

## 2020-10-12 NOTE — TELEPHONE ENCOUNTER
Tried calling patient number. Unable to reach and unable to leave voicemail.    Called and left message with patient emergency contact, Kellen, for patient to return call to office

## 2020-10-15 ENCOUNTER — OFFICE VISIT (OUTPATIENT)
Dept: FAMILY MEDICINE CLINIC | Facility: CLINIC | Age: 65
End: 2020-10-15

## 2020-10-15 VITALS — DIASTOLIC BLOOD PRESSURE: 80 MMHG | SYSTOLIC BLOOD PRESSURE: 127 MMHG

## 2020-10-15 DIAGNOSIS — G89.29 CHRONIC MIDLINE LOW BACK PAIN WITHOUT SCIATICA: Primary | ICD-10-CM

## 2020-10-15 DIAGNOSIS — F41.1 GENERALIZED ANXIETY DISORDER: ICD-10-CM

## 2020-10-15 DIAGNOSIS — Z12.31 ENCOUNTER FOR SCREENING MAMMOGRAM FOR MALIGNANT NEOPLASM OF BREAST: ICD-10-CM

## 2020-10-15 DIAGNOSIS — M54.50 CHRONIC MIDLINE LOW BACK PAIN WITHOUT SCIATICA: Primary | ICD-10-CM

## 2020-10-15 PROCEDURE — 99442 PR PHYS/QHP TELEPHONE EVALUATION 11-20 MIN: CPT | Performed by: FAMILY MEDICINE

## 2020-10-15 RX ORDER — HYDROCODONE BITARTRATE AND ACETAMINOPHEN 10; 325 MG/1; MG/1
1 TABLET ORAL EVERY 6 HOURS PRN
Qty: 120 TABLET | Refills: 0 | Status: SHIPPED | OUTPATIENT
Start: 2020-10-15 | End: 2020-11-13 | Stop reason: SDUPTHER

## 2020-10-15 RX ORDER — GABAPENTIN 300 MG/1
300 CAPSULE ORAL 3 TIMES DAILY
Qty: 90 CAPSULE | Refills: 0 | Status: SHIPPED | OUTPATIENT
Start: 2020-10-15 | End: 2020-11-13 | Stop reason: SDUPTHER

## 2020-10-15 RX ORDER — DIAZEPAM 5 MG/1
5 TABLET ORAL 2 TIMES DAILY PRN
Qty: 60 TABLET | Refills: 0 | Status: SHIPPED | OUTPATIENT
Start: 2020-10-15 | End: 2020-11-13 | Stop reason: SDUPTHER

## 2020-10-15 NOTE — PROGRESS NOTES
Subjective   Adriana Nuñez is a 65 y.o. female.     Back Pain  This is a chronic problem. The current episode started more than 1 year ago. The problem occurs constantly. The problem has been waxing and waning since onset. The pain is present in the lumbar spine. The quality of the pain is described as aching. The pain radiates to the right knee and left knee. The pain is at a severity of 8/10. The pain is moderate. The pain is worse during the day. The symptoms are aggravated by position and bending. Pertinent negatives include no bladder incontinence, bowel incontinence or fever.   Anxiety  Presents for follow-up visit. Symptoms include compulsions, excessive worry, nervous/anxious behavior and restlessness. Patient reports no depressed mood, insomnia or irritability. Symptoms occur constantly. The severity of symptoms is moderate.            The following portions of the patient's history were reviewed and updated as appropriate: allergies, current medications, past family history, past medical history, past social history, past surgical history and problem list.    Review of Systems   Constitutional: Negative for fever and irritability.   Gastrointestinal: Negative for bowel incontinence.   Genitourinary: Negative for bladder incontinence.   Musculoskeletal: Positive for back pain.   Psychiatric/Behavioral: The patient is nervous/anxious. The patient does not have insomnia.        Objective   Physical Exam   Neurological: She is alert.   Skin: Skin is warm and dry.   Psychiatric: Judgment and thought content normal. Her mood appears anxious. Her speech is rapid and/or pressured. She is hyperactive.   /80     Assessment/Plan   Problems Addressed this Visit        Nervous and Auditory    Chronic midline low back pain without sciatica    Relevant Medications    gabapentin (NEURONTIN) 300 MG capsule    HYDROcodone-acetaminophen (NORCO)  MG per tablet       Other    Generalized anxiety disorder     Relevant Medications    diazePAM (VALIUM) 5 MG tablet      Other Visit Diagnoses     Encounter for screening mammogram for malignant neoplasm of breast    -  Primary    Relevant Orders    Mammo Screening Digital Tomosynthesis Bilateral With CAD      Diagnoses       Codes Comments    Encounter for screening mammogram for malignant neoplasm of breast    -  Primary ICD-10-CM: Z12.31  ICD-9-CM: V76.12     Chronic midline low back pain without sciatica     ICD-10-CM: M54.5, G89.29  ICD-9-CM: 724.2, 338.29     Generalized anxiety disorder     ICD-10-CM: F41.1  ICD-9-CM: 300.02         ME = 39 AND ON VALIUM. RISK DISCUSSED   annie pect OCD, declined treatment for it          You have chosen to receive care through a telephone visit. Do you consent to use a telephone visit for your medical care today? Yes  This visit has been rescheduled as a phone visit to comply with patient safety concerns in accordance with CDC recommendations. Total time of discussion was 14 minutes.

## 2020-10-22 ENCOUNTER — TELEPHONE (OUTPATIENT)
Dept: OBSTETRICS AND GYNECOLOGY | Facility: CLINIC | Age: 65
End: 2020-10-22

## 2020-10-22 NOTE — TELEPHONE ENCOUNTER
Patient called and left a vm that she needs a refill on medication that was prescribed..didnt leave the name of medication

## 2020-10-26 ENCOUNTER — TELEPHONE (OUTPATIENT)
Dept: OBSTETRICS AND GYNECOLOGY | Facility: CLINIC | Age: 65
End: 2020-10-26

## 2020-10-27 ENCOUNTER — TELEPHONE (OUTPATIENT)
Dept: OBSTETRICS AND GYNECOLOGY | Facility: CLINIC | Age: 65
End: 2020-10-27

## 2020-10-27 NOTE — TELEPHONE ENCOUNTER
"Returned patient call, stated she had some ampicillin left, she wasn't taking it 4 times a day as prescribed because \"she wasn't getting up in the middle of the night to take it\"    Advised patient to finish prescription and let us know if she needs anything. Patient verbalized understanding  "

## 2020-11-13 ENCOUNTER — OFFICE VISIT (OUTPATIENT)
Dept: FAMILY MEDICINE CLINIC | Facility: CLINIC | Age: 65
End: 2020-11-13

## 2020-11-13 DIAGNOSIS — J42 CHRONIC BRONCHITIS, UNSPECIFIED CHRONIC BRONCHITIS TYPE (HCC): ICD-10-CM

## 2020-11-13 DIAGNOSIS — F41.1 GENERALIZED ANXIETY DISORDER: Primary | ICD-10-CM

## 2020-11-13 DIAGNOSIS — M35.3 POLYMYALGIA RHEUMATICA (HCC): ICD-10-CM

## 2020-11-13 DIAGNOSIS — G89.29 CHRONIC MIDLINE LOW BACK PAIN WITHOUT SCIATICA: ICD-10-CM

## 2020-11-13 DIAGNOSIS — K21.9 GASTROESOPHAGEAL REFLUX DISEASE WITHOUT ESOPHAGITIS: ICD-10-CM

## 2020-11-13 DIAGNOSIS — F41.1 GENERALIZED ANXIETY DISORDER: ICD-10-CM

## 2020-11-13 DIAGNOSIS — M54.50 CHRONIC MIDLINE LOW BACK PAIN WITHOUT SCIATICA: ICD-10-CM

## 2020-11-13 DIAGNOSIS — E78.00 PURE HYPERCHOLESTEROLEMIA: ICD-10-CM

## 2020-11-13 DIAGNOSIS — Z00.00 MEDICARE ANNUAL WELLNESS VISIT, SUBSEQUENT: ICD-10-CM

## 2020-11-13 PROCEDURE — G0439 PPPS, SUBSEQ VISIT: HCPCS | Performed by: FAMILY MEDICINE

## 2020-11-13 PROCEDURE — 99443 PR PHYS/QHP TELEPHONE EVALUATION 21-30 MIN: CPT | Performed by: FAMILY MEDICINE

## 2020-11-13 RX ORDER — GABAPENTIN 300 MG/1
300 CAPSULE ORAL 3 TIMES DAILY
Qty: 90 CAPSULE | Refills: 0 | Status: SHIPPED | OUTPATIENT
Start: 2020-11-13 | End: 2020-12-11 | Stop reason: SDUPTHER

## 2020-11-13 RX ORDER — HYDROCODONE BITARTRATE AND ACETAMINOPHEN 10; 325 MG/1; MG/1
1 TABLET ORAL EVERY 6 HOURS PRN
Qty: 120 TABLET | Refills: 0 | Status: SHIPPED | OUTPATIENT
Start: 2020-11-13 | End: 2020-12-11 | Stop reason: SDUPTHER

## 2020-11-13 NOTE — PROGRESS NOTES
The ABCs of the Annual Wellness Visit  Subsequent Medicare Wellness Visit    Chief Complaint   Patient presents with   • Back Pain       Subjective   History of Present Illness:  Adriana Nuñez is a 65 y.o. female who presents for a Subsequent Medicare Wellness Visit.    HEALTH RISK ASSESSMENT    Recent Hospitalizations:  No hospitalization(s) within the last year.    Current Medical Providers:  Patient Care Team:  Donato Donis MD as PCP - General  Aric Pina MD as Consulting Physician (Gastroenterology)  Kevin Yoon APRN as Nurse Practitioner (Orthopedic Surgery)    Smoking Status:  Social History     Tobacco Use   Smoking Status Current Every Day Smoker   • Packs/day: 1.00   • Types: Cigars, Cigarettes   Smokeless Tobacco Never Used   Tobacco Comment    09/12/2019 - Patient states she has utilized tobacco products periodically since the age of 11.       Alcohol Consumption:  Social History     Substance and Sexual Activity   Alcohol Use No       Depression Screen:   PHQ-2/PHQ-9 Depression Screening 10/30/2019   Little interest or pleasure in doing things 0   Feeling down, depressed, or hopeless 0   Trouble falling or staying asleep, or sleeping too much 0   Feeling tired or having little energy 2   Poor appetite or overeating 2   Feeling bad about yourself - or that you are a failure or have let yourself or your family down 0   Trouble concentrating on things, such as reading the newspaper or watching television 0   Moving or speaking so slowly that other people could have noticed. Or the opposite - being so fidgety or restless that you have been moving around a lot more than usual 0   Thoughts that you would be better off dead, or of hurting yourself in some way 0   Total Score 4   If you checked off any problems, how difficult have these problems made it for you to do your work, take care of things at home, or get along with other people? Not difficult at all       Fall Risk Screen:  HORACIO  Fall Risk Assessment has not been completed.    Health Habits and Functional and Cognitive Screening:  Functional & Cognitive Status 10/30/2019   Do you have difficulty preparing food and eating? No   Do you have difficulty bathing yourself, getting dressed or grooming yourself? No   Do you have difficulty using the toilet? No   Do you have difficulty moving around from place to place? No   Do you have trouble with steps or getting out of a bed or a chair? No   Current Diet Well Balanced Diet   Dental Exam Up to date   Eye Exam Up to date   Exercise (times per week) 7 times per week   Current Exercise Activities Include Walking   Do you need help using the phone?  No   Are you deaf or do you have serious difficulty hearing?  No   Do you need help with transportation? No   Do you need help shopping? No   Do you need help preparing meals?  No   Do you need help with housework?  No   Do you need help with laundry? No   Do you need help taking your medications? No   Do you need help managing money? No   Do you ever drive or ride in a car without wearing a seat belt? No   Have you felt unusual stress, anger or loneliness in the last month? No   Who do you live with? Other   If you need help, do you have trouble finding someone available to you? No   Have you been bothered in the last four weeks by sexual problems? No   Do you have difficulty concentrating, remembering or making decisions? No         Does the patient have evidence of cognitive impairment? No    Asprin use counseling:Does not need ASA (and currently is not on it)    Age-appropriate Screening Schedule:  Refer to the list below for future screening recommendations based on patient's age, sex and/or medical conditions. Orders for these recommended tests are listed in the plan section. The patient has been provided with a written plan.    Health Maintenance   Topic Date Due   • ZOSTER VACCINE (2 of 2) 10/20/2016   • LIPID PANEL  09/30/2020   • MAMMOGRAM   02/18/2021 (Originally 11/3/2019)   • DXA SCAN  02/25/2021 (Originally 11/3/2019)   • COLONOSCOPY  04/17/2024   • TDAP/TD VACCINES (2 - Td) 06/18/2024   • INFLUENZA VACCINE  Completed          The following portions of the patient's history were reviewed and updated as appropriate: allergies, current medications, past family history, past medical history, past social history, past surgical history and problem list.    Outpatient Medications Prior to Visit   Medication Sig Dispense Refill   • albuterol sulfate HFA (Ventolin HFA) 108 (90 Base) MCG/ACT inhaler Inhale 2 puffs Every 6 (Six) Hours As Needed for Wheezing. 18 g 5   • ATROVENT HFA 17 MCG/ACT inhaler Inhale 2 puffs 4 (Four) Times a Day. 1 inhaler 5   • diazePAM (VALIUM) 5 MG tablet Take 1 tablet by mouth 2 (Two) Times a Day As Needed for Anxiety. 60 tablet 0   • dicyclomine (BENTYL) 20 MG tablet TAKE 1 TABLET BY MOUTH TWICE DAILY 60 tablet 1   • fluticasone (FLONASE) 50 MCG/ACT nasal spray USE 2 SPRAYS INTO EACH NOSTRIL DAILY 16 g 0   • gabapentin (NEURONTIN) 300 MG capsule Take 1 capsule by mouth 3 (Three) Times a Day. 90 capsule 0   • HYDROcodone-acetaminophen (NORCO)  MG per tablet Take 1 tablet by mouth Every 6 (Six) Hours As Needed for Moderate Pain . 120 tablet 0   • lidocaine-prilocaine (EMLA) 2.5-2.5 % cream APPLY SMALL AMOUNT TO AFFECTED AREA FOR ITCHING AS NEEDED  3   • naloxone (NARCAN) 4 MG/0.1ML nasal spray 1 spray into the nostril(s) as directed by provider As Needed (accidental OD). If used, call 911 1 each 2   • nitrofurantoin (MACRODANTIN) 100 MG capsule Take 1 capsule by mouth Daily.  3   • nystatin (MYCOSTATIN) 072446 UNIT/ML suspension Take 5 mL by mouth 4 (Four) Times a Day. 150 mL 0   • omeprazole (priLOSEC) 20 MG capsule TAKE ONE CAPSULE BY MOUTH TWO TIMES A DAY 60 capsule 5   • predniSONE (DELTASONE) 10 MG tablet TAKE TWO TABLETS BY MOUTH DAILY FOR 7 DAYS THEN TAKE ONE TABLET DAILY 30 tablet 12     No facility-administered  medications prior to visit.        Patient Active Problem List   Diagnosis   • Vitamin D deficiency   • Urinary tract infection   • \   • Polymyalgia rheumatica (CMS/HCC)   • Peripheral nerve disease   • Chronic midline low back pain without sciatica   • Insomnia   • Hyperlipidemia   • Heavy tobacco smoker   • Generalized anxiety disorder   • Elevated levels of transaminase & lactic acid dehydrogenase   • Depressive disorder   • Chronic obstructive lung disease (CMS/HCC)   • Blood in urine seeing urology.   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   • GONZALES III (vulvar intraepithelial neoplasia III)   • TMJ (dislocation of temporomandibular joint)   • Gastroesophageal reflux disease without esophagitis   • Bartholin's cyst   • Vaginal atrophy   • Right lower quadrant abdominal pain   • Epigastric pain   • Lower abdominal pain   • Abdominal pain   • Change in bowel habits       Advanced Care Planning:  ACP discussion was held with the patient during this visit. Patient does not have an advance directive, information provided.    Review of Systems    Compared to one year ago, the patient feels her physical health is better.  Compared to one year ago, the patient feels her mental health is better.    Reviewed chart for potential of high risk medication in the elderly: yes  Reviewed chart for potential of harmful drug interactions in the elderly:yes    Objective       There were no vitals filed for this visit.    There is no height or weight on file to calculate BMI.  Discussed the patient's BMI with her. The BMI is in the acceptable range.    Physical Exam          Assessment/Plan   Medicare Risks and Personalized Health Plan  CMS Preventative Services Quick Reference  Advance Directive Discussion    The above risks/problems have been discussed with the patient.  Pertinent information has been shared with the patient in the After Visit Summary.  Follow up plans and orders are seen below in the  Assessment/Plan Section.    There are no diagnoses linked to this encounter.  Follow Up:  No follow-ups on file.     An After Visit Summary and PPPS were given to the patient.

## 2020-11-13 NOTE — TELEPHONE ENCOUNTER
PATIENT STATES SHE NEEDS REFILLS OF albuterol sulfate HFA (Ventolin HFA) 108 (90 Base) MCG/ACT inhaler  diazePAM (VALIUM) 5 MG tablet    GOOD CONTACT NUMBER   [phone] (H)        VERIFIED   [facility] - [place], [place] - [address] [place] [address] - [phone]  - [phone]   [phone]

## 2020-11-13 NOTE — PATIENT INSTRUCTIONS
Medicare Wellness  Personal Prevention Plan of Service     Date of Office Visit:  2020  Encounter Provider:  Donato Donis MD  Place of Service:  [facility]  Patient Name: Adriana Nuñez  :  1955    As part of the Medicare Wellness portion of your visit today, we are providing you with this personalized preventive plan of services (PPPS). This plan is based upon recommendations of the [place] Preventive Services Task Force (USPSTF) and the Advisory Committee on Immunization Practices (ACIP).    This lists the preventive care services that should be considered, and provides dates of when you are due. Items listed as completed are up-to-date and do not require any further intervention.    Health Maintenance   Topic Date Due   • ZOSTER VACCINE (2 of 2) 10/20/2016   • Pneumococcal Vaccine 65+ (2 of 2 - PPSV23) 2020   • LIPID PANEL  2020   • ANNUAL WELLNESS VISIT  10/30/2020   • MAMMOGRAM  2021 (Originally 11/3/2019)   • DXA SCAN  2021 (Originally 11/3/2019)   • COLONOSCOPY  2024   • TDAP/TD VACCINES (2 - Td) 2024   • HEPATITIS C SCREENING  Completed   • INFLUENZA VACCINE  Completed       No orders of the defined types were placed in this encounter.      No follow-ups on file.          Advance Care Planning and Advance Directives     You make decisions on a daily basis - decisions about where you want to live, your career, your home, your life. Perhaps one of the most important decisions you face is your choice for future medical care. Take time to talk with your family and your healthcare team and start planning today.  Advance Care Planning is a process that can help you:  · Understand possible future healthcare decisions in light of your own experiences  · Reflect on those decision in light of your goals and values  · Discuss your decisions with those closest to you and the healthcare professionals that care for  you  · Make a plan by creating a document that reflects your wishes    Surrogate Decision Maker  In the event of a medical emergency, which has left you unable to communicate or to make your own decisions, you would need someone to make decisions for you.  It is important to discuss your preferences for medical treatment with this person while you are in good health.     Qualities of a surrogate decision maker:  • Willing to take on this role and responsibility  • Knows what you want for future medical care  • Willing to follow your wishes even if they don't agree with them  • Able to make difficult medical decisions under stressful circumstances    Advance Directives  These are legal documents you can create that will guide your healthcare team and decision maker(s) when needed. These documents can be stored in the electronic medical record.    · Living Will - a legal document to guide your care if you have a terminal condition or a serious illness and are unable to communicate. States vary by statute in document names/types, but most forms may include one or more of the following:        -  Directions regarding life-prolonging treatments        -  Directions regarding artificially provided nutrition/hydration        -  Choosing a healthcare decision maker        -  Direction regarding organ/tissue donation    · Durable Power of  for Healthcare - this document names an -in-fact to make medical decisions for you, but it may also allow this person to make personal and financial decisions for you. Please seek the advice of an  if you need this type of document.    **Advance Directives are not required and no one may discriminate against you if you do not sign one.    Medical Orders  Many states allow specific forms/orders signed by your physician to record your wishes for medical treatment in your current state of health. This form, signed in personal communication with your physician,  addresses resuscitation and other medical interventions that you may or may not want.      For more information or to schedule a time with a [facility] Advance Care Planning Facilitator contact: [facility].[place] [employer] or call [phone] and someone will contact you directly.

## 2020-11-13 NOTE — PROGRESS NOTES
Subjective   Adriana Nuñez is a 65 y.o. female.     Back Pain  This is a chronic problem. The current episode started more than 1 year ago. The problem occurs constantly. The problem has been waxing and waning since onset. The pain is present in the lumbar spine. The quality of the pain is described as aching. The pain radiates to the right knee and left knee. The pain is at a severity of 8/10. The pain is severe. The pain is worse during the day. The symptoms are aggravated by position and bending. Pertinent negatives include no bladder incontinence, bowel incontinence, chest pain or fever.   Anxiety  Presents for follow-up visit. Symptoms include compulsions, excessive worry and nervous/anxious behavior. Patient reports no chest pain, depressed mood, insomnia, irritability, palpitations, shortness of breath or suicidal ideas.       Hyperlipidemia  This is a chronic problem. The current episode started more than 1 year ago. The problem is uncontrolled. Recent lipid tests were reviewed and are high. Associated symptoms include myalgias. Pertinent negatives include no chest pain or shortness of breath.   COPD  There is no cough, hemoptysis, shortness of breath, sputum production or wheezing. This is a chronic problem. The current episode started more than 1 year ago. The problem occurs constantly. The problem has been waxing and waning. Associated symptoms include myalgias. Pertinent negatives include no chest pain, fever or heartburn.   Heartburn  She reports no chest pain, no coughing, no heartburn or no wheezing. This is a chronic problem. The current episode started more than 1 year ago. The problem occurs rarely.        The following portions of the patient's history were reviewed and updated as appropriate: allergies, current medications, past family history, past medical history, past social history, past surgical history and problem list.    Review of Systems   Constitutional: Negative for fever and  irritability.   Respiratory: Negative for cough, hemoptysis, sputum production, shortness of breath and wheezing.    Cardiovascular: Negative for chest pain, palpitations and leg swelling.   Gastrointestinal: Negative for bowel incontinence and heartburn.   Genitourinary: Negative for bladder incontinence.   Musculoskeletal: Positive for back pain and myalgias.   Skin: Negative for rash and wound.   Psychiatric/Behavioral: Positive for dysphoric mood and sleep disturbance. Negative for suicidal ideas. The patient is nervous/anxious. The patient does not have insomnia.        Objective   Physical Exam   Constitutional: She is oriented to person, place, and time. No distress.   Neurological: She is alert and oriented to person, place, and time.   Psychiatric: Judgment and thought content normal. Her mood appears anxious. Her speech is rapid and/or pressured. She is hyperactive.       Assessment/Plan    Diagnosis Plan   1. Generalized anxiety disorder     2. Chronic midline low back pain without sciatica  Ambulatory Referral to Pain Management    gabapentin (NEURONTIN) 300 MG capsule    HYDROcodone-acetaminophen (NORCO)  MG per tablet   3. Chronic bronchitis, unspecified chronic bronchitis type (CMS/HCC)     4. Polymyalgia rheumatica (CMS/HCC)     5. Gastroesophageal reflux disease without esophagitis     6. Pure hypercholesterolemia     7. Medicare annual wellness visit, subsequent           I attest that all information history and review of systems is accurate.  For generalized anxiety disorder continue Valium.  We have discussed the increased half-life of that as we get older but she says other medicines do not work for her.    For COPD continue Atrovent and albuterol inhalers.    For polymyalgia rheumatica continue prednisone.  For GERD continue omeprazole.    For chronic back pain without sciatica continue Neurontin and hydrocodone.         annie chavez OCD, declined treatment for it. Declined hyperlipidemia  treatment    You have chosen to receive care through a telephone visit. Do you consent to use a telephone visit for your medical care today? Yes  This visit has been rescheduled as a phone visit to comply with patient safety concerns in accordance with CDC recommendations. Total time of discussion was 21 minutes.

## 2020-11-16 RX ORDER — ALBUTEROL SULFATE 90 UG/1
2 AEROSOL, METERED RESPIRATORY (INHALATION) EVERY 6 HOURS PRN
Qty: 18 G | Refills: 1 | Status: SHIPPED | OUTPATIENT
Start: 2020-11-16 | End: 2020-12-30

## 2020-11-16 RX ORDER — DIAZEPAM 5 MG/1
5 TABLET ORAL 2 TIMES DAILY PRN
Qty: 60 TABLET | Refills: 0 | Status: SHIPPED | OUTPATIENT
Start: 2020-11-16 | End: 2020-12-11 | Stop reason: SDUPTHER

## 2020-11-18 DIAGNOSIS — R10.13 EPIGASTRIC PAIN: ICD-10-CM

## 2020-11-18 RX ORDER — DICYCLOMINE HCL 20 MG
TABLET ORAL
Qty: 60 TABLET | Refills: 1 | Status: SHIPPED | OUTPATIENT
Start: 2020-11-18 | End: 2021-02-04

## 2020-12-04 ENCOUNTER — RESULTS ENCOUNTER (OUTPATIENT)
Dept: OBSTETRICS AND GYNECOLOGY | Facility: CLINIC | Age: 65
End: 2020-12-04

## 2020-12-04 ENCOUNTER — OFFICE VISIT (OUTPATIENT)
Dept: OBSTETRICS AND GYNECOLOGY | Facility: CLINIC | Age: 65
End: 2020-12-04

## 2020-12-04 VITALS — WEIGHT: 142.4 LBS | BODY MASS INDEX: 24.44 KG/M2 | SYSTOLIC BLOOD PRESSURE: 110 MMHG | DIASTOLIC BLOOD PRESSURE: 72 MMHG

## 2020-12-04 DIAGNOSIS — N76.0 RECURRENT VAGINITIS: ICD-10-CM

## 2020-12-04 DIAGNOSIS — M54.50 CHRONIC MIDLINE LOW BACK PAIN WITHOUT SCIATICA: ICD-10-CM

## 2020-12-04 DIAGNOSIS — N95.2 VAGINAL ATROPHY: Primary | ICD-10-CM

## 2020-12-04 DIAGNOSIS — G89.29 CHRONIC MIDLINE LOW BACK PAIN WITHOUT SCIATICA: ICD-10-CM

## 2020-12-04 DIAGNOSIS — R10.2 VAGINAL PAIN: ICD-10-CM

## 2020-12-04 LAB
CANDIDA ALBICANS: NEGATIVE
GARDNERELLA VAGINALIS: POSITIVE
T VAGINALIS DNA VAG QL PROBE+SIG AMP: NEGATIVE

## 2020-12-04 PROCEDURE — 87660 TRICHOMONAS VAGIN DIR PROBE: CPT | Performed by: OBSTETRICS & GYNECOLOGY

## 2020-12-04 PROCEDURE — 87480 CANDIDA DNA DIR PROBE: CPT | Performed by: OBSTETRICS & GYNECOLOGY

## 2020-12-04 PROCEDURE — 87510 GARDNER VAG DNA DIR PROBE: CPT | Performed by: OBSTETRICS & GYNECOLOGY

## 2020-12-04 PROCEDURE — 99214 OFFICE O/P EST MOD 30 MIN: CPT | Performed by: OBSTETRICS & GYNECOLOGY

## 2020-12-04 RX ORDER — L. ACIDOPHILUS/L.BULGARICUS 1MM CELL
1 TABLET ORAL DAILY
Qty: 30 TABLET | Refills: 11 | Status: SHIPPED | OUTPATIENT
Start: 2020-12-04

## 2020-12-04 RX ORDER — METRONIDAZOLE 500 MG/1
500 TABLET ORAL 2 TIMES DAILY
Qty: 14 TABLET | Refills: 0 | Status: SHIPPED | OUTPATIENT
Start: 2020-12-04 | End: 2020-12-11

## 2020-12-04 RX ORDER — METRONIDAZOLE 7.5 MG/G
GEL VAGINAL 2 TIMES WEEKLY
Qty: 70 G | Refills: 6 | Status: SHIPPED | OUTPATIENT
Start: 2020-12-07

## 2020-12-04 NOTE — PROGRESS NOTES
"[facility]  Gynecology  Date of Service: 2020    CC: \"I needed to see you\"    HPI  Adriana Nuñez is a 65 y.o.  postmenopausal female who presents with recurrent vaginal pains.  She states that \"it hurts real bad down there, ya hear?!\"  She said that it was \"all red down there.\"  She reiterated that she has chronic E. Coli per Dr. Luna \"but he gave up on her and so did Dr. Serna\" which we had talked about at her last visit 2 months ago.  She again told me that she had a vaginal infection that went up her spine and into her ear, also which she had told me before.  She said again \"you just gotta keep me alive 5 more years because my disabled son!\"  She states that the ABX she got before \"made her hurt more so the infection must be cleared.\"  She had a OneSwab done before which was +Gardnerella vaginalis, +Enterococcus faecalis, +Candida glabrata.  She was given a course of PO Flagyl, Diflucan, and ampicillin.  She again said that \"I should have gotten an IV before, ya hear?!  I can't say anything against the other doctors because then I won't have a bowel doctor.\"  She is very concerned that the abscess caused all of this and \"Dr. Luna's got the pictures to show it with the bladder fistula and that's why it's so bad.\"    ROS  Review of Systems   Constitutional: Positive for appetite change.   HENT: Negative.    Eyes: Negative.    Respiratory: Negative.    Cardiovascular: Negative.    Gastrointestinal: Positive for constipation, diarrhea and nausea.   Endocrine: Negative.    Genitourinary: Positive for pelvic pain and vaginal pain.   Musculoskeletal: Positive for back pain, joint swelling and myalgias.        \"old sick\"   Skin: Negative.    Allergic/Immunologic: Negative.    Neurological: Negative.    Hematological: Negative.    Psychiatric/Behavioral: Positive for confusion.        Stress- \"got disabled son age 20, can't die on him\"     GYN HISTORY  Menarche: age " "unknown  History of STIs: None per patient  S/p hyst at age 41 for \"beginning of cancer\"     OB HISTORY  OB History    Para Term  AB Living   3 2 2   1 2   SAB TAB Ectopic Molar Multiple Live Births   1         2      # Outcome Date GA Lbr Omar/2nd Weight Sex Delivery Anes PTL Lv   3 Term      Vag-Spont   BERNARDO   2 Term      Vag-Spont   BERNARDO   1 SAB      SAB        PAST MEDICAL HISTORY  Past Medical History:   Diagnosis Date   • Adenomatous polyp of colon    • Anorectal abscess    • Anxiety disorder    • Benign polyp of large intestine    • Bladder fistula      post rapair      • Blood in urine    • Chronic obstructive lung disease (CMS/HCC)    • Chronic urinary tract infection    • Depressive disorder    • Diarrhea    • Diverticulitis of colon      post colectomy      • Elevated levels of transaminase & lactic acid dehydrogenase    • Generalized anxiety disorder    • Heavy tobacco smoker    • History of colon polyps    • Hyperlipidemia    • Insomnia    • Low back pain    • Lymphadenopathy     ON CT   • Osteoporosis    • Peripheral nerve disease    • Polymyalgia rheumatica (CMS/HCC)    • Polyp of colon    • Vitamin D deficiency      PAST SURGICAL HISTORY  Past Surgical History:   Procedure Laterality Date   • COLON RESECTION     • COLONOSCOPY  2014    polyps   • COLONOSCOPY  2016   • COLONOSCOPY N/A 2017    Procedure: COLONOSCOPY;  Surgeon: Aric Pina MD;  Location: [facility] ENDOSCOPY;  Service:    • COLONOSCOPY N/A 2019    Procedure: COLONOSCOPY;  Surgeon: Aric Pina MD;  Location: [facility] ENDOSCOPY;  Service: Gastroenterology   • ENDOSCOPY  2015    Normal esophagus. Gastritis was found in the stomach. Biopsy taken. Normal duodenum   • ENDOSCOPY N/A 2017    Procedure: ESOPHAGOGASTRODUODENOSCOPY;  Surgeon: Aric Pina MD;  Location: [facility] ENDOSCOPY;  Service:    • ENDOSCOPY N/A 2019    Procedure: ESOPHAGOGASTRODUODENOSCOPY;  Surgeon: Aric Pina MD;  " Location: [facility] ENDOSCOPY;  Service: Gastroenterology   • HYSTERECTOMY     • UPPER GASTROINTESTINAL ENDOSCOPY  01/26/2015   • UPPER GASTROINTESTINAL ENDOSCOPY  09/18/2017   • UPPER GASTROINTESTINAL ENDOSCOPY  04/17/2019     FAMILY HISTORY  Family History   Problem Relation Age of Onset   • No Known Problems Sister    • No Known Problems Brother      SOCIAL HISTORY  Social History     Socioeconomic History   • Marital status:      Spouse name: Not on file   • Number of children: Not on file   • Years of education: Not on file   • Highest education level: Not on file   Tobacco Use   • Smoking status: Current Every Day Smoker     Packs/day: 1.00     Types: Cigars, Cigarettes   • Smokeless tobacco: Never Used   • Tobacco comment: 09/12/2019 - Patient states she has utilized tobacco products periodically since the age of 11.   Substance and Sexual Activity   • Alcohol use: No   • Drug use: No   • Sexual activity: Defer     ALLERGIES  Allergies   Allergen Reactions   • Fosamax [Alendronate] GI Intolerance     HOME MEDICATIONS  Prior to Admission medications    Medication Sig Start Date End Date Taking? Authorizing Provider   albuterol sulfate HFA (Ventolin HFA) 108 (90 Base) MCG/ACT inhaler Inhale 2 puffs Every 6 (Six) Hours As Needed for Wheezing. 11/16/20  Yes Donato Donis MD   ATROVENT HFA 17 MCG/ACT inhaler Inhale 2 puffs 4 (Four) Times a Day. 4/20/20  Yes Donato Donis MD   diazePAM (VALIUM) 5 MG tablet Take 1 tablet by mouth 2 (Two) Times a Day As Needed for Anxiety. 11/16/20  Yes Donato Donis MD   dicyclomine (BENTYL) 20 MG tablet TAKE 1 TABLET BY MOUTH TWICE DAILY 11/18/20  Yes Aric Pina MD   fluticasone (FLONASE) 50 MCG/ACT nasal spray USE 2 SPRAYS INTO EACH NOSTRIL DAILY 2/7/19  Yes Donato Donis MD   gabapentin (NEURONTIN) 300 MG capsule Take 1 capsule by mouth 3 (Three) Times a Day. 11/13/20  Yes Donato Donis MD   HYDROcodone-acetaminophen (NORCO)  MG per tablet  Take 1 tablet by mouth Every 6 (Six) Hours As Needed for Moderate Pain . 11/13/20  Yes Donato Donis MD   lidocaine-prilocaine (EMLA) 2.5-2.5 % cream APPLY SMALL AMOUNT TO AFFECTED AREA FOR ITCHING AS NEEDED 11/4/19  Yes ProviderFelicia MD   naloxone (NARCAN) 4 MG/0.1ML nasal spray 1 spray into the nostril(s) as directed by provider As Needed (accidental OD). If used, call 911 7/26/18  Yes Donato Donis MD   nitrofurantoin (MACRODANTIN) 100 MG capsule Take 1 capsule by mouth Daily. 9/10/18  Yes ProviderFelicia MD   nystatin (MYCOSTATIN) 827971 UNIT/ML suspension Take 5 mL by mouth 4 (Four) Times a Day. 7/10/19  Yes Gutierrez Bates MD   omeprazole (priLOSEC) 20 MG capsule TAKE ONE CAPSULE BY MOUTH TWO TIMES A DAY 7/1/20  Yes Aric Pina MD   predniSONE (DELTASONE) 10 MG tablet TAKE TWO TABLETS BY MOUTH DAILY FOR 7 DAYS THEN TAKE ONE TABLET DAILY 6/1/20  Yes Donato Donis MD     PE  /72 (BP Location: Left arm, Patient Position: Sitting, Cuff Size: Adult)   Wt 64.6 kg (142 lb 6.4 oz)   BMI 24.44 kg/m²        General: Disheveled but NAD.  Tangential speech.  Neurologic: Alert, oriented to person, place, and time.  Gait normal.  Cranial nerves II-XII grossly intact.    HEENT: Normocephalic, atraumatic.  Extraocular muscles intact, pupils equal and reactive times two.    Neck: Supple, no adenopathy, thyroid normal size, non-tender, without nodularity, trachea midline.  Lungs: Normal respiratory effort.  Clear to auscultation bilaterally.  No wheezes, rhonci, or rales.  Heart: Regular rate and rhythm.  No murmer, rub or gallop.  Abdomen: Soft, non-tender, non-distended,no masses, no hepatosplenomegaly, no hernia.  Skin: No rash, no lesions.  Extremities: No cyanosis, clubbing or edema.  PELVIC EXAM:  External Genitalia/Vulva: Anatomy is atrophic, no significant redness of labia, no discharge on vulvar tissues, [place]'s and Bartholin's glands are normal, no ulcers, no  condylomatous lesions.  Urethra: Normal, no lesions.  Vagina: Vaginal tissues are atrophic.  Cervix: Absent  Uterus: Absent  Adnexa: Absent  Rectal: Normal, no masses or polyps, confirms bimanual exam, perianal normal, no lesions; JASON deferred.    IMPRESSION  Adriana Nuñez is a 65 y.o.  presenting with vaginal pain.  I discussed that she has vaginal atrophy and refused to take the estrogen cream which is the best treatment for that.  I also reiterated that she did NOT test positive for E.coli on her last swab and that she does NOT have E.coli infection in her vagina as she was previously told.  She seemed surprised.  She also said that we told her that she had Chlamydia and I explained that neither myself OR any of my staff told her that she had Chlamydia.  I discussed that her back pain and leg pain is NOT related to her vagina.  She seems as if she is beginning to understand.    PLAN    1. Vaginal atrophy  Recommend vaginal estrogen cream which patient refuses to take.  Recommend coconut oil, pea-sized amount daily.    2. Recurrent vaginitis  - Gardnerella vaginalis, Trichomonas vaginalis, Candida albicans, DNA - Swab, Vagina  - OneSwab - Kit, Vagina; Future    3. Vaginal pain    4. Chronic midline low back pain without sciatica  Discussed that this is not a problem that I can fix and is NOT related to her vaginal symptoms at all.    This document has been electronically signed by Corazon Rojas MD on 2020 17:31 CST.

## 2020-12-07 ENCOUNTER — TELEPHONE (OUTPATIENT)
Dept: OBSTETRICS AND GYNECOLOGY | Facility: CLINIC | Age: 65
End: 2020-12-07

## 2020-12-07 NOTE — TELEPHONE ENCOUNTER
Patient returned call. Discussed results with her and let her know that dr rico has sent in medication as well as a vaginal cream.     Patient verbalized understanding and I advised patient if she had any further questions to contact the office

## 2020-12-07 NOTE — TELEPHONE ENCOUNTER
----- Message from Corazon Rojas MD sent at 12/4/2020  6:22 PM CST -----  Please let her know that she has BV again.  Will send in Flagyl for now but after she finishes the pills, she needs to use a vaginal gel 2x/week for 6 months to help prevent it from coming back.  She needs to use the coconut oil too.  She should also take a probiotic.

## 2020-12-11 ENCOUNTER — OFFICE VISIT (OUTPATIENT)
Dept: FAMILY MEDICINE CLINIC | Facility: CLINIC | Age: 65
End: 2020-12-11

## 2020-12-11 VITALS — SYSTOLIC BLOOD PRESSURE: 110 MMHG | DIASTOLIC BLOOD PRESSURE: 70 MMHG

## 2020-12-11 DIAGNOSIS — M54.50 CHRONIC MIDLINE LOW BACK PAIN WITHOUT SCIATICA: ICD-10-CM

## 2020-12-11 DIAGNOSIS — G89.29 CHRONIC MIDLINE LOW BACK PAIN WITHOUT SCIATICA: ICD-10-CM

## 2020-12-11 DIAGNOSIS — F41.1 GENERALIZED ANXIETY DISORDER: ICD-10-CM

## 2020-12-11 PROCEDURE — 99442 PR PHYS/QHP TELEPHONE EVALUATION 11-20 MIN: CPT | Performed by: FAMILY MEDICINE

## 2020-12-11 RX ORDER — HYDROCODONE BITARTRATE AND ACETAMINOPHEN 10; 325 MG/1; MG/1
1 TABLET ORAL EVERY 6 HOURS PRN
Qty: 120 TABLET | Refills: 0 | Status: SHIPPED | OUTPATIENT
Start: 2020-12-11 | End: 2021-01-07 | Stop reason: SDUPTHER

## 2020-12-11 RX ORDER — GABAPENTIN 300 MG/1
300 CAPSULE ORAL 3 TIMES DAILY
Qty: 90 CAPSULE | Refills: 0 | Status: SHIPPED | OUTPATIENT
Start: 2020-12-11 | End: 2021-01-07 | Stop reason: SDUPTHER

## 2020-12-11 RX ORDER — DIAZEPAM 5 MG/1
5 TABLET ORAL 2 TIMES DAILY PRN
Qty: 60 TABLET | Refills: 0 | Status: SHIPPED | OUTPATIENT
Start: 2020-12-11 | End: 2021-01-13 | Stop reason: SDUPTHER

## 2020-12-11 NOTE — PROGRESS NOTES
Subjective   Adriana Nuñez is a 65 y.o. female.     Back Pain  This is a chronic problem. The current episode started more than 1 year ago. The problem occurs constantly. The problem has been waxing and waning since onset. The pain is present in the lumbar spine. The quality of the pain is described as aching. The pain radiates to the right knee and left knee. The pain is at a severity of 10/10. The pain is moderate. The pain is worse during the day. The symptoms are aggravated by position and bending. Pertinent negatives include no bladder incontinence, bowel incontinence or fever.   Anxiety  Presents for follow-up visit. Symptoms include compulsions, excessive worry, nervous/anxious behavior and restlessness. Patient reports no depressed mood, insomnia or irritability. Symptoms occur constantly. The severity of symptoms is moderate.            The following portions of the patient's history were reviewed and updated as appropriate: allergies, current medications, past family history, past medical history, past social history, past surgical history and problem list.    Review of Systems   Constitutional: Negative for fever and irritability.   Gastrointestinal: Negative for bowel incontinence.   Genitourinary: Negative for bladder incontinence.   Musculoskeletal: Positive for back pain.   Psychiatric/Behavioral: The patient is nervous/anxious. The patient does not have insomnia.        Objective   Physical Exam   Neurological: She is alert.   Skin: Skin is warm and dry.   Psychiatric: Judgment and thought content normal. Her mood appears anxious. Her speech is rapid and/or pressured. She is hyperactive.   /70     Assessment/Plan   Problems Addressed this Visit        Nervous and Auditory    Chronic midline low back pain without sciatica    Relevant Medications    gabapentin (NEURONTIN) 300 MG capsule    HYDROcodone-acetaminophen (NORCO)  MG per tablet       Other    Generalized anxiety disorder     Relevant Medications    diazePAM (VALIUM) 5 MG tablet      Diagnoses       Codes Comments    Chronic midline low back pain without sciatica     ICD-10-CM: M54.5, G89.29  ICD-9-CM: 724.2, 338.29     Generalized anxiety disorder     ICD-10-CM: F41.1  ICD-9-CM: 300.02         ME = 39 AND ON VALIUM. RISK DISCUSSED   annie chavez OCD, declined treatment for it    I attest that all information history review of systems is accurate.    For chronic low back pain continue gabapentin and hydrocodone.  He has appointment with pain management on or before January 8, 2020.        Generalized anxiety continue diazepam.  We have discussed the drug interaction between diazepam and hydrocodone in the past.  We have also discussed the enhanced half-life of diazepam.  We have attempted at least once to switch her to different benzodiazepine with a lower half-life but she did not do well with it.  I continue to suspect she has obsessive-compulsive disorder but she has declined treatment for that in the past.    For other medical problems include polymyalgia rheumatica chronic bronchitis GERD hypercholesterolemia please see know of 11/11/2020.      You have chosen to receive care through a telephone visit. Do you consent to use a telephone visit for your medical care today? Yes  This visit has been rescheduled as a phone visit to comply with patient safety concerns in accordance with CDC recommendations. Total time of discussion was 14 minutes.

## 2020-12-14 ENCOUNTER — TELEPHONE (OUTPATIENT)
Dept: OBSTETRICS AND GYNECOLOGY | Facility: CLINIC | Age: 65
End: 2020-12-14

## 2020-12-14 DIAGNOSIS — N76.0 RECURRENT VAGINITIS: ICD-10-CM

## 2020-12-14 NOTE — TELEPHONE ENCOUNTER
Needs some more meds called in..her private area is hurting and making her head n neck hurt..please call..[phone]

## 2020-12-16 RX ORDER — METRONIDAZOLE 500 MG/1
TABLET ORAL
Qty: 14 TABLET | Refills: 0 | OUTPATIENT
Start: 2020-12-16

## 2020-12-16 NOTE — TELEPHONE ENCOUNTER
"Joselyn spoke w/ pt on 12/14 at 8:59AM.    \"Returned patient call, she is requesting refill of flagyl stating she thinks she has a bladder infection.   Advised patient to use vaginal gel dr rico sent in, and that the flagyl was to treat her for BV, and that if she believes she has a UTI she needs to contact her primary care.   Patient states she just needs a refill and that \"she really should be taking flagyl 4 times a day\"   Advised patient to contact primary care.\"    Agree w/ above.  She does NOT need to take Flagyl 4x/day for BV.  That is not the correct dosing for that particular infection.    Corazon Rico MD  12/16/2020  10:19 CST  "

## 2020-12-18 ENCOUNTER — TELEPHONE (OUTPATIENT)
Dept: GASTROENTEROLOGY | Facility: CLINIC | Age: 65
End: 2020-12-18

## 2020-12-30 RX ORDER — ALBUTEROL SULFATE 90 UG/1
AEROSOL, METERED RESPIRATORY (INHALATION)
Qty: 18 G | Refills: 0 | Status: SHIPPED | OUTPATIENT
Start: 2020-12-30 | End: 2021-01-07 | Stop reason: SDUPTHER

## 2021-01-05 ENCOUNTER — TELEPHONE (OUTPATIENT)
Dept: OBSTETRICS AND GYNECOLOGY | Facility: CLINIC | Age: 66
End: 2021-01-05

## 2021-01-05 NOTE — TELEPHONE ENCOUNTER
PATIENT THINKS SHE HAS A YEAST INFECTION AND WANTS SOMETHING CALLED INTO [facility]

## 2021-01-05 NOTE — TELEPHONE ENCOUNTER
"Returned patient call.  Patient states \"that her mouth is full of white stuff and she has not gotten a mirror to check down there\"but believes that she has a yeast infection...  Let patient know she should contact her primary care doctor to discuss any infection she believes to have with her mouth.  Pt verbalized understanding  "

## 2021-01-07 DIAGNOSIS — F41.1 GENERALIZED ANXIETY DISORDER: ICD-10-CM

## 2021-01-07 DIAGNOSIS — M54.50 CHRONIC MIDLINE LOW BACK PAIN WITHOUT SCIATICA: ICD-10-CM

## 2021-01-07 DIAGNOSIS — G89.29 CHRONIC MIDLINE LOW BACK PAIN WITHOUT SCIATICA: ICD-10-CM

## 2021-01-07 RX ORDER — HYDROCODONE BITARTRATE AND ACETAMINOPHEN 10; 325 MG/1; MG/1
1 TABLET ORAL EVERY 6 HOURS PRN
Qty: 120 TABLET | Refills: 0 | Status: SHIPPED | OUTPATIENT
Start: 2021-01-10 | End: 2021-02-16 | Stop reason: SDUPTHER

## 2021-01-07 RX ORDER — GABAPENTIN 300 MG/1
300 CAPSULE ORAL 3 TIMES DAILY
Qty: 90 CAPSULE | Refills: 0 | Status: SHIPPED | OUTPATIENT
Start: 2021-01-10 | End: 2021-02-12 | Stop reason: SDUPTHER

## 2021-01-07 RX ORDER — DIAZEPAM 5 MG/1
5 TABLET ORAL 2 TIMES DAILY PRN
Qty: 60 TABLET | Refills: 0 | OUTPATIENT
Start: 2021-01-07

## 2021-01-07 RX ORDER — PREDNISONE 10 MG/1
10 TABLET ORAL DAILY
Qty: 30 TABLET | Refills: 2 | Status: SHIPPED | OUTPATIENT
Start: 2021-01-10 | End: 2021-02-12 | Stop reason: SDUPTHER

## 2021-01-07 RX ORDER — ALBUTEROL SULFATE 90 UG/1
2 AEROSOL, METERED RESPIRATORY (INHALATION) EVERY 6 HOURS PRN
Qty: 18 G | Refills: 0 | Status: SHIPPED | OUTPATIENT
Start: 2021-01-07 | End: 2021-02-12 | Stop reason: SDUPTHER

## 2021-01-07 NOTE — TELEPHONE ENCOUNTER
Too soon for dizaepam refill.  Patient should call to request next week.  First fill date is 1/13/2021.   ThanksFATOUMATA

## 2021-01-07 NOTE — TELEPHONE ENCOUNTER
Caller: Kourtneymary Adriana Sousae    Relationship: Self    Best call back number: [phone]     Medication needed:   Requested Prescriptions     Pending Prescriptions Disp Refills   • albuterol sulfate  (90 Base) MCG/ACT inhaler 18 g 0     Sig: Inhale 2 puffs Every 6 (Six) Hours As Needed for Wheezing.   • diazePAM (VALIUM) 5 MG tablet 60 tablet 0     Sig: Take 1 tablet by mouth 2 (Two) Times a Day As Needed for Anxiety.     Signed Prescriptions Disp Refills   • HYDROcodone-acetaminophen (NORCO)  MG per tablet 120 tablet 0     Sig: Take 1 tablet by mouth Every 6 (Six) Hours As Needed for Moderate Pain .     Authorizing Provider: BERNIE MONTGOMERY   • gabapentin (NEURONTIN) 300 MG capsule 90 capsule 0     Sig: Take 1 capsule by mouth 3 (Three) Times a Day.     Authorizing Provider: BERNIE MONTGOMERY   • predniSONE (DELTASONE) 10 MG tablet 30 tablet 2     Sig: Take 1 tablet by mouth Daily.     Authorizing Provider: BERNIE MONTGOMERY       When do you need the refill by: BEFORE THE 11TH    What details did the patient provide when requesting the medication: PATIENT STATED SHE WILL BE OUT BY THE 11TH    Does the patient have less than a 3 day supply:  [] Yes  [x] No    What is the patient's preferred pharmacy: [address] [phone] [facility] [phone]

## 2021-01-07 NOTE — TELEPHONE ENCOUNTER
Adriana was Dr Donis patient, she does not have an appt with pain management until 01-28-21 and not scheduled with Dr Milan until March and she is going to be out of 3 meds. She uses [facility] pharmacy.     Hydrocodone  Prednisone  Gabapentin

## 2021-01-12 ENCOUNTER — TELEPHONE (OUTPATIENT)
Dept: FAMILY MEDICINE CLINIC | Facility: CLINIC | Age: 66
End: 2021-01-12

## 2021-01-12 NOTE — TELEPHONE ENCOUNTER
PATIENT STATES THAT INSURANCE WILL NOT PAY FOR   HYDROcodone-acetaminophen (NORCO)  MG per tablet  AND THE MEDICINE WILL BE $40    PLEASE ADVISE   BEST CALL [phone]

## 2021-01-13 DIAGNOSIS — F41.1 GENERALIZED ANXIETY DISORDER: ICD-10-CM

## 2021-01-13 RX ORDER — DIAZEPAM 5 MG/1
5 TABLET ORAL 2 TIMES DAILY PRN
Qty: 60 TABLET | Refills: 0 | Status: SHIPPED | OUTPATIENT
Start: 2021-01-13 | End: 2021-02-16 | Stop reason: SDUPTHER

## 2021-01-13 NOTE — TELEPHONE ENCOUNTER
I have not seen anything.  Please ask that patient check with the pharmacy again.  Thanks, FATOUMATA Milan

## 2021-01-13 NOTE — TELEPHONE ENCOUNTER
Called and spoke to Jelena at the pharmacy-her norco needs to be PA'd, but the requests was being sent to the wrong number.

## 2021-02-03 ENCOUNTER — IMMUNIZATION (OUTPATIENT)
Dept: VACCINE CLINIC | Facility: HOSPITAL | Age: 66
End: 2021-02-03

## 2021-02-03 PROCEDURE — 0001A: CPT | Performed by: NURSE PRACTITIONER

## 2021-02-03 PROCEDURE — 91300 HC SARSCOV02 VAC 30MCG/0.3ML IM: CPT | Performed by: NURSE PRACTITIONER

## 2021-02-04 DIAGNOSIS — R10.13 EPIGASTRIC PAIN: ICD-10-CM

## 2021-02-04 RX ORDER — DICYCLOMINE HCL 20 MG
TABLET ORAL
Qty: 60 TABLET | Refills: 1 | Status: SHIPPED | OUTPATIENT
Start: 2021-02-04 | End: 2021-04-12

## 2021-02-12 DIAGNOSIS — M54.50 CHRONIC MIDLINE LOW BACK PAIN WITHOUT SCIATICA: ICD-10-CM

## 2021-02-12 DIAGNOSIS — G89.29 CHRONIC MIDLINE LOW BACK PAIN WITHOUT SCIATICA: ICD-10-CM

## 2021-02-12 RX ORDER — HYDROCODONE BITARTRATE AND ACETAMINOPHEN 10; 325 MG/1; MG/1
1 TABLET ORAL EVERY 6 HOURS PRN
Qty: 120 TABLET | Refills: 0 | OUTPATIENT
Start: 2021-02-12

## 2021-02-12 RX ORDER — PREDNISONE 10 MG/1
10 TABLET ORAL DAILY
Qty: 30 TABLET | Refills: 2 | Status: SHIPPED | OUTPATIENT
Start: 2021-02-12 | End: 2021-06-17 | Stop reason: SDUPTHER

## 2021-02-12 RX ORDER — GABAPENTIN 300 MG/1
300 CAPSULE ORAL 3 TIMES DAILY
Qty: 90 CAPSULE | Refills: 0 | Status: SHIPPED | OUTPATIENT
Start: 2021-02-12 | End: 2021-03-31 | Stop reason: SDUPTHER

## 2021-02-12 RX ORDER — ALBUTEROL SULFATE 90 UG/1
2 AEROSOL, METERED RESPIRATORY (INHALATION) EVERY 6 HOURS PRN
Qty: 18 G | Refills: 0 | Status: SHIPPED | OUTPATIENT
Start: 2021-02-12 | End: 2021-06-02 | Stop reason: SDUPTHER

## 2021-02-12 NOTE — TELEPHONE ENCOUNTER
Caller: Adriana Nuñez    Relationship: Self    Best call back number: [phone]    Medication needed:   Requested Prescriptions     Pending Prescriptions Disp Refills   • predniSONE (DELTASONE) 10 MG tablet 30 tablet 2     Sig: Take 1 tablet by mouth Daily.   • HYDROcodone-acetaminophen (NORCO)  MG per tablet 120 tablet 0     Sig: Take 1 tablet by mouth Every 6 (Six) Hours As Needed for Moderate Pain .   • albuterol sulfate  (90 Base) MCG/ACT inhaler 18 g 0     Sig: Inhale 2 puffs Every 6 (Six) Hours As Needed for Wheezing.   • gabapentin (NEURONTIN) 300 MG capsule 90 capsule 0     Sig: Take 1 capsule by mouth 3 (Three) Times a Day.     Does the patient have less than a 3 day supply:  [x] Yes  [] No    What is the patient's preferred pharmacy: [address] [phone] [facility] [phone] FX

## 2021-02-12 NOTE — TELEPHONE ENCOUNTER
Please find out if patient has pain appointment.  She was supposed to see them on 1/28/2021 per previous telephone encounter, so refill from me was provided until this time.  Thanks, FATOUMATA Milan

## 2021-02-15 ENCOUNTER — TELEPHONE (OUTPATIENT)
Dept: FAMILY MEDICINE CLINIC | Facility: CLINIC | Age: 66
End: 2021-02-15

## 2021-02-16 DIAGNOSIS — M54.50 CHRONIC MIDLINE LOW BACK PAIN WITHOUT SCIATICA: ICD-10-CM

## 2021-02-16 DIAGNOSIS — G89.29 CHRONIC MIDLINE LOW BACK PAIN WITHOUT SCIATICA: ICD-10-CM

## 2021-02-16 DIAGNOSIS — F41.1 GENERALIZED ANXIETY DISORDER: ICD-10-CM

## 2021-02-16 NOTE — TELEPHONE ENCOUNTER
Last OV   12/11/2020    Next [place]anket OV    3/12/2021    Last Script Written     Valium 5 mg  #60,NR Last Script Written  01/13/2021  Norco 10/325 mg #120, NR Last Script Written  01/10/2021    Review PDMP    Please advise on refill    Thank you

## 2021-02-16 NOTE — TELEPHONE ENCOUNTER
Caller: Adriana Nuñez    Relationship: Self    Best call back number: [phone] (M)    Medication needed:   Requested Prescriptions     Pending Prescriptions Disp Refills   • HYDROcodone-acetaminophen (NORCO)  MG per tablet 120 tablet 0     Sig: Take 1 tablet by mouth Every 6 (Six) Hours As Needed for Moderate Pain .     diazePAM (VALIUM) 5 MG tablet  5 mg, 2 Times Daily PRN         When do you need the refill by: TODAY     What details did the patient provide when requesting the medication: WAS NOT ABLE TO GET IN TO HER APPT AND IS REQUESTING REFILLS   WILL BE OUT OF MEDICATION BY Monday     Does the patient have less than a 3 day supply:  [] Yes  [x] No    What is the patient's preferred pharmacy:    [address] - [phone] [facility] [phone]   [phone]

## 2021-02-17 RX ORDER — HYDROCODONE BITARTRATE AND ACETAMINOPHEN 10; 325 MG/1; MG/1
1 TABLET ORAL EVERY 6 HOURS PRN
Qty: 120 TABLET | Refills: 0 | Status: SHIPPED | OUTPATIENT
Start: 2021-02-17 | End: 2021-03-29

## 2021-02-17 RX ORDER — DIAZEPAM 5 MG/1
5 TABLET ORAL 2 TIMES DAILY PRN
Qty: 60 TABLET | Refills: 0 | Status: SHIPPED | OUTPATIENT
Start: 2021-02-17 | End: 2021-04-30

## 2021-02-17 NOTE — TELEPHONE ENCOUNTER
Medications refilled.  Please make sure that patient has pain management appointment set up.  ThanksFATOUMATA

## 2021-02-24 ENCOUNTER — IMMUNIZATION (OUTPATIENT)
Dept: VACCINE CLINIC | Facility: HOSPITAL | Age: 66
End: 2021-02-24

## 2021-02-24 PROCEDURE — 91300 HC SARSCOV02 VAC 30MCG/0.3ML IM: CPT | Performed by: THORACIC SURGERY (CARDIOTHORACIC VASCULAR SURGERY)

## 2021-02-24 PROCEDURE — 0002A: CPT | Performed by: THORACIC SURGERY (CARDIOTHORACIC VASCULAR SURGERY)

## 2021-03-01 ENCOUNTER — OFFICE VISIT (OUTPATIENT)
Dept: OBSTETRICS AND GYNECOLOGY | Facility: CLINIC | Age: 66
End: 2021-03-01

## 2021-03-01 VITALS — BODY MASS INDEX: 24.72 KG/M2 | SYSTOLIC BLOOD PRESSURE: 120 MMHG | DIASTOLIC BLOOD PRESSURE: 72 MMHG | WEIGHT: 144 LBS

## 2021-03-01 DIAGNOSIS — L43.9 LICHEN PLANUS: ICD-10-CM

## 2021-03-01 DIAGNOSIS — N76.0 RECURRENT VAGINITIS: Primary | ICD-10-CM

## 2021-03-01 PROCEDURE — 99214 OFFICE O/P EST MOD 30 MIN: CPT | Performed by: OBSTETRICS & GYNECOLOGY

## 2021-03-01 RX ORDER — TRIAMCINOLONE ACETONIDE 5 MG/G
CREAM TOPICAL
Qty: 15 G | Refills: 3 | Status: SHIPPED | OUTPATIENT
Start: 2021-03-01

## 2021-03-01 RX ORDER — LIDOCAINE AND PRILOCAINE 25; 25 MG/G; MG/G
CREAM TOPICAL
Qty: 30 G | Refills: 3 | Status: SHIPPED | OUTPATIENT
Start: 2021-03-01 | End: 2021-06-17 | Stop reason: SDUPTHER

## 2021-03-03 NOTE — PROGRESS NOTES
"[facility]  Gynecology  Date of Service: 2021    CC: \"Why is my butt so red?!\"    HPI  Adriana Nuñez is a 65 y.o.  postmenopausal female who presents with complaints of her butt being red.  She states that she feels that she needs another swab because \"she is going to see the butt doctor.\"  She reiterates again about her longstanding issues and previous evaluation by Dr. Luna.  Again reviewed with the patient that I do not deal with the bladder or the rectum.  She states that the outside of her vagina is sore.  She states that she refuses to do the estrogen and \"she doesn't want to hear that she is old and dry.\"  She refuses to use vaginal estrogen cream as she has read that it is bad to use with diverticulitis.      She is requesting vaginal lidocaine cream.    Denies any vaginal itching, burning, irritation, or discharge.    ROS  Review of Systems   Constitutional: Negative.    HENT: Negative.    Eyes: Negative.    Respiratory: Negative.    Cardiovascular: Negative.    Gastrointestinal: Positive for rectal pain.   Endocrine: Negative.    Genitourinary: Positive for vaginal pain.   Musculoskeletal: Negative.    Skin: Negative.    Neurological: Negative.    Hematological: Negative.    Psychiatric/Behavioral: Negative.       OB HISTORY  OB History    Para Term  AB Living   3 2 2   1 2   SAB TAB Ectopic Molar Multiple Live Births   1         2      # Outcome Date GA Lbr Omar/2nd Weight Sex Delivery Anes PTL Lv   3 Term      Vag-Spont   BERNARDO   2 Term      Vag-Spont   BERNARDO   1 SAB      SAB        PAST MEDICAL HISTORY  Past Medical History:   Diagnosis Date   • Adenomatous polyp of colon    • Anorectal abscess    • Anxiety disorder    • Benign polyp of large intestine    • Bladder fistula      post rapair      • Blood in urine    • Chronic obstructive lung disease (CMS/HCC)    • Chronic urinary tract infection    • Depressive disorder    • Diarrhea    • Diverticulitis of " colon      post colectomy      • Elevated levels of transaminase & lactic acid dehydrogenase    • Generalized anxiety disorder    • Heavy tobacco smoker    • History of colon polyps    • Hyperlipidemia    • Insomnia    • Low back pain    • Lymphadenopathy     ON CT   • Osteoporosis    • Peripheral nerve disease    • Polymyalgia rheumatica (CMS/HCC)    • Polyp of colon    • Vitamin D deficiency      PAST SURGICAL HISTORY  Past Surgical History:   Procedure Laterality Date   • COLON RESECTION     • COLONOSCOPY  03/17/2014    polyps   • COLONOSCOPY  02/08/2016   • COLONOSCOPY N/A 5/24/2017    Procedure: COLONOSCOPY;  Surgeon: Aric Pina MD;  Location: [facility] ENDOSCOPY;  Service:    • COLONOSCOPY N/A 4/17/2019    Procedure: COLONOSCOPY;  Surgeon: Aric Pina MD;  Location: [facility] ENDOSCOPY;  Service: Gastroenterology   • ENDOSCOPY  01/26/2015    Normal esophagus. Gastritis was found in the stomach. Biopsy taken. Normal duodenum   • ENDOSCOPY N/A 9/18/2017    Procedure: ESOPHAGOGASTRODUODENOSCOPY;  Surgeon: Aric Pina MD;  Location: [facility] ENDOSCOPY;  Service:    • ENDOSCOPY N/A 4/17/2019    Procedure: ESOPHAGOGASTRODUODENOSCOPY;  Surgeon: Aric Pina MD;  Location: [facility] ENDOSCOPY;  Service: Gastroenterology   • HYSTERECTOMY     • UPPER GASTROINTESTINAL ENDOSCOPY  01/26/2015   • UPPER GASTROINTESTINAL ENDOSCOPY  09/18/2017   • UPPER GASTROINTESTINAL ENDOSCOPY  04/17/2019     FAMILY HISTORY  Family History   Problem Relation Age of Onset   • No Known Problems Sister    • No Known Problems Brother      SOCIAL HISTORY  Social History     Socioeconomic History   • Marital status:      Spouse name: Not on file   • Number of children: Not on file   • Years of education: Not on file   • Highest education level: Not on file   Tobacco Use   • Smoking status: Current Every Day Smoker     Packs/day: 1.00     Types: Cigars, Cigarettes   • Smokeless tobacco: Never Used   • Tobacco comment: 09/12/2019 -  Patient states she has utilized tobacco products periodically since the age of 11.   Substance and Sexual Activity   • Alcohol use: No   • Drug use: No   • Sexual activity: Defer     ALLERGIES  Allergies   Allergen Reactions   • Fosamax [Alendronate] GI Intolerance     HOME MEDICATIONS  Prior to Admission medications    Medication Sig Start Date End Date Taking? Authorizing Provider   albuterol sulfate  (90 Base) MCG/ACT inhaler Inhale 2 puffs Every 6 (Six) Hours As Needed for Wheezing. 2/12/21  Yes Heather Milan MD   ATROVENT HFA 17 MCG/ACT inhaler Inhale 2 puffs 4 (Four) Times a Day. 4/20/20  Yes Donato Donis MD   diazePAM (VALIUM) 5 MG tablet Take 1 tablet by mouth 2 (Two) Times a Day As Needed for Anxiety. 2/17/21  Yes Heather Milan MD   dicyclomine (BENTYL) 20 MG tablet TAKE 1 TABLET BY MOUTH TWICE DAILY 2/4/21  Yes Aric Pina MD   fluticasone (FLONASE) 50 MCG/ACT nasal spray USE 2 SPRAYS INTO EACH NOSTRIL DAILY 2/7/19  Yes Donato Donis MD   gabapentin (NEURONTIN) 300 MG capsule Take 1 capsule by mouth 3 (Three) Times a Day. 2/12/21  Yes Heather Milan MD   HYDROcodone-acetaminophen (NORCO)  MG per tablet Take 1 tablet by mouth Every 6 (Six) Hours As Needed for Moderate Pain . 2/17/21  Yes Heather Milan MD   Lactobacillus Probiotic tablet Take 1 tablet by mouth Daily. 12/4/20  Yes Corazon Rojas MD   lidocaine-prilocaine (EMLA) 2.5-2.5 % cream Use to affected area as needed for pain. 3/1/21  Yes Corazon Rojas MD   metroNIDAZOLE (METROGEL VAGINAL) 0.75 % vaginal gel Insert  into the vagina 2 (Two) Times a Week. 12/7/20  Yes Corazon Rojas MD   naloxone (NARCAN) 4 MG/0.1ML nasal spray 1 spray into the nostril(s) as directed by provider As Needed (accidental OD). If used, call 911 7/26/18  Yes Donato Donis MD   nitrofurantoin (MACRODANTIN) 100 MG capsule Take 1 capsule by mouth Daily. 9/10/18  Yes Provider,  MD Felicia   nystatin (MYCOSTATIN) 601504 UNIT/ML suspension Take 5 mL by mouth 4 (Four) Times a Day. 7/10/19  Yes Gutierrez Bates MD   omeprazole (priLOSEC) 20 MG capsule TAKE ONE CAPSULE BY MOUTH TWO TIMES A DAY 7/1/20  Yes Aric Pina MD   predniSONE (DELTASONE) 10 MG tablet Take 1 tablet by mouth Daily. 2/12/21  Yes Heather Milan MD   triamcinolone (KENALOG) 0.5 % cream Apply pea-sized amount to outside of vagina and inside the opening twice a week at night. 3/1/21   Corazon Rojas MD     PE  /72 (BP Location: Left arm, Patient Position: Sitting, Cuff Size: Adult)   Wt 65.3 kg (144 lb)   BMI 24.72 kg/m²        General: Alert, healthy, no distress, well nourished and well developed.  Neurologic: Alert, oriented to person, place, and time.  Gait normal.  Cranial nerves II-XII grossly intact.  HEENT: Normocephalic, atraumatic.  Extraocular muscles intact, pupils equal and reactive times two.    Neck: Supple, no adenopathy, thyroid normal size, non-tender, without nodularity, trachea midline.  Lungs: Normal respiratory effort.  Clear to auscultation bilaterally.  No wheezes, rhonci, or rales.  Heart: Regular rate and rhythm.  No murmer, rub or gallop.  Abdomen: Soft, non-tender, non-distended,no masses, no hepatosplenomegaly, no hernia.  Skin: No rash, no lesions.  Extremities: No cyanosis, clubbing or edema.  PELVIC EXAM:  External Genitalia/Vulva: Anatomy is normal, no significant redness of labia, no discharge on vulvar tissues, [place]'s and Bartholin's glands are normal, no ulcers, no condylomatous lesions.  ?mild irritation around introitus.  Urethral meatus: Normal, no lesions, no prolapse.  Urethra: Normal, no masses, no tenderness with palpation.  Bladder: Normal, no fullness, no masses, no tenderness with palpation.  Vagina: Vaginal tissues are atrophic, no significant discharge present.  Pelvic support adequate.  Cervix: Absent  Uterus: Absent  Adnexa:  Absent  Rectal: Normal, no masses or polyps, confirms bimanual exam, perianal normal, no lesions; JASON deferred.  No erythema noted around rectum.    IMPRESSION  Adriana Nuñez is a 65 y.o.  presenting with multiple complaints regarding concerns of E.coli in her rectum and bladder.  I have reiterated to her that I have swabbed her vagina and there was no evidence of E.coli.  I have also discussed that unfortunately, this does not involve the vagina.  She does not seem to grasp this, although she is understandably frustrated about why she feels the way she does.  I did obtain a OneSwab today to appease her.  Will fill numbing cream per patient request.  I again offered estrogen cream for her vaginal atrophy; patient adamantly refuses.  Steroid cream may help her so will trial course of that.    PLAN    1. Recurrent vaginitis  - OneSwab - Kit, Vagina; Future  - lidocaine-prilocaine (EMLA) 2.5-2.5 % cream; Use to affected area as needed for pain.  Dispense: 30 g; Refill: 3    2. Lichen planus  - triamcinolone (KENALOG) 0.5 % cream; Apply pea-sized amount to outside of vagina and inside the opening twice a week at night.  Dispense: 15 g; Refill: 3    This document has been electronically signed by Corazon Rojas MD on 2021 18:13 CST.

## 2021-03-07 DIAGNOSIS — R10.13 EPIGASTRIC PAIN: ICD-10-CM

## 2021-03-09 RX ORDER — OMEPRAZOLE 20 MG/1
CAPSULE, DELAYED RELEASE ORAL
Qty: 60 CAPSULE | Refills: 5 | Status: SHIPPED | OUTPATIENT
Start: 2021-03-09 | End: 2021-04-16 | Stop reason: SDUPTHER

## 2021-03-11 ENCOUNTER — TELEPHONE (OUTPATIENT)
Dept: OBSTETRICS AND GYNECOLOGY | Facility: CLINIC | Age: 66
End: 2021-03-11

## 2021-03-11 RX ORDER — METRONIDAZOLE 500 MG/1
500 TABLET ORAL 2 TIMES DAILY
Qty: 14 TABLET | Refills: 0 | Status: SHIPPED | OUTPATIENT
Start: 2021-03-11 | End: 2021-03-18

## 2021-03-11 RX ORDER — AMPICILLIN 500 MG/1
500 CAPSULE ORAL 4 TIMES DAILY
Qty: 28 CAPSULE | Refills: 0 | Status: SHIPPED | OUTPATIENT
Start: 2021-03-11 | End: 2021-03-18

## 2021-03-11 NOTE — TELEPHONE ENCOUNTER
----- Message from Katherin Tenorio sent at 3/8/2021 11:41 AM CST -----  Regarding: Test Results  Contact: [phone]  Patient states she is wanting to see if her test results (swab) is back.

## 2021-03-12 ENCOUNTER — TELEPHONE (OUTPATIENT)
Dept: FAMILY MEDICINE CLINIC | Facility: CLINIC | Age: 66
End: 2021-03-12

## 2021-03-15 ENCOUNTER — TELEPHONE (OUTPATIENT)
Dept: OBSTETRICS AND GYNECOLOGY | Facility: CLINIC | Age: 66
End: 2021-03-15

## 2021-03-15 NOTE — TELEPHONE ENCOUNTER
Called and spoke with patient regarding results, let her know that she does NOT have e coli and that she was pos GBS and BV. Advised patient to use metrogel and to take antibiotics to prevent infection from coming back.   Patient verbalized understanding

## 2021-03-25 ENCOUNTER — OFFICE VISIT (OUTPATIENT)
Dept: GASTROENTEROLOGY | Facility: CLINIC | Age: 66
End: 2021-03-25

## 2021-03-25 VITALS
SYSTOLIC BLOOD PRESSURE: 130 MMHG | HEIGHT: 64 IN | HEART RATE: 124 BPM | DIASTOLIC BLOOD PRESSURE: 79 MMHG | BODY MASS INDEX: 24.07 KG/M2 | WEIGHT: 141 LBS

## 2021-03-25 DIAGNOSIS — R10.84 GENERALIZED ABDOMINAL PAIN: Primary | ICD-10-CM

## 2021-03-25 DIAGNOSIS — R10.13 EPIGASTRIC PAIN: ICD-10-CM

## 2021-03-25 PROCEDURE — 99214 OFFICE O/P EST MOD 30 MIN: CPT | Performed by: INTERNAL MEDICINE

## 2021-03-25 RX ORDER — SODIUM, POTASSIUM,MAG SULFATES 17.5-3.13G
1 SOLUTION, RECONSTITUTED, ORAL ORAL EVERY 12 HOURS
Qty: 1 ML | Refills: 0 | Status: SHIPPED | OUTPATIENT
Start: 2021-03-25 | End: 2021-04-22 | Stop reason: SDUPTHER

## 2021-03-25 RX ORDER — DEXTROSE AND SODIUM CHLORIDE 5; .45 G/100ML; G/100ML
30 INJECTION, SOLUTION INTRAVENOUS CONTINUOUS PRN
Status: CANCELLED | OUTPATIENT
Start: 2021-04-13

## 2021-03-25 NOTE — PROGRESS NOTES
[facility], [facility]      Chief Complaint:   Chief Complaint   Patient presents with   • Abdominal Pain       Subjective     HPI:   Patient complaining of diffuse abdominal pain and vaginal pain.  Patient has been to the OB/GYN who states that there is no vaginal findings.  Patient's last colonoscopy was normal but this was 2 years ago.  Patient states some changes in her bowel habits since that time and severe pain around her anus needing medication.  Patient also with severe epigastric abdominal pain.  Patient is agitated at this time and believe this is the cause of her elevated heart rate.  Blood pressure is normal.  Patient states she feels well.  Discussed with patient that she should follow-up with her primary doctor for this as patient does not feel like going to the emergency room at this time.    Plan; we will schedule patient for EGD and colonoscopy to evaluate the causes of these changes in bowel habits and pain in the abdomen.    Past Medical History:   Past Medical History:   Diagnosis Date   • Adenomatous polyp of colon    • Anorectal abscess    • Anxiety disorder    • Benign polyp of large intestine    • Bladder fistula      post rapair      • Blood in urine    • Chronic obstructive lung disease (CMS/HCC)    • Chronic urinary tract infection    • Depressive disorder    • Diarrhea    • Diverticulitis of colon      post colectomy      • Elevated levels of transaminase & lactic acid dehydrogenase    • Generalized anxiety disorder    • Heavy tobacco smoker    • History of colon polyps    • Hyperlipidemia    • Insomnia    • Low back pain    • Lymphadenopathy     ON CT   • Osteoporosis    • Peripheral nerve disease    • Polymyalgia rheumatica (CMS/HCC)    • Polyp of colon    • Vitamin D deficiency        Past Surgical History:  Past Surgical History:   Procedure Laterality Date   • COLON RESECTION     • COLONOSCOPY  03/17/2014    polyps   • COLONOSCOPY  02/08/2016   • COLONOSCOPY N/A  5/24/2017    Procedure: COLONOSCOPY;  Surgeon: Aric Pina MD;  Location: [facility] ENDOSCOPY;  Service:    • COLONOSCOPY N/A 4/17/2019    Procedure: COLONOSCOPY;  Surgeon: Aric Pina MD;  Location: [facility] ENDOSCOPY;  Service: Gastroenterology   • ENDOSCOPY  01/26/2015    Normal esophagus. Gastritis was found in the stomach. Biopsy taken. Normal duodenum   • ENDOSCOPY N/A 9/18/2017    Procedure: ESOPHAGOGASTRODUODENOSCOPY;  Surgeon: Aric Pina MD;  Location: [facility] ENDOSCOPY;  Service:    • ENDOSCOPY N/A 4/17/2019    Procedure: ESOPHAGOGASTRODUODENOSCOPY;  Surgeon: Aric Pina MD;  Location: [facility] ENDOSCOPY;  Service: Gastroenterology   • HYSTERECTOMY     • UPPER GASTROINTESTINAL ENDOSCOPY  01/26/2015   • UPPER GASTROINTESTINAL ENDOSCOPY  09/18/2017   • UPPER GASTROINTESTINAL ENDOSCOPY  04/17/2019       Family History:  Family History   Problem Relation Age of Onset   • No Known Problems Sister    • No Known Problems Brother        Social History:   reports that she has been smoking cigars and cigarettes. She has been smoking about 1.00 pack per day. She has never used smokeless tobacco. She reports that she does not drink alcohol and does not use drugs.    Medications:   Prior to Admission medications    Medication Sig Start Date End Date Taking? Authorizing Provider   albuterol sulfate  (90 Base) MCG/ACT inhaler Inhale 2 puffs Every 6 (Six) Hours As Needed for Wheezing. 2/12/21  Yes Heather Milan MD   ATROVENT HFA 17 MCG/ACT inhaler Inhale 2 puffs 4 (Four) Times a Day. 4/20/20  Yes Donato Donis MD   gabapentin (NEURONTIN) 300 MG capsule Take 1 capsule by mouth 3 (Three) Times a Day. 2/12/21  Yes Heather Milan MD   Lactobacillus Probiotic tablet Take 1 tablet by mouth Daily. 12/4/20  Yes Corazon Rojas MD   lidocaine-prilocaine (EMLA) 2.5-2.5 % cream Use to affected area as needed for pain. 3/1/21  Yes Corazon Rojas MD   metroNIDAZOLE  (METROGEL VAGINAL) 0.75 % vaginal gel Insert  into the vagina 2 (Two) Times a Week. 12/7/20  Yes Corazon Rojas MD   nitrofurantoin (MACRODANTIN) 100 MG capsule Take 1 capsule by mouth Daily. 9/10/18  Yes Felicia Buchanan MD   omeprazole (priLOSEC) 20 MG capsule TAKE 1 CAPSULE BY MOUTH TWICE DAILY 3/9/21  Yes Aric Pina MD   predniSONE (DELTASONE) 10 MG tablet Take 1 tablet by mouth Daily. 2/12/21  Yes Heather Milan MD   diazePAM (VALIUM) 5 MG tablet Take 1 tablet by mouth 2 (Two) Times a Day As Needed for Anxiety. 2/17/21   Heather Milan MD   dicyclomine (BENTYL) 20 MG tablet TAKE 1 TABLET BY MOUTH TWICE DAILY 2/4/21   Aric Pina MD   fluticasone (FLONASE) 50 MCG/ACT nasal spray USE 2 SPRAYS INTO EACH NOSTRIL DAILY 2/7/19   Donato Donis MD   HYDROcodone-acetaminophen (NORCO)  MG per tablet Take 1 tablet by mouth Every 6 (Six) Hours As Needed for Moderate Pain . 2/17/21   Heather Milan MD   naloxone (NARCAN) 4 MG/0.1ML nasal spray 1 spray into the nostril(s) as directed by provider As Needed (accidental OD). If used, call 911 7/26/18   Donato Donis MD   nystatin (MYCOSTATIN) 165008 UNIT/ML suspension Take 5 mL by mouth 4 (Four) Times a Day. 7/10/19   Gutierrez Bates MD   sodium-potassium-magnesium sulfates (SUPREP) 17.5-3.13-1.6 GM/177ML solution oral solution Take 1 bottle by mouth Every 12 (Twelve) Hours. 3/25/21   Aric Pina MD   triamcinolone (KENALOG) 0.5 % cream Apply pea-sized amount to outside of vagina and inside the opening twice a week at night. 3/1/21   Corazon Rojas MD       Allergies:  Fosamax [alendronate]    ROS:    Review of Systems   Constitutional: Negative for activity change, appetite change, chills, diaphoresis, fatigue, fever and unexpected weight change.   HENT: Negative for sore throat and trouble swallowing.    Respiratory: Negative for shortness of breath.    Gastrointestinal: Positive for  "abdominal pain and diarrhea. Negative for abdominal distention, anal bleeding, blood in stool, constipation, nausea, rectal pain and vomiting.   Endocrine: Negative for polydipsia, polyphagia and polyuria.   Genitourinary: Negative for difficulty urinating.   Musculoskeletal: Negative for arthralgias.   Skin: Negative for pallor.   Allergic/Immunologic: Negative for food allergies.   Neurological: Negative for weakness and light-headedness.   Psychiatric/Behavioral: Negative for behavioral problems.     Objective     Blood pressure 130/79, pulse (!) 124, height 162.6 cm (64\"), weight 64 kg (141 lb).    Physical Exam  Constitutional:       General: She is not in acute distress.     Appearance: She is well-developed. She is not diaphoretic.   HENT:      Head: Normocephalic and atraumatic.   Cardiovascular:      Rate and Rhythm: Normal rate and regular rhythm.      Heart sounds: Normal heart sounds. No murmur heard.   No friction rub. No gallop.    Pulmonary:      Effort: No respiratory distress.      Breath sounds: Normal breath sounds. No wheezing or rales.   Chest:      Chest wall: No tenderness.   Abdominal:      General: Bowel sounds are normal. There is no distension.      Palpations: Abdomen is soft. There is no mass.      Tenderness: There is no abdominal tenderness. There is no guarding or rebound.      Hernia: No hernia is present.   Musculoskeletal:         General: Normal range of motion.   Skin:     General: Skin is warm and dry.      Coloration: Skin is not pale.      Findings: No erythema or rash.   Neurological:      Mental Status: She is alert and oriented to person, place, and time.   Psychiatric:         Behavior: Behavior normal.         Thought Content: Thought content normal.         Judgment: Judgment normal.          Assessment/Plan   Diagnoses and all orders for this visit:    1. Generalized abdominal pain (Primary)  -     Case Request; Standing  -     dextrose 5 % and sodium chloride 0.45 % " infusion  -     Case Request    2. Epigastric pain  -     Case Request; Standing  -     dextrose 5 % and sodium chloride 0.45 % infusion  -     Case Request    Other orders  -     Follow Anesthesia Guidelines / Standing Orders; Future  -     Obtain Informed Consent; Future  -     Implement Anesthesia Orders Day of Procedure; Standing  -     Obtain Informed Consent; Standing  -     POC Glucose Once; Standing  -     Insert Peripheral IV; Standing  -     sodium-potassium-magnesium sulfates (SUPREP) 17.5-3.13-1.6 GM/177ML solution oral solution; Take 1 bottle by mouth Every 12 (Twelve) Hours.  Dispense: 1 mL; Refill: 0        ESOPHAGOGASTRODUODENOSCOPY (N/A), COLONOSCOPY (N/A)     Diagnosis Plan   1. Generalized abdominal pain  Case Request    dextrose 5 % and sodium chloride 0.45 % infusion    Case Request   2. Epigastric pain  Case Request    dextrose 5 % and sodium chloride 0.45 % infusion    Case Request       Anticipated Surgical Procedure:  Orders Placed This Encounter   Procedures   • Follow Anesthesia Guidelines / Standing Orders     Standing Status:   Future   • Obtain Informed Consent     Standing Status:   Future     Order Specific Question:   Informed Consent Given For     Answer:   egd and colonoscopy       The risks, benefits, and alternatives of this procedure have been discussed with the patient or the responsible party- the patient understands and agrees to proceed.

## 2021-03-29 ENCOUNTER — LAB (OUTPATIENT)
Dept: LAB | Facility: HOSPITAL | Age: 66
End: 2021-03-29

## 2021-03-29 ENCOUNTER — OFFICE VISIT (OUTPATIENT)
Dept: FAMILY MEDICINE CLINIC | Facility: CLINIC | Age: 66
End: 2021-03-29

## 2021-03-29 VITALS
SYSTOLIC BLOOD PRESSURE: 109 MMHG | BODY MASS INDEX: 24.07 KG/M2 | HEIGHT: 64 IN | HEART RATE: 95 BPM | DIASTOLIC BLOOD PRESSURE: 78 MMHG | WEIGHT: 141 LBS | OXYGEN SATURATION: 96 %

## 2021-03-29 DIAGNOSIS — E78.00 PURE HYPERCHOLESTEROLEMIA: ICD-10-CM

## 2021-03-29 DIAGNOSIS — R00.0 TACHYCARDIA: ICD-10-CM

## 2021-03-29 DIAGNOSIS — M35.3 POLYMYALGIA RHEUMATICA (HCC): ICD-10-CM

## 2021-03-29 DIAGNOSIS — Z76.89 ENCOUNTER TO ESTABLISH CARE: ICD-10-CM

## 2021-03-29 DIAGNOSIS — G89.29 CHRONIC MIDLINE LOW BACK PAIN WITHOUT SCIATICA: ICD-10-CM

## 2021-03-29 DIAGNOSIS — R10.9 CHRONIC ABDOMINAL PAIN: Primary | ICD-10-CM

## 2021-03-29 DIAGNOSIS — G89.29 CHRONIC ABDOMINAL PAIN: Primary | ICD-10-CM

## 2021-03-29 DIAGNOSIS — M54.50 CHRONIC MIDLINE LOW BACK PAIN WITHOUT SCIATICA: ICD-10-CM

## 2021-03-29 DIAGNOSIS — L81.9 PIGMENTED SKIN LESION SUSPICIOUS FOR MALIGNANT NEOPLASM: ICD-10-CM

## 2021-03-29 DIAGNOSIS — J42 CHRONIC BRONCHITIS, UNSPECIFIED CHRONIC BRONCHITIS TYPE (HCC): ICD-10-CM

## 2021-03-29 DIAGNOSIS — F41.1 GENERALIZED ANXIETY DISORDER: ICD-10-CM

## 2021-03-29 LAB
ALBUMIN SERPL-MCNC: 4.4 G/DL (ref 3.5–5.2)
ALBUMIN/GLOB SERPL: 1.8 G/DL
ALP SERPL-CCNC: 99 U/L (ref 39–117)
ALT SERPL W P-5'-P-CCNC: 14 U/L (ref 1–33)
ANION GAP SERPL CALCULATED.3IONS-SCNC: 9.7 MMOL/L (ref 5–15)
AST SERPL-CCNC: 16 U/L (ref 1–32)
BASOPHILS # BLD AUTO: 0.07 10*3/MM3 (ref 0–0.2)
BASOPHILS NFR BLD AUTO: 0.6 % (ref 0–1.5)
BILIRUB SERPL-MCNC: 0.7 MG/DL (ref 0–1.2)
BUN SERPL-MCNC: 13 MG/DL (ref 8–23)
BUN/CREAT SERPL: 21.3 (ref 7–25)
CALCIUM SPEC-SCNC: 9.8 MG/DL (ref 8.6–10.5)
CHLORIDE SERPL-SCNC: 103 MMOL/L (ref 98–107)
CHOLEST SERPL-MCNC: 249 MG/DL (ref 0–200)
CO2 SERPL-SCNC: 27.3 MMOL/L (ref 22–29)
CREAT SERPL-MCNC: 0.61 MG/DL (ref 0.57–1)
DEPRECATED RDW RBC AUTO: 39.1 FL (ref 37–54)
EOSINOPHIL # BLD AUTO: 0.07 10*3/MM3 (ref 0–0.4)
EOSINOPHIL NFR BLD AUTO: 0.6 % (ref 0.3–6.2)
ERYTHROCYTE [DISTWIDTH] IN BLOOD BY AUTOMATED COUNT: 11.8 % (ref 12.3–15.4)
GFR SERPL CREATININE-BSD FRML MDRD: 98 ML/MIN/1.73
GLOBULIN UR ELPH-MCNC: 2.4 GM/DL
GLUCOSE SERPL-MCNC: 90 MG/DL (ref 65–99)
HCT VFR BLD AUTO: 45.9 % (ref 34–46.6)
HDLC SERPL-MCNC: 43 MG/DL (ref 40–60)
HGB BLD-MCNC: 15.6 G/DL (ref 12–15.9)
IMM GRANULOCYTES # BLD AUTO: 0.05 10*3/MM3 (ref 0–0.05)
IMM GRANULOCYTES NFR BLD AUTO: 0.4 % (ref 0–0.5)
LDLC SERPL CALC-MCNC: 169 MG/DL (ref 0–100)
LDLC/HDLC SERPL: 3.86 {RATIO}
LYMPHOCYTES # BLD AUTO: 1.83 10*3/MM3 (ref 0.7–3.1)
LYMPHOCYTES NFR BLD AUTO: 15.9 % (ref 19.6–45.3)
MCH RBC QN AUTO: 31.3 PG (ref 26.6–33)
MCHC RBC AUTO-ENTMCNC: 34 G/DL (ref 31.5–35.7)
MCV RBC AUTO: 92 FL (ref 79–97)
MONOCYTES # BLD AUTO: 0.67 10*3/MM3 (ref 0.1–0.9)
MONOCYTES NFR BLD AUTO: 5.8 % (ref 5–12)
NEUTROPHILS NFR BLD AUTO: 76.7 % (ref 42.7–76)
NEUTROPHILS NFR BLD AUTO: 8.82 10*3/MM3 (ref 1.7–7)
NRBC BLD AUTO-RTO: 0 /100 WBC (ref 0–0.2)
PLATELET # BLD AUTO: 324 10*3/MM3 (ref 140–450)
PMV BLD AUTO: 11.3 FL (ref 6–12)
POTASSIUM SERPL-SCNC: 4.3 MMOL/L (ref 3.5–5.2)
PROT SERPL-MCNC: 6.8 G/DL (ref 6–8.5)
RBC # BLD AUTO: 4.99 10*6/MM3 (ref 3.77–5.28)
SODIUM SERPL-SCNC: 140 MMOL/L (ref 136–145)
TRIGL SERPL-MCNC: 201 MG/DL (ref 0–150)
VLDLC SERPL-MCNC: 37 MG/DL (ref 5–40)
WBC # BLD AUTO: 11.51 10*3/MM3 (ref 3.4–10.8)

## 2021-03-29 PROCEDURE — 99214 OFFICE O/P EST MOD 30 MIN: CPT | Performed by: FAMILY MEDICINE

## 2021-03-29 PROCEDURE — 36415 COLL VENOUS BLD VENIPUNCTURE: CPT

## 2021-03-29 PROCEDURE — 80061 LIPID PANEL: CPT

## 2021-03-29 PROCEDURE — 85025 COMPLETE CBC W/AUTO DIFF WBC: CPT

## 2021-03-29 PROCEDURE — 80053 COMPREHEN METABOLIC PANEL: CPT

## 2021-03-29 PROCEDURE — 93010 ELECTROCARDIOGRAM REPORT: CPT | Performed by: INTERNAL MEDICINE

## 2021-03-29 PROCEDURE — 93005 ELECTROCARDIOGRAM TRACING: CPT | Performed by: FAMILY MEDICINE

## 2021-03-29 RX ORDER — CEPHALEXIN 250 MG/1
CAPSULE ORAL
COMMUNITY
Start: 2020-12-16 | End: 2021-05-21

## 2021-03-29 RX ORDER — METHENAMINE HIPPURATE 1000 MG/1
1 TABLET ORAL
COMMUNITY
Start: 2020-12-15 | End: 2021-05-21

## 2021-03-29 RX ORDER — HYDROMORPHONE HYDROCHLORIDE 4 MG/1
4 TABLET ORAL 4 TIMES DAILY
COMMUNITY
Start: 2021-03-11 | End: 2021-07-15

## 2021-03-29 RX ORDER — SULFAMETHOXAZOLE AND TRIMETHOPRIM 400; 80 MG/1; MG/1
TABLET ORAL
COMMUNITY
Start: 2021-01-18 | End: 2021-04-30

## 2021-03-31 DIAGNOSIS — G89.29 CHRONIC MIDLINE LOW BACK PAIN WITHOUT SCIATICA: ICD-10-CM

## 2021-03-31 DIAGNOSIS — M54.50 CHRONIC MIDLINE LOW BACK PAIN WITHOUT SCIATICA: ICD-10-CM

## 2021-04-01 LAB
QT INTERVAL: 340 MS
QTC INTERVAL: 427 MS

## 2021-04-01 RX ORDER — GABAPENTIN 300 MG/1
300 CAPSULE ORAL 3 TIMES DAILY
Qty: 90 CAPSULE | Refills: 0 | Status: SHIPPED | OUTPATIENT
Start: 2021-04-01 | End: 2021-11-09 | Stop reason: SDUPTHER

## 2021-04-05 ENCOUNTER — TELEPHONE (OUTPATIENT)
Dept: FAMILY MEDICINE CLINIC | Facility: CLINIC | Age: 66
End: 2021-04-05

## 2021-04-05 NOTE — TELEPHONE ENCOUNTER
"Pt called for refills    albuterol sulfate  (90 Base) MCG/ACT inhaler    predniSONE (DELTASONE) 10 MG tablet    Pt would like to know if you can increase the dosage for the prednisone to 3 per day to \"straighten out her bowels\"    [facility] - [place], [place] - [address] [phone] [facility] [phone]    [address] [facility] [id number]       "

## 2021-04-05 NOTE — TELEPHONE ENCOUNTER
Patient's albuterol will be refilled.  If patient is having acute abdominal pain, please recommend that she be seen in the emergency department.  Make sure that she got rescheduled for her EGD/colonoscopy with gastroenterology, as she needs to have this done for her bowel changes.  Please let her know the following lab results:    -Lipid panel showed elevated cholesterol, total cholesterol and LDL cholesterol.  Patient needs to be on a statin medication for cholesterol.  Please find out if she has been on anything or had any problems with the statin medications in the past.  -CBC showed elevated white blood cell count, which could indicate infection.  She has been on steroids though, so this sometimes makes it difficult to determine whether it is infection versus steroid use causing the elevation.  -CMP is normal    The prednisone should remain at 10 mg daily.  This medication is for her polymyalgia rheumatica, not to help with her bowels.  Again, if she is having acute abdominal pain, she should be seen in the emergency department today.        Thanks, Jayashree

## 2021-04-06 ENCOUNTER — TELEPHONE (OUTPATIENT)
Dept: FAMILY MEDICINE CLINIC | Facility: CLINIC | Age: 66
End: 2021-04-06

## 2021-04-06 DIAGNOSIS — R30.0 DYSURIA: Primary | ICD-10-CM

## 2021-04-10 DIAGNOSIS — R10.13 EPIGASTRIC PAIN: ICD-10-CM

## 2021-04-12 RX ORDER — DICYCLOMINE HCL 20 MG
TABLET ORAL
Qty: 60 TABLET | Refills: 1 | Status: SHIPPED | OUTPATIENT
Start: 2021-04-12 | End: 2021-04-16 | Stop reason: SDUPTHER

## 2021-04-12 NOTE — TELEPHONE ENCOUNTER
We can check urinalysis, if patient is having urinary symptoms.  Lab order placed today, and patient can come in her convenience to have this done.  If vaginal concerns, patient will need to recheck with OB/GYN.  Thanks, FATOUMATA Milan

## 2021-04-16 DIAGNOSIS — R10.13 EPIGASTRIC PAIN: ICD-10-CM

## 2021-04-16 RX ORDER — OMEPRAZOLE 20 MG/1
CAPSULE, DELAYED RELEASE ORAL
Qty: 60 CAPSULE | Refills: 5 | Status: SHIPPED | OUTPATIENT
Start: 2021-04-16 | End: 2021-05-20 | Stop reason: SDUPTHER

## 2021-04-16 RX ORDER — DICYCLOMINE HCL 20 MG
20 TABLET ORAL 2 TIMES DAILY
Qty: 60 TABLET | Refills: 1 | Status: SHIPPED | OUTPATIENT
Start: 2021-04-16 | End: 2021-05-20 | Stop reason: SDUPTHER

## 2021-04-22 ENCOUNTER — OFFICE VISIT (OUTPATIENT)
Dept: GASTROENTEROLOGY | Facility: CLINIC | Age: 66
End: 2021-04-22

## 2021-04-22 ENCOUNTER — OFFICE VISIT (OUTPATIENT)
Dept: OBSTETRICS AND GYNECOLOGY | Facility: CLINIC | Age: 66
End: 2021-04-22

## 2021-04-22 VITALS
WEIGHT: 141 LBS | DIASTOLIC BLOOD PRESSURE: 84 MMHG | HEART RATE: 107 BPM | BODY MASS INDEX: 24.07 KG/M2 | HEIGHT: 64 IN | SYSTOLIC BLOOD PRESSURE: 122 MMHG

## 2021-04-22 VITALS — DIASTOLIC BLOOD PRESSURE: 72 MMHG | WEIGHT: 137.8 LBS | SYSTOLIC BLOOD PRESSURE: 124 MMHG | BODY MASS INDEX: 23.65 KG/M2

## 2021-04-22 DIAGNOSIS — F17.200 HEAVY TOBACCO SMOKER: ICD-10-CM

## 2021-04-22 DIAGNOSIS — K21.9 GASTROESOPHAGEAL REFLUX DISEASE, UNSPECIFIED WHETHER ESOPHAGITIS PRESENT: ICD-10-CM

## 2021-04-22 DIAGNOSIS — N76.0 RECURRENT VAGINITIS: Primary | ICD-10-CM

## 2021-04-22 DIAGNOSIS — R10.84 GENERALIZED ABDOMINAL PAIN: Primary | ICD-10-CM

## 2021-04-22 DIAGNOSIS — N95.2 VAGINAL ATROPHY: ICD-10-CM

## 2021-04-22 PROBLEM — R19.4 CHANGE IN BOWEL HABITS: Status: RESOLVED | Noted: 2019-03-14 | Resolved: 2021-04-22

## 2021-04-22 PROBLEM — N75.0 BARTHOLIN'S CYST: Status: RESOLVED | Noted: 2017-12-04 | Resolved: 2021-04-22

## 2021-04-22 PROBLEM — R10.9 ABDOMINAL PAIN: Status: RESOLVED | Noted: 2019-03-14 | Resolved: 2021-04-22

## 2021-04-22 PROBLEM — R10.30 LOWER ABDOMINAL PAIN: Status: RESOLVED | Noted: 2018-11-08 | Resolved: 2021-04-22

## 2021-04-22 PROBLEM — N90.89 VULVAR LESION: Status: RESOLVED | Noted: 2017-06-21 | Resolved: 2021-04-22

## 2021-04-22 PROBLEM — R10.31 RIGHT LOWER QUADRANT ABDOMINAL PAIN: Status: RESOLVED | Noted: 2018-06-04 | Resolved: 2021-04-22

## 2021-04-22 PROBLEM — R10.13 EPIGASTRIC PAIN: Status: RESOLVED | Noted: 2018-11-08 | Resolved: 2021-04-22

## 2021-04-22 PROCEDURE — 99214 OFFICE O/P EST MOD 30 MIN: CPT | Performed by: OBSTETRICS & GYNECOLOGY

## 2021-04-22 PROCEDURE — 99214 OFFICE O/P EST MOD 30 MIN: CPT | Performed by: INTERNAL MEDICINE

## 2021-04-22 RX ORDER — ESTRADIOL 0.1 MG/G
CREAM VAGINAL DAILY
COMMUNITY
Start: 2020-09-03 | End: 2021-05-26

## 2021-04-22 RX ORDER — SODIUM, POTASSIUM,MAG SULFATES 17.5-3.13G
1 SOLUTION, RECONSTITUTED, ORAL ORAL EVERY 12 HOURS
Qty: 1 ML | Refills: 0 | Status: SHIPPED | OUTPATIENT
Start: 2021-04-22 | End: 2021-05-21

## 2021-04-22 RX ORDER — DEXTROSE AND SODIUM CHLORIDE 5; .45 G/100ML; G/100ML
30 INJECTION, SOLUTION INTRAVENOUS CONTINUOUS PRN
Status: CANCELLED | OUTPATIENT
Start: 2021-05-05

## 2021-04-22 RX ORDER — SODIUM, POTASSIUM,MAG SULFATES 17.5-3.13G
1 SOLUTION, RECONSTITUTED, ORAL ORAL EVERY 12 HOURS
Qty: 1 ML | Refills: 0 | Status: ON HOLD | OUTPATIENT
Start: 2021-04-22 | End: 2021-05-24

## 2021-04-22 NOTE — PROGRESS NOTES
"[facility]  Gynecology  Date of Service: 2021    CC: \"I wanna be rechecked\"    [facility]  Adriana Nuñez is a 65 y.o.  postmenopausal female who presents for recheck.      She states that she wants to be checked again for a vaginal infection.  She denies any discharge.  She mixed up her creams.    She states that she has some hematuria and called her urologist but \"she didn't do anything so she doubled up on her Bactrim.\"    She continues to reiterate that she is worried about getting septic from an abscess.  She states that \"no one will listen to her\" and \"she just won't go to the ER.\"  She states that Dr. Doins ignored her.  She states that she has showed Dr. Pina the pictures from \"when Dr. Luna showed her she had an abscess but he doesn't believe her.\"  She had a CT scan done in 2018 that showed no abscess or fluid collection.  She requests that I \"scrape up at the top corner to get her infection.\"    She takes care of her disabled grandson Louis who she worries greatly for.    ROS  Review of Systems   Constitutional: Negative.    HENT: Negative.    Eyes: Negative.    Respiratory: Negative.    Cardiovascular: Negative.    Gastrointestinal: Negative.    Endocrine: Negative.    Genitourinary: Negative.    Musculoskeletal: Negative.    Skin: Negative.    Neurological: Negative.    Hematological: Negative.    Psychiatric/Behavioral: Negative.      GYN HISTORY  Menarche: age unknown  History of STIs: None per patient  S/p hyst at age 41 \"beginning of cancer\"     OB HISTORY  OB History    Para Term  AB Living   3 2 2   1 2   SAB TAB Ectopic Molar Multiple Live Births   1         2      # Outcome Date GA Lbr Omar/2nd Weight Sex Delivery Anes PTL Lv   3 Term      Vag-Spont   BERNARDO   2 Term      Vag-Spont   BERNARDO   1 SAB      SAB        PAST MEDICAL HISTORY  Past Medical History:   Diagnosis Date   • Anorectal abscess    • Anxiety disorder    • Bladder fistula      post rapair  "     • Chronic obstructive lung disease (CMS/HCC)    • Depressive disorder    • Diverticulitis of colon      post colectomy      • Generalized anxiety disorder    • Heavy tobacco smoker    • History of colon polyps    • Hyperlipidemia    • Osteoporosis    • Peripheral nerve disease    • Polymyalgia rheumatica (CMS/HCC)    • Vitamin D deficiency    • Vulvar intraepithelial neoplasia (GONZALES)     remote history     PAST SURGICAL HISTORY  Past Surgical History:   Procedure Laterality Date   • COLON RESECTION     • COLONOSCOPY  03/17/2014    polyps   • COLONOSCOPY  02/08/2016   • COLONOSCOPY N/A 5/24/2017    Procedure: COLONOSCOPY;  Surgeon: Aric Pina MD;  Location: [facility] ENDOSCOPY;  Service:    • COLONOSCOPY N/A 4/17/2019    Procedure: COLONOSCOPY;  Surgeon: Aric Pina MD;  Location: [facility] ENDOSCOPY;  Service: Gastroenterology   • ENDOSCOPY  01/26/2015    Normal esophagus. Gastritis was found in the stomach. Biopsy taken. Normal duodenum   • ENDOSCOPY N/A 9/18/2017    Procedure: ESOPHAGOGASTRODUODENOSCOPY;  Surgeon: Aric Pina MD;  Location: [facility] ENDOSCOPY;  Service:    • ENDOSCOPY N/A 4/17/2019    Procedure: ESOPHAGOGASTRODUODENOSCOPY;  Surgeon: Aric Pina MD;  Location: [facility] ENDOSCOPY;  Service: Gastroenterology   • HYSTERECTOMY     • UPPER GASTROINTESTINAL ENDOSCOPY  01/26/2015   • UPPER GASTROINTESTINAL ENDOSCOPY  09/18/2017   • UPPER GASTROINTESTINAL ENDOSCOPY  04/17/2019     FAMILY HISTORY  Family History   Problem Relation Age of Onset   • No Known Problems Sister    • No Known Problems Brother      SOCIAL HISTORY  Social History     Socioeconomic History   • Marital status:      Spouse name: Not on file   • Number of children: Not on file   • Years of education: Not on file   • Highest education level: Not on file   Tobacco Use   • Smoking status: Current Every Day Smoker     Packs/day: 1.00     Types: Cigars, Cigarettes   • Smokeless tobacco: Never Used   • Tobacco comment:  09/12/2019 - Patient states she has utilized tobacco products periodically since the age of 11.   Vaping Use   • Vaping Use: Never used   Substance and Sexual Activity   • Alcohol use: No   • Drug use: No   • Sexual activity: Defer     ALLERGIES  Allergies   Allergen Reactions   • Fosamax [Alendronate] GI Intolerance     HOME MEDICATIONS  Prior to Admission medications    Medication Sig Start Date End Date Taking? Authorizing Provider   albuterol sulfate  (90 Base) MCG/ACT inhaler Inhale 2 puffs Every 6 (Six) Hours As Needed for Wheezing. 2/12/21   Heather Milan MD   ATROVENT HFA 17 MCG/ACT inhaler Inhale 2 puffs 4 (Four) Times a Day. 4/20/20   Donato Donis MD   cephalexin (KEFLEX) 250 MG capsule TAKE 1 CAPSULE BY MOUTH EVERY OTHER DAY 12/16/20   Felicia Buchanan MD   diazePAM (VALIUM) 5 MG tablet Take 1 tablet by mouth 2 (Two) Times a Day As Needed for Anxiety. 2/17/21   Heather Milan MD   dicyclomine (BENTYL) 20 MG tablet Take 1 tablet by mouth 2 (Two) Times a Day. 4/16/21   Aric Pina MD   fluticasone (FLONASE) 50 MCG/ACT nasal spray USE 2 SPRAYS INTO EACH NOSTRIL DAILY 2/7/19   Donato Donis MD   gabapentin (NEURONTIN) 300 MG capsule Take 1 capsule by mouth 3 (Three) Times a Day. 4/1/21   Heather Milan MD   HYDROmorphone (DILAUDID) 4 MG tablet Take 4 mg by mouth 4 (Four) Times a Day. 3/11/21   Felicia Buchanan MD   Lactobacillus Probiotic tablet Take 1 tablet by mouth Daily. 12/4/20   Corazon Rojas MD   lidocaine-prilocaine (EMLA) 2.5-2.5 % cream Use to affected area as needed for pain. 3/1/21   Corazon Rojas MD   methenamine (HIPREX) 1 g tablet Take 1 g by mouth. 12/15/20 12/16/21  Felicia Buchanan MD   metroNIDAZOLE (METROGEL VAGINAL) 0.75 % vaginal gel Insert  into the vagina 2 (Two) Times a Week. 12/7/20   Corazon Rojas MD   naloxone (NARCAN) 4 MG/0.1ML nasal spray 1 spray into the nostril(s) as directed by  provider As Needed (accidental OD). If used, call 911 7/26/18   Donato Donis MD   nystatin (MYCOSTATIN) 182148 UNIT/ML suspension Take 5 mL by mouth 4 (Four) Times a Day. 7/10/19   Gutierrez Bates MD   omeprazole (priLOSEC) 20 MG capsule TAKE 1 CAPSULE BY MOUTH TWICE DAILY 4/16/21   Aric Pina MD   predniSONE (DELTASONE) 10 MG tablet Take 1 tablet by mouth Daily. 2/12/21   Heather Milan MD   sodium-potassium-magnesium sulfates (SUPREP) 17.5-3.13-1.6 GM/177ML solution oral solution Take 1 bottle by mouth Every 12 (Twelve) Hours. 3/25/21   Aric Pina MD   sulfamethoxazole-trimethoprim (BACTRIM,SEPTRA) 400-80 MG tablet TAKE 1 TABLET BY MOUTH AT BEDTIME TO PREVENT BLADDER INFECTIONS 1/18/21   Provider, MD Felicia   triamcinolone (KENALOG) 0.5 % cream Apply pea-sized amount to outside of vagina and inside the opening twice a week at night. 3/1/21   Corazon Rojas MD     PE  /72 (BP Location: Left arm, Patient Position: Sitting, Cuff Size: Adult)   Wt 62.5 kg (137 lb 12.8 oz)   BMI 23.65 kg/m²        General: Alert, healthy, no distress, well nourished and well developed.  Neurologic: Alert, oriented to person, place, and time.  Gait normal.  Cranial nerves II-XII grossly intact.  HEENT: Normocephalic, atraumatic.  Extraocular muscles intact, pupils equal and reactive times two.    Neck: Supple, no adenopathy, thyroid normal size, non-tender, without nodularity, trachea midline.  Lungs: Normal respiratory effort.  Clear to auscultation bilaterally.  No wheezes, rhonci, or rales.  Heart: Regular rate and rhythm.  No murmer, rub or gallop.  Abdomen: Soft, non-tender, non-distended,no masses, no hepatosplenomegaly, no hernia.  Skin: No rash, no lesions.  Extremities: No cyanosis, clubbing or edema.  PELVIC EXAM:  External Genitalia/Vulva: Anatomy is normal, no significant redness of labia but minor erythema noted at the labia minora near the introitus, no discharge  "on vulvar tissues, [place]'s and Bartholin's glands are normal, no ulcers, no condylomatous lesions.  Urethral meatus: Normal, no lesions, no prolapse.  Urethra: Normal, no masses, no tenderness with palpation.  Bladder: Normal, no fullness, no masses, no tenderness with palpation.  Vagina: Vaginal tissues are atrophic, no significant discharge present.  Pelvic support adequate.  Rectal: Normal, no masses or polyps, confirms bimanual exam, perianal normal, no lesions; JASON deferred.    IMPRESSION  Adriana Nuñez is a 65 y.o.  presenting with recurrent vaginitis, no obvious infection today.  I discussed with her that we do not scrape the vaginal cuff.  She reiterates that she has had an abscess and has been septic in the past.  I discussed that there is no current indication that she has an infection or abscess.  I also reviewed that her bowels are NOT in her vagina and that she does not have an E.coli infection in her vagina (which has been proved on multiple OneSwabs).    PLAN    1. Recurrent vaginitis  - OneSwab - Kit, Vagina; Future    2. Vaginal atrophy  Patient refuses to use vaginal estrogen since \"she read that people with diverticulitis should use it.\"  I told her that she will have issues since she does not want to use that.    3. Heavy tobacco smoker  Heavily encouraged tobacco cessation, discussing that this will help to promote healing and prevent infections.  She refuses.         This document has been electronically signed by Corazon Rojas MD on 2021 09:26 CDT.    I spent 32 minutes with the patient with >50% of that time was spent counseling the patient.  "

## 2021-04-22 NOTE — PROGRESS NOTES
[facility]      Chief Complaint:   Chief Complaint   Patient presents with   • 4 Week Clinical Appointment     Epigastric Pain.  Generalized Abdominal Pain.       Subjective     HPI:   Patient with diffuse abdominal pain.  Patient states that she has difficulty with gas and bloating.  Patient was scheduled for an EGD and colonoscopy but did not show up for this.  Patient states she she was too sick at that time.  Patient also has a history of colon polyps.  Patient denies any nausea vomiting.  Patient also insists on getting antibiotics.  Discussed with patient how dangerous this is an she should not just take antibiotics without a real reason.    Plan; we will schedule patient for EGD and colonoscopy to evaluate.    Past Medical History:   Past Medical History:   Diagnosis Date   • Anorectal abscess    • Anxiety disorder    • Bladder fistula      post rapair      • Chronic obstructive lung disease (CMS/HCC)    • Depressive disorder    • Diverticulitis of colon      post colectomy      • Generalized anxiety disorder    • Heavy tobacco smoker    • History of colon polyps    • Hyperlipidemia    • Osteoporosis    • Peripheral nerve disease    • Polymyalgia rheumatica (CMS/HCC)    • Vitamin D deficiency    • Vulvar intraepithelial neoplasia (GONZALES)     remote history       Past Surgical History:  Past Surgical History:   Procedure Laterality Date   • COLON RESECTION     • COLONOSCOPY  03/17/2014    polyps   • COLONOSCOPY  02/08/2016   • COLONOSCOPY N/A 5/24/2017    Procedure: COLONOSCOPY;  Surgeon: Aric Pina MD;  Location: [facility] ENDOSCOPY;  Service:    • COLONOSCOPY N/A 4/17/2019    Procedure: COLONOSCOPY;  Surgeon: Aric Pina MD;  Location: [facility] ENDOSCOPY;  Service: Gastroenterology   • ENDOSCOPY  01/26/2015    Normal esophagus. Gastritis was found in the stomach. Biopsy taken. Normal duodenum   • ENDOSCOPY N/A 9/18/2017    Procedure: ESOPHAGOGASTRODUODENOSCOPY;  Surgeon: Aric HERNÁNDEZ  MD Yovani;  Location: [facility] ENDOSCOPY;  Service:    • ENDOSCOPY N/A 4/17/2019    Procedure: ESOPHAGOGASTRODUODENOSCOPY;  Surgeon: Aric Pina MD;  Location: [facility] ENDOSCOPY;  Service: Gastroenterology   • HYSTERECTOMY     • UPPER GASTROINTESTINAL ENDOSCOPY  01/26/2015   • UPPER GASTROINTESTINAL ENDOSCOPY  09/18/2017   • UPPER GASTROINTESTINAL ENDOSCOPY  04/17/2019       Family History:  Family History   Problem Relation Age of Onset   • No Known Problems Sister    • No Known Problems Brother        Social History:   reports that she has been smoking cigarettes and cigars. She has been smoking about 1.00 pack per day. She has never used smokeless tobacco. She reports that she does not drink alcohol and does not use drugs.    Medications:   Prior to Admission medications    Medication Sig Start Date End Date Taking? Authorizing Provider   albuterol sulfate  (90 Base) MCG/ACT inhaler Inhale 2 puffs Every 6 (Six) Hours As Needed for Wheezing. 2/12/21  Yes Heather Milan MD   ATROVENT HFA 17 MCG/ACT inhaler Inhale 2 puffs 4 (Four) Times a Day. 4/20/20  Yes Donato Donis MD   cephalexin (KEFLEX) 250 MG capsule TAKE 1 CAPSULE BY MOUTH EVERY OTHER DAY 12/16/20  Yes Felicia Buchanan MD   dicyclomine (BENTYL) 20 MG tablet Take 1 tablet by mouth 2 (Two) Times a Day. 4/16/21  Yes Aric Pina MD   estradiol (ESTRACE) 0.1 MG/GM vaginal cream Insert  into the vagina. 9/3/20 9/4/21 Yes Felicia Buchanan MD   gabapentin (NEURONTIN) 300 MG capsule Take 1 capsule by mouth 3 (Three) Times a Day. 4/1/21  Yes Heather Milan MD   HYDROmorphone (DILAUDID) 4 MG tablet Take 4 mg by mouth 4 (Four) Times a Day. 3/11/21  Yes Felicia Buchanan MD   Lactobacillus Probiotic tablet Take 1 tablet by mouth Daily. 12/4/20  Yes Corazon Rojas MD   lidocaine-prilocaine (EMLA) 2.5-2.5 % cream Use to affected area as needed for pain. 3/1/21  Yes Corazon Rojas MD   metroNIDAZOLE  (METROGEL VAGINAL) 0.75 % vaginal gel Insert  into the vagina 2 (Two) Times a Week. 12/7/20  Yes Corazon Rojas MD   omeprazole (priLOSEC) 20 MG capsule TAKE 1 CAPSULE BY MOUTH TWICE DAILY 4/16/21  Yes Aric Pina MD   predniSONE (DELTASONE) 10 MG tablet Take 1 tablet by mouth Daily. 2/12/21  Yes Heather Milan MD   sulfamethoxazole-trimethoprim (BACTRIM,SEPTRA) 400-80 MG tablet TAKE 1 TABLET BY MOUTH AT BEDTIME TO PREVENT BLADDER INFECTIONS 1/18/21  Yes ProviderFelicia MD   triamcinolone (KENALOG) 0.5 % cream Apply pea-sized amount to outside of vagina and inside the opening twice a week at night. 3/1/21  Yes Corazon Rojas MD   diazePAM (VALIUM) 5 MG tablet Take 1 tablet by mouth 2 (Two) Times a Day As Needed for Anxiety. 2/17/21   Heather Milan MD   fluticasone (FLONASE) 50 MCG/ACT nasal spray USE 2 SPRAYS INTO EACH NOSTRIL DAILY 2/7/19   Donato Donis MD   methenamine (HIPREX) 1 g tablet Take 1 g by mouth. 12/15/20 12/16/21  ProviderFelicia MD   naloxone (NARCAN) 4 MG/0.1ML nasal spray 1 spray into the nostril(s) as directed by provider As Needed (accidental OD). If used, call 911 7/26/18   Donato Donis MD   nystatin (MYCOSTATIN) 507582 UNIT/ML suspension Take 5 mL by mouth 4 (Four) Times a Day. 7/10/19   Gutierrez Bates MD   sodium-potassium-magnesium sulfates (SUPREP) 17.5-3.13-1.6 GM/177ML solution oral solution Take 1 bottle by mouth Every 12 (Twelve) Hours. 4/22/21   Aric Pina MD   sodium-potassium-magnesium sulfates (SUPREP) 17.5-3.13-1.6 GM/177ML solution oral solution Take 1 bottle by mouth Every 12 (Twelve) Hours. 4/22/21   Aric Pina MD   sodium-potassium-magnesium sulfates (SUPREP) 17.5-3.13-1.6 GM/177ML solution oral solution Take 1 bottle by mouth Every 12 (Twelve) Hours. 3/25/21 4/22/21  Aric Pina MD       Allergies:  Fosamax [alendronate]    ROS:    Review of Systems   Constitutional: Negative for  "activity change, appetite change, chills, diaphoresis, fatigue, fever and unexpected weight change.   HENT: Negative for sore throat and trouble swallowing.    Respiratory: Negative for shortness of breath.    Gastrointestinal: Positive for abdominal pain. Negative for abdominal distention, anal bleeding, blood in stool, constipation, diarrhea, nausea, rectal pain and vomiting.   Endocrine: Negative for polydipsia, polyphagia and polyuria.   Genitourinary: Negative for difficulty urinating.   Musculoskeletal: Negative for arthralgias.   Skin: Negative for pallor.   Allergic/Immunologic: Negative for food allergies.   Neurological: Negative for weakness and light-headedness.   Psychiatric/Behavioral: Negative for behavioral problems.     Objective     Blood pressure 122/84, pulse 107, height 162.6 cm (64\"), weight 64 kg (141 lb).    Physical Exam  Constitutional:       General: She is not in acute distress.     Appearance: She is well-developed. She is not diaphoretic.   HENT:      Head: Normocephalic and atraumatic.   Cardiovascular:      Rate and Rhythm: Normal rate and regular rhythm.      Heart sounds: Normal heart sounds. No murmur heard.   No friction rub. No gallop.    Pulmonary:      Effort: No respiratory distress.      Breath sounds: Normal breath sounds. No wheezing or rales.   Chest:      Chest wall: No tenderness.   Abdominal:      General: Bowel sounds are normal. There is no distension.      Palpations: Abdomen is soft. There is no mass.      Tenderness: There is no abdominal tenderness. There is no guarding or rebound.      Hernia: No hernia is present.   Musculoskeletal:         General: Normal range of motion.   Skin:     General: Skin is warm and dry.      Coloration: Skin is not pale.      Findings: No erythema or rash.   Neurological:      Mental Status: She is alert and oriented to person, place, and time.   Psychiatric:         Behavior: Behavior normal.         Thought Content: Thought content " normal.         Judgment: Judgment normal.          Assessment/Plan   Diagnoses and all orders for this visit:    1. Generalized abdominal pain (Primary)  -     Case Request; Standing  -     dextrose 5 % and sodium chloride 0.45 % infusion  -     Case Request    2. Gastroesophageal reflux disease, unspecified whether esophagitis present  -     Case Request; Standing  -     dextrose 5 % and sodium chloride 0.45 % infusion  -     Case Request    Other orders  -     Follow Anesthesia Guidelines / Standing Orders; Future  -     Obtain Informed Consent; Future  -     Implement Anesthesia Orders Day of Procedure; Standing  -     Obtain Informed Consent; Standing  -     POC Glucose Once; Standing  -     Insert Peripheral IV; Standing  -     sodium-potassium-magnesium sulfates (SUPREP) 17.5-3.13-1.6 GM/177ML solution oral solution; Take 1 bottle by mouth Every 12 (Twelve) Hours.  Dispense: 1 mL; Refill: 0  -     sodium-potassium-magnesium sulfates (SUPREP) 17.5-3.13-1.6 GM/177ML solution oral solution; Take 1 bottle by mouth Every 12 (Twelve) Hours.  Dispense: 1 mL; Refill: 0        ESOPHAGOGASTRODUODENOSCOPY (N/A), COLONOSCOPY (N/A)     Diagnosis Plan   1. Generalized abdominal pain  Case Request    dextrose 5 % and sodium chloride 0.45 % infusion    Case Request   2. Gastroesophageal reflux disease, unspecified whether esophagitis present  Case Request    dextrose 5 % and sodium chloride 0.45 % infusion    Case Request       Anticipated Surgical Procedure:  Orders Placed This Encounter   Procedures   • Follow Anesthesia Guidelines / Standing Orders     Standing Status:   Future   • Obtain Informed Consent     Standing Status:   Future     Order Specific Question:   Informed Consent Given For     Answer:   egd and colonoscopy       The risks, benefits, and alternatives of this procedure have been discussed with the patient or the responsible party- the patient understands and agrees to  proceed.

## 2021-04-26 ENCOUNTER — TELEPHONE (OUTPATIENT)
Dept: OBSTETRICS AND GYNECOLOGY | Facility: CLINIC | Age: 66
End: 2021-04-26

## 2021-04-26 NOTE — TELEPHONE ENCOUNTER
Spoke with patient about her lab results. Let her know it would be five to seven business days to receive results.

## 2021-05-03 ENCOUNTER — OFFICE VISIT (OUTPATIENT)
Dept: SURGERY | Facility: CLINIC | Age: 66
End: 2021-05-03

## 2021-05-03 ENCOUNTER — LAB (OUTPATIENT)
Dept: LAB | Facility: HOSPITAL | Age: 66
End: 2021-05-03

## 2021-05-03 VITALS
HEIGHT: 64 IN | SYSTOLIC BLOOD PRESSURE: 116 MMHG | BODY MASS INDEX: 23.76 KG/M2 | DIASTOLIC BLOOD PRESSURE: 82 MMHG | TEMPERATURE: 97 F | WEIGHT: 139.2 LBS | HEART RATE: 104 BPM

## 2021-05-03 DIAGNOSIS — L98.9 SKIN LESION: Primary | ICD-10-CM

## 2021-05-03 DIAGNOSIS — Z01.818 PREOP TESTING: Primary | ICD-10-CM

## 2021-05-03 DIAGNOSIS — N76.1 SUBACUTE VAGINITIS: Primary | ICD-10-CM

## 2021-05-03 LAB — SARS-COV-2 N GENE RESP QL NAA+PROBE: NOT DETECTED

## 2021-05-03 PROCEDURE — C9803 HOPD COVID-19 SPEC COLLECT: HCPCS

## 2021-05-03 PROCEDURE — 17000 DESTRUCT PREMALG LESION: CPT | Performed by: NURSE PRACTITIONER

## 2021-05-03 PROCEDURE — 87635 SARS-COV-2 COVID-19 AMP PRB: CPT

## 2021-05-03 PROCEDURE — 11104 PUNCH BX SKIN SINGLE LESION: CPT | Performed by: NURSE PRACTITIONER

## 2021-05-03 PROCEDURE — 17003 DESTRUCT PREMALG LES 2-14: CPT | Performed by: NURSE PRACTITIONER

## 2021-05-03 PROCEDURE — 11105 PUNCH BX SKIN EA SEP/ADDL: CPT | Performed by: NURSE PRACTITIONER

## 2021-05-03 RX ORDER — GABAPENTIN 400 MG/1
400 CAPSULE ORAL 3 TIMES DAILY
COMMUNITY
Start: 2021-05-01 | End: 2021-05-21

## 2021-05-03 RX ORDER — METRONIDAZOLE 500 MG/1
500 TABLET ORAL 2 TIMES DAILY
Qty: 14 TABLET | Refills: 0 | Status: SHIPPED | OUTPATIENT
Start: 2021-05-03 | End: 2021-05-10

## 2021-05-03 RX ORDER — FLUCONAZOLE 150 MG/1
TABLET ORAL
Qty: 2 TABLET | Refills: 0 | Status: ON HOLD | OUTPATIENT
Start: 2021-05-03 | End: 2021-05-24

## 2021-05-03 RX ORDER — AMPICILLIN 500 MG/1
500 CAPSULE ORAL 4 TIMES DAILY
Qty: 28 CAPSULE | Refills: 0 | Status: SHIPPED | OUTPATIENT
Start: 2021-05-03 | End: 2021-05-10

## 2021-05-03 NOTE — PROGRESS NOTES
"Subjective   Adriana Silvia Nuñez is a 65 y.o. female     Chief Complaint: Skin lesions     History of Present Illness   Ms. Nuñez is a 65 year old patient referred by Dr. Milan for multiple concerning skin lesions.  Patient denies any history of skin cancers or family history of melanoma. She states she noticed the lesion on her right upper arm about 6 months ago. She claims it started as a small clear bump and occasionally has some clear drainage from it.  She states she initially thought it was from a cat scratch and \"just slowly got larger\". She states it is tender and does bleed occasionally.  She also has a large area on her left flank that she states has been there for a couple years. She states she scratches it and it never heals.\" She also has a skin tag on eyelid she states is bothering her. She states \"please just cut this thing off\".  I educated her that being on eyelid she would need to see her eye doctor and she states she will just \"remove it herself at home.\"    Review of Systems   HENT: Negative.    Eyes: Negative.    Respiratory: Negative.    Cardiovascular: Negative.    Gastrointestinal: Positive for abdominal pain.   Endocrine: Negative.    Genitourinary: Positive for difficulty urinating.   Musculoskeletal: Positive for back pain.        Arthritis   Skin: Negative.    Allergic/Immunologic: Negative.    Neurological: Positive for numbness.   Hematological: Negative.    Psychiatric/Behavioral: Negative.      Past Medical History:   Diagnosis Date   • Anorectal abscess    • Anxiety disorder    • Bladder fistula      post rapair      • Chronic obstructive lung disease (CMS/HCC)    • Depressive disorder    • Diverticulitis of colon      post colectomy      • Generalized anxiety disorder    • Heavy tobacco smoker    • History of colon polyps    • Hyperlipidemia    • Osteoporosis    • Peripheral nerve disease    • Polymyalgia rheumatica (CMS/HCC)    • Vitamin D deficiency    • Vulvar intraepithelial " neoplasia (GONZALES)     remote history     Past Surgical History:   Procedure Laterality Date   • COLON RESECTION     • COLONOSCOPY  03/17/2014    polyps   • COLONOSCOPY  02/08/2016   • COLONOSCOPY N/A 5/24/2017    Procedure: COLONOSCOPY;  Surgeon: Aric Pina MD;  Location: [facility] ENDOSCOPY;  Service:    • COLONOSCOPY N/A 4/17/2019    Procedure: COLONOSCOPY;  Surgeon: Aric Pina MD;  Location: [facility] ENDOSCOPY;  Service: Gastroenterology   • ENDOSCOPY  01/26/2015    Normal esophagus. Gastritis was found in the stomach. Biopsy taken. Normal duodenum   • ENDOSCOPY N/A 9/18/2017    Procedure: ESOPHAGOGASTRODUODENOSCOPY;  Surgeon: Aric Pina MD;  Location: [facility] ENDOSCOPY;  Service:    • ENDOSCOPY N/A 4/17/2019    Procedure: ESOPHAGOGASTRODUODENOSCOPY;  Surgeon: Aric Pina MD;  Location: [facility] ENDOSCOPY;  Service: Gastroenterology   • HYSTERECTOMY     • UPPER GASTROINTESTINAL ENDOSCOPY  01/26/2015   • UPPER GASTROINTESTINAL ENDOSCOPY  09/18/2017   • UPPER GASTROINTESTINAL ENDOSCOPY  04/17/2019     Family History   Problem Relation Age of Onset   • No Known Problems Sister    • No Known Problems Brother      Social History     Socioeconomic History   • Marital status:      Spouse name: Not on file   • Number of children: Not on file   • Years of education: Not on file   • Highest education level: Not on file   Tobacco Use   • Smoking status: Current Every Day Smoker     Packs/day: 1.00     Years: 50.00     Pack years: 50.00     Types: Cigarettes, Cigars   • Smokeless tobacco: Never Used   Vaping Use   • Vaping Use: Never used   Substance and Sexual Activity   • Alcohol use: No   • Drug use: No   • Sexual activity: Defer     Allergies   Allergen Reactions   • Fosamax [Alendronate] GI Intolerance     Vitals:    05/03/21 1004   BP: 116/82   Pulse: 104   Temp: 97 °F (36.1 °C)       Home Medications:  Prior to Admission medications    Medication Sig Start Date End Date Taking? Authorizing Provider    albuterol sulfate  (90 Base) MCG/ACT inhaler Inhale 2 puffs Every 6 (Six) Hours As Needed for Wheezing. 2/12/21  Yes Heather Milan MD   ATROVENT HFA 17 MCG/ACT inhaler Inhale 2 puffs 4 (Four) Times a Day. 4/20/20  Yes Donato Donis MD   cephalexin (KEFLEX) 250 MG capsule TAKE 1 CAPSULE BY MOUTH EVERY OTHER DAY 12/16/20  Yes Felicia Buchanan MD   dicyclomine (BENTYL) 20 MG tablet Take 1 tablet by mouth 2 (Two) Times a Day. 4/16/21  Yes Aric Pina MD   estradiol (ESTRACE) 0.1 MG/GM vaginal cream Insert  into the vagina. 9/3/20 9/4/21 Yes Felicia Buchanan MD   fluconazole (DIFLUCAN) 150 MG tablet Take one tablet today and another in 3 days. 5/3/21  Yes Corazon Rojas MD   gabapentin (NEURONTIN) 400 MG capsule Take 400 mg by mouth 3 (Three) Times a Day. 5/1/21  Yes Felicia Buchanan MD   HYDROmorphone (DILAUDID) 4 MG tablet Take 4 mg by mouth 4 (Four) Times a Day. 3/11/21  Yes Felicia Buchanan MD   Lactobacillus Probiotic tablet Take 1 tablet by mouth Daily. 12/4/20  Yes Corazon Rojas MD   lidocaine-prilocaine (EMLA) 2.5-2.5 % cream Use to affected area as needed for pain. 3/1/21  Yes Corazon Rojas MD   methenamine (HIPREX) 1 g tablet Take 1 g by mouth. 12/15/20 12/16/21 Yes Felicia Buchanan MD   metroNIDAZOLE (Flagyl) 500 MG tablet Take 1 tablet by mouth 2 (Two) Times a Day for 7 days. 5/3/21 5/10/21 Yes Corazon Rojas MD   metroNIDAZOLE (METROGEL VAGINAL) 0.75 % vaginal gel Insert  into the vagina 2 (Two) Times a Week. 12/7/20  Yes Corazon Rojas MD   omeprazole (priLOSEC) 20 MG capsule TAKE 1 CAPSULE BY MOUTH TWICE DAILY 4/16/21  Yes Aric Pina MD   predniSONE (DELTASONE) 10 MG tablet Take 1 tablet by mouth Daily. 2/12/21  Yes Heather Milan MD   triamcinolone (KENALOG) 0.5 % cream Apply pea-sized amount to outside of vagina and inside the opening twice a week at night. 3/1/21  Yes  Corazon Rojas MD   ampicillin (PRINCIPEN) 500 MG capsule Take 1 capsule by mouth 4 (Four) Times a Day for 7 days. 5/3/21 5/10/21  Corazon Rojas MD   gabapentin (NEURONTIN) 300 MG capsule Take 1 capsule by mouth 3 (Three) Times a Day. 4/1/21   Heather Milan MD   sodium-potassium-magnesium sulfates (SUPREP) 17.5-3.13-1.6 GM/177ML solution oral solution Take 1 bottle by mouth Every 12 (Twelve) Hours. 4/22/21   Aric Pina MD   sodium-potassium-magnesium sulfates (SUPREP) 17.5-3.13-1.6 GM/177ML solution oral solution Take 1 bottle by mouth Every 12 (Twelve) Hours. 4/22/21   Aric Pina MD       Objective   Physical Exam  Vitals reviewed.   Constitutional:       General: She is not in acute distress.     Appearance: Normal appearance. She is not ill-appearing.   HENT:      Head: Normocephalic and atraumatic.     Cardiovascular:      Rate and Rhythm: Tachycardia present.   Pulmonary:      Effort: Pulmonary effort is normal. No respiratory distress.   Skin:     General: Skin is warm and dry.      Findings: Lesion present.          Neurological:      General: No focal deficit present.      Mental Status: She is alert.   Psychiatric:         Attention and Perception: She is inattentive.         Mood and Affect: Mood is anxious.         Speech: Speech is tangential.         Behavior: Behavior is hyperactive.       Procedure: Consent obtained. After prepping the skin with alcohol and locally anesthetizing the areas with 5 cc of 1% xylocaine with epinephrine, a punch biopsy performed of both the right upper arm and the left flank . Hemostasis is accomplished with gelfoam and pressure. Tolerated well. Both specimens sent to pathology for evaluations.      Assessment/Plan   Punch biopsies performed. Will plan to see patient in 10 days to discuss pathology and likely excise skin lesion of right upper arm.      The encounter diagnosis was Skin lesion.                         This  document has been electronically signed by NAKUL Lyle on May 3, 2021 16:40 CDT

## 2021-05-03 NOTE — PATIENT INSTRUCTIONS
"BMI for Adults  What is BMI?  Body mass index (BMI) is a number that is calculated from a person's weight and height. BMI can help estimate how much of a person's weight is composed of fat. BMI does not measure body fat directly. Rather, it is an alternative to procedures that directly measure body fat, which can be difficult and expensive.  BMI can help identify people who may be at higher risk for certain medical problems.  What are BMI measurements used for?  BMI is used as a screening tool to identify possible weight problems. It helps determine whether a person is obese, overweight, a healthy weight, or underweight.  BMI is useful for:  · Identifying a weight problem that may be related to a medical condition or may increase the risk for medical problems.  · Promoting changes, such as changes in diet and exercise, to help reach a healthy weight. BMI screening can be repeated to see if these changes are working.  How is BMI calculated?  BMI involves measuring your weight in relation to your height. Both height and weight are measured, and the BMI is calculated from those numbers. This can be done either in English (U.S[place] or metric measurements. Note that charts and online BMI calculators are available to help you find your BMI quickly and easily without having to do these calculations yourself.  To calculate your BMI in English (U.S[place] measurements:    1. Measure your weight in pounds (lb).  2. Multiply the number of pounds by 703.  ? For example, for a person who weighs 180 lb, multiply that number by 703, which equals 126,540.  3. Measure your height in inches. Then multiply that number by itself to get a measurement called \"inches squared.\"  ? For example, for a person who is 70 inches tall, the \"inches squared\" measurement is 70 inches x 70 inches, which equals 4,900 inches squared.  4. Divide the total from step 2 (number of lb x 703) by the total from step 3 (inches squared): 126,540 ÷ 4,900 = 25.8. This is " "your BMI.  To calculate your BMI in metric measurements:  1. Measure your weight in kilograms (kg).  2. Measure your height in meters (m). Then multiply that number by itself to get a measurement called \"meters squared.\"  ? For example, for a person who is 1.75 m tall, the \"meters squared\" measurement is 1.75 m x 1.75 m, which is equal to 3.1 meters squared.  3. Divide the number of kilograms (your weight) by the meters squared number. In this example: 70 ÷ 3.1 = 22.6. This is your BMI.  What do the results mean?  BMI charts are used to identify whether you are underweight, normal weight, overweight, or obese. The following guidelines will be used:  · Underweight: BMI less than 18.5.  · Normal weight: BMI between 18.5 and 24.9.  · Overweight: BMI between 25 and 29.9.  · Obese: BMI of 30 or above.  Keep these notes in mind:  · Weight includes both fat and muscle, so someone with a muscular build, such as an athlete, may have a BMI that is higher than 24.9. In cases like these, BMI is not an accurate measure of body fat.  · To determine if excess body fat is the cause of a BMI of 25 or higher, further assessments may need to be done by a health care provider.  · BMI is usually interpreted in the same way for men and women.  Where to find more information  For more information about BMI, including tools to quickly calculate your BMI, go to these websites:  · Centers for Disease Control and Prevention: www.cdc.gov  · American Heart Association: www.heart.org  · National Heart, Lung, and Blood [place]: www.nhlbi.nih.gov  Summary  · Body mass index (BMI) is a number that is calculated from a person's weight and height.  · BMI may help estimate how much of a person's weight is composed of fat. BMI can help identify those who may be at higher risk for certain medical problems.  · BMI can be measured using English measurements or metric measurements.  · BMI charts are used to identify whether you are underweight, normal " weight, overweight, or obese.  This information is not intended to replace advice given to you by your health care provider. Make sure you discuss any questions you have with your health care provider.  Document Revised: 09/09/2020 Document Reviewed: 07/17/2020  Elsevier Patient Education © 2021 [employer]

## 2021-05-04 ENCOUNTER — OFFICE VISIT (OUTPATIENT)
Dept: FAMILY MEDICINE CLINIC | Facility: CLINIC | Age: 66
End: 2021-05-04

## 2021-05-04 VITALS
SYSTOLIC BLOOD PRESSURE: 100 MMHG | OXYGEN SATURATION: 95 % | HEIGHT: 64 IN | HEART RATE: 105 BPM | BODY MASS INDEX: 23.42 KG/M2 | DIASTOLIC BLOOD PRESSURE: 80 MMHG | WEIGHT: 137.2 LBS

## 2021-05-04 DIAGNOSIS — G89.29 CHRONIC ABDOMINAL PAIN: Primary | ICD-10-CM

## 2021-05-04 DIAGNOSIS — L81.9 PIGMENTED SKIN LESION SUSPICIOUS FOR MALIGNANT NEOPLASM: ICD-10-CM

## 2021-05-04 DIAGNOSIS — R10.9 CHRONIC ABDOMINAL PAIN: Primary | ICD-10-CM

## 2021-05-04 PROCEDURE — 99213 OFFICE O/P EST LOW 20 MIN: CPT | Performed by: FAMILY MEDICINE

## 2021-05-05 ENCOUNTER — HOSPITAL ENCOUNTER (OUTPATIENT)
Facility: HOSPITAL | Age: 66
Setting detail: HOSPITAL OUTPATIENT SURGERY
Discharge: HOME | End: 2021-05-05
Attending: INTERNAL MEDICINE | Admitting: INTERNAL MEDICINE

## 2021-05-05 ENCOUNTER — ANESTHESIA (OUTPATIENT)
Dept: GASTROENTEROLOGY | Facility: HOSPITAL | Age: 66
End: 2021-05-05

## 2021-05-05 ENCOUNTER — ANESTHESIA EVENT (OUTPATIENT)
Dept: GASTROENTEROLOGY | Facility: HOSPITAL | Age: 66
End: 2021-05-05

## 2021-05-05 VITALS
HEART RATE: 101 BPM | DIASTOLIC BLOOD PRESSURE: 73 MMHG | BODY MASS INDEX: 23.05 KG/M2 | RESPIRATION RATE: 18 BRPM | TEMPERATURE: 96.9 F | WEIGHT: 135 LBS | SYSTOLIC BLOOD PRESSURE: 113 MMHG | HEIGHT: 64 IN | OXYGEN SATURATION: 99 %

## 2021-05-05 DIAGNOSIS — K21.9 GASTROESOPHAGEAL REFLUX DISEASE, UNSPECIFIED WHETHER ESOPHAGITIS PRESENT: ICD-10-CM

## 2021-05-05 DIAGNOSIS — R10.84 GENERALIZED ABDOMINAL PAIN: ICD-10-CM

## 2021-05-05 DIAGNOSIS — R10.13 EPIGASTRIC PAIN: ICD-10-CM

## 2021-05-05 LAB
LAB AP CASE REPORT: NORMAL
LAB AP CLINICAL INFORMATION: NORMAL
PATH REPORT.FINAL DX SPEC: NORMAL

## 2021-05-05 PROCEDURE — 43239 EGD BIOPSY SINGLE/MULTIPLE: CPT | Performed by: INTERNAL MEDICINE

## 2021-05-05 PROCEDURE — 45385 COLONOSCOPY W/LESION REMOVAL: CPT | Performed by: INTERNAL MEDICINE

## 2021-05-05 PROCEDURE — 88305 TISSUE EXAM BY PATHOLOGIST: CPT

## 2021-05-05 PROCEDURE — 25010000002 PROPOFOL 10 MG/ML EMULSION: Performed by: NURSE ANESTHETIST, CERTIFIED REGISTERED

## 2021-05-05 RX ORDER — DEXTROSE AND SODIUM CHLORIDE 5; .45 G/100ML; G/100ML
30 INJECTION, SOLUTION INTRAVENOUS CONTINUOUS PRN
Status: DISCONTINUED | OUTPATIENT
Start: 2021-05-05 | End: 2021-05-05 | Stop reason: HOSPADM

## 2021-05-05 RX ORDER — PROPOFOL 10 MG/ML
VIAL (ML) INTRAVENOUS AS NEEDED
Status: DISCONTINUED | OUTPATIENT
Start: 2021-05-05 | End: 2021-05-05 | Stop reason: SURG

## 2021-05-05 RX ORDER — LIDOCAINE HYDROCHLORIDE 20 MG/ML
INJECTION, SOLUTION INTRAVENOUS AS NEEDED
Status: DISCONTINUED | OUTPATIENT
Start: 2021-05-05 | End: 2021-05-05 | Stop reason: SURG

## 2021-05-05 RX ADMIN — PROPOFOL 20 MG: 10 INJECTION, EMULSION INTRAVENOUS at 14:44

## 2021-05-05 RX ADMIN — LIDOCAINE HYDROCHLORIDE 100 MG: 20 INJECTION, SOLUTION INTRAVENOUS at 14:40

## 2021-05-05 RX ADMIN — PROPOFOL 30 MG: 10 INJECTION, EMULSION INTRAVENOUS at 14:52

## 2021-05-05 RX ADMIN — DEXTROSE AND SODIUM CHLORIDE 30 ML/HR: 5; 450 INJECTION, SOLUTION INTRAVENOUS at 13:45

## 2021-05-05 RX ADMIN — PROPOFOL 30 MG: 10 INJECTION, EMULSION INTRAVENOUS at 14:42

## 2021-05-05 RX ADMIN — PROPOFOL 80 MG: 10 INJECTION, EMULSION INTRAVENOUS at 14:40

## 2021-05-05 RX ADMIN — PROPOFOL 30 MG: 10 INJECTION, EMULSION INTRAVENOUS at 14:47

## 2021-05-05 RX ADMIN — PROPOFOL 30 MG: 10 INJECTION, EMULSION INTRAVENOUS at 14:41

## 2021-05-05 RX ADMIN — PROPOFOL 30 MG: 10 INJECTION, EMULSION INTRAVENOUS at 14:49

## 2021-05-05 NOTE — ANESTHESIA PREPROCEDURE EVALUATION
Anesthesia Evaluation     NPO Solid Status: > 8 hours  NPO Liquid Status: > 2 hours           Airway   Mallampati: II  TM distance: >3 FB  Neck ROM: full  no difficulty expected  Dental    (+) poor dentition    Pulmonary    (+) COPD, decreased breath sounds,   Cardiovascular - normal exam    (+) hypertension, hyperlipidemia,       Neuro/Psych  (+) numbness, psychiatric history,     GI/Hepatic/Renal/Endo    (+)  GERD,      Musculoskeletal     Abdominal    Substance History      OB/GYN          Other                      Anesthesia Plan    ASA 3     MAC     intravenous induction     Anesthetic plan, all risks, benefits, and alternatives have been provided, discussed and informed consent has been obtained with: patient.

## 2021-05-05 NOTE — ANESTHESIA POSTPROCEDURE EVALUATION
Patient: Adriana Nuñez    Procedure Summary     Date: 05/05/21 Room / Location: [facility] ENDOSCOPY 3 / [facility] ENDOSCOPY    Anesthesia Start: 1438 Anesthesia Stop: 1456    Procedures:       ESOPHAGOGASTRODUODENOSCOPY (N/A )      COLONOSCOPY (N/A ) Diagnosis:       Generalized abdominal pain      Gastroesophageal reflux disease, unspecified whether esophagitis present      (Generalized abdominal pain [R10.84])      (Gastroesophageal reflux disease, unspecified whether esophagitis present [K21.9])    Surgeons: Aric Pina MD Provider: Diego Mcnair CRNA    Anesthesia Type: MAC ASA Status: 3          Anesthesia Type: MAC    Vitals  No vitals data found for the desired time range.          Post Anesthesia Care and Evaluation    Patient location during evaluation: bedside  Patient participation: waiting for patient participation  Level of consciousness: responsive to verbal stimuli  Pain management: adequate  Airway patency: patent  Anesthetic complications: No anesthetic complications  PONV Status: none  Cardiovascular status: acceptable  Respiratory status: acceptable  Hydration status: acceptable    Comments: ---------------------------               05/05/21                     1456         ---------------------------   BP:         129/78         Pulse:         97          Resp:          16           Temp:   96.5 °F (35.8 °C)   SpO2:          98%         ---------------------------

## 2021-05-10 ENCOUNTER — PREP FOR SURGERY (OUTPATIENT)
Dept: OTHER | Facility: HOSPITAL | Age: 66
End: 2021-05-10

## 2021-05-10 ENCOUNTER — PROCEDURE VISIT (OUTPATIENT)
Dept: SURGERY | Facility: CLINIC | Age: 66
End: 2021-05-10

## 2021-05-10 VITALS
DIASTOLIC BLOOD PRESSURE: 68 MMHG | OXYGEN SATURATION: 93 % | HEART RATE: 124 BPM | SYSTOLIC BLOOD PRESSURE: 106 MMHG | WEIGHT: 139 LBS | TEMPERATURE: 96.2 F | HEIGHT: 64 IN | BODY MASS INDEX: 23.73 KG/M2

## 2021-05-10 DIAGNOSIS — C44.90 SKIN CANCER: Primary | ICD-10-CM

## 2021-05-10 PROCEDURE — 99024 POSTOP FOLLOW-UP VISIT: CPT | Performed by: NURSE PRACTITIONER

## 2021-05-10 RX ORDER — SODIUM CHLORIDE, SODIUM LACTATE, POTASSIUM CHLORIDE, CALCIUM CHLORIDE 600; 310; 30; 20 MG/100ML; MG/100ML; MG/100ML; MG/100ML
100 INJECTION, SOLUTION INTRAVENOUS CONTINUOUS
Status: CANCELLED | OUTPATIENT
Start: 2021-05-24

## 2021-05-10 RX ORDER — BUPIVACAINE HCL/0.9 % NACL/PF 0.1 %
2 PLASTIC BAG, INJECTION (ML) EPIDURAL ONCE
Status: CANCELLED | OUTPATIENT
Start: 2021-05-24 | End: 2021-05-10

## 2021-05-10 NOTE — PATIENT INSTRUCTIONS
"BMI for Adults  What is BMI?  Body mass index (BMI) is a number that is calculated from a person's weight and height. BMI can help estimate how much of a person's weight is composed of fat. BMI does not measure body fat directly. Rather, it is an alternative to procedures that directly measure body fat, which can be difficult and expensive.  BMI can help identify people who may be at higher risk for certain medical problems.  What are BMI measurements used for?  BMI is used as a screening tool to identify possible weight problems. It helps determine whether a person is obese, overweight, a healthy weight, or underweight.  BMI is useful for:  · Identifying a weight problem that may be related to a medical condition or may increase the risk for medical problems.  · Promoting changes, such as changes in diet and exercise, to help reach a healthy weight. BMI screening can be repeated to see if these changes are working.  How is BMI calculated?  BMI involves measuring your weight in relation to your height. Both height and weight are measured, and the BMI is calculated from those numbers. This can be done either in English (U.S.) or metric measurements. Note that charts and online BMI calculators are available to help you find your BMI quickly and easily without having to do these calculations yourself.  To calculate your BMI in English (U.S.) measurements:    1. Measure your weight in pounds (lb).  2. Multiply the number of pounds by 703.  ? For example, for a person who weighs 180 lb, multiply that number by 703, which equals 126,540.  3. Measure your height in inches. Then multiply that number by itself to get a measurement called \"inches squared.\"  ? For example, for a person who is 70 inches tall, the \"inches squared\" measurement is 70 inches x 70 inches, which equals 4,900 inches squared.  4. Divide the total from step 2 (number of lb x 703) by the total from step 3 (inches squared): 126,540 ÷ 4,900 = 25.8. This is " "your BMI.  To calculate your BMI in metric measurements:  1. Measure your weight in kilograms (kg).  2. Measure your height in meters (m). Then multiply that number by itself to get a measurement called \"meters squared.\"  ? For example, for a person who is 1.75 m tall, the \"meters squared\" measurement is 1.75 m x 1.75 m, which is equal to 3.1 meters squared.  3. Divide the number of kilograms (your weight) by the meters squared number. In this example: 70 ÷ 3.1 = 22.6. This is your BMI.  What do the results mean?  BMI charts are used to identify whether you are underweight, normal weight, overweight, or obese. The following guidelines will be used:  · Underweight: BMI less than 18.5.  · Normal weight: BMI between 18.5 and 24.9.  · Overweight: BMI between 25 and 29.9.  · Obese: BMI of 30 or above.  Keep these notes in mind:  · Weight includes both fat and muscle, so someone with a muscular build, such as an athlete, may have a BMI that is higher than 24.9. In cases like these, BMI is not an accurate measure of body fat.  · To determine if excess body fat is the cause of a BMI of 25 or higher, further assessments may need to be done by a health care provider.  · BMI is usually interpreted in the same way for men and women.  Where to find more information  For more information about BMI, including tools to quickly calculate your BMI, go to these websites:  · Centers for Disease Control and Prevention: www.cdc.gov  · American Heart Association: www.heart.org  · National Heart, Lung, and Blood [place]: www.nhlbi.nih.gov  Summary  · Body mass index (BMI) is a number that is calculated from a person's weight and height.  · BMI may help estimate how much of a person's weight is composed of fat. BMI can help identify those who may be at higher risk for certain medical problems.  · BMI can be measured using English measurements or metric measurements.  · BMI charts are used to identify whether you are underweight, normal " weight, overweight, or obese.  This information is not intended to replace advice given to you by your health care provider. Make sure you discuss any questions you have with your health care provider.  Document Revised: 09/09/2020 Document Reviewed: 07/17/2020  Elsevier Patient Education © 2021 [employer] Inc.

## 2021-05-10 NOTE — PROGRESS NOTES
CHIEF COMPLAINT:   Chief Complaint   Patient presents with   • Follow-up     punch biopsy results, schedule excision in OR       HPI: This patient presents for a follow up visit after undergoing punch biopsy of concerning skin lesions of right upper arm and left flank.  Doing well- no cellulitis, wound separation, or significant pain. Excision to be performed per Dr. Vizcaino in OR at patient's soonest convenience.      PATHOLOGY:         Physical Exam  Vitals reviewed.   Constitutional:       General: She is not in acute distress.     Appearance: Normal appearance. She is not ill-appearing.   HENT:      Head: Normocephalic and atraumatic.   Cardiovascular:      Rate and Rhythm: Tachycardia present.   Pulmonary:      Effort: Pulmonary effort is normal. No respiratory distress.   Skin:     General: Skin is warm and dry.      Findings: Lesion present.          Neurological:      General: No focal deficit present.      Mental Status: She is alert.   Psychiatric:         Attention and Perception: She is inattentive.         Mood and Affect: Mood is anxious.         Speech: Speech is tangential.         Behavior: Behavior is hyperactive.         ASSESSMENT:    Diagnoses and all orders for this visit:    1. Skin cancer (Primary)        PLAN:  1. The patient is scheduled for excision of skin cancers in OR per Dr. Vizcaino.                  This document has been electronically signed by NAKUL Lyle on May 10, 2021 16:27 CDT

## 2021-05-12 LAB
LAB AP CASE REPORT: NORMAL
PATH REPORT.ADDENDUM SPEC: NORMAL
PATH REPORT.FINAL DX SPEC: NORMAL

## 2021-05-17 ENCOUNTER — TELEPHONE (OUTPATIENT)
Dept: OBSTETRICS AND GYNECOLOGY | Facility: CLINIC | Age: 66
End: 2021-05-17

## 2021-05-18 NOTE — TELEPHONE ENCOUNTER
"Tried calling patient, unable to reach. Unable to leave message   \"the person you have called does not have a voicemail box set up\"  "

## 2021-05-19 NOTE — TELEPHONE ENCOUNTER
Patient returned call.  States she has taken medication that was sent but was unsure what it was for.  Let her know oneswab was positive for a yeast infection.  Patient wanted to know which bacteria it was positive for. Let her know it was negative for e coli and that she was positive for a yeast infection.    Patient verbalized understanding.

## 2021-05-20 ENCOUNTER — OFFICE VISIT (OUTPATIENT)
Dept: GASTROENTEROLOGY | Facility: CLINIC | Age: 66
End: 2021-05-20

## 2021-05-20 DIAGNOSIS — R10.13 EPIGASTRIC PAIN: ICD-10-CM

## 2021-05-20 PROCEDURE — 99213 OFFICE O/P EST LOW 20 MIN: CPT | Performed by: INTERNAL MEDICINE

## 2021-05-20 RX ORDER — DICYCLOMINE HCL 20 MG
20 TABLET ORAL 2 TIMES DAILY
Qty: 60 TABLET | Refills: 1 | Status: SHIPPED | OUTPATIENT
Start: 2021-05-20 | End: 2021-06-02 | Stop reason: SDUPTHER

## 2021-05-20 RX ORDER — OMEPRAZOLE 20 MG/1
CAPSULE, DELAYED RELEASE ORAL
Qty: 60 CAPSULE | Refills: 5 | Status: SHIPPED | OUTPATIENT
Start: 2021-05-20 | End: 2021-12-06

## 2021-05-20 NOTE — PATIENT INSTRUCTIONS
MyPlate from USDA    MyPlate is an outline of a general healthy diet based on the 2010 Dietary Guidelines for Americans, from the U.S. Department of Agriculture (USDA). It sets guidelines for how much food you should eat from each food group based on your age, sex, and level of physical activity.  What are tips for following MyPlate?  To follow MyPlate recommendations:  · Eat a wide variety of fruits and vegetables, grains, and protein foods.  · Serve smaller portions and eat less food throughout the day.  · Limit portion sizes to avoid overeating.  · Enjoy your food.  · Get at least 150 minutes of exercise every week. This is about 30 minutes each day, 5 or more days per week.  It can be difficult to have every meal look like MyPlate. Think about MyPlate as eating guidelines for an entire day, rather than each individual meal.  Fruits and vegetables  · Make half of your plate fruits and vegetables.  · Eat many different colors of fruits and vegetables each day.  · For a 2,000 calorie daily food plan, eat:  ? 2½ cups of vegetables every day.  ? 2 cups of fruit every day.  · 1 cup is equal to:  ? 1 cup raw or cooked vegetables.  ? 1 cup raw fruit.  ? 1 medium-sized orange, apple, or banana.  ? 1 cup 100% fruit or vegetable juice.  ? 2 cups raw leafy greens, such as lettuce, spinach, or kale.  ? ½ cup dried fruit.  Grains  · One fourth of your plate should be grains.  · Make at least half of the grains you eat each day whole grains.  · For a 2,000 calorie daily food plan, eat 6 oz of grains every day.  · 1 oz is equal to:  ? 1 slice bread.  ? 1 cup cereal.  ? ½ cup cooked rice, cereal, or pasta.  Protein  · One fourth of your plate should be protein.  · Eat a wide variety of protein foods, including meat, poultry, fish, eggs, beans, nuts, and tofu.  · For a 2,000 calorie daily food plan, eat 5½ oz of protein every day.  · 1 oz is equal to:  ? 1 oz meat, poultry, or fish.  ? ¼ cup cooked beans.  ? 1 egg.  ? ½ oz nuts  or seeds.  ? 1 Tbsp peanut butter.  Dairy  · Drink fat-free or low-fat (1%) milk.  · Eat or drink dairy as a side to meals.  · For a 2,000 calorie daily food plan, eat or drink 3 cups of dairy every day.  · 1 cup is equal to:  ? 1 cup milk, yogurt, cottage cheese, or soy milk (soy beverage).  ? 2 oz processed cheese.  ? 1½ oz natural cheese.  Fats, oils, salt, and sugars  · Only small amounts of oils are recommended.  · Avoid foods that are high in calories and low in nutritional value (empty calories), like foods high in fat or added sugars.  · Choose foods that are low in salt (sodium). Choose foods that have less than 140 milligrams (mg) of sodium per serving.  · Drink water instead of sugary drinks. Drink enough water each day to keep your urine pale yellow.  Where to find support  · Work with your health care provider or a nutrition specialist (dietitian) to develop a customized eating plan that is right for you.  · Download an bessie (mobile application) to help you track your daily food intake.  Where to find more information  · Go to ChooseMyPlate.gov for more information.  Summary  · MyPlate is a general guideline for healthy eating from the USDA. It is based on the 2010 Dietary Guidelines for Americans.  · In general, fruits and vegetables should take up ½ of your plate, grains should take up ¼ of your plate, and protein should take up ¼ of your plate.  This information is not intended to replace advice given to you by your health care provider. Make sure you discuss any questions you have with your health care provider.  Document Revised: 05/21/2020 Document Reviewed: 03/19/2018  Elsevier Patient Education © 2021 Elsevier Inc.  BMI for Adults  What is BMI?  Body mass index (BMI) is a number that is calculated from a person's weight and height. BMI can help estimate how much of a person's weight is composed of fat. BMI does not measure body fat directly. Rather, it is an alternative to procedures that  "directly measure body fat, which can be difficult and expensive.  BMI can help identify people who may be at higher risk for certain medical problems.  What are BMI measurements used for?  BMI is used as a screening tool to identify possible weight problems. It helps determine whether a person is obese, overweight, a healthy weight, or underweight.  BMI is useful for:  · Identifying a weight problem that may be related to a medical condition or may increase the risk for medical problems.  · Promoting changes, such as changes in diet and exercise, to help reach a healthy weight. BMI screening can be repeated to see if these changes are working.  How is BMI calculated?  BMI involves measuring your weight in relation to your height. Both height and weight are measured, and the BMI is calculated from those numbers. This can be done either in English (U.S[place] or metric measurements. Note that charts and online BMI calculators are available to help you find your BMI quickly and easily without having to do these calculations yourself.  To calculate your BMI in English (U.S[place] measurements:    1. Measure your weight in pounds (lb).  2. Multiply the number of pounds by 703.  ? For example, for a person who weighs 180 lb, multiply that number by 703, which equals 126,540.  3. Measure your height in inches. Then multiply that number by itself to get a measurement called \"inches squared.\"  ? For example, for a person who is 70 inches tall, the \"inches squared\" measurement is 70 inches x 70 inches, which equals 4,900 inches squared.  4. Divide the total from step 2 (number of lb x 703) by the total from step 3 (inches squared): 126,540 ÷ 4,900 = 25.8. This is your BMI.  To calculate your BMI in metric measurements:  1. Measure your weight in kilograms (kg).  2. Measure your height in meters (m). Then multiply that number by itself to get a measurement called \"meters squared.\"  ? For example, for a person who is 1.75 m tall, the " "\"meters squared\" measurement is 1.75 m x 1.75 m, which is equal to 3.1 meters squared.  3. Divide the number of kilograms (your weight) by the meters squared number. In this example: 70 ÷ 3.1 = 22.6. This is your BMI.  What do the results mean?  BMI charts are used to identify whether you are underweight, normal weight, overweight, or obese. The following guidelines will be used:  · Underweight: BMI less than 18.5.  · Normal weight: BMI between 18.5 and 24.9.  · Overweight: BMI between 25 and 29.9.  · Obese: BMI of 30 or above.  Keep these notes in mind:  · Weight includes both fat and muscle, so someone with a muscular build, such as an athlete, may have a BMI that is higher than 24.9. In cases like these, BMI is not an accurate measure of body fat.  · To determine if excess body fat is the cause of a BMI of 25 or higher, further assessments may need to be done by a health care provider.  · BMI is usually interpreted in the same way for men and women.  Where to find more information  For more information about BMI, including tools to quickly calculate your BMI, go to these websites:  · Centers for Disease Control and Prevention: www.cdc.gov  · American Heart Association: www.heart.org  · National Heart, Lung, and Blood Malcolm: www.nhlbi.nih.gov  Summary  · Body mass index (BMI) is a number that is calculated from a person's weight and height.  · BMI may help estimate how much of a person's weight is composed of fat. BMI can help identify those who may be at higher risk for certain medical problems.  · BMI can be measured using English measurements or metric measurements.  · BMI charts are used to identify whether you are underweight, normal weight, overweight, or obese.  This information is not intended to replace advice given to you by your health care provider. Make sure you discuss any questions you have with your health care provider.  Document Revised: 09/09/2020 Document Reviewed: 07/17/2020  Kadeem " Patient Education © 2021 Elsevier Inc.

## 2021-05-20 NOTE — PROGRESS NOTES
[facility]      Chief Complaint:   Chief Complaint   Patient presents with   • Abdominal Pain   • Heartburn       Subjective     HPI:   Patient states she is doing well after EGD and colonoscopy.  EGD shows mild esophagitis patient is currently on a proton pump inhibitor.  Patient also states that her bowel movements are markedly improved since her colonoscopy.  Patient did have a tubular adenoma found in the ascending colon will need a repeat colonoscopy in 3 years.  We will continue patient on her proton pump inhibitor    Plan; we will reorder patient's proton pump inhibitor we will also reorder Bentyl as this improves patient's abdominal discomfort.  Patient follow-up in 3 months    Past Medical History:   Past Medical History:   Diagnosis Date   • Anorectal abscess    • Anxiety disorder    • Bladder fistula      post rapair      • Chronic obstructive lung disease (CMS/HCC)    • Depressive disorder    • Diverticulitis of colon      post colectomy      • Generalized anxiety disorder    • Heavy tobacco smoker    • History of colon polyps    • Hyperlipidemia    • Hypertension    • Osteoporosis    • Peripheral nerve disease    • Polymyalgia rheumatica (CMS/HCC)    • Vitamin D deficiency    • Vulvar intraepithelial neoplasia (GONZALES)     remote history       Past Surgical History:  Past Surgical History:   Procedure Laterality Date   • COLON RESECTION     • COLONOSCOPY  03/17/2014    polyps   • COLONOSCOPY  02/08/2016   • COLONOSCOPY N/A 5/24/2017    Procedure: COLONOSCOPY;  Surgeon: Aric Pina MD;  Location: [facility] ENDOSCOPY;  Service:    • COLONOSCOPY N/A 4/17/2019    Procedure: COLONOSCOPY;  Surgeon: Aric Pina MD;  Location: [facility] ENDOSCOPY;  Service: Gastroenterology   • COLONOSCOPY N/A 5/5/2021    Procedure: COLONOSCOPY;  Surgeon: Aric Pina MD;  Location: [facility] ENDOSCOPY;  Service: Gastroenterology;  Laterality: N/A;   • ENDOSCOPY  01/26/2015    Normal esophagus.  Gastritis was found in the stomach. Biopsy taken. Normal duodenum   • ENDOSCOPY N/A 9/18/2017    Procedure: ESOPHAGOGASTRODUODENOSCOPY;  Surgeon: Aric Pina MD;  Location: [facility] ENDOSCOPY;  Service:    • ENDOSCOPY N/A 4/17/2019    Procedure: ESOPHAGOGASTRODUODENOSCOPY;  Surgeon: Aric Pina MD;  Location: [facility] ENDOSCOPY;  Service: Gastroenterology   • ENDOSCOPY N/A 5/5/2021    Procedure: ESOPHAGOGASTRODUODENOSCOPY;  Surgeon: Aric Pina MD;  Location: [facility] ENDOSCOPY;  Service: Gastroenterology;  Laterality: N/A;   • HYSTERECTOMY     • UPPER GASTROINTESTINAL ENDOSCOPY  01/26/2015   • UPPER GASTROINTESTINAL ENDOSCOPY  09/18/2017   • UPPER GASTROINTESTINAL ENDOSCOPY  04/17/2019       Family History:  Family History   Problem Relation Age of Onset   • No Known Problems Sister    • No Known Problems Brother        Social History:   reports that she has been smoking cigarettes and cigars. She has a 50.00 pack-year smoking history. She has never used smokeless tobacco. She reports that she does not drink alcohol and does not use drugs.    Medications:   Prior to Admission medications    Medication Sig Start Date End Date Taking? Authorizing Provider   albuterol sulfate  (90 Base) MCG/ACT inhaler Inhale 2 puffs Every 6 (Six) Hours As Needed for Wheezing. 2/12/21  Yes Heather Milan MD   ATROVENT HFA 17 MCG/ACT inhaler Inhale 2 puffs 4 (Four) Times a Day. 4/20/20  Yes Donato Donis MD   cephalexin (KEFLEX) 250 MG capsule TAKE 1 CAPSULE BY MOUTH EVERY OTHER DAY 12/16/20  Yes Felicia Buchanan MD   dicyclomine (BENTYL) 20 MG tablet Take 1 tablet by mouth 2 (Two) Times a Day. 5/20/21  Yes Aric Pina MD   estradiol (ESTRACE) 0.1 MG/GM vaginal cream Insert  into the vagina. 9/3/20 9/4/21 Yes Felicia Buchanan MD   gabapentin (NEURONTIN) 300 MG capsule Take 1 capsule by mouth 3 (Three) Times a Day. 4/1/21  Yes Heather Milan MD   gabapentin (NEURONTIN) 400 MG capsule Take 400  mg by mouth 3 (Three) Times a Day. 5/1/21  Yes Felicia Buchanan MD   HYDROmorphone (DILAUDID) 4 MG tablet Take 4 mg by mouth 4 (Four) Times a Day. 3/11/21  Yes Felicia Buchanan MD   Lactobacillus Probiotic tablet Take 1 tablet by mouth Daily. 12/4/20  Yes Corazon Rojas MD   lidocaine-prilocaine (EMLA) 2.5-2.5 % cream Use to affected area as needed for pain. 3/1/21  Yes Corazon Rojas MD   methenamine (HIPREX) 1 g tablet Take 1 g by mouth. 12/15/20 12/16/21 Yes Felicia Buchanan MD   metroNIDAZOLE (METROGEL VAGINAL) 0.75 % vaginal gel Insert  into the vagina 2 (Two) Times a Week. 12/7/20  Yes Corazon Rojas MD   omeprazole (priLOSEC) 20 MG capsule TAKE 1 CAPSULE BY MOUTH TWICE DAILY 5/20/21  Yes Aric Pina MD   predniSONE (DELTASONE) 10 MG tablet Take 1 tablet by mouth Daily. 2/12/21  Yes Heather Milan MD   triamcinolone (KENALOG) 0.5 % cream Apply pea-sized amount to outside of vagina and inside the opening twice a week at night. 3/1/21  Yes Corazon Rojas MD   dicyclomine (BENTYL) 20 MG tablet Take 1 tablet by mouth 2 (Two) Times a Day. 4/16/21 5/20/21 Yes Aric Pina MD   omeprazole (priLOSEC) 20 MG capsule TAKE 1 CAPSULE BY MOUTH TWICE DAILY 4/16/21 5/20/21 Yes Aric Pina MD   fluconazole (DIFLUCAN) 150 MG tablet Take one tablet today and another in 3 days. 5/3/21   Corazon Rojas MD   sodium-potassium-magnesium sulfates (SUPREP) 17.5-3.13-1.6 GM/177ML solution oral solution Take 1 bottle by mouth Every 12 (Twelve) Hours. 4/22/21   Aric Pina MD   sodium-potassium-magnesium sulfates (SUPREP) 17.5-3.13-1.6 GM/177ML solution oral solution Take 1 bottle by mouth Every 12 (Twelve) Hours. 4/22/21   Aric Pina MD       Allergies:  Fosamax [alendronate]    ROS:    Review of Systems   Constitutional: Negative for activity change, appetite change, chills, diaphoresis, fatigue, fever and unexpected  weight change.   HENT: Negative for sore throat and trouble swallowing.    Respiratory: Negative for shortness of breath.    Gastrointestinal: Negative for abdominal distention, abdominal pain, anal bleeding, blood in stool, constipation, diarrhea, nausea, rectal pain and vomiting.   Endocrine: Negative for polydipsia, polyphagia and polyuria.   Genitourinary: Negative for difficulty urinating.   Musculoskeletal: Negative for arthralgias.   Skin: Negative for pallor.   Allergic/Immunologic: Negative for food allergies.   Neurological: Negative for weakness and light-headedness.   Psychiatric/Behavioral: Negative for behavioral problems.     Objective     There were no vitals taken for this visit.    Physical Exam  Constitutional:       General: She is not in acute distress.     Appearance: She is well-developed. She is not diaphoretic.   HENT:      Head: Normocephalic and atraumatic.   Cardiovascular:      Rate and Rhythm: Normal rate and regular rhythm.      Heart sounds: Normal heart sounds. No murmur heard.   No friction rub. No gallop.    Pulmonary:      Effort: No respiratory distress.      Breath sounds: Normal breath sounds. No wheezing or rales.   Chest:      Chest wall: No tenderness.   Abdominal:      General: Bowel sounds are normal. There is no distension.      Palpations: Abdomen is soft. There is no mass.      Tenderness: There is no abdominal tenderness. There is no guarding or rebound.      Hernia: No hernia is present.   Musculoskeletal:         General: Normal range of motion.   Skin:     General: Skin is warm and dry.      Coloration: Skin is not pale.      Findings: No erythema or rash.   Neurological:      Mental Status: She is alert and oriented to person, place, and time.   Psychiatric:         Behavior: Behavior normal.         Thought Content: Thought content normal.         Judgment: Judgment normal.          Assessment/Plan   Diagnoses and all orders for this visit:    1. Epigastric  pain  -     omeprazole (priLOSEC) 20 MG capsule; TAKE 1 CAPSULE BY MOUTH TWICE DAILY  Dispense: 60 capsule; Refill: 5  -     dicyclomine (BENTYL) 20 MG tablet; Take 1 tablet by mouth 2 (Two) Times a Day.  Dispense: 60 tablet; Refill: 1        * Surgery not found *     Diagnosis Plan   1. Epigastric pain  omeprazole (priLOSEC) 20 MG capsule    dicyclomine (BENTYL) 20 MG tablet       Anticipated Surgical Procedure:  No orders of the defined types were placed in this encounter.      The risks, benefits, and alternatives of this procedure have been discussed with the patient or the responsible party- the patient understands and agrees to proceed.

## 2021-05-21 ENCOUNTER — PRE-ADMISSION TESTING (OUTPATIENT)
Dept: PREADMISSION TESTING | Facility: HOSPITAL | Age: 66
End: 2021-05-21

## 2021-05-21 ENCOUNTER — LAB (OUTPATIENT)
Dept: LAB | Facility: HOSPITAL | Age: 66
End: 2021-05-21

## 2021-05-21 VITALS
DIASTOLIC BLOOD PRESSURE: 64 MMHG | SYSTOLIC BLOOD PRESSURE: 102 MMHG | WEIGHT: 137 LBS | OXYGEN SATURATION: 95 % | BODY MASS INDEX: 23.39 KG/M2 | RESPIRATION RATE: 18 BRPM | HEART RATE: 100 BPM | HEIGHT: 64 IN

## 2021-05-21 DIAGNOSIS — Z01.818 PREOP TESTING: Primary | ICD-10-CM

## 2021-05-21 LAB — SARS-COV-2 N GENE RESP QL NAA+PROBE: NOT DETECTED

## 2021-05-21 PROCEDURE — 87635 SARS-COV-2 COVID-19 AMP PRB: CPT

## 2021-05-21 PROCEDURE — C9803 HOPD COVID-19 SPEC COLLECT: HCPCS

## 2021-05-24 ENCOUNTER — ANESTHESIA EVENT (OUTPATIENT)
Dept: PERIOP | Facility: HOSPITAL | Age: 66
End: 2021-05-24

## 2021-05-24 ENCOUNTER — HOSPITAL ENCOUNTER (OUTPATIENT)
Facility: HOSPITAL | Age: 66
Setting detail: HOSPITAL OUTPATIENT SURGERY
Discharge: HOME | End: 2021-05-24
Attending: SURGERY | Admitting: SURGERY

## 2021-05-24 ENCOUNTER — ANESTHESIA (OUTPATIENT)
Dept: PERIOP | Facility: HOSPITAL | Age: 66
End: 2021-05-24

## 2021-05-24 VITALS
TEMPERATURE: 97.8 F | WEIGHT: 136.91 LBS | OXYGEN SATURATION: 95 % | SYSTOLIC BLOOD PRESSURE: 122 MMHG | BODY MASS INDEX: 23.37 KG/M2 | HEART RATE: 100 BPM | DIASTOLIC BLOOD PRESSURE: 75 MMHG | RESPIRATION RATE: 18 BRPM | HEIGHT: 64 IN

## 2021-05-24 DIAGNOSIS — C44.90 SKIN CANCER: Primary | ICD-10-CM

## 2021-05-24 PROCEDURE — 25010000002 MIDAZOLAM PER 1 MG: Performed by: NURSE ANESTHETIST, CERTIFIED REGISTERED

## 2021-05-24 PROCEDURE — 25010000002 HYDROMORPHONE 1 MG/ML SOLUTION: Performed by: NURSE ANESTHETIST, CERTIFIED REGISTERED

## 2021-05-24 PROCEDURE — 25010000002 ONDANSETRON PER 1 MG: Performed by: NURSE ANESTHETIST, CERTIFIED REGISTERED

## 2021-05-24 PROCEDURE — 11606 EXC TR-EXT MAL+MARG >4 CM: CPT | Performed by: SURGERY

## 2021-05-24 PROCEDURE — 88331 PATH CONSLTJ SURG 1 BLK 1SPC: CPT

## 2021-05-24 PROCEDURE — 25010000002 FENTANYL CITRATE (PF) 50 MCG/ML SOLUTION: Performed by: NURSE ANESTHETIST, CERTIFIED REGISTERED

## 2021-05-24 PROCEDURE — S0260 H&P FOR SURGERY: HCPCS | Performed by: SURGERY

## 2021-05-24 PROCEDURE — 25010000002 DEXAMETHASONE PER 1 MG: Performed by: NURSE ANESTHETIST, CERTIFIED REGISTERED

## 2021-05-24 PROCEDURE — 25010000002 PROPOFOL 10 MG/ML EMULSION: Performed by: NURSE ANESTHETIST, CERTIFIED REGISTERED

## 2021-05-24 PROCEDURE — 88305 TISSUE EXAM BY PATHOLOGIST: CPT

## 2021-05-24 PROCEDURE — 25010000002 CEFAZOLIN PER 500 MG: Performed by: SURGERY

## 2021-05-24 RX ORDER — SODIUM CHLORIDE, SODIUM LACTATE, POTASSIUM CHLORIDE, CALCIUM CHLORIDE 600; 310; 30; 20 MG/100ML; MG/100ML; MG/100ML; MG/100ML
100 INJECTION, SOLUTION INTRAVENOUS CONTINUOUS
Status: DISCONTINUED | OUTPATIENT
Start: 2021-05-24 | End: 2021-05-24 | Stop reason: HOSPADM

## 2021-05-24 RX ORDER — BACITRACIN ZINC 500 [USP'U]/G
OINTMENT TOPICAL AS NEEDED
Status: DISCONTINUED | OUTPATIENT
Start: 2021-05-24 | End: 2021-05-24 | Stop reason: HOSPADM

## 2021-05-24 RX ORDER — BUPIVACAINE HYDROCHLORIDE 5 MG/ML
INJECTION, SOLUTION EPIDURAL; INTRACAUDAL AS NEEDED
Status: DISCONTINUED | OUTPATIENT
Start: 2021-05-24 | End: 2021-05-24 | Stop reason: HOSPADM

## 2021-05-24 RX ORDER — DEXAMETHASONE SODIUM PHOSPHATE 4 MG/ML
INJECTION, SOLUTION INTRA-ARTICULAR; INTRALESIONAL; INTRAMUSCULAR; INTRAVENOUS; SOFT TISSUE AS NEEDED
Status: DISCONTINUED | OUTPATIENT
Start: 2021-05-24 | End: 2021-05-24 | Stop reason: SURG

## 2021-05-24 RX ORDER — MIDAZOLAM HYDROCHLORIDE 1 MG/ML
INJECTION INTRAMUSCULAR; INTRAVENOUS AS NEEDED
Status: DISCONTINUED | OUTPATIENT
Start: 2021-05-24 | End: 2021-05-24 | Stop reason: SURG

## 2021-05-24 RX ORDER — BUPIVACAINE HCL/0.9 % NACL/PF 0.1 %
2 PLASTIC BAG, INJECTION (ML) EPIDURAL ONCE
Status: COMPLETED | OUTPATIENT
Start: 2021-05-24 | End: 2021-05-24

## 2021-05-24 RX ORDER — HYDROCODONE BITARTRATE AND ACETAMINOPHEN 5; 325 MG/1; MG/1
1 TABLET ORAL EVERY 4 HOURS PRN
Qty: 12 TABLET | Refills: 0 | Status: SHIPPED | OUTPATIENT
Start: 2021-05-24 | End: 2021-06-01

## 2021-05-24 RX ORDER — SODIUM CHLORIDE, SODIUM GLUCONATE, SODIUM ACETATE, POTASSIUM CHLORIDE, AND MAGNESIUM CHLORIDE 526; 502; 368; 37; 30 MG/100ML; MG/100ML; MG/100ML; MG/100ML; MG/100ML
INJECTION, SOLUTION INTRAVENOUS CONTINUOUS PRN
Status: DISCONTINUED | OUTPATIENT
Start: 2021-05-24 | End: 2021-05-24 | Stop reason: SURG

## 2021-05-24 RX ORDER — LIDOCAINE HYDROCHLORIDE 20 MG/ML
INJECTION, SOLUTION EPIDURAL; INFILTRATION; INTRACAUDAL; PERINEURAL AS NEEDED
Status: DISCONTINUED | OUTPATIENT
Start: 2021-05-24 | End: 2021-05-24 | Stop reason: SURG

## 2021-05-24 RX ORDER — ONDANSETRON 2 MG/ML
INJECTION INTRAMUSCULAR; INTRAVENOUS AS NEEDED
Status: DISCONTINUED | OUTPATIENT
Start: 2021-05-24 | End: 2021-05-24 | Stop reason: SURG

## 2021-05-24 RX ORDER — PROPOFOL 10 MG/ML
VIAL (ML) INTRAVENOUS AS NEEDED
Status: DISCONTINUED | OUTPATIENT
Start: 2021-05-24 | End: 2021-05-24 | Stop reason: SURG

## 2021-05-24 RX ORDER — FENTANYL CITRATE 50 UG/ML
INJECTION, SOLUTION INTRAMUSCULAR; INTRAVENOUS AS NEEDED
Status: DISCONTINUED | OUTPATIENT
Start: 2021-05-24 | End: 2021-05-24 | Stop reason: SURG

## 2021-05-24 RX ADMIN — Medication 2 G: at 07:45

## 2021-05-24 RX ADMIN — MIDAZOLAM HYDROCHLORIDE 2 MG: 2 INJECTION, SOLUTION INTRAMUSCULAR; INTRAVENOUS at 07:28

## 2021-05-24 RX ADMIN — SODIUM CHLORIDE, POTASSIUM CHLORIDE, SODIUM LACTATE AND CALCIUM CHLORIDE 100 ML/HR: 600; 310; 30; 20 INJECTION, SOLUTION INTRAVENOUS at 06:24

## 2021-05-24 RX ADMIN — FENTANYL CITRATE 50 MCG: 50 INJECTION INTRAMUSCULAR; INTRAVENOUS at 08:13

## 2021-05-24 RX ADMIN — HYDROMORPHONE HYDROCHLORIDE 0.25 MG: 1 INJECTION, SOLUTION INTRAMUSCULAR; INTRAVENOUS; SUBCUTANEOUS at 09:42

## 2021-05-24 RX ADMIN — SODIUM CHLORIDE, SODIUM GLUCONATE, SODIUM ACETATE, POTASSIUM CHLORIDE, AND MAGNESIUM CHLORIDE: 526; 502; 368; 37; 30 INJECTION, SOLUTION INTRAVENOUS at 08:40

## 2021-05-24 RX ADMIN — HYDROMORPHONE HYDROCHLORIDE 0.25 MG: 1 INJECTION, SOLUTION INTRAMUSCULAR; INTRAVENOUS; SUBCUTANEOUS at 09:15

## 2021-05-24 RX ADMIN — LIDOCAINE HYDROCHLORIDE 100 MG: 20 INJECTION, SOLUTION EPIDURAL; INFILTRATION; INTRACAUDAL; PERINEURAL at 07:35

## 2021-05-24 RX ADMIN — HYDROMORPHONE HYDROCHLORIDE 0.5 MG: 1 INJECTION, SOLUTION INTRAMUSCULAR; INTRAVENOUS; SUBCUTANEOUS at 08:22

## 2021-05-24 RX ADMIN — FENTANYL CITRATE 25 MCG: 50 INJECTION INTRAMUSCULAR; INTRAVENOUS at 07:54

## 2021-05-24 RX ADMIN — HYDROMORPHONE HYDROCHLORIDE 0.25 MG: 1 INJECTION, SOLUTION INTRAMUSCULAR; INTRAVENOUS; SUBCUTANEOUS at 09:26

## 2021-05-24 RX ADMIN — HYDROMORPHONE HYDROCHLORIDE 0.5 MG: 1 INJECTION, SOLUTION INTRAMUSCULAR; INTRAVENOUS; SUBCUTANEOUS at 09:05

## 2021-05-24 RX ADMIN — DEXAMETHASONE SODIUM PHOSPHATE 4 MG: 4 INJECTION, SOLUTION INTRAMUSCULAR; INTRAVENOUS at 07:45

## 2021-05-24 RX ADMIN — ONDANSETRON 4 MG: 2 INJECTION INTRAMUSCULAR; INTRAVENOUS at 08:02

## 2021-05-24 RX ADMIN — FENTANYL CITRATE 25 MCG: 50 INJECTION INTRAMUSCULAR; INTRAVENOUS at 07:42

## 2021-05-24 RX ADMIN — PROPOFOL 100 MG: 10 INJECTION, EMULSION INTRAVENOUS at 07:35

## 2021-05-24 NOTE — ANESTHESIA PREPROCEDURE EVALUATION
Anesthesia Evaluation     no history of anesthetic complications:  NPO Solid Status: > 8 hours  NPO Liquid Status: > 2 hours           Airway   Mallampati: I  TM distance: >3 FB  Neck ROM: full  No difficulty expected  Dental    (+) edentulous    Pulmonary - normal exam    breath sounds clear to auscultation  (+) a smoker (1 ppd) Current Smoked day of surgery, COPD mild, asthma,  (-) sleep apnea  Cardiovascular - normal exam  Exercise tolerance: poor (<4 METS)    Rhythm: regular  Rate: normal    (+) hyperlipidemia,   (-) hypertension, valvular problems/murmurs, dysrhythmias, angina, cardiac stents, DVT      Neuro/Psych  (+) numbness (peripheral nerve issues), psychiatric history (no meds) Anxiety and Depression,     (-) seizures, TIA, CVA  GI/Hepatic/Renal/Endo    (+)  GERD,    (-) hepatitis, liver disease, no renal disease, diabetes, no thyroid disorder    Musculoskeletal     (+) back pain, chronic pain,   Abdominal    Substance History   (-) alcohol use, drug use     OB/GYN          Other   arthritis, chronic steroid use adrenal insufficiency   history of cancer (skin)                    Anesthesia Plan    ASA 3     general   (May need stress dose steroid, did take yesterday)  intravenous induction     Anesthetic plan, all risks, benefits, and alternatives have been provided, discussed and informed consent has been obtained with: patient.

## 2021-05-24 NOTE — H&P
[facility]   HISTORY AND PHYSICAL    Patient Name: Adriana Nuñez  : 1955  MRN: [id number]  Primary Care Physician:  Heather Milan MD  Date of admission: 2021    Subjective   Subjective     Chief Complaint: Skin cancer right arm and left back    History of Present Illness  Bx proves above  Review of Systems   Constitutional: Negative for appetite change, chills, fever and unexpected weight change.   HENT: Negative for hearing loss, nosebleeds and trouble swallowing.    Eyes: Negative for visual disturbance.   Respiratory: Negative for apnea, cough, choking, chest tightness, shortness of breath, wheezing and stridor.    Cardiovascular: Negative for chest pain, palpitations and leg swelling.   Gastrointestinal: Negative for abdominal distention, abdominal pain, blood in stool, constipation, diarrhea, nausea and vomiting.   Endocrine: Negative for cold intolerance, heat intolerance, polydipsia, polyphagia and polyuria.   Genitourinary: Negative for difficulty urinating, dysuria, frequency, hematuria and urgency.   Musculoskeletal: Negative for arthralgias, back pain, myalgias and neck pain.   Skin: Negative for color change, pallor and rash.   Allergic/Immunologic: Negative for immunocompromised state.   Neurological: Negative for dizziness, seizures, syncope, light-headedness, numbness and headaches.   Hematological: Negative for adenopathy.   Psychiatric/Behavioral: Negative for suicidal ideas. The patient is not nervous/anxious.         Personal History     Past Medical History:   Diagnosis Date   • Anorectal abscess    • Anxiety disorder    • Bladder fistula      post rapair      • Chronic obstructive lung disease (CMS/HCC)    • Depressive disorder    • Diverticulitis of colon      post colectomy      • Generalized anxiety disorder    • Heavy tobacco smoker    • History of colon polyps    • Hyperlipidemia    • Hypertension    • Osteoporosis    • Peripheral nerve disease    • Polymyalgia  rheumatica (CMS/HCC)    • Vitamin D deficiency    • Vulvar intraepithelial neoplasia (GONZALES)     remote history       Past Surgical History:   Procedure Laterality Date   • COLON RESECTION     • COLONOSCOPY  03/17/2014    polyps   • COLONOSCOPY  02/08/2016   • COLONOSCOPY N/A 5/24/2017    Procedure: COLONOSCOPY;  Surgeon: Aric Pina MD;  Location: [facility] ENDOSCOPY;  Service:    • COLONOSCOPY N/A 4/17/2019    Procedure: COLONOSCOPY;  Surgeon: Aric Pina MD;  Location: [facility] ENDOSCOPY;  Service: Gastroenterology   • COLONOSCOPY N/A 5/5/2021    Procedure: COLONOSCOPY;  Surgeon: Aric Pina MD;  Location: [facility] ENDOSCOPY;  Service: Gastroenterology;  Laterality: N/A;   • ENDOSCOPY  01/26/2015    Normal esophagus. Gastritis was found in the stomach. Biopsy taken. Normal duodenum   • ENDOSCOPY N/A 9/18/2017    Procedure: ESOPHAGOGASTRODUODENOSCOPY;  Surgeon: Aric Pina MD;  Location: [facility] ENDOSCOPY;  Service:    • ENDOSCOPY N/A 4/17/2019    Procedure: ESOPHAGOGASTRODUODENOSCOPY;  Surgeon: Aric Pina MD;  Location: [facility] ENDOSCOPY;  Service: Gastroenterology   • ENDOSCOPY N/A 5/5/2021    Procedure: ESOPHAGOGASTRODUODENOSCOPY;  Surgeon: Aric Pina MD;  Location: [facility] ENDOSCOPY;  Service: Gastroenterology;  Laterality: N/A;   • HYSTERECTOMY     • UPPER GASTROINTESTINAL ENDOSCOPY  01/26/2015   • UPPER GASTROINTESTINAL ENDOSCOPY  09/18/2017   • UPPER GASTROINTESTINAL ENDOSCOPY  04/17/2019       Family History: family history includes No Known Problems in her brother and sister. Otherwise pertinent FHx was reviewed and not pertinent to current issue.    Social History:  reports that she has been smoking cigarettes and cigars. She has a 50.00 pack-year smoking history. She has never used smokeless tobacco. She reports that she does not drink alcohol and does not use drugs.    Home Medications:  HYDROmorphone, Lactobacillus Probiotic, albuterol sulfate HFA, dicyclomine, estradiol, gabapentin,  ipratropium, lidocaine-prilocaine, metroNIDAZOLE, omeprazole, predniSONE, and triamcinolone    Allergies:  Allergies   Allergen Reactions   • Fosamax [Alendronate] GI Intolerance       Objective    Objective     Vitals:   Temp:  [96.9 °F (36.1 °C)] 96.9 °F (36.1 °C)  Heart Rate:  [93] 93  Resp:  [18] 18  BP: (124)/(79) 124/79    Physical Exam  Vitals reviewed.   Constitutional:       Appearance: Normal appearance.   HENT:      Head: Normocephalic and atraumatic.   Cardiovascular:      Rate and Rhythm: Normal rate and regular rhythm.   Pulmonary:      Effort: Pulmonary effort is normal. No respiratory distress.   Musculoskeletal:      Cervical back: Normal range of motion.   Skin:     General: Skin is warm and dry.          Neurological:      Mental Status: She is alert.         Result Review    Result Review:  I have personally reviewed the results from the time of this admission to 5/24/2021 07:20 CDT and agree with these findings:  [x]  Laboratory  []  Microbiology  []  Radiology  []  EKG/Telemetry   []  Cardiology/Vascular   []  Pathology  []  Old records  []  Other:      Assessment/Plan   Assessment / Plan     Brief Patient Summary:  Adriana Nuñez is a 66 y.o. female who has skin cancer    Active Hospital Problems:  Active Hospital Problems    Diagnosis    • **Skin cancer      Added automatically from request for surgery [id number]       Plan: Excision of cancers    Procedure is explained with the risks of bleeding, infection ,scarring, poor wound healing.      DVT prophylaxis:  Mechanical DVT prophylaxis orders are present.        Electronically signed by Venkatesh Vizcaino MD, 05/24/21, 7:20 AM CDT.

## 2021-05-24 NOTE — INTERVAL H&P NOTE
H&P reviewed. The patient was examined and there are no changes to the H&P.      Temp:  [96.9 °F (36.1 °C)] 96.9 °F (36.1 °C)  Heart Rate:  [93] 93  Resp:  [18] 18  BP: (124)/(79) 124/79

## 2021-05-24 NOTE — ANESTHESIA POSTPROCEDURE EVALUATION
Patient: Adriana Nuñez    Procedure Summary     Date: 05/24/21 Room / Location: [facility] OR 09 / [facility] OR    Anesthesia Start: 0728 Anesthesia Stop: 0911    Procedure: SKIN LESION EXCISION RIGHT ARM AND LEFT BACK (N/A ) Diagnosis:       Skin cancer      (Skin cancer [C44.90])    Surgeons: Venkatesh Vizcaino MD Provider: Parminder Hassan MD    Anesthesia Type: general ASA Status: 3          Anesthesia Type: general    Vitals  No vitals data found for the desired time range.          Post Anesthesia Care and Evaluation    Patient location during evaluation: bedside  Patient participation: complete - patient cannot participate  Level of consciousness: awake  Pain score: 0  Pain management: adequate  Airway patency: patent  Anesthetic complications: No anesthetic complications  PONV Status: none  Cardiovascular status: acceptable  Respiratory status: acceptable  Hydration status: acceptable

## 2021-05-24 NOTE — OP NOTE
SKIN LESION EXCISION  Procedure Note    Adriana Nuñez  5/24/2021    Pre-op Diagnosis:   1.  Skin cancer right forearm 3 cm in size  2.  Skin cancer left lower back 3.5 cm in size    Post-op Diagnosis:     Same    Procedure:  1.  Excision of skin cancer from the right forearm requiring 9 cm length of incision with intermediate layered closure  2.  Excision of skin cancer from the left lower back requiring 10.5 length of incision with intermediate layered closure    Surgeon(s):  Venkatesh Vizcaino MD    Anesthesia: General    Staff:   Circulator: Dianne Cavazos RN  Scrub Person: Humaira Rome  Assistant: Naima Glover    Assistant: Naima Glover was responsible for performing the following activities: Retraction, Suction, Irrigation, Suturing, Closing and Placing Dressing and their skilled assistance was necessary for the success of this case.     Estimated Blood Loss: minimal    Specimens:                ID Type Source Tests Collected by Time   A : Lesion Right Arm, check margins Tissue Arm, Right TISSUE PATHOLOGY EXAM Venkatesh Vizcaino MD 5/24/2021 0809   B : Lesion Left Flank, check margins, short stittch Superior, Long stitch lateral Tissue Back, Lower TISSUE PATHOLOGY EXAM Venkatesh Vizcaino MD 5/24/2021 0812         Drains: * No LDAs found *    Findings: All cancers completely excised with a negative margin    Complications: None    Indications: 66-year-old woman with skin cancers in the above-mentioned position.  Brought to surgery today for excision and closure.    Description of procedure: Patient was brought to the operating room and placed supine on the operating table.  After adequate general anesthesia with an LMA, the forearm and back are prepped and draped in a sterile manner.  A briefing and timeout are performed and all parties are in agreement.    Each area is excised sharply down to the subcutaneous fat.  Bleeding is well controlled and the patients are completely excised using  electrocautery.  Bleeding is well controlled and the wound is closed with interrupted 3-0 Vicryl's in the subcutaneous and interrupted 4-0 nylon's on skin.    Pathology demonstrated complete excision of all areas.    Procedure was terminated.  She tolerated well.  Sponge needle counts were correct.  Returned to recovery in satisfactory condition.            This document has been electronically signed by Venkatesh Vizcaino MD on May 24, 2021 08:40 CDT      Date: 5/24/2021  Time: 08:40 CDT

## 2021-05-24 NOTE — DISCHARGE INSTRUCTIONS
Dr. Venkatesh Vizcaino  [address] [place] [address] [phone] (office)  [phone] (hospital)  [phone] (cell)      Postoperative Discharge Instructions         1. Keep dressings in place and dry postoperatively for a total of 48 hours. May then remove dressings. Can shower and get affected area wet after dressings are removed. Prefer no soaking in the tub for one week. Leave steri-strips in place.   2. A responsible adult should accompany the patient on discharge and for 24 hours following surgery.   3. No heavy lifting (>5 lbs) or strenuous upper arm activity, and no raising of arms over the head until followup appointment.   4. For 24 hours postoperatively, or while taking pain or nausea medications - Do not drive, operate machinery or drink alcohol.   5. Call the office for any of the following symptoms:    Persistent bleeding   Excessive bruising or swelling   Excessive sleepiness or trouble breathing   Severe pain   Persistent nausea or vomiting   Fever greater than 101.   6. Patient should keep the postoperative visit that was scheduled for him/her in the office. If the patient has forgotten this date, please call the clinic for a reminder of the appointment time. If it is after hours, the patient can call the clinic the next business day for this reminder.

## 2021-05-24 NOTE — ANESTHESIA PROCEDURE NOTES
Airway  Urgency: elective    Date/Time: 5/24/2021 7:36 AM    General Information and Staff    Patient location during procedure: OR  CRNA: Kervin Tomlin CRNA    Indications and Patient Condition  Indications for airway management: airway protection    Preoxygenated: yes  Mask difficulty assessment: 0 - not attempted    Final Airway Details  Final airway type: supraglottic airway      Successful airway: I-gel  Size 4    Number of attempts at approach: 1  Assessment: lips, teeth, and gum same as pre-op

## 2021-05-25 ENCOUNTER — TELEPHONE (OUTPATIENT)
Dept: MAMMOGRAPHY | Facility: CLINIC | Age: 66
End: 2021-05-25

## 2021-05-25 NOTE — TELEPHONE ENCOUNTER
MS HASTINGS CALLED ASKING IF YOU CAN GIVE HER A PRESCRIPTION FOR AN ANTIBIOTIC.    SHE USES [facility].    PHONE [phone]

## 2021-05-26 RX ORDER — ESTRADIOL 0.1 MG/G
CREAM VAGINAL
Qty: 42.5 G | Refills: 5 | Status: SHIPPED | OUTPATIENT
Start: 2021-05-26

## 2021-05-27 LAB
LAB AP CASE REPORT: NORMAL
PATH REPORT.FINAL DX SPEC: NORMAL

## 2021-05-28 ENCOUNTER — TELEPHONE (OUTPATIENT)
Dept: GASTROENTEROLOGY | Facility: CLINIC | Age: 66
End: 2021-05-28

## 2021-05-28 NOTE — TELEPHONE ENCOUNTER
05/28/2021, 1641 - Patient telephone call returned per this staff member [phone] with notification of verbal orders received per Dr. Aric Flower M.D. - Patient may increase Bentyl 20 MG Tablets to 1 tablet by mouth 4 times per day for abdominal pain related to diarrhea and may purchase over the counter Gas-X and utilize as directed per packaging instructions for abdominal bloating/swelling following consumption of food.  Patient verbalized understanding.

## 2021-05-28 NOTE — TELEPHONE ENCOUNTER
----- Message from Kem Payne sent at 5/28/2021  3:28 PM CDT -----  Contact: [phone]  Please give Ms. Nuñez a call back. She has a question about taking medication and possibly taking more of it. Sorry I could not really understand what she was trying to tell me.     [phone]

## 2021-06-01 ENCOUNTER — OFFICE VISIT (OUTPATIENT)
Dept: SURGERY | Facility: CLINIC | Age: 66
End: 2021-06-01

## 2021-06-01 VITALS
DIASTOLIC BLOOD PRESSURE: 74 MMHG | WEIGHT: 139.2 LBS | OXYGEN SATURATION: 94 % | HEIGHT: 64 IN | BODY MASS INDEX: 23.76 KG/M2 | TEMPERATURE: 98.9 F | HEART RATE: 100 BPM | SYSTOLIC BLOOD PRESSURE: 118 MMHG | RESPIRATION RATE: 22 BRPM

## 2021-06-01 DIAGNOSIS — C44.90 SKIN CANCER: Primary | ICD-10-CM

## 2021-06-01 PROCEDURE — 99024 POSTOP FOLLOW-UP VISIT: CPT | Performed by: NURSE PRACTITIONER

## 2021-06-02 DIAGNOSIS — R10.13 EPIGASTRIC PAIN: ICD-10-CM

## 2021-06-02 RX ORDER — ALBUTEROL SULFATE 90 UG/1
2 AEROSOL, METERED RESPIRATORY (INHALATION) EVERY 6 HOURS PRN
Qty: 18 G | Refills: 3 | Status: SHIPPED | OUTPATIENT
Start: 2021-06-02 | End: 2022-01-10 | Stop reason: SDUPTHER

## 2021-06-02 RX ORDER — IPRATROPIUM BROMIDE 17 UG/1
2 AEROSOL, METERED RESPIRATORY (INHALATION)
Qty: 12.9 G | Refills: 3 | Status: SHIPPED | OUTPATIENT
Start: 2021-06-02 | End: 2022-01-10 | Stop reason: SDUPTHER

## 2021-06-03 RX ORDER — DICYCLOMINE HCL 20 MG
20 TABLET ORAL 4 TIMES DAILY
Qty: 120 TABLET | Refills: 5 | Status: SHIPPED | OUTPATIENT
Start: 2021-06-03 | End: 2021-11-29

## 2021-06-17 ENCOUNTER — OFFICE VISIT (OUTPATIENT)
Dept: FAMILY MEDICINE CLINIC | Facility: CLINIC | Age: 66
End: 2021-06-17

## 2021-06-17 VITALS
SYSTOLIC BLOOD PRESSURE: 98 MMHG | OXYGEN SATURATION: 96 % | BODY MASS INDEX: 22.88 KG/M2 | HEIGHT: 64 IN | HEART RATE: 109 BPM | WEIGHT: 134 LBS | DIASTOLIC BLOOD PRESSURE: 80 MMHG

## 2021-06-17 DIAGNOSIS — G89.29 CHRONIC MIDLINE LOW BACK PAIN WITHOUT SCIATICA: ICD-10-CM

## 2021-06-17 DIAGNOSIS — K21.9 GASTROESOPHAGEAL REFLUX DISEASE WITHOUT ESOPHAGITIS: ICD-10-CM

## 2021-06-17 DIAGNOSIS — N76.0 RECURRENT VAGINITIS: ICD-10-CM

## 2021-06-17 DIAGNOSIS — G89.29 CHRONIC ABDOMINAL PAIN: Primary | ICD-10-CM

## 2021-06-17 DIAGNOSIS — R10.9 CHRONIC ABDOMINAL PAIN: Primary | ICD-10-CM

## 2021-06-17 DIAGNOSIS — M54.50 CHRONIC MIDLINE LOW BACK PAIN WITHOUT SCIATICA: ICD-10-CM

## 2021-06-17 PROCEDURE — 99213 OFFICE O/P EST LOW 20 MIN: CPT | Performed by: FAMILY MEDICINE

## 2021-06-17 RX ORDER — PREDNISONE 10 MG/1
10 TABLET ORAL DAILY
Qty: 30 TABLET | Refills: 2 | Status: SHIPPED | OUTPATIENT
Start: 2021-06-17 | End: 2021-12-20 | Stop reason: SDUPTHER

## 2021-06-17 RX ORDER — LIDOCAINE AND PRILOCAINE 25; 25 MG/G; MG/G
1 CREAM TOPICAL AS NEEDED
Qty: 30 G | Refills: 2 | Status: SHIPPED | OUTPATIENT
Start: 2021-06-17

## 2021-07-15 ENCOUNTER — TELEPHONE (OUTPATIENT)
Dept: FAMILY MEDICINE CLINIC | Facility: CLINIC | Age: 66
End: 2021-07-15

## 2021-07-15 ENCOUNTER — LAB (OUTPATIENT)
Dept: LAB | Facility: HOSPITAL | Age: 66
End: 2021-07-15

## 2021-07-15 ENCOUNTER — OFFICE VISIT (OUTPATIENT)
Dept: FAMILY MEDICINE CLINIC | Facility: CLINIC | Age: 66
End: 2021-07-15

## 2021-07-15 VITALS
OXYGEN SATURATION: 97 % | WEIGHT: 135 LBS | HEART RATE: 102 BPM | HEIGHT: 64 IN | BODY MASS INDEX: 23.05 KG/M2 | SYSTOLIC BLOOD PRESSURE: 98 MMHG | DIASTOLIC BLOOD PRESSURE: 76 MMHG

## 2021-07-15 DIAGNOSIS — N76.0 RECURRENT VAGINITIS: ICD-10-CM

## 2021-07-15 DIAGNOSIS — N89.8 VAGINAL DISCHARGE: ICD-10-CM

## 2021-07-15 DIAGNOSIS — G89.29 CHRONIC ABDOMINAL PAIN: ICD-10-CM

## 2021-07-15 DIAGNOSIS — R10.9 CHRONIC ABDOMINAL PAIN: Primary | ICD-10-CM

## 2021-07-15 DIAGNOSIS — G89.29 CHRONIC ABDOMINAL PAIN: Primary | ICD-10-CM

## 2021-07-15 DIAGNOSIS — N30.00 ACUTE CYSTITIS WITHOUT HEMATURIA: Primary | ICD-10-CM

## 2021-07-15 DIAGNOSIS — K21.9 GASTROESOPHAGEAL REFLUX DISEASE WITHOUT ESOPHAGITIS: ICD-10-CM

## 2021-07-15 DIAGNOSIS — R10.9 CHRONIC ABDOMINAL PAIN: ICD-10-CM

## 2021-07-15 LAB
ALBUMIN SERPL-MCNC: 4 G/DL (ref 3.5–5.2)
ALBUMIN/GLOB SERPL: 1.7 G/DL
ALP SERPL-CCNC: 89 U/L (ref 39–117)
ALT SERPL W P-5'-P-CCNC: 19 U/L (ref 1–33)
ANION GAP SERPL CALCULATED.3IONS-SCNC: 13.1 MMOL/L (ref 5–15)
AST SERPL-CCNC: 21 U/L (ref 1–32)
BACTERIA UR QL AUTO: ABNORMAL /HPF
BASOPHILS # BLD AUTO: 0.1 10*3/MM3 (ref 0–0.2)
BASOPHILS NFR BLD AUTO: 1 % (ref 0–1.5)
BILIRUB SERPL-MCNC: 0.2 MG/DL (ref 0–1.2)
BILIRUB UR QL STRIP: ABNORMAL
BUN SERPL-MCNC: 9 MG/DL (ref 8–23)
BUN/CREAT SERPL: 13.8 (ref 7–25)
CALCIUM SPEC-SCNC: 9.3 MG/DL (ref 8.6–10.5)
CANDIDA ALBICANS: POSITIVE
CHLORIDE SERPL-SCNC: 101 MMOL/L (ref 98–107)
CLARITY UR: ABNORMAL
CO2 SERPL-SCNC: 27.9 MMOL/L (ref 22–29)
COD CRY URNS QL: ABNORMAL /HPF
COLOR UR: ABNORMAL
CREAT SERPL-MCNC: 0.65 MG/DL (ref 0.57–1)
DEPRECATED RDW RBC AUTO: 38.9 FL (ref 37–54)
EOSINOPHIL # BLD AUTO: 0.18 10*3/MM3 (ref 0–0.4)
EOSINOPHIL NFR BLD AUTO: 1.7 % (ref 0.3–6.2)
ERYTHROCYTE [DISTWIDTH] IN BLOOD BY AUTOMATED COUNT: 11.9 % (ref 12.3–15.4)
GARDNERELLA VAGINALIS: NEGATIVE
GFR SERPL CREATININE-BSD FRML MDRD: 91 ML/MIN/1.73
GLOBULIN UR ELPH-MCNC: 2.3 GM/DL
GLUCOSE SERPL-MCNC: 95 MG/DL (ref 65–99)
GLUCOSE UR STRIP-MCNC: NEGATIVE MG/DL
HCT VFR BLD AUTO: 45.3 % (ref 34–46.6)
HGB BLD-MCNC: 15.3 G/DL (ref 12–15.9)
HGB UR QL STRIP.AUTO: NEGATIVE
HYALINE CASTS UR QL AUTO: ABNORMAL /LPF
IMM GRANULOCYTES # BLD AUTO: 0.13 10*3/MM3 (ref 0–0.05)
IMM GRANULOCYTES NFR BLD AUTO: 1.2 % (ref 0–0.5)
KETONES UR QL STRIP: ABNORMAL
LEUKOCYTE ESTERASE UR QL STRIP.AUTO: ABNORMAL
LYMPHOCYTES # BLD AUTO: 2.54 10*3/MM3 (ref 0.7–3.1)
LYMPHOCYTES NFR BLD AUTO: 24.1 % (ref 19.6–45.3)
MCH RBC QN AUTO: 30.5 PG (ref 26.6–33)
MCHC RBC AUTO-ENTMCNC: 33.8 G/DL (ref 31.5–35.7)
MCV RBC AUTO: 90.2 FL (ref 79–97)
MONOCYTES # BLD AUTO: 0.84 10*3/MM3 (ref 0.1–0.9)
MONOCYTES NFR BLD AUTO: 8 % (ref 5–12)
NEUTROPHILS NFR BLD AUTO: 6.73 10*3/MM3 (ref 1.7–7)
NEUTROPHILS NFR BLD AUTO: 64 % (ref 42.7–76)
NITRITE UR QL STRIP: POSITIVE
NRBC BLD AUTO-RTO: 0 /100 WBC (ref 0–0.2)
PH UR STRIP.AUTO: <=5 [PH] (ref 5–8)
PLATELET # BLD AUTO: 332 10*3/MM3 (ref 140–450)
PMV BLD AUTO: 10.7 FL (ref 6–12)
POTASSIUM SERPL-SCNC: 3.8 MMOL/L (ref 3.5–5.2)
PROT SERPL-MCNC: 6.3 G/DL (ref 6–8.5)
PROT UR QL STRIP: ABNORMAL
RBC # BLD AUTO: 5.02 10*6/MM3 (ref 3.77–5.28)
RBC # UR: ABNORMAL /HPF
REF LAB TEST METHOD: ABNORMAL
SODIUM SERPL-SCNC: 142 MMOL/L (ref 136–145)
SP GR UR STRIP: >=1.03 (ref 1–1.03)
SQUAMOUS #/AREA URNS HPF: ABNORMAL /HPF
T VAGINALIS DNA VAG QL PROBE+SIG AMP: NEGATIVE
UROBILINOGEN UR QL STRIP: ABNORMAL
WBC # BLD AUTO: 10.52 10*3/MM3 (ref 3.4–10.8)
WBC UR QL AUTO: ABNORMAL /HPF
YEAST URNS QL MICRO: ABNORMAL /HPF

## 2021-07-15 PROCEDURE — 81001 URINALYSIS AUTO W/SCOPE: CPT

## 2021-07-15 PROCEDURE — 87086 URINE CULTURE/COLONY COUNT: CPT

## 2021-07-15 PROCEDURE — 36415 COLL VENOUS BLD VENIPUNCTURE: CPT

## 2021-07-15 PROCEDURE — 87510 GARDNER VAG DNA DIR PROBE: CPT | Performed by: FAMILY MEDICINE

## 2021-07-15 PROCEDURE — 99214 OFFICE O/P EST MOD 30 MIN: CPT | Performed by: FAMILY MEDICINE

## 2021-07-15 PROCEDURE — 80053 COMPREHEN METABOLIC PANEL: CPT

## 2021-07-15 PROCEDURE — 87480 CANDIDA DNA DIR PROBE: CPT | Performed by: FAMILY MEDICINE

## 2021-07-15 PROCEDURE — 85025 COMPLETE CBC W/AUTO DIFF WBC: CPT

## 2021-07-15 PROCEDURE — 87660 TRICHOMONAS VAGIN DIR PROBE: CPT | Performed by: FAMILY MEDICINE

## 2021-07-15 RX ORDER — SULFAMETHOXAZOLE AND TRIMETHOPRIM 800; 160 MG/1; MG/1
1 TABLET ORAL 2 TIMES DAILY
Qty: 6 TABLET | Refills: 0 | Status: SHIPPED | OUTPATIENT
Start: 2021-07-15 | End: 2021-07-18

## 2021-07-15 RX ORDER — FLUCONAZOLE 150 MG/1
150 TABLET ORAL ONCE
Qty: 2 TABLET | Refills: 0 | Status: SHIPPED | OUTPATIENT
Start: 2021-07-15 | End: 2021-07-15

## 2021-07-15 RX ORDER — METHENAMINE HIPPURATE 1000 MG/1
1 TABLET ORAL 2 TIMES DAILY WITH MEALS
COMMUNITY
Start: 2021-07-08 | End: 2022-09-23 | Stop reason: SDUPTHER

## 2021-07-15 RX ORDER — MORPHINE SULFATE 15 MG/1
TABLET, FILM COATED, EXTENDED RELEASE ORAL
COMMUNITY
Start: 2021-07-14 | End: 2022-01-10

## 2021-07-15 NOTE — PROGRESS NOTES
"Chief Complaint  Follow-up    Subjective          Adriana Nuñez presents to [facility]  History of Present Illness    Patient presents today for follow-up.  She continues to have the same complaints she has had previously, including chronic abdominal pain.  Also has vaginal pain and discharge.  Patient requests antibiotics that she has had in the past to help with vaginal infection.  Patient consistently worries about worsening symptoms that will cause her to be \" bad off again\".  Patient worries that she has an infection that needs treatment.  Patient does follow with gastroenterology for irritable bowel.  Takes Bentyl 20 mg 4 times a day.  Uses Prilosec for GERD.  Patient also follows with OB/GYN.  On treatment with metronidazole vaginal gel twice weekly for recurrent vaginitis.  Patient uses this medication, but still feels like she has problems.  Patient follows with pain management for chronic pain in her back.  She is on morphine 15 mg every 12 hours and gabapentin 300 mg 3 times a day.    Objective   Vital Signs:   BP 98/76   Pulse 102   Ht 162.6 cm (64\")   Wt 61.2 kg (135 lb)   SpO2 97%   BMI 23.17 kg/m²     Physical Exam  Vitals reviewed.   Constitutional:       General: She is not in acute distress.     Appearance: She is well-developed.   HENT:      Head: Normocephalic and atraumatic.   Cardiovascular:      Rate and Rhythm: Normal rate and regular rhythm.      Heart sounds: Normal heart sounds. No murmur heard.     Pulmonary:      Effort: Pulmonary effort is normal. No respiratory distress.      Breath sounds: Normal breath sounds. No wheezing or rales.   Abdominal:      Palpations: Abdomen is soft.      Tenderness: There is no abdominal tenderness.   Skin:     General: Skin is warm and dry.   Neurological:      Mental Status: She is alert and oriented to person, place, and time.   Psychiatric:         Mood and Affect: Mood is anxious.         Speech: Speech is " "tangential.        Result Review :   The following data was reviewed by: Heather Milan MD on 07/15/2021:  Common labs    Common Labsle 3/29/21 3/29/21 3/29/21    1231 1231 1231   Glucose   90   BUN   13   Creatinine   0.61   eGFR Non African Am   98   Sodium   140   Potassium   4.3   Chloride   103   Calcium   9.8   Albumin   4.40   Total Bilirubin   0.7   Alkaline Phosphatase   99   AST (SGOT)   16   ALT (SGPT)   14   WBC 11.51 (A)     Hemoglobin 15.6     Hematocrit 45.9     Platelets 324     Total Cholesterol  249 (A)    Triglycerides  201 (A)    HDL Cholesterol  43    LDL Cholesterol   169 (A)    (A) Abnormal value              CT abdomen and pelvis done at outside facility on 2/2/2021 reviewed     \"Findings: Lung bases are clear. No free intraperitoneal air is detected. There is no evidence for enteritis, colitis or appendicitis. The fat of the right lower quadrant is normal in density.     The liver, spleen, adrenal glands and kidneys are unremarkable. No evidence for ureterectasis or urinary bladder stone. Uterus is absent. There is no adnexal mass or ascites.     The aorta is calcified but not dilated. Ventral abdominal wall is preserved. Retroperitoneal lymph nodes are not enlarged.     IMPRESSION:     Normal CT appearance of right lower quadrant structures, with no obstructive uropathy or bowel pathology detected.\"                   Assessment and Plan    Diagnoses and all orders for this visit:    1. Chronic abdominal pain (Primary)  -     Comprehensive Metabolic Panel; Future  -     CBC & Differential; Future  -     Urinalysis With Culture If Indicated -; Future    2. Vaginal discharge  -     Gardnerella vaginalis, Trichomonas vaginalis, Candida albicans, DNA - Swab, Vagina    3. Recurrent vaginitis    4. Gastroesophageal reflux disease without esophagitis      Patient presents today for follow-up.  Chronic abdominal pain still causing her problems.  Patient is following with gastroenterology.  Had " normal CT abdomen and pelvis 5 months ago.  Had colonoscopy with 1 polyp removed -pathology showed tubular adenoma with no high-grade dysplasia.  Had EGD that showed mild gastropathy.  We will continue to monitor for worsening of symptoms.  Encourage patient to make sure she is taking her Bentyl 20 mg daily, as irritable bowel can cause abdominal pain/cramping.  She has follow-up with GI next month.  Discussed that antibiotics are only needed for short periods and for infectious causes.  Patient having vaginal irritation with history of recurrent vaginitis.  Swab self collected today and will be sent to the lab for additional evaluation.  Will treat as indicated for positive results.  Patient should continue Prilosec for GERD.          Follow Up   Return in about 3 months (around 10/15/2021) for Recheck.  Patient was given instructions and counseling regarding her condition or for health maintenance advice. Please see specific information pulled into the AVS if appropriate.         This document has been electronically signed by Heather Milan MD

## 2021-07-15 NOTE — TELEPHONE ENCOUNTER
Please let patient know she is positive for yeast infection.  Will send diflucan to the pharmacy.  Take one tab today, and addition tablet 3 days later if needed.  ThanksFATOUMATA

## 2021-07-16 ENCOUNTER — TELEPHONE (OUTPATIENT)
Dept: FAMILY MEDICINE CLINIC | Facility: CLINIC | Age: 66
End: 2021-07-16

## 2021-07-16 NOTE — TELEPHONE ENCOUNTER
Per Dr. Milan, Ms. Nuñez has been called with recent lab results and recommendations.   Continue current medications and follow-up as planned or sooner if any problems.     I did stress that she needs to  her Antibiotic as soon as she can and start taking the medicaiton

## 2021-07-16 NOTE — TELEPHONE ENCOUNTER
Please call and let patient know the following results:    - CMP is ok  - Urinalysis shows UTI.  Will treat with antibiotics, bactrim twice daily for 3 days.  Urine culture pending, will call with results once available if antibiotics needs to be changed.  - CBC shows some slight abnormalities that could be associated with urinary tract infection    ThanksFATOUMATA

## 2021-07-16 NOTE — TELEPHONE ENCOUNTER
Please let patient know that urine culture shows no growth.  Can discontinue antibiotics.  ThanksFATOUMATA

## 2021-07-17 LAB — BACTERIA SPEC AEROBE CULT: NO GROWTH

## 2021-07-19 ENCOUNTER — TELEPHONE (OUTPATIENT)
Dept: FAMILY MEDICINE CLINIC | Facility: CLINIC | Age: 66
End: 2021-07-19

## 2021-08-06 ENCOUNTER — OFFICE VISIT (OUTPATIENT)
Dept: FAMILY MEDICINE CLINIC | Facility: CLINIC | Age: 66
End: 2021-08-06

## 2021-08-06 VITALS
WEIGHT: 132 LBS | HEIGHT: 64 IN | BODY MASS INDEX: 22.53 KG/M2 | HEART RATE: 124 BPM | OXYGEN SATURATION: 94 % | DIASTOLIC BLOOD PRESSURE: 80 MMHG | SYSTOLIC BLOOD PRESSURE: 110 MMHG

## 2021-08-06 DIAGNOSIS — M54.50 CHRONIC MIDLINE LOW BACK PAIN WITHOUT SCIATICA: Primary | ICD-10-CM

## 2021-08-06 DIAGNOSIS — G89.29 CHRONIC MIDLINE LOW BACK PAIN WITHOUT SCIATICA: Primary | ICD-10-CM

## 2021-08-06 DIAGNOSIS — G89.29 CHRONIC ABDOMINAL PAIN: ICD-10-CM

## 2021-08-06 DIAGNOSIS — R10.9 CHRONIC ABDOMINAL PAIN: ICD-10-CM

## 2021-08-06 DIAGNOSIS — N76.0 RECURRENT VAGINITIS: ICD-10-CM

## 2021-08-06 PROCEDURE — 99213 OFFICE O/P EST LOW 20 MIN: CPT | Performed by: FAMILY MEDICINE

## 2021-08-06 NOTE — PROGRESS NOTES
"Chief Complaint  Generalized Body Aches    Subjective          Adriana Nuñez presents to [facility]  History of Present Illness    Patient presents today for follow up.  Notes that she is continue to have body aches and pain.  She has been following with pain management.  Most of the pain is in her lower back and upper legs.  She sometimes has pain in shoulder and lower legs as well.  Patient says that pain management has made some adjustments, switching her morphine to tramadol.  She still has morphine at this time.  Worries that tramadol is not going to control her pain.  Worried that she will have withdrawal symptoms.  Patient states that she does not know why this change was made.  She feels like she get \"yelled at\" when she is at her pain management office, and would like to find an alternative provider.    Patient continues to have abdominal pain.  She is following with gastroenterology for this concern.  She had recent EGD and colonoscopy for evaluation 05/05/2021.  Patient not having fever or chills.  She worries about having an infection, as she had history of severe infection at one time causing hospitalization.    Objective   Vital Signs:   /80   Pulse (!) 124   Ht 162.6 cm (64\")   Wt 59.9 kg (132 lb)   SpO2 94%   BMI 22.66 kg/m²     Physical Exam  Vitals reviewed.   Constitutional:       General: She is not in acute distress.     Appearance: She is well-developed.   HENT:      Head: Normocephalic and atraumatic.   Cardiovascular:      Rate and Rhythm: Regular rhythm. Tachycardia present.      Heart sounds: Normal heart sounds. No murmur heard.     Pulmonary:      Effort: Pulmonary effort is normal. No respiratory distress.      Breath sounds: Normal breath sounds. No wheezing or rales.   Abdominal:      General: Bowel sounds are normal. There is no distension.      Palpations: Abdomen is soft.      Tenderness: There is abdominal tenderness (generalized). " There is no guarding or rebound.   Musculoskeletal:      Cervical back: Neck supple.   Skin:     General: Skin is warm and dry.   Neurological:      Mental Status: She is alert.        Result Review :   The following data was reviewed by: Heather Milan MD on 08/06/2021:  Common labs    Common Labsle 3/29/21 7/15/21 7/15/21    1231 1050 1050   Glucose   95   BUN   9   Creatinine   0.65   eGFR Non African Am   91   Sodium   142   Potassium   3.8   Chloride   101   Calcium   9.3   Albumin   4.00   Total Bilirubin   0.2   Alkaline Phosphatase   89   AST (SGOT)   21   ALT (SGPT)   19   WBC  10.52    Hemoglobin  15.3    Hematocrit  45.3    Platelets  332    Total Cholesterol 249 (A)     Triglycerides 201 (A)     HDL Cholesterol 43     LDL Cholesterol  169 (A)     (A) Abnormal value                      Assessment and Plan    Diagnoses and all orders for this visit:    1. Chronic midline low back pain without sciatica (Primary)  -     Ambulatory Referral to Pain Management    2. Chronic abdominal pain    3. Recurrent vaginitis      Patient seen today for follow up   Back pain has been controlled with pain medication  Adjustment to medication made at last pain visit, not sure why this change - patient unable to provide explanation   She is not happy with the care she is receiving, and would like a new referral  Pain management referral placed today  Patient should continue working with current provider until she starts with new office  Encouraged to wean off morphine to decrease withdrawal symptoms if she is no longer being prescribed this medication    Patient should keep follow up with GI for chronic abdominal pain  Follow with OB/Gyn for recurrent vaginitis      Follow Up   Return in about 3 months (around 11/6/2021) for Recheck.  Patient was given instructions and counseling regarding her condition or for health maintenance advice. Please see specific information pulled into the AVS if appropriate.           This  document has been electronically signed by Heather Milan MD

## 2021-08-09 ENCOUNTER — TELEPHONE (OUTPATIENT)
Dept: FAMILY MEDICINE CLINIC | Facility: CLINIC | Age: 66
End: 2021-08-09

## 2021-08-09 NOTE — TELEPHONE ENCOUNTER
Referral was placed at visit on Friday.  I will let referral coordinator know of this location.  Thanks, FATOUMATA Milan

## 2021-08-09 NOTE — TELEPHONE ENCOUNTER
Pt called to ask about referral to [facility] in [place].    Pt has had a terrible experience with current pain management and will not return.    Please call pt back to discuss next steps.    [phone]

## 2021-08-13 ENCOUNTER — TELEPHONE (OUTPATIENT)
Dept: FAMILY MEDICINE CLINIC | Facility: CLINIC | Age: 66
End: 2021-08-13

## 2021-08-13 NOTE — TELEPHONE ENCOUNTER
PT WANTS TO CHANGE HER PAIN MGMT BACK TO DR MONTGOMERY DUE TO INCONSISTENT  TREATMENT.    PLEASE CONTACT PT TO DISCUSS NEXT STEPS.    [phone]

## 2021-08-20 ENCOUNTER — TELEPHONE (OUTPATIENT)
Dept: FAMILY MEDICINE CLINIC | Facility: CLINIC | Age: 66
End: 2021-08-20

## 2021-08-20 NOTE — TELEPHONE ENCOUNTER
I am not taking over her pain medication prescription.  I have referred her to another pain management provider on 8/6/2021.  Thanks, FATOUMATA Milan

## 2021-08-20 NOTE — TELEPHONE ENCOUNTER
Pt Dismissed herself from pain management to get the care from her PCP. Patient states she is not sure if that was the best choice.     Patient stated that Dr. Garcia did not care for her professionally, that he wouldn't evaluate her. Pt garrick he was just prescribing the pain medication and leaving the room. Pt is complaining about multiple issues.     Please contact the patient with her concern at [phone]    Thanks

## 2021-08-26 ENCOUNTER — TELEPHONE (OUTPATIENT)
Dept: FAMILY MEDICINE CLINIC | Facility: CLINIC | Age: 66
End: 2021-08-26

## 2021-08-26 NOTE — TELEPHONE ENCOUNTER
I spoke to patient today about her referral to pain management. She states that Pain management was a joke and that she had already been on the meds that doctor wanted to gave her and that the meds do nothing for her. She states that she wanted to be referred to someone that will give her meds and take time to actually see her and not just come in and throw the RX at her and leave. I told patient that I would be more than glad to send her someplace else to let me know where she wanted it sent. She stated that I need to find her someone that will do that for her. I told patient to do some research on a facility and then let me know who she wants referral sent that I can not guarantee her anything.

## 2021-08-27 ENCOUNTER — TELEPHONE (OUTPATIENT)
Dept: FAMILY MEDICINE CLINIC | Facility: CLINIC | Age: 66
End: 2021-08-27

## 2021-08-27 DIAGNOSIS — G89.29 CHRONIC MIDLINE LOW BACK PAIN WITHOUT SCIATICA: Primary | ICD-10-CM

## 2021-08-27 DIAGNOSIS — M54.50 CHRONIC MIDLINE LOW BACK PAIN WITHOUT SCIATICA: Primary | ICD-10-CM

## 2021-08-27 NOTE — TELEPHONE ENCOUNTER
[facility]     LEONORA SAXENA    WOULD LIKE REFERRAL TO HERE FOR HER PAIN MGMNT.    [phone]

## 2021-08-30 ENCOUNTER — TELEPHONE (OUTPATIENT)
Dept: FAMILY MEDICINE CLINIC | Facility: CLINIC | Age: 66
End: 2021-08-30

## 2021-09-03 ENCOUNTER — TELEPHONE (OUTPATIENT)
Dept: FAMILY MEDICINE CLINIC | Facility: CLINIC | Age: 66
End: 2021-09-03

## 2021-09-03 DIAGNOSIS — Z13.820 ENCOUNTER FOR OSTEOPOROSIS SCREENING IN ASYMPTOMATIC POSTMENOPAUSAL PATIENT: ICD-10-CM

## 2021-09-03 DIAGNOSIS — Z12.31 ENCOUNTER FOR SCREENING MAMMOGRAM FOR MALIGNANT NEOPLASM OF BREAST: Primary | ICD-10-CM

## 2021-09-03 DIAGNOSIS — Z78.0 POSTMENOPAUSAL: ICD-10-CM

## 2021-09-03 DIAGNOSIS — Z78.0 ENCOUNTER FOR OSTEOPOROSIS SCREENING IN ASYMPTOMATIC POSTMENOPAUSAL PATIENT: ICD-10-CM

## 2021-09-07 NOTE — TELEPHONE ENCOUNTER
Will order mammogram and DEXA bone scan to be done at the same time.  Please let patient know she will be contacted for scheduling.  Thanks, FATOUMATA Milan

## 2021-09-15 ENCOUNTER — TELEPHONE (OUTPATIENT)
Dept: FAMILY MEDICINE CLINIC | Facility: CLINIC | Age: 66
End: 2021-09-15

## 2021-09-15 ENCOUNTER — TRANSCRIBE ORDERS (OUTPATIENT)
Dept: GENERAL RADIOLOGY | Facility: CLINIC | Age: 66
End: 2021-09-15

## 2021-09-15 DIAGNOSIS — M81.0 AGE-RELATED OSTEOPOROSIS WITHOUT CURRENT PATHOLOGICAL FRACTURE: Primary | ICD-10-CM

## 2021-09-15 DIAGNOSIS — M51.36 DEGENERATION OF LUMBAR INTERVERTEBRAL DISC: Primary | ICD-10-CM

## 2021-09-16 ENCOUNTER — TELEPHONE (OUTPATIENT)
Dept: FAMILY MEDICINE CLINIC | Facility: CLINIC | Age: 66
End: 2021-09-16

## 2021-09-16 NOTE — TELEPHONE ENCOUNTER
Please call and let patient know that mammogram is normal.  Plan to repeat in 1 year.  ThanksFATOUMATA

## 2021-09-16 NOTE — TELEPHONE ENCOUNTER
Please let patient know that bone scan shows osteoporosis, worsening.  She needs to see bone health clinic.  Referral placed.  Mammogram pending, will let her know results once available.  ThanksFATOUMATA

## 2021-09-16 NOTE — TELEPHONE ENCOUNTER
Per Dr. Milan, Ms. Nuñez has been called with recent DEXA Bone Density Scan results and recommendations.  Continue current medications and follow-up as planned or sooner if any problems.

## 2021-09-17 NOTE — TELEPHONE ENCOUNTER
Per Dr. Milan, [facility]ne[facility] has been called with recent Screening Mammogram results and recommendaitons

## 2021-11-09 DIAGNOSIS — G89.29 CHRONIC MIDLINE LOW BACK PAIN WITHOUT SCIATICA: ICD-10-CM

## 2021-11-09 DIAGNOSIS — M54.50 CHRONIC MIDLINE LOW BACK PAIN WITHOUT SCIATICA: ICD-10-CM

## 2021-11-09 RX ORDER — GABAPENTIN 400 MG/1
400 CAPSULE ORAL 3 TIMES DAILY
Qty: 90 CAPSULE | Refills: 0 | Status: SHIPPED | OUTPATIENT
Start: 2021-11-11 | End: 2021-12-20

## 2021-11-09 NOTE — TELEPHONE ENCOUNTER
Incoming Refill Request      Medication requested (name and dose): Gabapentin 400mg     Pharmacy where request should be sent: [facility]     Additional details provided by patient:     Best call back number: [phone]    Does the patient have less than a 3 day supply:  [x] Yes  [] No    Jose Villanueva Rep  11/09/21, 09:32 CST

## 2021-11-28 DIAGNOSIS — R10.13 EPIGASTRIC PAIN: ICD-10-CM

## 2021-11-29 RX ORDER — DICYCLOMINE HCL 20 MG
TABLET ORAL
Qty: 120 TABLET | Refills: 5 | Status: SHIPPED | OUTPATIENT
Start: 2021-11-29 | End: 2022-06-02

## 2021-12-04 DIAGNOSIS — R10.13 EPIGASTRIC PAIN: ICD-10-CM

## 2021-12-06 RX ORDER — OMEPRAZOLE 20 MG/1
CAPSULE, DELAYED RELEASE ORAL
Qty: 60 CAPSULE | Refills: 5 | Status: SHIPPED | OUTPATIENT
Start: 2021-12-06 | End: 2022-06-02

## 2021-12-20 DIAGNOSIS — M54.50 CHRONIC MIDLINE LOW BACK PAIN WITHOUT SCIATICA: ICD-10-CM

## 2021-12-20 DIAGNOSIS — G89.29 CHRONIC MIDLINE LOW BACK PAIN WITHOUT SCIATICA: ICD-10-CM

## 2021-12-20 RX ORDER — GABAPENTIN 400 MG/1
CAPSULE ORAL
Qty: 90 CAPSULE | Refills: 0 | Status: SHIPPED | OUTPATIENT
Start: 2021-12-20 | End: 2022-01-10 | Stop reason: SDUPTHER

## 2021-12-20 RX ORDER — PREDNISONE 10 MG/1
10 TABLET ORAL DAILY
Qty: 30 TABLET | Refills: 0 | Status: SHIPPED | OUTPATIENT
Start: 2021-12-20 | End: 2022-01-10 | Stop reason: SDUPTHER

## 2022-01-10 ENCOUNTER — OFFICE VISIT (OUTPATIENT)
Dept: FAMILY MEDICINE CLINIC | Facility: CLINIC | Age: 67
End: 2022-01-10

## 2022-01-10 VITALS
HEART RATE: 123 BPM | SYSTOLIC BLOOD PRESSURE: 112 MMHG | DIASTOLIC BLOOD PRESSURE: 90 MMHG | WEIGHT: 122 LBS | BODY MASS INDEX: 20.83 KG/M2 | OXYGEN SATURATION: 94 % | HEIGHT: 64 IN

## 2022-01-10 DIAGNOSIS — R00.0 TACHYCARDIA: ICD-10-CM

## 2022-01-10 DIAGNOSIS — M35.3 POLYMYALGIA RHEUMATICA: ICD-10-CM

## 2022-01-10 DIAGNOSIS — R73.09 ELEVATED GLUCOSE: ICD-10-CM

## 2022-01-10 DIAGNOSIS — Z79.52 LONG TERM SYSTEMIC STEROID USER: ICD-10-CM

## 2022-01-10 DIAGNOSIS — G89.29 CHRONIC MIDLINE LOW BACK PAIN WITHOUT SCIATICA: ICD-10-CM

## 2022-01-10 DIAGNOSIS — F41.1 GENERALIZED ANXIETY DISORDER: Primary | ICD-10-CM

## 2022-01-10 DIAGNOSIS — M54.50 CHRONIC MIDLINE LOW BACK PAIN WITHOUT SCIATICA: ICD-10-CM

## 2022-01-10 PROCEDURE — 1159F MED LIST DOCD IN RCRD: CPT | Performed by: FAMILY MEDICINE

## 2022-01-10 PROCEDURE — 1170F FXNL STATUS ASSESSED: CPT | Performed by: FAMILY MEDICINE

## 2022-01-10 PROCEDURE — G0439 PPPS, SUBSEQ VISIT: HCPCS | Performed by: FAMILY MEDICINE

## 2022-01-10 RX ORDER — ALBUTEROL SULFATE 90 UG/1
2 AEROSOL, METERED RESPIRATORY (INHALATION) EVERY 6 HOURS PRN
Qty: 18 G | Refills: 3 | Status: SHIPPED | OUTPATIENT
Start: 2022-01-10 | End: 2022-06-02 | Stop reason: SDUPTHER

## 2022-01-10 RX ORDER — PREDNISONE 10 MG/1
10 TABLET ORAL DAILY
Qty: 30 TABLET | Refills: 2 | Status: SHIPPED | OUTPATIENT
Start: 2022-01-10 | End: 2022-03-21

## 2022-01-10 RX ORDER — BUPRENORPHINE HYDROCHLORIDE AND NALOXONE HYDROCHLORIDE DIHYDRATE 8; 2 MG/1; MG/1
TABLET SUBLINGUAL
COMMUNITY

## 2022-01-10 RX ORDER — IPRATROPIUM BROMIDE 17 UG/1
2 AEROSOL, METERED RESPIRATORY (INHALATION)
Qty: 12.9 G | Refills: 3 | Status: SHIPPED | OUTPATIENT
Start: 2022-01-10 | End: 2022-06-02 | Stop reason: SDUPTHER

## 2022-01-10 RX ORDER — GABAPENTIN 400 MG/1
400 CAPSULE ORAL 3 TIMES DAILY
Qty: 90 CAPSULE | Refills: 0 | Status: SHIPPED | OUTPATIENT
Start: 2022-01-10 | End: 2022-02-28

## 2022-01-10 NOTE — PROGRESS NOTES
The ABCs of the Annual Wellness Visit  Subsequent Medicare Wellness Visit    Chief Complaint   Patient presents with   • Pain     Subjective    History of Present Illness:  Adriana Nuñez is a 66 y.o. female who presents for a Subsequent Medicare Wellness Visit.    The following portions of the patient's history were reviewed and   updated as appropriate: allergies, current medications, past family history, past medical history, past social history, past surgical history and problem list.    Compared to one year ago, the patient feels her physical   health is better.    Compared to one year ago, the patient feels her mental   health is the same.    Recent Hospitalizations:  She was not admitted to the hospital during the last year.     Concerns:  Patient has been started on Suboxone therapy.  Would like to be back on pain medications, as asks for them to be prescribed today.     Current Medical Providers:  Patient Care Team:  Heather Milan MD as PCP - General (Family Medicine)  Aric Pina MD as Consulting Physician (Gastroenterology)  Kevin Yoon APRN as Nurse Practitioner (Orthopedic Surgery)    Outpatient Medications Prior to Visit   Medication Sig Dispense Refill   • albuterol sulfate  (90 Base) MCG/ACT inhaler Inhale 2 puffs Every 6 (Six) Hours As Needed for Wheezing. 18 g 3   • Atrovent HFA 17 MCG/ACT inhaler Inhale 2 puffs 4 (Four) Times a Day. 12.9 g 3   • buprenorphine-naloxone (SUBOXONE) 8-2 MG per SL tablet buprenorphine 8 mg-naloxone 2 mg sublingual tablet   TAKE 2 TABLETS UNDER THE TONGUE EVERY DAY     • dicyclomine (BENTYL) 20 MG tablet take 1 tablet by mouth 4 TIMES DAILY 120 tablet 5   • estradiol (ESTRACE) 0.1 MG/GM vaginal cream PLACE 1 GRAM VAGINALLY DAILY FOR 14 DAYS, THEN 1 GRAM 3 TIMES a WEEK ON MONDAY, WEDNESDAY, AND FRIDAY 42.5 g 5   • gabapentin (NEURONTIN) 400 MG capsule TAKE 1 CAPSULE BY MOUTH THREE TIMES DAILY 90 capsule 0   • Lactobacillus Probiotic tablet  Take 1 tablet by mouth Daily. 30 tablet 11   • lidocaine-prilocaine (EMLA) 2.5-2.5 % cream Apply 1 application topically to the appropriate area as directed As Needed for Moderate Pain . Use to affected area as needed for pain. 30 g 2   • methenamine (HIPREX) 1 g tablet Take 1 g by mouth 2 (Two) Times a Day With Meals.     • metroNIDAZOLE (METROGEL VAGINAL) 0.75 % vaginal gel Insert  into the vagina 2 (Two) Times a Week. 70 g 6   • omeprazole (priLOSEC) 20 MG capsule take 1 capsule by mouth TWICE DAILY 60 capsule 5   • predniSONE (DELTASONE) 10 MG tablet Take 1 tablet by mouth Daily. 30 tablet 0   • triamcinolone (KENALOG) 0.5 % cream Apply pea-sized amount to outside of vagina and inside the opening twice a week at night. (Patient taking differently: Take As Directed. Apply pea-sized amount to outside of vagina and inside the opening twice a week at night.) 15 g 3   • Morphine (MS CONTIN) 15 MG 12 hr tablet        No facility-administered medications prior to visit.       Opioid medication/s are on active medication list.  and I have evaluated her active treatment plan and pain score trends (see table).  There were no vitals filed for this visit.  I have reviewed the chart for potential of high risk medication and harmful drug interactions in the elderly.            Aspirin is not on active medication list.  Aspirin use is not indicated based on review of current medical condition/s. Risk of harm outweighs potential benefits.  .    Patient Active Problem List   Diagnosis   • Vitamin D deficiency   • Polymyalgia rheumatica (HCC)   • Peripheral nerve disease   • Chronic midline low back pain without sciatica   • Insomnia   • Hyperlipidemia   • Heavy tobacco smoker   • Generalized anxiety disorder   • Elevated levels of transaminase & lactic acid dehydrogenase   • Depressive disorder   • Chronic obstructive lung disease (HCC)   • Blood in urine seeing urology.   • Bladder fistula   • Colon polyps   • GONZALES III (vulvar  "intraepithelial neoplasia III)   • TMJ (dislocation of temporomandibular joint)   • Gastroesophageal reflux disease without esophagitis   • Vaginal atrophy   • Generalized abdominal pain   • Gastroesophageal reflux disease     Advance Care Planning  Advance Directive is on file.  ACP discussion was held with the patient during this visit. Patient has an advance directive in EMR which is still valid.           Objective    Vitals:    01/10/22 1016   BP: 112/90   Pulse: (!) 123   SpO2: 94%   Weight: 55.3 kg (122 lb)   Height: 162.6 cm (64\")     BMI Readings from Last 1 Encounters:   01/10/22 20.94 kg/m²   BMI is within normal parameters. No follow-up required.    Does the patient have evidence of cognitive impairment? Yes    Physical Exam  Vitals reviewed.   Constitutional:       General: She is not in acute distress.     Appearance: She is well-developed.   Cardiovascular:      Rate and Rhythm: Normal rate and regular rhythm.      Heart sounds: Normal heart sounds. No murmur heard.      Pulmonary:      Effort: Pulmonary effort is normal. No respiratory distress.      Breath sounds: Normal breath sounds. No wheezing or rales.   Abdominal:      Palpations: Abdomen is soft.      Tenderness: There is no abdominal tenderness.   Skin:     General: Skin is warm and dry.      Findings: No rash.   Neurological:      Mental Status: She is alert and oriented to person, place, and time.                 HEALTH RISK ASSESSMENT    Smoking Status:  Social History     Tobacco Use   Smoking Status Current Every Day Smoker   • Packs/day: 1.00   • Years: 50.00   • Pack years: 50.00   • Types: Cigarettes, Cigars   Smokeless Tobacco Never Used     Alcohol Consumption:  Social History     Substance and Sexual Activity   Alcohol Use No     Fall Risk Screen:    STEADI Fall Risk Assessment was completed, and patient is at LOW risk for falls.Assessment completed on:1/10/2022    Depression Screening:  PHQ-2/PHQ-9 Depression Screening 1/10/2022 "   Little interest or pleasure in doing things 0   Feeling down, depressed, or hopeless 0   Trouble falling or staying asleep, or sleeping too much -   Feeling tired or having little energy -   Poor appetite or overeating -   Feeling bad about yourself - or that you are a failure or have let yourself or your family down -   Trouble concentrating on things, such as reading the newspaper or watching television -   Moving or speaking so slowly that other people could have noticed. Or the opposite - being so fidgety or restless that you have been moving around a lot more than usual -   Thoughts that you would be better off dead, or of hurting yourself in some way -   Total Score 0   If you checked off any problems, how difficult have these problems made it for you to do your work, take care of things at home, or get along with other people? -       Health Habits and Functional and Cognitive Screening:  Functional & Cognitive Status 11/17/2020   Do you have difficulty preparing food and eating? No   Do you have difficulty bathing yourself, getting dressed or grooming yourself? No   Do you have difficulty using the toilet? No   Do you have difficulty moving around from place to place? No   Do you have trouble with steps or getting out of a bed or a chair? No   Current Diet Unhealthy Diet   Dental Exam Not up to date   Eye Exam Not up to date   Exercise (times per week) 1 times per week   Current Exercises Include Walking   Current Exercise Activities Include -   Do you need help using the phone?  No   Are you deaf or do you have serious difficulty hearing?  No   Do you need help with transportation? No   Do you need help shopping? No   Do you need help preparing meals?  No   Do you need help with housework?  No   Do you need help with laundry? No   Do you need help taking your medications? No   Do you need help managing money? No   Do you ever drive or ride in a car without wearing a seat belt? Yes   Have you felt unusual  stress, anger or loneliness in the last month? No   Who do you live with? Spouse   If you need help, do you have trouble finding someone available to you? Yes   Have you been bothered in the last four weeks by sexual problems? -   Do you have difficulty concentrating, remembering or making decisions? No       Age-appropriate Screening Schedule:  Refer to the list below for future screening recommendations based on patient's age, sex and/or medical conditions. Orders for these recommended tests are listed in the plan section. The patient has been provided with a written plan.    Health Maintenance   Topic Date Due   • ZOSTER VACCINE (2 of 2) 10/20/2016   • INFLUENZA VACCINE  08/01/2021   • LIPID PANEL  03/29/2022   • MAMMOGRAM  09/15/2023   • DXA SCAN  09/15/2023   • TDAP/TD VACCINES (2 - Td or Tdap) 06/18/2024   • PAP SMEAR  Discontinued              Assessment/Plan   CMS Preventative Services Quick Reference  Risk Factors Identified During Encounter  Cardiovascular Disease  Dementia/Memory   Inadequate Social Support, Isolation, Loneliness, Lack of Transportation, Financial Difficulties, or Caregiver Stress   Inactivity/Sedentary  The above risks/problems have been discussed with the patient.  Follow up actions/plans if indicated are seen below in the Assessment/Plan Section.  Pertinent information has been shared with the patient in the After Visit Summary.      Diagnoses and all orders for this visit:    1. Generalized anxiety disorder (Primary)  -     Comprehensive Metabolic Panel; Future  -     CBC & Differential; Future    2. Chronic midline low back pain without sciatica  -     gabapentin (NEURONTIN) 400 MG capsule; Take 1 capsule by mouth 3 (Three) Times a Day.  Dispense: 90 capsule; Refill: 0  -     Ambulatory Referral to Pain Management    3. Polymyalgia rheumatica (HCC)  -     Comprehensive Metabolic Panel; Future  -     CBC & Differential; Future    4. Tachycardia  -     Comprehensive Metabolic Panel;  Future  -     CBC & Differential; Future    5. Long term systemic steroid user  -     Hemoglobin A1c; Future    6. Elevated glucose  -     Hemoglobin A1c; Future    Other orders  -     Atrovent HFA 17 MCG/ACT inhaler; Inhale 2 puffs 4 (Four) Times a Day.  Dispense: 12.9 g; Refill: 3  -     albuterol sulfate  (90 Base) MCG/ACT inhaler; Inhale 2 puffs Every 6 (Six) Hours As Needed for Wheezing.  Dispense: 18 g; Refill: 3  -     predniSONE (DELTASONE) 10 MG tablet; Take 1 tablet by mouth Daily.  Dispense: 30 tablet; Refill: 2      Medicare wellness exam completed  No medication changes made today  Labs ordered as above  Patient advised on the risks of frequent antibiotic use, should try to avoid unless prescribed by physician to treat specific illness      Follow Up:   Return in about 3 months (around 4/10/2022) for Recheck.     An After Visit Summary and PPPS were made available to the patient.                   This document has been electronically signed by Heather Milan MD

## 2022-01-14 ENCOUNTER — TELEPHONE (OUTPATIENT)
Dept: FAMILY MEDICINE CLINIC | Facility: CLINIC | Age: 67
End: 2022-01-14

## 2022-01-14 NOTE — TELEPHONE ENCOUNTER
Please let patient know that Dr. Szymanski/[facility] is not taking new patients so this referral has been closed.  She can be sent to another location or continue with same provider she has been seeing for pain.  ThanksFATOUMATA

## 2022-01-14 NOTE — TELEPHONE ENCOUNTER
I could not understand a thing she is saying, except:    She wants to go to that new clinic in [place], or where ever you were going to refer her.   She states she has quit several of them.      Tramadol is no good and Suboxone is for people that are wiggin.   She has waist ed 2 months some where and its all a money game and all the want to do is run test that cost to much money, CT's & MRI's.   Then she states she has not slept in 2 months.

## 2022-01-17 NOTE — TELEPHONE ENCOUNTER
Patient has tried several local/regional pain management options.  She is going to have to give us some guidance if she would like a referral elsewhere.  She cannot go to the one on the [facility] ([facility]) but if there is somewhere else she would like to be seen, ask that she let me know.     Thanks, FATOUMATA Milan

## 2022-01-27 ENCOUNTER — TELEPHONE (OUTPATIENT)
Dept: FAMILY MEDICINE CLINIC | Facility: CLINIC | Age: 67
End: 2022-01-27

## 2022-01-27 NOTE — TELEPHONE ENCOUNTER
I only send prescriptions for controlled substances monthly.  If the other doctor wants to take over this prescription, then he is welcome to do so, and you can call to let them know.  If she would like for me to send a 30 day refill, please let me know and I can do that today.  Thanks, FATOUMATA Milan

## 2022-01-27 NOTE — TELEPHONE ENCOUNTER
Pt wants to know if she can get gabapenton 3 mo supply so that can just go to pharmacy 1 X every 3 mo or says Dr Stallings said they would do it for her if Dr Milan sends a letter saying is ok     Dr Hussain Stallings with [facility]   ( addiction Dr)   [facility] [phone]    [facility]

## 2022-02-28 DIAGNOSIS — G89.29 CHRONIC MIDLINE LOW BACK PAIN WITHOUT SCIATICA: ICD-10-CM

## 2022-02-28 DIAGNOSIS — M54.50 CHRONIC MIDLINE LOW BACK PAIN WITHOUT SCIATICA: ICD-10-CM

## 2022-02-28 RX ORDER — GABAPENTIN 400 MG/1
400 CAPSULE ORAL 3 TIMES DAILY
Qty: 90 CAPSULE | Refills: 0 | Status: SHIPPED | OUTPATIENT
Start: 2022-02-28 | End: 2022-03-29

## 2022-02-28 NOTE — TELEPHONE ENCOUNTER
Incoming Refill Request      Medication requested (name and dose): gabapentin (NEURONTIN) 400 MG capsule    Pharmacy where request should be sent[facility] [place], [place]     Additional details provided by patient:     Best call back number:     Does the patient have less than a 3 day supply:  [x] Yes  [] No    Jose Alexis Rep  02/28/22, 12:06 CST

## 2022-03-29 DIAGNOSIS — G89.29 CHRONIC MIDLINE LOW BACK PAIN WITHOUT SCIATICA: ICD-10-CM

## 2022-03-29 DIAGNOSIS — M54.50 CHRONIC MIDLINE LOW BACK PAIN WITHOUT SCIATICA: ICD-10-CM

## 2022-03-29 NOTE — TELEPHONE ENCOUNTER
Incoming Refill Request      Medication requested (name and dose):gabapentin (NEURONTIN) 400 MG capsule    Pharmacy where request should be sent: [facility]    Additional details provided by patient: NONE    Best call back number: [phone]    Does the patient have less than a 3 day supply:  [x] Yes  [] No    Jose Rivera Rep  03/29/22, 15:33 CDT

## 2022-03-29 NOTE — TELEPHONE ENCOUNTER
Last Script  02/28/2022  #90, NR    Next Appt  With Family Medicine (Heather Milan MD)  04/12/2022 at 10:00 AM    Last OV 01/10/2022

## 2022-03-30 RX ORDER — GABAPENTIN 400 MG/1
400 CAPSULE ORAL 3 TIMES DAILY
Qty: 90 CAPSULE | Refills: 0 | Status: SHIPPED | OUTPATIENT
Start: 2022-03-30 | End: 2022-05-03 | Stop reason: SDUPTHER

## 2022-05-02 ENCOUNTER — LAB (OUTPATIENT)
Dept: LAB | Facility: HOSPITAL | Age: 67
End: 2022-05-02

## 2022-05-02 ENCOUNTER — TELEPHONE (OUTPATIENT)
Dept: FAMILY MEDICINE CLINIC | Facility: CLINIC | Age: 67
End: 2022-05-02

## 2022-05-02 ENCOUNTER — OFFICE VISIT (OUTPATIENT)
Dept: FAMILY MEDICINE CLINIC | Facility: CLINIC | Age: 67
End: 2022-05-02

## 2022-05-02 VITALS
BODY MASS INDEX: 17.33 KG/M2 | OXYGEN SATURATION: 93 % | DIASTOLIC BLOOD PRESSURE: 80 MMHG | HEIGHT: 64 IN | SYSTOLIC BLOOD PRESSURE: 110 MMHG | WEIGHT: 101.5 LBS | HEART RATE: 109 BPM

## 2022-05-02 DIAGNOSIS — D72.829 LEUKOCYTOSIS, UNSPECIFIED TYPE: ICD-10-CM

## 2022-05-02 DIAGNOSIS — R09.89 CHEST RALES: ICD-10-CM

## 2022-05-02 DIAGNOSIS — D72.829 LEUKOCYTOSIS, UNSPECIFIED TYPE: Primary | ICD-10-CM

## 2022-05-02 DIAGNOSIS — K57.92 DIVERTICULITIS: Primary | ICD-10-CM

## 2022-05-02 LAB
ALBUMIN SERPL-MCNC: 3.6 G/DL (ref 3.5–5.2)
ALBUMIN/GLOB SERPL: 1.4 G/DL
ALP SERPL-CCNC: 130 U/L (ref 39–117)
ALT SERPL W P-5'-P-CCNC: 17 U/L (ref 1–33)
ANION GAP SERPL CALCULATED.3IONS-SCNC: 10.8 MMOL/L (ref 5–15)
AST SERPL-CCNC: 21 U/L (ref 1–32)
BASOPHILS # BLD AUTO: 0.05 10*3/MM3 (ref 0–0.2)
BASOPHILS NFR BLD AUTO: 0.4 % (ref 0–1.5)
BILIRUB SERPL-MCNC: 0.3 MG/DL (ref 0–1.2)
BUN SERPL-MCNC: 15 MG/DL (ref 8–23)
BUN/CREAT SERPL: 27.8 (ref 7–25)
CALCIUM SPEC-SCNC: 9.3 MG/DL (ref 8.6–10.5)
CHLORIDE SERPL-SCNC: 99 MMOL/L (ref 98–107)
CO2 SERPL-SCNC: 31.2 MMOL/L (ref 22–29)
CREAT SERPL-MCNC: 0.54 MG/DL (ref 0.57–1)
DEPRECATED RDW RBC AUTO: 44.2 FL (ref 37–54)
EGFRCR SERPLBLD CKD-EPI 2021: 101.1 ML/MIN/1.73
EOSINOPHIL # BLD AUTO: 0.01 10*3/MM3 (ref 0–0.4)
EOSINOPHIL NFR BLD AUTO: 0.1 % (ref 0.3–6.2)
ERYTHROCYTE [DISTWIDTH] IN BLOOD BY AUTOMATED COUNT: 13.2 % (ref 12.3–15.4)
GLOBULIN UR ELPH-MCNC: 2.6 GM/DL
GLUCOSE SERPL-MCNC: 120 MG/DL (ref 65–99)
HCT VFR BLD AUTO: 42.6 % (ref 34–46.6)
HGB BLD-MCNC: 15 G/DL (ref 12–15.9)
IMM GRANULOCYTES # BLD AUTO: 0.09 10*3/MM3 (ref 0–0.05)
IMM GRANULOCYTES NFR BLD AUTO: 0.8 % (ref 0–0.5)
LYMPHOCYTES # BLD AUTO: 0.87 10*3/MM3 (ref 0.7–3.1)
LYMPHOCYTES NFR BLD AUTO: 7.3 % (ref 19.6–45.3)
MCH RBC QN AUTO: 33.6 PG (ref 26.6–33)
MCHC RBC AUTO-ENTMCNC: 35.2 G/DL (ref 31.5–35.7)
MCV RBC AUTO: 95.3 FL (ref 79–97)
MONOCYTES # BLD AUTO: 0.51 10*3/MM3 (ref 0.1–0.9)
MONOCYTES NFR BLD AUTO: 4.3 % (ref 5–12)
NEUTROPHILS NFR BLD AUTO: 10.32 10*3/MM3 (ref 1.7–7)
NEUTROPHILS NFR BLD AUTO: 87.1 % (ref 42.7–76)
NRBC BLD AUTO-RTO: 0 /100 WBC (ref 0–0.2)
PATHOLOGY REVIEW: YES
PLATELET # BLD AUTO: 326 10*3/MM3 (ref 140–450)
PMV BLD AUTO: 10.8 FL (ref 6–12)
POTASSIUM SERPL-SCNC: 4 MMOL/L (ref 3.5–5.2)
PROT SERPL-MCNC: 6.2 G/DL (ref 6–8.5)
RBC # BLD AUTO: 4.47 10*6/MM3 (ref 3.77–5.28)
SODIUM SERPL-SCNC: 141 MMOL/L (ref 136–145)
WBC NRBC COR # BLD: 11.85 10*3/MM3 (ref 3.4–10.8)

## 2022-05-02 PROCEDURE — 99213 OFFICE O/P EST LOW 20 MIN: CPT | Performed by: FAMILY MEDICINE

## 2022-05-02 PROCEDURE — 85025 COMPLETE CBC W/AUTO DIFF WBC: CPT

## 2022-05-02 PROCEDURE — 80053 COMPREHEN METABOLIC PANEL: CPT

## 2022-05-02 PROCEDURE — 36415 COLL VENOUS BLD VENIPUNCTURE: CPT

## 2022-05-02 NOTE — PROGRESS NOTES
Transitional Care Follow Up Visit  Subjective     Adriana Nuñez is a 66 y.o. female who presents for a transitional care management visit.     I reviewed and discussed the details of the discharge summary and hospital problems with Adriana.     Current outpatient and discharge medications have been reconciled for the patient.  Reviewed by: Heather Milan MD    History of Present Illness   Course During Hospital Stay: Patient seen today for hospital follow-up.  She was admitted to the hospital twice in the last month.  Initial admission was from 4/5/2022 -4/7/2022 for hypoxia secondary to COPD with exacerbation and UTI.  Second admission from 4/12/2022 - 4/14/2022 for diverticulitis and sepsis. Patient doing ok since hospital discharge.  Says that she is feeling better.  Patient still weak, but believe she is improving.  No new concerns today.     The following portions of the patient's history were reviewed and updated as appropriate: allergies, current medications, past family history, past medical history, past social history, past surgical history and problem list.    Objective   Physical Exam  Vitals reviewed.   Constitutional:       General: She is not in acute distress.     Appearance: She is well-developed.   Cardiovascular:      Rate and Rhythm: Normal rate and regular rhythm.      Heart sounds: Normal heart sounds. No murmur heard.  Pulmonary:      Effort: Pulmonary effort is normal. No respiratory distress.      Breath sounds: Rales (right lower lobe) present. No wheezing.   Abdominal:      Palpations: Abdomen is soft.      Tenderness: There is no abdominal tenderness.   Skin:     General: Skin is warm and dry.   Neurological:      Mental Status: She is alert and oriented to person, place, and time.         Assessment/Plan   Diagnoses and all orders for this visit:    1. Diverticulitis (Primary)    2. Leukocytosis, unspecified type  -     CBC & Differential; Future  -     Peripheral Blood Smear;  Future  -     Comprehensive Metabolic Panel; Future    3. Chest rales  -     XR Chest PA & Lateral; Future      Patient improved since hospital discharge  Diverticulitis resolved  Repeat labs for leukocytosis  Xray for evaluation of rales heard on Xray - may be more atelectasis in nature   Will call with results once available    Return in about 4 weeks (around 5/30/2022) for Recheck.             This document has been electronically signed by Heather Milan MD

## 2022-05-03 DIAGNOSIS — M54.50 CHRONIC MIDLINE LOW BACK PAIN WITHOUT SCIATICA: ICD-10-CM

## 2022-05-03 DIAGNOSIS — G89.29 CHRONIC MIDLINE LOW BACK PAIN WITHOUT SCIATICA: ICD-10-CM

## 2022-05-03 LAB
LAB AP CASE REPORT: NORMAL
PATH REPORT.FINAL DX SPEC: NORMAL
PATH REPORT.GROSS SPEC: NORMAL

## 2022-05-03 RX ORDER — GABAPENTIN 400 MG/1
400 CAPSULE ORAL 3 TIMES DAILY
Qty: 90 CAPSULE | Refills: 0 | Status: SHIPPED | OUTPATIENT
Start: 2022-05-03 | End: 2022-06-06 | Stop reason: SDUPTHER

## 2022-05-03 NOTE — TELEPHONE ENCOUNTER
Per Dr. Milan, MsJyoti Joaquin has been called with recent Chest x-ray results and recommendations.

## 2022-05-04 ENCOUNTER — TELEPHONE (OUTPATIENT)
Dept: FAMILY MEDICINE CLINIC | Facility: CLINIC | Age: 67
End: 2022-05-04

## 2022-05-04 NOTE — TELEPHONE ENCOUNTER
Per Dr. Milan, Ms. Nuñez has been called with recent lab results & recommendations.  Continue current medications and follow-up as planned or sooner if any problems.       ----- Message from Heather Milan MD sent at 5/2/2022 10:31 PM CDT -----  Labs show persistent elevation of white blood cells, increased from last check at outside facility.  Patient clinically improved.  Will await peripheral smear.  Please ask that patient be evaluated if feeling worse at any point with abdominal pain, vomiting or fever.  Needs repeat CBC at the end of the week - order placed.  - FATOUMATA Milan

## 2022-05-04 NOTE — PROGRESS NOTES
Per Dr. Milan, Ms. Nuñez has been called with recent lab results & recommendations.  Continue current medications and follow-up as planned or sooner if any problems.

## 2022-05-05 ENCOUNTER — TELEPHONE (OUTPATIENT)
Dept: FAMILY MEDICINE CLINIC | Facility: CLINIC | Age: 67
End: 2022-05-05

## 2022-05-05 NOTE — TELEPHONE ENCOUNTER
Pt is having issues oxygen level is dropping in 60's and how long it should stay that low before being concerned because it goes up and down back up what number should be concerned pt [phone]    Pt said the er told her to call PCP . Pt says she can't come in.

## 2022-05-05 NOTE — TELEPHONE ENCOUNTER
I talked to the patient and told her that if her O2 sats were dropping into the 60's she needs to go to the ER.   She said it usually comes back up to the upper 80's to 100 depending on what she is doing.     She said she did not know she that low, she said she did not feel short of breath

## 2022-05-09 NOTE — TELEPHONE ENCOUNTER
Please call to check on patient this week and follow up on whether or not she went to ER.  Thanks, FATOUMATA Milan

## 2022-05-10 NOTE — TELEPHONE ENCOUNTER
Per Dr. Milan, Ms. Nuñez has been called and she states she is feeling better and No, she did not go to the hospital.     She said she will come in in 3 or 4 days to have her lab work rechecked.     She said they don't do anything for her when she goes to [facility] and she states they are tired of her coming up there.

## 2022-05-17 ENCOUNTER — TELEPHONE (OUTPATIENT)
Dept: FAMILY MEDICINE CLINIC | Facility: CLINIC | Age: 67
End: 2022-05-17

## 2022-05-17 NOTE — TELEPHONE ENCOUNTER
Pt has been put in hospital 4th time  and this time in [place], [place]  And has to do rehab  Before going home says she will drink more and get a vaporizer just letting you know

## 2022-06-01 ENCOUNTER — OFFICE VISIT (OUTPATIENT)
Dept: FAMILY MEDICINE CLINIC | Facility: CLINIC | Age: 67
End: 2022-06-01

## 2022-06-01 VITALS
DIASTOLIC BLOOD PRESSURE: 60 MMHG | BODY MASS INDEX: 17.07 KG/M2 | OXYGEN SATURATION: 92 % | HEART RATE: 110 BPM | SYSTOLIC BLOOD PRESSURE: 100 MMHG | WEIGHT: 100 LBS | HEIGHT: 64 IN

## 2022-06-01 DIAGNOSIS — F41.1 GENERALIZED ANXIETY DISORDER: ICD-10-CM

## 2022-06-01 DIAGNOSIS — J18.9 PNEUMONIA OF RIGHT UPPER LOBE DUE TO INFECTIOUS ORGANISM: Primary | ICD-10-CM

## 2022-06-01 DIAGNOSIS — R41.0 DELIRIUM: ICD-10-CM

## 2022-06-01 PROCEDURE — 99214 OFFICE O/P EST MOD 30 MIN: CPT | Performed by: FAMILY MEDICINE

## 2022-06-01 RX ORDER — DULOXETIN HYDROCHLORIDE 30 MG/1
1 CAPSULE, DELAYED RELEASE ORAL EVERY 24 HOURS
COMMUNITY
Start: 2022-05-19 | End: 2022-07-12 | Stop reason: DRUGHIGH

## 2022-06-01 RX ORDER — FLUTICASONE PROPIONATE 50 MCG
2 SPRAY, SUSPENSION (ML) NASAL DAILY
Qty: 16 G | Refills: 2 | Status: SHIPPED | OUTPATIENT
Start: 2022-06-01 | End: 2022-09-21 | Stop reason: SDUPTHER

## 2022-06-01 NOTE — PROGRESS NOTES
Chief Complaint  Transitional Care Management    Subjective    History of Present Illness {CC  Problem List  Visit  Diagnosis   Encounters  Notes  Medications  Labs  Result Review Imaging  Media :23}     Adriana Nuñez [facility] for     Chief Complaint   Patient presents with   • Transitional Care Management      Patient seen today for hospital follow up.   Has been admitted multiple times over the last couple of months.  Most recent admission from 5/12/2022 -5/18/2022 for aspiration pneumonia, delirium and hypoxia.  Patient had a chest x-ray with right-sided infiltrate concerning for pneumonia, and was treated with IV antibiotics.  Patient was discharged to skilled rehab, and discharged home following this.  Patient has been doing well since last hospitalization.  No longer having difficulties with breathing.  Patient understands that she was confused during hospitalization.  She is tolerating p.o. without difficulty.  She is ambulating unassisted in her home.  Daughter brings her to appointment today, but is not present during evaluation in the exam room.  Discussed case with daughter following visit, and daughter believes that patient is doing better, confusion decreased.      Current Outpatient Medications:   •  albuterol sulfate  (90 Base) MCG/ACT inhaler, Inhale 2 puffs Every 6 (Six) Hours As Needed for Wheezing., Disp: 18 g, Rfl: 3  •  Atrovent HFA 17 MCG/ACT inhaler, Inhale 2 puffs 4 (Four) Times a Day., Disp: 12.9 g, Rfl: 3  •  buprenorphine-naloxone (SUBOXONE) 8-2 MG per SL tablet, buprenorphine 8 mg-naloxone 2 mg sublingual tablet  TAKE 2 TABLETS UNDER THE TONGUE EVERY DAY, Disp: , Rfl:   •  dicyclomine (BENTYL) 20 MG tablet, take 1 tablet by mouth 4 TIMES DAILY, Disp: 120 tablet, Rfl: 5  •  DULoxetine (CYMBALTA) 30 MG capsule, Take 1 capsule by mouth Daily., Disp: , Rfl:   •  estradiol (ESTRACE) 0.1 MG/GM vaginal  "cream, PLACE 1 GRAM VAGINALLY DAILY FOR 14 DAYS, THEN 1 GRAM 3 TIMES a WEEK ON MONDAY, WEDNESDAY, AND FRIDAY, Disp: 42.5 g, Rfl: 5  •  gabapentin (NEURONTIN) 400 MG capsule, Take 1 capsule by mouth 3 (Three) Times a Day., Disp: 90 capsule, Rfl: 0  •  Lactobacillus Probiotic tablet, Take 1 tablet by mouth Daily., Disp: 30 tablet, Rfl: 11  •  lidocaine-prilocaine (EMLA) 2.5-2.5 % cream, Apply 1 application topically to the appropriate area as directed As Needed for Moderate Pain . Use to affected area as needed for pain., Disp: 30 g, Rfl: 2  •  methenamine (HIPREX) 1 g tablet, Take 1 g by mouth 2 (Two) Times a Day With Meals., Disp: , Rfl:   •  metroNIDAZOLE (METROGEL VAGINAL) 0.75 % vaginal gel, Insert  into the vagina 2 (Two) Times a Week., Disp: 70 g, Rfl: 6  •  omeprazole (priLOSEC) 20 MG capsule, take 1 capsule by mouth TWICE DAILY, Disp: 60 capsule, Rfl: 5  •  ondansetron ODT (ZOFRAN-ODT) 4 MG disintegrating tablet, Place 1 tablet on the tongue Every 8 (Eight) Hours As Needed for Nausea or Vomiting., Disp: 10 tablet, Rfl: 0  •  triamcinolone (KENALOG) 0.5 % cream, Apply pea-sized amount to outside of vagina and inside the opening twice a week at night. (Patient taking differently: Take As Directed. Apply pea-sized amount to outside of vagina and inside the opening twice a week at night.), Disp: 15 g, Rfl: 3     Objective       Vital Signs:   /60   Pulse 110   Ht 162.6 cm (64\")   Wt 45.4 kg (100 lb)   SpO2 92%   BMI 17.16 kg/m²     Physical Exam  Vitals reviewed.   Constitutional:       General: She is not in acute distress.     Appearance: She is well-developed.   HENT:      Head: Normocephalic and atraumatic.   Cardiovascular:      Rate and Rhythm: Normal rate and regular rhythm.      Heart sounds: Normal heart sounds. No murmur heard.  Pulmonary:      Effort: Pulmonary effort is normal. No respiratory distress.      Breath sounds: Normal breath sounds. No wheezing or rales.   Abdominal:      " Palpations: Abdomen is soft.      Tenderness: There is no abdominal tenderness.   Musculoskeletal:      Cervical back: Neck supple.   Skin:     General: Skin is warm and dry.      Findings: No rash.   Neurological:      Mental Status: She is alert and oriented to person, place, and time.   Psychiatric:         Mood and Affect: Mood normal.         Speech: Speech normal.         Behavior: Behavior is hyperactive. Behavior is not agitated or aggressive. Behavior is cooperative.         Thought Content: Thought content normal.        Result Review :{ Labs  Result Review  Imaging  Med Tab  Media :23}   The following data was reviewed by: Heather Milan MD on 06/01/2022    Common labs    Common Labsle 5/14/22 5/14/22 5/15/22 5/16/22    0511 1915     Glucose 88  87 83   BUN 4 (A)  2 (A) 4 (A)   Creatinine 0.4 (A)  0.3 (A) 0.4 (A)   Sodium 142  140 143   Potassium 2.6 (A) 4.0 4.0 3.6   Chloride 105  101 102   Calcium 7.4 (A)  7.7 (A) 8.0 (A)   Albumin 2.0 (A)  2.1 (A)    Total Bilirubin 0.40  0.60    Alkaline Phosphatase 103  121 (A)    AST (SGOT) 12 (A)  13 (A)    ALT (SGPT) 11 (A)  11 (A)    (A) Abnormal value       Comments are available for some flowsheets but are not being displayed.                   Assessment and Plan {CC Problem List  Visit Diagnosis  ROS  Review (Popup)  [facility] Maintenance  Quality  BestPractice  Medications  SmartSets  SnapShot Encounters  Media :23}   Diagnoses and all orders for this visit:    1. Pneumonia of right upper lobe due to infectious organism (Primary)    2. Delirium    3. Generalized anxiety disorder    Other orders  -     fluticasone (Flonase) 50 MCG/ACT nasal spray; 2 sprays into the nostril(s) as directed by provider Daily.  Dispense: 16 g; Refill: 2       Patient seen today for follow-up after hospitalization  Reviewed hospital discharge summary from outside facility  Pneumonia of right upper lobe, symptoms resolved  We will get follow-up x-ray with next  office visit to ensure complete resolution  Hospital delirium during most recent stay  Delirium has resolved  Mood appears normal  Cognitive screening completed today with MOCA assessment  Score 24/30, indicating mild cognitive impairment  Patient reports no difficulties with paying bills/managing affairs at home  She has good understanding of situation and chronic health conditions      Follow Up {Instructions Charge Capture  Follow-up Communications :23}   Return in about 4 weeks (around 6/29/2022) for Recheck.  Patient was given instructions and counseling regarding her condition or for health maintenance advice. Please see specific information pulled into the AVS if appropriate.            This document has been electronically signed by Heather Milan MD

## 2022-06-02 DIAGNOSIS — R10.13 EPIGASTRIC PAIN: ICD-10-CM

## 2022-06-02 RX ORDER — DICYCLOMINE HCL 20 MG
TABLET ORAL
Qty: 120 TABLET | Refills: 5 | Status: SHIPPED | OUTPATIENT
Start: 2022-06-02

## 2022-06-02 RX ORDER — OMEPRAZOLE 20 MG/1
CAPSULE, DELAYED RELEASE ORAL
Qty: 60 CAPSULE | Refills: 5 | Status: SHIPPED | OUTPATIENT
Start: 2022-06-02

## 2022-06-02 RX ORDER — IPRATROPIUM BROMIDE 17 UG/1
2 AEROSOL, METERED RESPIRATORY (INHALATION)
Qty: 12.9 G | Refills: 3 | Status: SHIPPED | OUTPATIENT
Start: 2022-06-02 | End: 2022-09-26 | Stop reason: SDUPTHER

## 2022-06-02 RX ORDER — ALBUTEROL SULFATE 90 UG/1
2 AEROSOL, METERED RESPIRATORY (INHALATION) EVERY 6 HOURS PRN
Qty: 18 G | Refills: 3 | Status: SHIPPED | OUTPATIENT
Start: 2022-06-02 | End: 2022-10-28 | Stop reason: SDUPTHER

## 2022-06-06 DIAGNOSIS — M54.50 CHRONIC MIDLINE LOW BACK PAIN WITHOUT SCIATICA: ICD-10-CM

## 2022-06-06 DIAGNOSIS — G89.29 CHRONIC MIDLINE LOW BACK PAIN WITHOUT SCIATICA: ICD-10-CM

## 2022-06-06 RX ORDER — GABAPENTIN 400 MG/1
400 CAPSULE ORAL 3 TIMES DAILY
Qty: 90 CAPSULE | Refills: 0 | Status: SHIPPED | OUTPATIENT
Start: 2022-06-06 | End: 2022-07-08 | Stop reason: SDUPTHER

## 2022-06-08 ENCOUNTER — TELEPHONE (OUTPATIENT)
Dept: FAMILY MEDICINE CLINIC | Facility: CLINIC | Age: 67
End: 2022-06-08

## 2022-06-08 NOTE — TELEPHONE ENCOUNTER
Pt needs predniSONE (DELTASONE) 10 MG tablet [5135] says has to have it and is out was not on her current med list      refilled at [facility]

## 2022-06-09 RX ORDER — PREDNISONE 10 MG/1
10 TABLET ORAL DAILY
Qty: 30 TABLET | Refills: 2 | Status: SHIPPED | OUTPATIENT
Start: 2022-06-09 | End: 2022-09-08 | Stop reason: SDUPTHER

## 2022-06-14 ENCOUNTER — TELEPHONE (OUTPATIENT)
Dept: FAMILY MEDICINE CLINIC | Facility: CLINIC | Age: 67
End: 2022-06-14

## 2022-06-14 NOTE — TELEPHONE ENCOUNTER
[facility] called and said Ms. Nuñez wants to be discharged from their services.     [phone]

## 2022-06-14 NOTE — TELEPHONE ENCOUNTER
If patient declines services, I do not think there is anything to do from our standpoint.  Thanks, FATOUMATA Milan

## 2022-07-01 NOTE — TELEPHONE ENCOUNTER
Pt called again regarding pain management issue. Please contact pt.   Transposition Flap Text: The defect edges were debeveled with a #15 scalpel blade.  Given the location of the defect and the proximity to free margins a transposition flap was deemed most appropriate.  Using a sterile surgical marker, an appropriate transposition flap was drawn incorporating the defect.    The area thus outlined was incised deep to adipose tissue with a #15 scalpel blade.  The skin margins were undermined to an appropriate distance in all directions utilizing iris scissors.

## 2022-07-08 DIAGNOSIS — M54.50 CHRONIC MIDLINE LOW BACK PAIN WITHOUT SCIATICA: ICD-10-CM

## 2022-07-08 DIAGNOSIS — G89.29 CHRONIC MIDLINE LOW BACK PAIN WITHOUT SCIATICA: ICD-10-CM

## 2022-07-08 RX ORDER — GABAPENTIN 400 MG/1
400 CAPSULE ORAL 3 TIMES DAILY
Qty: 90 CAPSULE | Refills: 0 | Status: SHIPPED | OUTPATIENT
Start: 2022-07-08 | End: 2022-08-08

## 2022-07-08 NOTE — TELEPHONE ENCOUNTER
Incoming Refill Request      Medication requested (name and dose): GABAPENTIN    Pharmacy where request should be sent: [facility] [place]     Additional details provided by patient:     Best call back number:     Does the patient have less than a 3 day supply:  [] Yes  [] No    Jose Omer Rep  07/08/22, 16:26 CDT

## 2022-07-12 ENCOUNTER — OFFICE VISIT (OUTPATIENT)
Dept: FAMILY MEDICINE CLINIC | Facility: CLINIC | Age: 67
End: 2022-07-12

## 2022-07-12 VITALS
HEART RATE: 97 BPM | BODY MASS INDEX: 16.73 KG/M2 | WEIGHT: 98 LBS | HEIGHT: 64 IN | DIASTOLIC BLOOD PRESSURE: 82 MMHG | SYSTOLIC BLOOD PRESSURE: 102 MMHG | OXYGEN SATURATION: 97 %

## 2022-07-12 DIAGNOSIS — M54.50 CHRONIC MIDLINE LOW BACK PAIN WITHOUT SCIATICA: ICD-10-CM

## 2022-07-12 DIAGNOSIS — G89.29 CHRONIC MIDLINE LOW BACK PAIN WITHOUT SCIATICA: ICD-10-CM

## 2022-07-12 DIAGNOSIS — F41.1 GENERALIZED ANXIETY DISORDER: ICD-10-CM

## 2022-07-12 DIAGNOSIS — K56.600 PARTIAL SMALL BOWEL OBSTRUCTION: Primary | ICD-10-CM

## 2022-07-12 PROCEDURE — 99214 OFFICE O/P EST MOD 30 MIN: CPT | Performed by: FAMILY MEDICINE

## 2022-07-12 RX ORDER — DULOXETIN HYDROCHLORIDE 60 MG/1
60 CAPSULE, DELAYED RELEASE ORAL DAILY
COMMUNITY
Start: 2022-06-02

## 2022-07-12 RX ORDER — SENNA PLUS 8.6 MG/1
8.6 TABLET ORAL
COMMUNITY
Start: 2022-06-23 | End: 2022-09-22

## 2022-07-12 RX ORDER — POLYETHYLENE GLYCOL 3350 17 G/17G
POWDER, FOR SOLUTION ORAL
COMMUNITY
Start: 2022-06-23

## 2022-07-12 RX ORDER — NICOTINE 21 MG/24HR
PATCH, TRANSDERMAL 24 HOURS TRANSDERMAL
COMMUNITY
Start: 2022-06-23

## 2022-07-12 NOTE — PROGRESS NOTES
Chief Complaint  Hospital Follow Up Visit    Subjective    History of Present Illness {CC  Problem List  Visit  Diagnosis   Encounters  Notes  Medications  Labs  Result Review Imaging  Media :23}     Adriana Nuñez presents to [facility] [place] for     Chief Complaint   Patient presents with   • Hospital Follow Up Visit        Patient admitted from 7/3/22 - 7/6/22 for small bowel obstruction.  She was kept NPO with NG tube, and diet slowly advanced as tolerated.  She was evaluated by surgery, but determined to not need surgical intervention due to spontaneous resolution.  Had some electrolyte abnormalities as well, that improved with IVFs.  Patient was discharged to home.  She has been doing well since this time.  No recurrence of pain, nausea or vomiting.  She is tolerating PO without difficulty and having regular bowel movements.  She continues to worry that her daughter will be given guardianship and worries that she will be put in a nursing home.      Current Outpatient Medications:   •  albuterol sulfate  (90 Base) MCG/ACT inhaler, Inhale 2 puffs Every 6 (Six) Hours As Needed for Wheezing., Disp: 18 g, Rfl: 3  •  Atrovent HFA 17 MCG/ACT inhaler, Inhale 2 puffs 4 (Four) Times a Day., Disp: 12.9 g, Rfl: 3  •  buprenorphine-naloxone (SUBOXONE) 8-2 MG per SL tablet, buprenorphine 8 mg-naloxone 2 mg sublingual tablet  TAKE 2 TABLETS UNDER THE TONGUE EVERY DAY, Disp: , Rfl:   •  dicyclomine (BENTYL) 20 MG tablet, take 1 tablet by mouth 4 TIMES DAILY, Disp: 120 tablet, Rfl: 5  •  DULoxetine (CYMBALTA) 60 MG capsule, Take 60 mg by mouth Daily., Disp: , Rfl:   •  estradiol (ESTRACE) 0.1 MG/GM vaginal cream, PLACE 1 GRAM VAGINALLY DAILY FOR 14 DAYS, THEN 1 GRAM 3 TIMES a WEEK ON MONDAY, WEDNESDAY, AND FRIDAY, Disp: 42.5 g, Rfl: 5  •  fluticasone (Flonase) 50 MCG/ACT nasal spray, 2 sprays into the nostril(s) as directed by provider Daily., Disp: 16 g,  "Rfl: 2  •  gabapentin (NEURONTIN) 400 MG capsule, Take 1 capsule by mouth 3 (Three) Times a Day., Disp: 90 capsule, Rfl: 0  •  Lactobacillus Probiotic tablet, Take 1 tablet by mouth Daily., Disp: 30 tablet, Rfl: 11  •  lidocaine-prilocaine (EMLA) 2.5-2.5 % cream, Apply 1 application topically to the appropriate area as directed As Needed for Moderate Pain . Use to affected area as needed for pain., Disp: 30 g, Rfl: 2  •  methenamine (HIPREX) 1 g tablet, Take 1 g by mouth 2 (Two) Times a Day With Meals., Disp: , Rfl:   •  metroNIDAZOLE (METROGEL VAGINAL) 0.75 % vaginal gel, Insert  into the vagina 2 (Two) Times a Week., Disp: 70 g, Rfl: 6  •  nicotine (NICODERM CQ) 21 MG/24HR patch, PLCE 1 PATCH ONTO SKIN ONCE DAILY, Disp: , Rfl:   •  omeprazole (priLOSEC) 20 MG capsule, take 1 capsule by mouth TWICE DAILY, Disp: 60 capsule, Rfl: 5  •  ondansetron ODT (ZOFRAN-ODT) 4 MG disintegrating tablet, Place 1 tablet on the tongue Every 8 (Eight) Hours As Needed for Nausea or Vomiting., Disp: 10 tablet, Rfl: 0  •  polyethylene glycol 17 g packet, DISSOLVE ONE PACKET INTO 8 OUNCE OF LIQUID AND DRINK ONCE DAILY, Disp: , Rfl:   •  predniSONE (DELTASONE) 10 MG tablet, Take 1 tablet by mouth Daily., Disp: 30 tablet, Rfl: 2  •  senna (SENOKOT) 8.6 MG tablet, Take 8.6 mg by mouth., Disp: , Rfl:   •  triamcinolone (KENALOG) 0.5 % cream, Apply pea-sized amount to outside of vagina and inside the opening twice a week at night. (Patient taking differently: Take As Directed. Apply pea-sized amount to outside of vagina and inside the opening twice a week at night.), Disp: 15 g, Rfl: 3     Objective       Vital Signs:   /82   Pulse 97   Ht 162.6 cm (64\")   Wt 44.5 kg (98 lb)   SpO2 97%   BMI 16.82 kg/m²     Physical Exam  Vitals reviewed.   Constitutional:       General: She is not in acute distress.     Appearance: She is well-developed.   Cardiovascular:      Rate and Rhythm: Normal rate and regular rhythm.      Heart sounds: " Normal heart sounds. No murmur heard.  Pulmonary:      Effort: Pulmonary effort is normal. No respiratory distress.      Breath sounds: Normal breath sounds. No wheezing or rales.   Abdominal:      Palpations: Abdomen is soft.      Tenderness: There is no abdominal tenderness.   Skin:     General: Skin is warm and dry.   Neurological:      Mental Status: She is alert and oriented to person, place, and time.        Result Review :{ Labs  Result Review  Imaging  Med Tab  Media :23}   The following data was reviewed by: Heather Milan MD on 07/12/2022    Common labs    Common Labsle 7/4/22 7/5/22 7/6/22   Glucose 57 (A) 50 (A) 99   BUN 13 12 1 (A)   Creatinine 0.5 (A) 0.5 (A) 0.3 (A)   Sodium 129 (A) 136 135 (A)   Potassium 3.4 (A) 3.2 (A) 2.6 (A)   Chloride 92 (A) 99 99   Calcium 7.9 (A) 8.6 8.0 (A)   Albumin 2.4 (A)     Total Bilirubin 1.00     Alkaline Phosphatase 90     AST (SGOT) 13 (A)     ALT (SGPT) 17     (A) Abnormal value       Comments are available for some flowsheets but are not being displayed.                   Assessment and Plan {CC Problem List  Visit Diagnosis  ROS  Review (Popup)  Health Maintenance  Quality  BestPractice  Medications  SmartSets  SnapShot Encounters  Media :23}   Diagnoses and all orders for this visit:    1. Partial small bowel obstruction (HCC) (Primary)    2. Generalized anxiety disorder    3. Chronic midline low back pain without sciatica  -     Comprehensive Metabolic Panel; Future       Partial small bowel obstruction resolved  No recurrence, tolerating full diet without difficulty  Anxiety symptoms ok, continue cymbalta  Gabapentin helps with anxiety and low back pain, continue  Check CMP for monitoring after electrolyte abnormalities (hyponatremia) during hospitalization      Follow Up {Instructions Charge Capture  Follow-up Communications :23}   Return in about 4 weeks (around 8/9/2022) for Recheck.  Patient was given instructions and counseling  regarding her condition or for health maintenance advice. Please see specific information pulled into the AVS if appropriate.          This document has been electronically signed by Heather Milan MD

## 2022-07-21 ENCOUNTER — TELEPHONE (OUTPATIENT)
Dept: FAMILY MEDICINE CLINIC | Facility: CLINIC | Age: 67
End: 2022-07-21

## 2022-07-21 DIAGNOSIS — R30.0 DYSURIA: Primary | ICD-10-CM

## 2022-07-27 NOTE — TELEPHONE ENCOUNTER
I have placed order if this is still needed by patient.  Please apologize for delay, we just got message today.  Thanks, FATOUMATA Milan

## 2022-07-28 ENCOUNTER — LAB (OUTPATIENT)
Dept: LAB | Facility: OTHER | Age: 67
End: 2022-07-28

## 2022-07-28 DIAGNOSIS — R73.09 ELEVATED GLUCOSE: ICD-10-CM

## 2022-07-28 DIAGNOSIS — R00.0 TACHYCARDIA: ICD-10-CM

## 2022-07-28 DIAGNOSIS — G89.29 CHRONIC MIDLINE LOW BACK PAIN WITHOUT SCIATICA: ICD-10-CM

## 2022-07-28 DIAGNOSIS — M54.50 CHRONIC MIDLINE LOW BACK PAIN WITHOUT SCIATICA: ICD-10-CM

## 2022-07-28 DIAGNOSIS — D72.829 LEUKOCYTOSIS, UNSPECIFIED TYPE: ICD-10-CM

## 2022-07-28 DIAGNOSIS — M35.3 POLYMYALGIA RHEUMATICA: ICD-10-CM

## 2022-07-28 DIAGNOSIS — F41.1 GENERALIZED ANXIETY DISORDER: ICD-10-CM

## 2022-07-28 DIAGNOSIS — R30.0 DYSURIA: ICD-10-CM

## 2022-07-28 DIAGNOSIS — Z79.52 LONG TERM SYSTEMIC STEROID USER: ICD-10-CM

## 2022-07-28 LAB
ALBUMIN SERPL-MCNC: 3.2 G/DL (ref 3.5–5)
ALBUMIN/GLOB SERPL: 1.1 G/DL (ref 1.1–1.8)
ALP SERPL-CCNC: 106 U/L (ref 38–126)
ALT SERPL W P-5'-P-CCNC: 12 U/L
ANION GAP SERPL CALCULATED.3IONS-SCNC: 3 MMOL/L (ref 5–15)
ANISOCYTOSIS BLD QL: NORMAL
AST SERPL-CCNC: 19 U/L (ref 14–36)
BACTERIA UR QL AUTO: ABNORMAL /HPF
BILIRUB SERPL-MCNC: 0.3 MG/DL (ref 0.2–1.3)
BILIRUB UR QL STRIP: NEGATIVE
BUN SERPL-MCNC: 12 MG/DL (ref 7–23)
BUN/CREAT SERPL: 25.5 (ref 7–25)
CALCIUM SPEC-SCNC: 8.2 MG/DL (ref 8.4–10.2)
CHLORIDE SERPL-SCNC: 99 MMOL/L (ref 101–112)
CLARITY UR: ABNORMAL
CO2 SERPL-SCNC: 36 MMOL/L (ref 22–30)
COLOR UR: ABNORMAL
CREAT SERPL-MCNC: 0.47 MG/DL (ref 0.52–1.04)
DEPRECATED RDW RBC AUTO: 54.9 FL (ref 37–54)
EGFRCR SERPLBLD CKD-EPI 2021: 103.8 ML/MIN/1.73
EOSINOPHIL # BLD MANUAL: 0.2 10*3/MM3 (ref 0–0.4)
EOSINOPHIL NFR BLD MANUAL: 2 % (ref 0.3–6.2)
ERYTHROCYTE [DISTWIDTH] IN BLOOD BY AUTOMATED COUNT: 16.1 % (ref 12.3–15.4)
GLOBULIN UR ELPH-MCNC: 2.8 GM/DL (ref 2.3–3.5)
GLUCOSE SERPL-MCNC: 92 MG/DL (ref 70–99)
GLUCOSE UR STRIP-MCNC: NEGATIVE MG/DL
HCT VFR BLD AUTO: 42.8 % (ref 34–46.6)
HGB BLD-MCNC: 13.6 G/DL (ref 12–15.9)
HGB UR QL STRIP.AUTO: ABNORMAL
HYALINE CASTS UR QL AUTO: ABNORMAL /LPF
KETONES UR QL STRIP: NEGATIVE
LEUKOCYTE ESTERASE UR QL STRIP.AUTO: NEGATIVE
LYMPHOCYTES # BLD MANUAL: 2.76 10*3/MM3 (ref 0.7–3.1)
LYMPHOCYTES NFR BLD MANUAL: 9 % (ref 5–12)
MCH RBC QN AUTO: 30.3 PG (ref 26.6–33)
MCHC RBC AUTO-ENTMCNC: 31.8 G/DL (ref 31.5–35.7)
MCV RBC AUTO: 95.3 FL (ref 79–97)
MONOCYTES # BLD: 0.89 10*3/MM3 (ref 0.1–0.9)
NEUTROPHILS # BLD AUTO: 6.01 10*3/MM3 (ref 1.7–7)
NEUTROPHILS NFR BLD MANUAL: 61 % (ref 42.7–76)
NITRITE UR QL STRIP: NEGATIVE
PH UR STRIP.AUTO: 5.5 [PH] (ref 5.5–8)
PLATELET # BLD AUTO: 321 10*3/MM3 (ref 140–450)
PMV BLD AUTO: 9.5 FL (ref 6–12)
POTASSIUM SERPL-SCNC: 3.5 MMOL/L (ref 3.4–5)
PROT SERPL-MCNC: 6 G/DL (ref 6.3–8.6)
PROT UR QL STRIP: NEGATIVE
RBC # BLD AUTO: 4.49 10*6/MM3 (ref 3.77–5.28)
RBC # UR STRIP: ABNORMAL /HPF
REF LAB TEST METHOD: ABNORMAL
SMALL PLATELETS BLD QL SMEAR: ADEQUATE
SODIUM SERPL-SCNC: 138 MMOL/L (ref 137–145)
SP GR UR STRIP: 1.02 (ref 1–1.03)
SQUAMOUS #/AREA URNS HPF: ABNORMAL /HPF
UROBILINOGEN UR QL STRIP: ABNORMAL
VARIANT LYMPHS NFR BLD MANUAL: 1 % (ref 0–5)
VARIANT LYMPHS NFR BLD MANUAL: 27 % (ref 19.6–45.3)
WBC # UR STRIP: ABNORMAL /HPF
WBC MORPH BLD: NORMAL
WBC NRBC COR # BLD: 9.85 10*3/MM3 (ref 3.4–10.8)

## 2022-07-28 PROCEDURE — 81001 URINALYSIS AUTO W/SCOPE: CPT | Performed by: FAMILY MEDICINE

## 2022-07-28 PROCEDURE — 36415 COLL VENOUS BLD VENIPUNCTURE: CPT | Performed by: FAMILY MEDICINE

## 2022-07-28 PROCEDURE — 80053 COMPREHEN METABOLIC PANEL: CPT | Performed by: FAMILY MEDICINE

## 2022-07-28 PROCEDURE — 85025 COMPLETE CBC W/AUTO DIFF WBC: CPT | Performed by: FAMILY MEDICINE

## 2022-07-28 PROCEDURE — 83036 HEMOGLOBIN GLYCOSYLATED A1C: CPT | Performed by: FAMILY MEDICINE

## 2022-07-29 LAB — HBA1C MFR BLD: 5.1 % (ref 4.8–5.6)

## 2022-08-04 ENCOUNTER — OFFICE VISIT (OUTPATIENT)
Dept: GASTROENTEROLOGY | Facility: CLINIC | Age: 67
End: 2022-08-04

## 2022-08-04 VITALS
DIASTOLIC BLOOD PRESSURE: 85 MMHG | BODY MASS INDEX: 16.73 KG/M2 | WEIGHT: 98 LBS | HEART RATE: 101 BPM | HEIGHT: 64 IN | SYSTOLIC BLOOD PRESSURE: 101 MMHG

## 2022-08-04 DIAGNOSIS — R19.7 DIARRHEA, UNSPECIFIED TYPE: ICD-10-CM

## 2022-08-04 DIAGNOSIS — R10.13 EPIGASTRIC PAIN: Primary | ICD-10-CM

## 2022-08-04 PROCEDURE — 99213 OFFICE O/P EST LOW 20 MIN: CPT | Performed by: INTERNAL MEDICINE

## 2022-08-04 NOTE — PROGRESS NOTES
[facility]      Chief Complaint:   Chief Complaint   Patient presents with   • Abdominal Pain       Subjective     HPI:   Patient with continued epigastric abdominal pain some changes in bowel habits.  Patient denies any nausea vomiting.  Patient is doing well on current medications.  Patient is currently taking Bentyl for abdominal discomfort.Pt with diarrhea, pt states that cuting back on her meds to every other day.    Plan; we will continue current medications outpatient follow-up in 6 months will decrease mirilax to every other day    Past Medical History:   Past Medical History:   Diagnosis Date   • Anorectal abscess    • Anxiety disorder    • Bladder fistula      post rapair      • Chronic obstructive lung disease (HCC)    • Depressive disorder    • Diverticulitis of colon      post colectomy      • Generalized anxiety disorder    • Heavy tobacco smoker    • History of colon polyps    • Hyperlipidemia    • Hypertension    • Osteoporosis    • Peripheral nerve disease    • Polymyalgia rheumatica (HCC)    • Vitamin D deficiency    • Vulvar intraepithelial neoplasia (GONZALES)     remote history       Past Surgical History:    Past Surgical History:   Procedure Laterality Date   • BREAST BIOPSY Right negative   • COLON RESECTION     • COLONOSCOPY  03/17/2014    polyps   • COLONOSCOPY  02/08/2016   • COLONOSCOPY N/A 5/24/2017    Procedure: COLONOSCOPY;  Surgeon: Aric Pina MD;  Location: [facility] ENDOSCOPY;  Service:    • COLONOSCOPY N/A 4/17/2019    Procedure: COLONOSCOPY;  Surgeon: Aric Pina MD;  Location: [facility] ENDOSCOPY;  Service: Gastroenterology   • COLONOSCOPY N/A 5/5/2021    Procedure: COLONOSCOPY;  Surgeon: Aric Pina MD;  Location: [facility] ENDOSCOPY;  Service: Gastroenterology;  Laterality: N/A;   • ENDOSCOPY  01/26/2015    Normal esophagus. Gastritis was found in the stomach. Biopsy taken. Normal duodenum   • ENDOSCOPY N/A 9/18/2017    Procedure:  ESOPHAGOGASTRODUODENOSCOPY;  Surgeon: Aric Pina MD;  Location: [facility] ENDOSCOPY;  Service:    • ENDOSCOPY N/A 4/17/2019    Procedure: ESOPHAGOGASTRODUODENOSCOPY;  Surgeon: Aric Pina MD;  Location: [facility] ENDOSCOPY;  Service: Gastroenterology   • ENDOSCOPY N/A 5/5/2021    Procedure: ESOPHAGOGASTRODUODENOSCOPY;  Surgeon: Aric Pina MD;  Location: [facility] ENDOSCOPY;  Service: Gastroenterology;  Laterality: N/A;   • HYSTERECTOMY     • OOPHORECTOMY     • SKIN LESION EXCISION N/A 5/24/2021    Procedure: SKIN LESION EXCISION RIGHT ARM AND LEFT BACK;  Surgeon: Venkatesh Vizcaino MD;  Location: [facility] OR;  Service: General;  Laterality: N/A;   • UPPER GASTROINTESTINAL ENDOSCOPY  01/26/2015   • UPPER GASTROINTESTINAL ENDOSCOPY  09/18/2017   • UPPER GASTROINTESTINAL ENDOSCOPY  04/17/2019       Family History:  Family History   Problem Relation Age of Onset   • No Known Problems Sister    • No Known Problems Brother        Social History:   reports that she has been smoking cigarettes and cigars. She has a 50.00 pack-year smoking history. She has never used smokeless tobacco. She reports that she does not drink alcohol and does not use drugs.    Medications:   Prior to Admission medications    Medication Sig Start Date End Date Taking? Authorizing Provider   albuterol sulfate  (90 Base) MCG/ACT inhaler Inhale 2 puffs Every 6 (Six) Hours As Needed for Wheezing. 6/2/22  Yes Heather Milan MD   Atrovent HFA 17 MCG/ACT inhaler Inhale 2 puffs 4 (Four) Times a Day. 6/2/22  Yes Heather Milan MD   buprenorphine-naloxone (SUBOXONE) 8-2 MG per SL tablet buprenorphine 8 mg-naloxone 2 mg sublingual tablet   TAKE 2 TABLETS UNDER THE TONGUE EVERY DAY   Yes Felicia Buchanan MD   dicyclomine (BENTYL) 20 MG tablet take 1 tablet by mouth 4 TIMES DAILY 6/2/22  Yes Aric Pina MD   DULoxetine (CYMBALTA) 60 MG capsule Take 60 mg by mouth Daily. 6/2/22  Yes Felicia Buchanan MD   estradiol (ESTRACE) 0.1  MG/GM vaginal cream PLACE 1 GRAM VAGINALLY DAILY FOR 14 DAYS, THEN 1 GRAM 3 TIMES a WEEK ON MONDAY, WEDNESDAY, AND FRIDAY 5/26/21  Yes Corazon Rojas MD   fluticasone (Flonase) 50 MCG/ACT nasal spray 2 sprays into the nostril(s) as directed by provider Daily. 6/1/22  Yes Heather Milan MD   gabapentin (NEURONTIN) 400 MG capsule Take 1 capsule by mouth 3 (Three) Times a Day. 7/8/22  Yes Heather Milan MD   Lactobacillus Probiotic tablet Take 1 tablet by mouth Daily. 12/4/20  Yes Corazon Rojas MD   lidocaine-prilocaine (EMLA) 2.5-2.5 % cream Apply 1 application topically to the appropriate area as directed As Needed for Moderate Pain . Use to affected area as needed for pain. 6/17/21  Yes Heahter Milan MD   methenamine (HIPREX) 1 g tablet Take 1 g by mouth 2 (Two) Times a Day With Meals. 7/8/21  Yes Felicia Buchanan MD   metroNIDAZOLE (METROGEL VAGINAL) 0.75 % vaginal gel Insert  into the vagina 2 (Two) Times a Week. 12/7/20  Yes Corazon Rojas MD   nicotine (NICODERM CQ) 21 MG/24HR patch PLCE 1 PATCH ONTO SKIN ONCE DAILY 6/23/22  Yes Felicia Buchanan MD   omeprazole (priLOSEC) 20 MG capsule take 1 capsule by mouth TWICE DAILY 6/2/22  Yes Aric Pina MD   ondansetron ODT (ZOFRAN-ODT) 4 MG disintegrating tablet Place 1 tablet on the tongue Every 8 (Eight) Hours As Needed for Nausea or Vomiting. 3/21/22  Yes Sue Snyder PA-C   polyethylene glycol 17 g packet DISSOLVE ONE PACKET INTO 8 OUNCE OF LIQUID AND DRINK ONCE DAILY 6/23/22  Yes Felicia Buchanan MD   predniSONE (DELTASONE) 10 MG tablet Take 1 tablet by mouth Daily. 6/9/22  Yes Heather Milan MD   senna (SENOKOT) 8.6 MG tablet Take 8.6 mg by mouth. 6/23/22 9/22/22 Yes Felicia Buchanan, MD   triamcinolone (KENALOG) 0.5 % cream Apply pea-sized amount to outside of vagina and inside the opening twice a week at night.  Patient taking differently: Take As Directed. Apply  pea-sized amount to outside of vagina and inside the opening twice a week at night. 3/1/21  Yes Corazon Rojas MD       Allergies:  Fosamax [alendronate]    ROS:    Review of Systems   Constitutional: Negative for activity change, appetite change, chills, diaphoresis, fatigue, fever and unexpected weight change.   HENT: Negative for sore throat and trouble swallowing.    Respiratory: Negative for shortness of breath.    Gastrointestinal: Negative for abdominal distention, abdominal pain, anal bleeding, blood in stool, constipation, diarrhea, nausea, rectal pain and vomiting.   Endocrine: Negative for polydipsia, polyphagia and polyuria.   Genitourinary: Negative for difficulty urinating.   Musculoskeletal: Negative for arthralgias.   Skin: Negative for pallor.   Allergic/Immunologic: Negative for food allergies.   Neurological: Negative for weakness and light-headedness.   Psychiatric/Behavioral: Negative for behavioral problems.     Objective     not currently breastfeeding.    Physical Exam  Constitutional:       General: She is not in acute distress.     Appearance: She is well-developed. She is not diaphoretic.   HENT:      Head: Normocephalic and atraumatic.   Cardiovascular:      Rate and Rhythm: Normal rate and regular rhythm.      Heart sounds: Normal heart sounds. No murmur heard.    No friction rub. No gallop.   Pulmonary:      Effort: No respiratory distress.      Breath sounds: Normal breath sounds. No wheezing or rales.   Chest:      Chest wall: No tenderness.   Abdominal:      General: Bowel sounds are normal. There is no distension.      Palpations: Abdomen is soft. There is no mass.      Tenderness: There is no abdominal tenderness. There is no guarding or rebound.      Hernia: No hernia is present.   Musculoskeletal:         General: Normal range of motion.   Skin:     General: Skin is warm and dry.      Coloration: Skin is not pale.      Findings: No erythema or rash.   Neurological:       Mental Status: She is alert and oriented to person, place, and time.   Psychiatric:         Behavior: Behavior normal.         Thought Content: Thought content normal.         Judgment: Judgment normal.          Assessment & Plan   Diagnoses and all orders for this visit:    1. Epigastric pain (Primary)    2. Diarrhea, unspecified type        * Surgery not found *     Diagnosis Plan   1. Epigastric pain     2. Diarrhea, unspecified type         Anticipated Surgical Procedure:  No orders of the defined types were placed in this encounter.      The risks, benefits, and alternatives of this procedure have been discussed with the patient or the responsible party- the patient understands and agrees to proceed.

## 2022-08-05 ENCOUNTER — OFFICE VISIT (OUTPATIENT)
Dept: FAMILY MEDICINE CLINIC | Facility: CLINIC | Age: 67
End: 2022-08-05

## 2022-08-05 VITALS
WEIGHT: 95 LBS | SYSTOLIC BLOOD PRESSURE: 126 MMHG | DIASTOLIC BLOOD PRESSURE: 90 MMHG | HEART RATE: 113 BPM | OXYGEN SATURATION: 95 % | BODY MASS INDEX: 16.22 KG/M2 | HEIGHT: 64 IN

## 2022-08-05 DIAGNOSIS — M35.3 POLYMYALGIA RHEUMATICA: ICD-10-CM

## 2022-08-05 DIAGNOSIS — F41.1 GENERALIZED ANXIETY DISORDER: Primary | ICD-10-CM

## 2022-08-05 DIAGNOSIS — G89.29 CHRONIC ABDOMINAL PAIN: ICD-10-CM

## 2022-08-05 DIAGNOSIS — M54.50 CHRONIC MIDLINE LOW BACK PAIN WITHOUT SCIATICA: ICD-10-CM

## 2022-08-05 DIAGNOSIS — R10.9 CHRONIC ABDOMINAL PAIN: ICD-10-CM

## 2022-08-05 DIAGNOSIS — G89.29 CHRONIC MIDLINE LOW BACK PAIN WITHOUT SCIATICA: ICD-10-CM

## 2022-08-05 PROCEDURE — 99214 OFFICE O/P EST MOD 30 MIN: CPT | Performed by: FAMILY MEDICINE

## 2022-08-05 NOTE — PROGRESS NOTES
Chief Complaint  Diverticulitis (Follow up)    Subjective    History of Present Illness {CC  Problem List  Visit  Diagnosis   Encounters  Notes  Medications  Labs  Result Review Imaging  Media :23}     Adriana Nuñez presents to [facility] - [place] for     Chief Complaint   Patient presents with   • Diverticulitis     Follow up      Patient seen today for follow up.  Notes that she has been doing better.  Continues to have some intermittent chronic abdominal pain and chronic low back pain.  Using bentyl for irritable bowel symptoms.  No more episodes of diverticulitis since previous hospitalization.  Using gabapentin for low back pain, and this helps some.  Anxiety is manageable.       Current Outpatient Medications:   •  albuterol sulfate  (90 Base) MCG/ACT inhaler, Inhale 2 puffs Every 6 (Six) Hours As Needed for Wheezing., Disp: 18 g, Rfl: 3  •  Atrovent HFA 17 MCG/ACT inhaler, Inhale 2 puffs 4 (Four) Times a Day., Disp: 12.9 g, Rfl: 3  •  buprenorphine-naloxone (SUBOXONE) 8-2 MG per SL tablet, buprenorphine 8 mg-naloxone 2 mg sublingual tablet  TAKE 2 TABLETS UNDER THE TONGUE EVERY DAY, Disp: , Rfl:   •  dicyclomine (BENTYL) 20 MG tablet, take 1 tablet by mouth 4 TIMES DAILY, Disp: 120 tablet, Rfl: 5  •  DULoxetine (CYMBALTA) 60 MG capsule, Take 60 mg by mouth Daily., Disp: , Rfl:   •  estradiol (ESTRACE) 0.1 MG/GM vaginal cream, PLACE 1 GRAM VAGINALLY DAILY FOR 14 DAYS, THEN 1 GRAM 3 TIMES a WEEK ON MONDAY, WEDNESDAY, AND FRIDAY, Disp: 42.5 g, Rfl: 5  •  fluticasone (Flonase) 50 MCG/ACT nasal spray, 2 sprays into the nostril(s) as directed by provider Daily., Disp: 16 g, Rfl: 2  •  gabapentin (NEURONTIN) 400 MG capsule, Take 1 capsule by mouth 3 (Three) Times a Day., Disp: 90 capsule, Rfl: 0  •  Lactobacillus Probiotic tablet, Take 1 tablet by mouth Daily., Disp: 30 tablet, Rfl: 11  •  lidocaine-prilocaine (EMLA) 2.5-2.5 % cream, Apply 1  "application topically to the appropriate area as directed As Needed for Moderate Pain . Use to affected area as needed for pain., Disp: 30 g, Rfl: 2  •  methenamine (HIPREX) 1 g tablet, Take 1 g by mouth 2 (Two) Times a Day With Meals., Disp: , Rfl:   •  metroNIDAZOLE (METROGEL VAGINAL) 0.75 % vaginal gel, Insert  into the vagina 2 (Two) Times a Week., Disp: 70 g, Rfl: 6  •  nicotine (NICODERM CQ) 21 MG/24HR patch, PLCE 1 PATCH ONTO SKIN ONCE DAILY, Disp: , Rfl:   •  omeprazole (priLOSEC) 20 MG capsule, take 1 capsule by mouth TWICE DAILY, Disp: 60 capsule, Rfl: 5  •  ondansetron ODT (ZOFRAN-ODT) 4 MG disintegrating tablet, Place 1 tablet on the tongue Every 8 (Eight) Hours As Needed for Nausea or Vomiting., Disp: 10 tablet, Rfl: 0  •  polyethylene glycol 17 g packet, DISSOLVE ONE PACKET INTO 8 OUNCE OF LIQUID AND DRINK ONCE DAILY, Disp: , Rfl:   •  predniSONE (DELTASONE) 10 MG tablet, Take 1 tablet by mouth Daily., Disp: 30 tablet, Rfl: 2  •  senna (SENOKOT) 8.6 MG tablet, Take 8.6 mg by mouth., Disp: , Rfl:   •  triamcinolone (KENALOG) 0.5 % cream, Apply pea-sized amount to outside of vagina and inside the opening twice a week at night. (Patient taking differently: Take As Directed. Apply pea-sized amount to outside of vagina and inside the opening twice a week at night.), Disp: 15 g, Rfl: 3     Objective       Vital Signs:   /90   Pulse 113   Ht 162.6 cm (64\")   Wt 43.1 kg (95 lb)   SpO2 95%   BMI 16.31 kg/m²     Physical Exam  Vitals reviewed.   Constitutional:       General: She is not in acute distress.     Appearance: She is well-developed.   Cardiovascular:      Rate and Rhythm: Normal rate and regular rhythm.      Heart sounds: Normal heart sounds. No murmur heard.  Pulmonary:      Effort: Pulmonary effort is normal. No respiratory distress.      Breath sounds: Normal breath sounds. No wheezing or rales.   Abdominal:      Palpations: Abdomen is soft.      Tenderness: There is no abdominal " tenderness.   Skin:     General: Skin is warm and dry.   Neurological:      Mental Status: She is alert and oriented to person, place, and time.        Result Review :{ Labs  Result Review  Imaging  Med Tab  Media :23}   The following data was reviewed by: Heather Milan MD on 08/05/2022    Common labs    Common Labsle 7/5/22 7/6/22 7/28/22 7/28/22 7/28/22      1154 1154 1154   Glucose   92     Glucose 50 (A) 99      BUN 12 1 (A) 12     Creatinine 0.5 (A) 0.3 (A) 0.47 (A)     Sodium 136 135 (A) 138     Potassium 3.2 (A) 2.6 (A) 3.5     Chloride 99 99 99 (A)     Calcium 8.6 8.0 (A) 8.2 (A)     Albumin   3.20 (A)     Total Bilirubin   0.3     Alkaline Phosphatase   106     AST (SGOT)   19     ALT (SGPT)   12     WBC    9.85    Hemoglobin    13.6    Hematocrit    42.8    Platelets    321    Hemoglobin A1C     5.10   (A) Abnormal value       Comments are available for some flowsheets but are not being displayed.                   Assessment and Plan {CC Problem List  Visit Diagnosis  ROS  Review (Popup)  Health Maintenance  Quality  BestPractice  Medications  SmartSets  SnapShot Encounters  Media :23}   Diagnoses and all orders for this visit:    1. Generalized anxiety disorder (Primary)    2. Chronic abdominal pain    3. Chronic midline low back pain without sciatica    4. Polymyalgia rheumatica (HCC)       Patient seen today for follow up.    Chronic medical problems as listed above managed, no medication changes at this time  She will continue current medications  Is following with gastroenterology for abdominal pain symptoms and diarrhea  Patient is on chronic steroids for polymyalgia rheumatica      Follow Up {Instructions Charge Capture  Follow-up Communications :23}   Return in about 3 months (around 11/5/2022) for Recheck.  Patient was given instructions and counseling regarding her condition or for health maintenance advice. Please see specific information pulled into the AVS if  appropriate.            This document has been electronically signed by Heather Milan MD

## 2022-08-08 DIAGNOSIS — M54.50 CHRONIC MIDLINE LOW BACK PAIN WITHOUT SCIATICA: ICD-10-CM

## 2022-08-08 DIAGNOSIS — G89.29 CHRONIC MIDLINE LOW BACK PAIN WITHOUT SCIATICA: ICD-10-CM

## 2022-08-08 RX ORDER — GABAPENTIN 400 MG/1
CAPSULE ORAL
Qty: 90 CAPSULE | Refills: 0 | Status: SHIPPED | OUTPATIENT
Start: 2022-08-08 | End: 2022-09-09 | Stop reason: SDUPTHER

## 2022-08-22 ENCOUNTER — TELEPHONE (OUTPATIENT)
Dept: FAMILY MEDICINE CLINIC | Facility: CLINIC | Age: 67
End: 2022-08-22

## 2022-08-22 NOTE — TELEPHONE ENCOUNTER
FYI    Patient called wanting to tell Dr. Milan that she did not lose weight because she was sick. Patient said she lost because there was a tube in her for 3 days that went through her nose, down through her stomach, and into her bowels. Patient said she has bought ensure high calorie drinks and has started drinking them to help put on weight.

## 2022-09-08 RX ORDER — PREDNISONE 10 MG/1
10 TABLET ORAL DAILY
Qty: 30 TABLET | Refills: 2 | Status: SHIPPED | OUTPATIENT
Start: 2022-09-08 | End: 2022-11-15 | Stop reason: SDUPTHER

## 2022-09-09 DIAGNOSIS — G89.29 CHRONIC MIDLINE LOW BACK PAIN WITHOUT SCIATICA: ICD-10-CM

## 2022-09-09 DIAGNOSIS — M54.50 CHRONIC MIDLINE LOW BACK PAIN WITHOUT SCIATICA: ICD-10-CM

## 2022-09-09 RX ORDER — GABAPENTIN 400 MG/1
400 CAPSULE ORAL 3 TIMES DAILY
Qty: 90 CAPSULE | Refills: 0 | Status: SHIPPED | OUTPATIENT
Start: 2022-09-09 | End: 2022-10-03 | Stop reason: SDUPTHER

## 2022-09-21 RX ORDER — FLUTICASONE PROPIONATE 50 MCG
2 SPRAY, SUSPENSION (ML) NASAL DAILY
Qty: 16 G | Refills: 2 | Status: SHIPPED | OUTPATIENT
Start: 2022-09-21

## 2022-09-23 NOTE — TELEPHONE ENCOUNTER
Next [facility] OV 11/18/2022    Ms. Nuñez said that the Kidney doctor she had been seeing Dr Farias is leaving and they will no longer fill this medication.     She said she takes this to keep her from getting a UTI.  She wants to know if you will refill the medication for her?     I told her I would ask.

## 2022-09-26 RX ORDER — IPRATROPIUM BROMIDE 17 UG/1
2 AEROSOL, METERED RESPIRATORY (INHALATION)
Qty: 12.9 G | Refills: 3 | Status: SHIPPED | OUTPATIENT
Start: 2022-09-26

## 2022-09-27 RX ORDER — METHENAMINE HIPPURATE 1000 MG/1
1 TABLET ORAL 2 TIMES DAILY WITH MEALS
Qty: 60 TABLET | Refills: 1 | Status: SHIPPED | OUTPATIENT
Start: 2022-09-27

## 2022-10-03 DIAGNOSIS — M54.50 CHRONIC MIDLINE LOW BACK PAIN WITHOUT SCIATICA: ICD-10-CM

## 2022-10-03 DIAGNOSIS — G89.29 CHRONIC MIDLINE LOW BACK PAIN WITHOUT SCIATICA: ICD-10-CM

## 2022-10-03 RX ORDER — GABAPENTIN 400 MG/1
400 CAPSULE ORAL 3 TIMES DAILY
Qty: 90 CAPSULE | Refills: 0 | Status: SHIPPED | OUTPATIENT
Start: 2022-10-08

## 2022-10-03 NOTE — TELEPHONE ENCOUNTER
Last Rx            09/09/2022  #90, NR  Will need by Friday please          Next Appt:   11/18/2022 - Heather Milan MD     Last OV           08/05/2022

## 2022-10-18 ENCOUNTER — TELEPHONE (OUTPATIENT)
Dept: FAMILY MEDICINE CLINIC | Facility: CLINIC | Age: 67
End: 2022-10-18

## 2022-10-18 NOTE — TELEPHONE ENCOUNTER
She left message requesting her Gabapentin be refilled.   I called and let her know that a script was sent to [facility] on 10/08/2022  If she has not picked up that script she needs to call them and let them know she is ready for her refill.     If she has picked it up it can not be refilled until November.

## 2022-10-25 ENCOUNTER — LAB (OUTPATIENT)
Dept: LAB | Facility: HOSPITAL | Age: 67
End: 2022-10-25

## 2022-10-25 ENCOUNTER — OFFICE VISIT (OUTPATIENT)
Dept: FAMILY MEDICINE CLINIC | Facility: CLINIC | Age: 67
End: 2022-10-25

## 2022-10-25 VITALS
OXYGEN SATURATION: 91 % | BODY MASS INDEX: 16.76 KG/M2 | RESPIRATION RATE: 24 BRPM | HEIGHT: 64 IN | SYSTOLIC BLOOD PRESSURE: 98 MMHG | DIASTOLIC BLOOD PRESSURE: 60 MMHG | WEIGHT: 98.2 LBS | HEART RATE: 99 BPM

## 2022-10-25 DIAGNOSIS — R53.83 OTHER FATIGUE: Primary | ICD-10-CM

## 2022-10-25 DIAGNOSIS — R53.83 OTHER FATIGUE: ICD-10-CM

## 2022-10-25 LAB
ALBUMIN SERPL-MCNC: 3.6 G/DL (ref 3.5–5.2)
ALBUMIN/GLOB SERPL: 1.3 G/DL
ALP SERPL-CCNC: 91 U/L (ref 39–117)
ALT SERPL W P-5'-P-CCNC: 13 U/L (ref 1–33)
ANION GAP SERPL CALCULATED.3IONS-SCNC: 8 MMOL/L (ref 5–15)
AST SERPL-CCNC: 12 U/L (ref 1–32)
BASOPHILS # BLD AUTO: 0.05 10*3/MM3 (ref 0–0.2)
BASOPHILS NFR BLD AUTO: 0.3 % (ref 0–1.5)
BILIRUB SERPL-MCNC: 0.2 MG/DL (ref 0–1.2)
BUN SERPL-MCNC: 21 MG/DL (ref 8–23)
BUN/CREAT SERPL: 33.9 (ref 7–25)
CALCIUM SPEC-SCNC: 8.9 MG/DL (ref 8.6–10.5)
CHLORIDE SERPL-SCNC: 99 MMOL/L (ref 98–107)
CO2 SERPL-SCNC: 31 MMOL/L (ref 22–29)
CREAT SERPL-MCNC: 0.62 MG/DL (ref 0.57–1)
DEPRECATED RDW RBC AUTO: 45.7 FL (ref 37–54)
EGFRCR SERPLBLD CKD-EPI 2021: 97.1 ML/MIN/1.73
EOSINOPHIL # BLD AUTO: 0.02 10*3/MM3 (ref 0–0.4)
EOSINOPHIL NFR BLD AUTO: 0.1 % (ref 0.3–6.2)
ERYTHROCYTE [DISTWIDTH] IN BLOOD BY AUTOMATED COUNT: 13.1 % (ref 12.3–15.4)
GLOBULIN UR ELPH-MCNC: 2.7 GM/DL
GLUCOSE SERPL-MCNC: 161 MG/DL (ref 65–99)
HCT VFR BLD AUTO: 46.1 % (ref 34–46.6)
HGB BLD-MCNC: 14.5 G/DL (ref 12–15.9)
IMM GRANULOCYTES # BLD AUTO: 0.1 10*3/MM3 (ref 0–0.05)
IMM GRANULOCYTES NFR BLD AUTO: 0.7 % (ref 0–0.5)
LYMPHOCYTES # BLD AUTO: 0.76 10*3/MM3 (ref 0.7–3.1)
LYMPHOCYTES NFR BLD AUTO: 5.3 % (ref 19.6–45.3)
MCH RBC QN AUTO: 29.8 PG (ref 26.6–33)
MCHC RBC AUTO-ENTMCNC: 31.5 G/DL (ref 31.5–35.7)
MCV RBC AUTO: 94.7 FL (ref 79–97)
MONOCYTES # BLD AUTO: 0.27 10*3/MM3 (ref 0.1–0.9)
MONOCYTES NFR BLD AUTO: 1.9 % (ref 5–12)
NEUTROPHILS NFR BLD AUTO: 13.11 10*3/MM3 (ref 1.7–7)
NEUTROPHILS NFR BLD AUTO: 91.7 % (ref 42.7–76)
NRBC BLD AUTO-RTO: 0 /100 WBC (ref 0–0.2)
PLATELET # BLD AUTO: 317 10*3/MM3 (ref 140–450)
PMV BLD AUTO: 9.7 FL (ref 6–12)
POTASSIUM SERPL-SCNC: 4.2 MMOL/L (ref 3.5–5.2)
PROT SERPL-MCNC: 6.3 G/DL (ref 6–8.5)
RBC # BLD AUTO: 4.87 10*6/MM3 (ref 3.77–5.28)
SODIUM SERPL-SCNC: 138 MMOL/L (ref 136–145)
WBC NRBC COR # BLD: 14.31 10*3/MM3 (ref 3.4–10.8)

## 2022-10-25 PROCEDURE — 85025 COMPLETE CBC W/AUTO DIFF WBC: CPT | Performed by: NURSE PRACTITIONER

## 2022-10-25 PROCEDURE — 81003 URINALYSIS AUTO W/O SCOPE: CPT

## 2022-10-25 PROCEDURE — 36415 COLL VENOUS BLD VENIPUNCTURE: CPT | Performed by: NURSE PRACTITIONER

## 2022-10-25 PROCEDURE — 99213 OFFICE O/P EST LOW 20 MIN: CPT | Performed by: NURSE PRACTITIONER

## 2022-10-25 PROCEDURE — 80053 COMPREHEN METABOLIC PANEL: CPT | Performed by: NURSE PRACTITIONER

## 2022-10-25 NOTE — PROGRESS NOTES
Transitional Care Follow Up Visit  Subjective     Adriana Nuñez is a 67 y.o. female who presents for a transitional care management visit.    Within 48 business hours after discharge our office contacted her via telephone to coordinate her care and needs.      I reviewed and discussed the details of that call along with the discharge summary, hospital problems, inpatient lab results, inpatient diagnostic studies, and consultation reports with Adriana.     Current outpatient and discharge medications have been reconciled for the patient.  Reviewed by: NAKUL Russo      No flowsheet data found.  Risk for Readmission (LACE) No data recorded    Fatigue  This is a recurrent problem. The current episode started in the past 7 days. The problem occurs constantly. The problem has been waxing and waning. Associated symptoms include arthralgias, fatigue and myalgias. The symptoms are aggravated by smoking. She has tried rest (atb) for the symptoms. The treatment provided no relief.      Course During Hospital Stay:     Copied from hospital chart:      Admit date: 10/12/2022    Discharge date and time: 10/15/2022 11:26AM    Hospital Course:     As per H&P (10/12):  Adriana Nuñez is a 67 y.o. female admitted 10/12/2022 from the emergency department to the intensive care unit with acute on chronic hypoxic, hypercarbic respiratory failure with COPD exacerbation. Patient presented to the emergency department with shortness of breath and body aches. Initial O2 sat in the 40s on room air. Improved to 90s on 4 L nasal cannula, steroids, and breathing treatments. Patient reports she has been feeling poorly for a few days. She denies any fever chills. She denies any nausea, vomiting, or diarrhea. She denies any dysuria or frequency. She denies any headache, sore throat, or runny nose. She reports shortness of breath is worse with activity. Denies any chest pain. All labs, vital signs, and radiology reports reviewed as  indicated below. Treatment in the emergency department included Rocephin 1 g, Xopenex x3 nebs, and Solu-Medrol 125 mg. Patient is currently afebrile with stable vital signs in no acute distress. HPI and review of symptoms somewhat limited due to patient condition. She is alert and oriented x3 but somewhat confused and uncooperative for exam. She does not want stay at hospital. Patient pulled off oxygen during exam and O2 sat dropped into the low 80s rapidly. Was found climbing off of stretcher almost fell in the emergency department.    Hospital Events:  Acute respiratory failure with hypoxia and acute on chronic hypercapnia 2/2 acute COPD exacerbation  Sepsis 2/2 Possible pneumonia  Acute septic encephalopathy 2/2 above  Patient presented to the emergency department with shortness of breath, hypoxia.   Currently requiring 1L NC. On admission was on 4L NC. Does not use home O2.  Sepsis criteria on admission: temp 100.6F, , RR28, WBC 17. Source may be pulmonary, will repeat CXR if no improvement. Started IV levaquin.  Started IV solumedrol, duonebs.,  CXR unremarkable but having productive cough. Collected sputum cx but normal micah.  Symptoms greatly improved, weaned to 1L NC at rest, was 82% on room air and 90% with 1L at rest and required 2L when walking to be > 88%.  Will discharge on solumedrol taper and PO levaquin for 5d.  She continues to smoke and advised her against this, she will continue to have worsening COPD and more exacerbations. She is declining recommendation.  She has O2 at home PRN, will need to be continuous now it seems.        The following portions of the patient's history were reviewed and updated as appropriate: allergies, current medications, past family history, past medical history, past social history, past surgical history and problem list.    Review of Systems   Constitutional: Positive for fatigue.   Musculoskeletal: Positive for arthralgias and myalgias.       Objective    Physical Exam  Vitals and nursing note reviewed.   Constitutional:       Appearance: She is well-developed.   HENT:      Head: Normocephalic and atraumatic.   Eyes:      General: Lids are normal.      Conjunctiva/sclera: Conjunctivae normal.   Neck:      Thyroid: No thyroid mass or thyromegaly.      Trachea: Trachea normal. No tracheal tenderness.   Cardiovascular:      Rate and Rhythm: Normal rate.      Pulses: Normal pulses.      Heart sounds: Normal heart sounds.   Pulmonary:      Effort: Pulmonary effort is normal. No respiratory distress.      Breath sounds: Normal breath sounds. No wheezing.   Abdominal:      General: There is no distension.      Palpations: Abdomen is soft. There is no mass.   Musculoskeletal:         General: Normal range of motion.      Cervical back: Normal range of motion. No edema.   Skin:     General: Skin is warm and dry.      Coloration: Skin is not pale.      Findings: No abrasion, erythema or lesion.   Neurological:      Mental Status: She is alert and oriented to person, place, and time.   Psychiatric:         Mood and Affect: Mood is not anxious. Affect is not inappropriate.         Speech: Speech normal.         Behavior: Behavior normal.         Thought Content: Thought content normal.         Judgment: Judgment normal. Judgment is not impulsive.         Assessment & Plan   Diagnoses and all orders for this visit:    1. Other fatigue (Primary)  -     Comprehensive Metabolic Panel  -     CBC & Differential  -     Urinalysis With Culture If Indicated - Urine, Clean Catch; Future        Complete ordered lab work  We will call results    Patient instructed to put oxygen back on when she gets home    Please call the office if you have any issues    Any prescribed medications including inhalers  Compliance of both this medication discussed       Return if symptoms worsen or fail to improve, for Next scheduled follow up.        This document has been electronically signed by Misty  NAKUL Byrne on October 25, 2022 13:04 CDT

## 2022-10-25 NOTE — PROGRESS NOTES
Chief Complaint  Hospital discharge follow up    Subjective     {Problem List  Visit Diagnosis   Encounters  Notes  Medications  Labs  Result Review Imaging  Media :23}     Adriana Nuñez presents to [facility] [place]  History of Present Illness  Outpatient Medications Prior to Visit   Medication Sig Dispense Refill   • albuterol sulfate  (90 Base) MCG/ACT inhaler Inhale 2 puffs Every 6 (Six) Hours As Needed for Wheezing. 18 g 3   • Atrovent HFA 17 MCG/ACT inhaler Inhale 2 puffs 4 (Four) Times a Day. 12.9 g 3   • buprenorphine-naloxone (SUBOXONE) 8-2 MG per SL tablet buprenorphine 8 mg-naloxone 2 mg sublingual tablet   TAKE 2 TABLETS UNDER THE TONGUE EVERY DAY     • dicyclomine (BENTYL) 20 MG tablet take 1 tablet by mouth 4 TIMES DAILY 120 tablet 5   • DULoxetine (CYMBALTA) 60 MG capsule Take 60 mg by mouth Daily.     • estradiol (ESTRACE) 0.1 MG/GM vaginal cream PLACE 1 GRAM VAGINALLY DAILY FOR 14 DAYS, THEN 1 GRAM 3 TIMES a WEEK ON MONDAY, WEDNESDAY, AND FRIDAY 42.5 g 5   • fluticasone (Flonase) 50 MCG/ACT nasal spray 2 sprays into the nostril(s) as directed by provider Daily. 16 g 2   • gabapentin (NEURONTIN) 400 MG capsule Take 1 capsule by mouth 3 (Three) Times a Day. 90 capsule 0   • Lactobacillus Probiotic tablet Take 1 tablet by mouth Daily. 30 tablet 11   • lidocaine-prilocaine (EMLA) 2.5-2.5 % cream Apply 1 application topically to the appropriate area as directed As Needed for Moderate Pain . Use to affected area as needed for pain. 30 g 2   • methenamine (HIPREX) 1 g tablet Take 1 tablet by mouth 2 (Two) Times a Day With Meals. 60 tablet 1   • metroNIDAZOLE (METROGEL VAGINAL) 0.75 % vaginal gel Insert  into the vagina 2 (Two) Times a Week. 70 g 6   • nicotine (NICODERM CQ) 21 MG/24HR patch PLCE 1 PATCH ONTO SKIN ONCE DAILY     • omeprazole (priLOSEC) 20 MG capsule take 1 capsule by mouth TWICE DAILY 60 capsule 5   • ondansetron ODT  "(ZOFRAN-ODT) 4 MG disintegrating tablet Place 1 tablet on the tongue Every 8 (Eight) Hours As Needed for Nausea or Vomiting. 10 tablet 0   • polyethylene glycol 17 g packet DISSOLVE ONE PACKET INTO 8 OUNCE OF LIQUID AND DRINK ONCE DAILY     • predniSONE (DELTASONE) 10 MG tablet Take 1 tablet by mouth Daily. 30 tablet 2   • triamcinolone (KENALOG) 0.5 % cream Apply pea-sized amount to outside of vagina and inside the opening twice a week at night. (Patient taking differently: Take As Directed. Apply pea-sized amount to outside of vagina and inside the opening twice a week at night.) 15 g 3     No facility-administered medications prior to visit.       Review of Systems      Objective   Vital Signs:   Visit Vitals  BP 98/60 (BP Location: Left arm, Patient Position: Sitting, Cuff Size: Adult)   Pulse 99   Resp 24   Ht 162.6 cm (64\")   Wt 44.5 kg (98 lb 3.2 oz)   SpO2 91%   BMI 16.86 kg/m²     Physical Exam   Result Review :{Labs  Result Review  Imaging  Med Tab  Media :23}   {The following data was reviewed by (Optional):04323}  {Ambulatory Labs (Optional):77090}  {Data reviewed (Optional):68036:::1}          Assessment and Plan {CC Problem List  Visit Diagnosis  ROS  Review (Popup)  Health Maintenance  Quality  BestPractice  Medications  SmartSets  SnapShot Encounters  Media :23}   There are no diagnoses linked to this encounter.  {Time Spent (Optional):52413}  Follow Up {Instructions Charge Capture  Follow-up Communications :23}  No follow-ups on file.  Patient was given instructions and counseling regarding her condition or for health maintenance advice. Please see specific information pulled into the AVS if appropriate.           This document has been electronically signed by NAKUL Russo on October 25, 2022 11:21 CDT  "

## 2022-10-26 ENCOUNTER — TELEPHONE (OUTPATIENT)
Dept: FAMILY MEDICINE CLINIC | Facility: CLINIC | Age: 67
End: 2022-10-26

## 2022-10-26 LAB
BILIRUB UR QL STRIP: NEGATIVE
CLARITY UR: CLEAR
COLOR UR: YELLOW
GLUCOSE UR STRIP-MCNC: NEGATIVE MG/DL
HGB UR QL STRIP.AUTO: NEGATIVE
KETONES UR QL STRIP: NEGATIVE
LEUKOCYTE ESTERASE UR QL STRIP.AUTO: NEGATIVE
NITRITE UR QL STRIP: NEGATIVE
PH UR STRIP.AUTO: 6 [PH] (ref 5–8)
PROT UR QL STRIP: ABNORMAL
SP GR UR STRIP: 1.03 (ref 1–1.03)
UROBILINOGEN UR QL STRIP: ABNORMAL

## 2022-10-27 NOTE — PROGRESS NOTES
Looks like possible infection.  Was she complaining of anything that may need to be treated? Stomach, upper respiratory?

## 2022-10-28 RX ORDER — ALBUTEROL SULFATE 90 UG/1
2 AEROSOL, METERED RESPIRATORY (INHALATION) EVERY 6 HOURS PRN
Qty: 18 G | Refills: 3 | Status: SHIPPED | OUTPATIENT
Start: 2022-10-28

## 2022-11-15 RX ORDER — PREDNISONE 10 MG/1
10 TABLET ORAL DAILY
Qty: 30 TABLET | Refills: 2 | Status: SHIPPED | OUTPATIENT
Start: 2022-11-15

## 2022-12-14 NOTE — TELEPHONE ENCOUNTER
11/09/2018, 1300 - Patient telephoned per this staff member [phone].  Zero answer.  Zero option to submit voice message.    11/09/2018, 1302 - Patient's Emergency Contact/Daughter, Kellen Nuñez, telephoned per this staff member [phone].  Zero answer.  Voice message submitted on Ms. Nuñez's self identified answering device with date, time, office contact information, and notification this staff member is attempting to contact patient with information from the office of Dr. Aric Flower M.D.    Rationale - Patient requested Colonoscopy in addition to EGD scheduled for completion Wednesday, December 5, 2018 at 3:30 p.m.  Dr. Aric Flower M.D. made aware of patient request.  Addition of Colonoscopy approved per Dr. Aric Flower M.D.  [facility] Bowel Preparation with instructions for utilization submitted to patient's pharmacy of choice, [place], [place].  
shortness of breath

## 2023-06-11 NOTE — TELEPHONE ENCOUNTER
Patient is calling the office this morning complaining about her head neck and vagina hurting her. She states that she did seek emergency care at [facility] in [facility] where she had a CT scan done of her head and also an X-ray of her lungs. While at [place] the patient was treated with 875mg of Amoxicillin twice a day for a UTI. Patient states that she did go and see her OB/GYN where she has been treated with 4 rounds of flagyl and a cream. Patient is informed that  only can handle problems with her stomach or bowels that we are not able to assist her with problems with her head neck of Vaginal area. Patient then asks if she has an appointment with  she is informed that she does have an appointment on 05/18/2017 with . She seems very confused. Patient is made aware that if she is having any further problems or questions to seek emergency medical attention at her local ER. Patient understands and states that she is just very sick and hangs up. Office tried contacting the patient again with no answer.  
show

## 2024-01-25 NOTE — PROGRESS NOTES
"Subjective   Adriana Nuñez is a 65 y.o. female.     Back Pain   This is a chronic problem. The current episode started more than 1 year ago. The problem occurs constantly. The problem has been waxing and waning since onset. The pain is present in the lumbar spine. The quality of the pain is described as aching. The pain radiates to the right knee and left knee. The pain is at a severity of 8/10. The pain is moderate. The pain is worse during the day. The symptoms are aggravated by position and bending. Pertinent negatives include no bladder incontinence, bowel incontinence or fever.   Anxiety   Presents for follow-up visit. Symptoms include compulsions, excessive worry, nervous/anxious behavior and restlessness. Patient reports no depressed mood, insomnia or irritability. Symptoms occur constantly. The severity of symptoms is moderate.            The following portions of the patient's history were reviewed and updated as appropriate: allergies, current medications, past family history, past medical history, past social history, past surgical history and problem list.    Review of Systems   Constitutional: Negative for fever and irritability.   Gastrointestinal: Negative for bowel incontinence.   Genitourinary: Negative for bladder incontinence.   Musculoskeletal: Positive for back pain and myalgias.   Psychiatric/Behavioral: The patient is nervous/anxious. The patient does not have insomnia.        Objective   Physical Exam   Constitutional: She is oriented to person, place, and time.   HENT:   Right Ear: Hearing and tympanic membrane normal.   Left Ear: Hearing and tympanic membrane normal.   Neurological: She is alert and oriented to person, place, and time.   Skin: Skin is warm and dry.   Psychiatric: Judgment and thought content normal. Her mood appears anxious. Her speech is rapid and/or pressured. She is hyperactive. Cognition and memory are normal.   Ht 162.6 cm (64\")   BMI 21.28 kg/m² "     Assessment/Plan   Problems Addressed this Visit        Nervous and Auditory    Chronic midline low back pain without sciatica    Relevant Medications    gabapentin (NEURONTIN) 300 MG capsule    HYDROcodone-acetaminophen (NORCO)  MG per tablet       Other    Generalized anxiety disorder    Relevant Medications    diazePAM (VALIUM) 5 MG tablet        ME = 39 AND ON VALIUM. RISK DISCUSSED   annie peccrista OCD, declined treatment for it          You have chosen to receive care through a telephone visit. Do you consent to use a telephone visit for your medical care today? Yes  This visit has been rescheduled as a phone visit to comply with patient safety concerns in accordance with CDC recommendations. Total time of discussion was 12 minutes.       There are no Wet Read(s) to document.

## 2024-03-21 NOTE — PROGRESS NOTES
Attempted to call report to [facility]. Personnel stated the charge nurse will call back when they have a bed assigned to the patient.   Subjective   Adriana Nuñez is a 63 y.o. female.     Back Pain   This is a chronic problem. The current episode started more than 1 year ago. The problem occurs constantly. The problem has been waxing and waning since onset. The pain is present in the lumbar spine. The quality of the pain is described as aching. The pain radiates to the right knee and left knee. The pain is at a severity of 8/10. The pain is moderate. The pain is worse during the day. The symptoms are aggravated by position and bending. Pertinent negatives include no bladder incontinence, bowel incontinence or fever.   Anxiety   Presents for follow-up visit. Symptoms include compulsions, excessive worry and nervous/anxious behavior. Patient reports no depressed mood, insomnia or irritability.       Hyperlipidemia   This is a chronic problem. The current episode started more than 1 year ago. The problem is uncontrolled. Recent lipid tests were reviewed and are high. Associated symptoms include myalgias.   COPD   This is a chronic problem. The current episode started more than 1 year ago. The problem occurs constantly. The problem has been waxing and waning. Associated symptoms include myalgias. Pertinent negatives include no chills, congestion, coughing or fever.   Heartburn   She reports no coughing or no heartburn. This is a chronic problem. The current episode started more than 1 year ago. The problem occurs rarely.        The following portions of the patient's history were reviewed and updated as appropriate: allergies, current medications, past family history, past medical history, past social history, past surgical history and problem list.    Review of Systems   Constitutional: Negative for chills, fever and irritability.   HENT: Negative for congestion.    Respiratory: Negative for cough.    Gastrointestinal: Negative for bowel incontinence and heartburn.   Genitourinary: Negative for bladder incontinence.   Musculoskeletal: Positive for  back pain and myalgias.   Psychiatric/Behavioral: The patient is nervous/anxious. The patient does not have insomnia.        Objective   Physical Exam   Constitutional: She is oriented to person, place, and time. She appears well-developed and well-nourished. No distress.   HENT:   Head: Normocephalic and atraumatic.   Right Ear: Hearing and tympanic membrane normal.   Left Ear: Hearing and tympanic membrane normal.   Cardiovascular: Normal rate, regular rhythm, normal heart sounds and intact distal pulses. Exam reveals no gallop and no friction rub.   No murmur heard.  Pulmonary/Chest: Effort normal. No respiratory distress. She has decreased breath sounds. She has no wheezes. She has rhonchi. She has no rales. She exhibits no tenderness.   Musculoskeletal:        Lumbar back: She exhibits decreased range of motion, tenderness and pain. She exhibits no bony tenderness, no swelling, no edema, no deformity, no laceration and no spasm.   Neurological: She is alert and oriented to person, place, and time.   Reflex Scores:       Patellar reflexes are 2+ on the right side and 2+ on the left side.  Skin: Skin is warm and dry. She is not diaphoretic.   Psychiatric: Judgment and thought content normal. Her mood appears anxious. Her speech is rapid and/or pressured. She is hyperactive.   Nursing note and vitals reviewed.      Assessment/Plan   Problems Addressed this Visit        Cardiovascular and Mediastinum    Hyperlipidemia       Digestive    Gastroesophageal reflux disease without esophagitis       Nervous and Auditory    Polymyalgia rheumatica (CMS/HCC)    Chronic midline low back pain without sciatica - Primary       Other    Heavy tobacco smoker    Generalized anxiety disorder      Other Visit Diagnoses     Obsessive-compulsive disorder, unspecified type            ME = 39 . Risk of valium and hydrocodone discussed  Recommend quit smoking   annie pect OCD, declined treatment for it. Declined hyperlipidemia treatment

## (undated) DEVICE — GLV SURG SENSICARE PI ORTHO SZ6.5 LF STRL

## (undated) DEVICE — CANN SMPL SOFTECH BIFLO ETCO2 A/M 7FT

## (undated) DEVICE — SINGLE-USE BIOPSY FORCEPS: Brand: RADIAL JAW 4

## (undated) DEVICE — GOWN,NON-REINFORCED,SIRUS,SET IN SLV,XL: Brand: MEDLINE

## (undated) DEVICE — TRAP SXN POLYP QUICKCATCH LF

## (undated) DEVICE — GOWN,PREVENTION PLUS,XLNG/XXLARGE,STRL: Brand: MEDLINE

## (undated) DEVICE — GLV SURG SENSICARE POLYISPRN W/ALOE PF LF 6 GRN STRL

## (undated) DEVICE — GLV SURG SENSICARE PI PF LF 7 GRN STRL

## (undated) DEVICE — BITEBLOCK ENDO W/STRAP 60F A/ LF DISP

## (undated) DEVICE — STERILE POLYISOPRENE POWDER-FREE SURGICAL GLOVES WITH EMOLLIENT COATING: Brand: PROTEXIS

## (undated) DEVICE — SUT VIC 2/0 SH 27IN

## (undated) DEVICE — GLV SURG SENSICARE PI LF PF 7.5 GRN STRL

## (undated) DEVICE — PK MAJ PROC LF 60

## (undated) DEVICE — PATIENT RETURN ELECTRODE, SINGLE-USE, CONTACT QUALITY MONITORING, ADULT, WITH 9FT CORD, FOR PATIENTS WEIGING OVER 33LBS. (15KG): Brand: MEGADYNE

## (undated) DEVICE — Device: Brand: DISPOSABLE ELECTROSURGICAL SNARE

## (undated) DEVICE — GLV SURG TRIUMPH PF LTX 6.5 STRL

## (undated) DEVICE — TOWEL,OR,DSP,ST,BLUE,DLX,4/PK,20PK/CS: Brand: MEDLINE

## (undated) DEVICE — GLV SURG SIGNATURE ESSENTIAL PF LTX SZ7

## (undated) DEVICE — PAD GRND REM POLYHESIVE A/ DISP

## (undated) DEVICE — GLV SURG SIGNATURE ESSENTIAL PF LTX SZ7.5

## (undated) DEVICE — SOL IRR NACL 0.9PCT BT 1000ML

## (undated) DEVICE — GLV SURG SENSICARE PI ORTHO PF SZ7 LF STRL

## (undated) DEVICE — GLV SURG SIGNATURE ESSENTIAL PF LTX SZ6.5

## (undated) DEVICE — CONTAINER,SPECIMEN,OR STERILE,4OZ: Brand: MEDLINE

## (undated) DEVICE — PENCL ES MEGADINE EZ/CLEAN BUTN W/HOLSTR 10FT

## (undated) DEVICE — SUT ETHLN 4/0 FS2 18IN 662G

## (undated) DEVICE — SUT VICRYL 3-0 SH-1 PO 18IN J772D